# Patient Record
Sex: FEMALE | Race: WHITE | Employment: OTHER | URBAN - NONMETROPOLITAN AREA
[De-identification: names, ages, dates, MRNs, and addresses within clinical notes are randomized per-mention and may not be internally consistent; named-entity substitution may affect disease eponyms.]

---

## 2017-05-15 ENCOUNTER — HOSPITAL ENCOUNTER (OUTPATIENT)
Dept: CT IMAGING | Age: 67
Discharge: HOME | End: 2017-05-15
Payer: MEDICARE

## 2017-05-15 DIAGNOSIS — M79.605 LEFT LEG PAIN: ICD-10-CM

## 2017-05-15 DIAGNOSIS — R22.42 LOCALIZED SWELLING, MASS AND LUMP, LOWER LIMB, LEFT: ICD-10-CM

## 2017-05-15 PROCEDURE — 73701 CT LOWER EXTREMITY W/DYE: CPT

## 2017-05-15 PROCEDURE — 6360000004 HC RX CONTRAST MEDICATION: Performed by: NURSE PRACTITIONER

## 2017-05-15 RX ADMIN — IOVERSOL 75 ML: 741 INJECTION INTRA-ARTERIAL; INTRAVENOUS at 14:57

## 2017-07-27 RX ORDER — TRIAMTERENE AND HYDROCHLOROTHIAZIDE 37.5; 25 MG/1; MG/1
TABLET ORAL
Qty: 90 TABLET | Refills: 1 | Status: SHIPPED | OUTPATIENT
Start: 2017-07-27 | End: 2018-04-16 | Stop reason: SDUPTHER

## 2017-08-14 LAB
ALBUMIN SERPL-MCNC: 3.8 G/DL (ref 3.5–5.2)
ALP BLD-CCNC: 70 U/L (ref 35–104)
ALT SERPL-CCNC: 14 U/L (ref 5–33)
ANION GAP SERPL CALCULATED.3IONS-SCNC: 16 MMOL/L (ref 7–19)
AST SERPL-CCNC: 21 U/L (ref 5–32)
BASOPHILS ABSOLUTE: 0.1 K/UL (ref 0–0.2)
BASOPHILS RELATIVE PERCENT: 0.5 % (ref 0–1)
BILIRUB SERPL-MCNC: <0.2 MG/DL (ref 0.2–1.2)
BUN BLDV-MCNC: 24 MG/DL (ref 8–23)
CALCIUM SERPL-MCNC: 9.7 MG/DL (ref 8.8–10.2)
CHLORIDE BLD-SCNC: 101 MMOL/L (ref 98–111)
CHOLESTEROL, TOTAL: 120 MG/DL (ref 160–199)
CO2: 24 MMOL/L (ref 22–29)
CREAT SERPL-MCNC: 1.1 MG/DL (ref 0.5–0.9)
EOSINOPHILS ABSOLUTE: 0.8 K/UL (ref 0–0.6)
EOSINOPHILS RELATIVE PERCENT: 6.8 % (ref 0–5)
GFR NON-AFRICAN AMERICAN: 49
GLUCOSE BLD-MCNC: 91 MG/DL (ref 74–109)
HCT VFR BLD CALC: 37.2 % (ref 37–47)
HDLC SERPL-MCNC: 42 MG/DL (ref 65–121)
HEMOGLOBIN: 12.4 G/DL (ref 12–16)
LDL CHOLESTEROL CALCULATED: 30 MG/DL
LYMPHOCYTES ABSOLUTE: 3.6 K/UL (ref 1.1–4.5)
LYMPHOCYTES RELATIVE PERCENT: 33 % (ref 20–40)
MCH RBC QN AUTO: 31.4 PG (ref 27–31)
MCHC RBC AUTO-ENTMCNC: 33.3 G/DL (ref 33–37)
MCV RBC AUTO: 94.2 FL (ref 81–99)
MONOCYTES ABSOLUTE: 0.5 K/UL (ref 0–0.9)
MONOCYTES RELATIVE PERCENT: 4.7 % (ref 0–10)
NEUTROPHILS ABSOLUTE: 6 K/UL (ref 1.5–7.5)
NEUTROPHILS RELATIVE PERCENT: 54.6 % (ref 50–65)
PDW BLD-RTO: 11.7 % (ref 11.5–14.5)
PLATELET # BLD: 292 K/UL (ref 130–400)
PMV BLD AUTO: 9.7 FL (ref 9.4–12.3)
POTASSIUM SERPL-SCNC: 4.7 MMOL/L (ref 3.5–5)
RBC # BLD: 3.95 M/UL (ref 4.2–5.4)
SODIUM BLD-SCNC: 141 MMOL/L (ref 136–145)
TOTAL PROTEIN: 6.8 G/DL (ref 6.6–8.7)
TRIGL SERPL-MCNC: 242 MG/DL (ref 150–199)
TSH SERPL DL<=0.05 MIU/L-ACNC: 3.84 UIU/ML (ref 0.27–4.2)
VITAMIN D 25-HYDROXY: 46.8 NG/ML
WBC # BLD: 11 K/UL (ref 4.8–10.8)

## 2017-08-18 RX ORDER — ALBUTEROL SULFATE 90 UG/1
2 AEROSOL, METERED RESPIRATORY (INHALATION) EVERY 4 HOURS PRN
COMMUNITY
End: 2020-05-28

## 2017-08-18 RX ORDER — LORATADINE 10 MG/1
10 TABLET ORAL DAILY
COMMUNITY
End: 2019-12-30 | Stop reason: ALTCHOICE

## 2017-08-18 RX ORDER — LOVASTATIN 40 MG/1
40 TABLET ORAL NIGHTLY
COMMUNITY
End: 2018-04-16 | Stop reason: SDUPTHER

## 2017-08-18 RX ORDER — LOSARTAN POTASSIUM 100 MG/1
100 TABLET ORAL DAILY
COMMUNITY
End: 2018-04-16 | Stop reason: SDUPTHER

## 2017-08-18 RX ORDER — B-COMPLEX WITH VITAMIN C
1 TABLET ORAL 2 TIMES DAILY
COMMUNITY

## 2017-08-18 RX ORDER — ERGOCALCIFEROL 1.25 MG/1
50000 CAPSULE ORAL
COMMUNITY
End: 2018-01-16 | Stop reason: SDUPTHER

## 2017-08-18 RX ORDER — ESTRADIOL 0.5 MG/1
0.5 TABLET ORAL DAILY
COMMUNITY
End: 2017-11-14 | Stop reason: SDUPTHER

## 2017-08-18 RX ORDER — LEVOTHYROXINE SODIUM 0.05 MG/1
50 TABLET ORAL DAILY
COMMUNITY
End: 2018-02-27

## 2017-08-18 RX ORDER — DIPHENHYDRAMINE HYDROCHLORIDE 25 MG/1
2 TABLET ORAL DAILY
COMMUNITY
End: 2022-06-28

## 2017-08-18 RX ORDER — OMEGA-3/DHA/EPA/FISH OIL 60 MG-90MG
4 CAPSULE ORAL DAILY
COMMUNITY
End: 2022-06-28

## 2017-08-20 PROBLEM — E03.9 ACQUIRED HYPOTHYROIDISM: Chronic | Status: ACTIVE | Noted: 2017-08-20

## 2017-08-20 PROBLEM — E78.01 FAMILIAL HYPERCHOLESTEROLEMIA: Chronic | Status: ACTIVE | Noted: 2017-08-20

## 2017-08-20 PROBLEM — I10 ESSENTIAL HYPERTENSION: Chronic | Status: ACTIVE | Noted: 2017-08-20

## 2017-08-21 ENCOUNTER — OFFICE VISIT (OUTPATIENT)
Dept: INTERNAL MEDICINE | Age: 67
End: 2017-08-21
Payer: MEDICARE

## 2017-08-21 VITALS
HEIGHT: 60 IN | RESPIRATION RATE: 20 BRPM | BODY MASS INDEX: 28.27 KG/M2 | HEART RATE: 98 BPM | DIASTOLIC BLOOD PRESSURE: 64 MMHG | OXYGEN SATURATION: 98 % | WEIGHT: 144 LBS | SYSTOLIC BLOOD PRESSURE: 132 MMHG

## 2017-08-21 DIAGNOSIS — I10 ESSENTIAL HYPERTENSION: Chronic | ICD-10-CM

## 2017-08-21 DIAGNOSIS — L65.9 ALOPECIA: Chronic | ICD-10-CM

## 2017-08-21 DIAGNOSIS — E78.01 FAMILIAL HYPERCHOLESTEROLEMIA: Chronic | ICD-10-CM

## 2017-08-21 DIAGNOSIS — E03.9 ACQUIRED HYPOTHYROIDISM: Primary | Chronic | ICD-10-CM

## 2017-08-21 DIAGNOSIS — Z12.31 ENCOUNTER FOR SCREENING MAMMOGRAM FOR MALIGNANT NEOPLASM OF BREAST: ICD-10-CM

## 2017-08-21 DIAGNOSIS — Z12.39 BREAST CANCER SCREENING: ICD-10-CM

## 2017-08-21 PROCEDURE — G0439 PPPS, SUBSEQ VISIT: HCPCS | Performed by: INTERNAL MEDICINE

## 2017-08-21 PROCEDURE — 90670 PCV13 VACCINE IM: CPT | Performed by: INTERNAL MEDICINE

## 2017-08-21 PROCEDURE — G0009 ADMIN PNEUMOCOCCAL VACCINE: HCPCS | Performed by: INTERNAL MEDICINE

## 2017-08-21 PROCEDURE — 99213 OFFICE O/P EST LOW 20 MIN: CPT | Performed by: INTERNAL MEDICINE

## 2017-08-21 RX ORDER — LEVOTHYROXINE SODIUM 50 MCG
50 TABLET ORAL DAILY
Qty: 30 TABLET | Refills: 5 | Status: SHIPPED | OUTPATIENT
Start: 2017-08-21 | End: 2018-03-13 | Stop reason: SDUPTHER

## 2017-08-21 ASSESSMENT — ENCOUNTER SYMPTOMS
COUGH: 0
BLOOD IN STOOL: 0
EYE PAIN: 0
ABDOMINAL PAIN: 0
APNEA: 0
TROUBLE SWALLOWING: 0
ANAL BLEEDING: 0
CONSTIPATION: 0
BACK PAIN: 0
SHORTNESS OF BREATH: 0
DIARRHEA: 0
EYE REDNESS: 0
VOICE CHANGE: 0
SORE THROAT: 0
NAUSEA: 0

## 2017-08-21 ASSESSMENT — PATIENT HEALTH QUESTIONNAIRE - PHQ9: SUM OF ALL RESPONSES TO PHQ QUESTIONS 1-9: 0

## 2017-08-21 ASSESSMENT — LIFESTYLE VARIABLES: HOW OFTEN DO YOU HAVE A DRINK CONTAINING ALCOHOL: 0

## 2017-08-21 ASSESSMENT — ANXIETY QUESTIONNAIRES: GAD7 TOTAL SCORE: 0

## 2017-09-18 ENCOUNTER — TELEPHONE (OUTPATIENT)
Dept: INTERNAL MEDICINE | Age: 67
End: 2017-09-18

## 2017-09-21 ENCOUNTER — TELEPHONE (OUTPATIENT)
Dept: INTERNAL MEDICINE | Age: 67
End: 2017-09-21

## 2017-11-14 RX ORDER — ESTRADIOL 0.5 MG/1
0.5 TABLET ORAL DAILY
Qty: 90 TABLET | Refills: 1 | Status: SHIPPED | OUTPATIENT
Start: 2017-11-14 | End: 2017-11-17 | Stop reason: SDUPTHER

## 2017-11-14 NOTE — TELEPHONE ENCOUNTER
Nikkie called requesting a refill of the below medication which has been pended for you:     Requested Prescriptions     Pending Prescriptions Disp Refills    estradiol (ESTRACE) 0.5 MG tablet 90 tablet 1     Sig: Take 1 tablet by mouth daily       Last Appointment Date: 8/21/2017  Next Appointment Date: 2/27/2018    Allergies   Allergen Reactions    Compazine [Prochlorperazine Maleate]      Eyelids flipped up and mouth stretched to the side.      Thorazine [Chlorpromazine]      Eyelids flipped up and mouth stretched to the side

## 2017-11-15 ENCOUNTER — TELEPHONE (OUTPATIENT)
Dept: INTERNAL MEDICINE | Age: 67
End: 2017-11-15

## 2017-11-17 RX ORDER — ESTRADIOL 0.5 MG/1
0.5 TABLET ORAL DAILY
Qty: 90 TABLET | Refills: 3 | Status: SHIPPED | OUTPATIENT
Start: 2017-11-17 | End: 2017-11-22 | Stop reason: SDUPTHER

## 2017-11-22 RX ORDER — ESTRADIOL 0.5 MG/1
0.5 TABLET ORAL DAILY
Qty: 90 TABLET | Refills: 3 | Status: SHIPPED | OUTPATIENT
Start: 2017-11-22 | End: 2018-04-16 | Stop reason: SDUPTHER

## 2017-11-27 ENCOUNTER — HOSPITAL ENCOUNTER (OUTPATIENT)
Dept: WOMENS IMAGING | Age: 67
Discharge: HOME | End: 2017-11-27
Payer: MEDICARE

## 2017-11-27 DIAGNOSIS — Z12.39 BREAST CANCER SCREENING: ICD-10-CM

## 2017-11-27 DIAGNOSIS — Z12.31 ENCOUNTER FOR SCREENING MAMMOGRAM FOR MALIGNANT NEOPLASM OF BREAST: ICD-10-CM

## 2017-11-27 PROCEDURE — 77063 BREAST TOMOSYNTHESIS BI: CPT

## 2018-01-17 RX ORDER — ERGOCALCIFEROL 1.25 MG/1
CAPSULE ORAL
Qty: 3 CAPSULE | Refills: 5 | Status: SHIPPED | OUTPATIENT
Start: 2018-01-17 | End: 2019-03-19 | Stop reason: SDUPTHER

## 2018-01-18 PROBLEM — E55.9 VITAMIN D DEFICIENCY: Status: ACTIVE | Noted: 2018-01-18

## 2018-02-20 DIAGNOSIS — E03.9 ACQUIRED HYPOTHYROIDISM: Chronic | ICD-10-CM

## 2018-02-20 DIAGNOSIS — L65.9 ALOPECIA: Chronic | ICD-10-CM

## 2018-02-20 DIAGNOSIS — I10 ESSENTIAL HYPERTENSION: Chronic | ICD-10-CM

## 2018-02-20 DIAGNOSIS — E78.01 FAMILIAL HYPERCHOLESTEROLEMIA: Chronic | ICD-10-CM

## 2018-02-20 LAB
ALBUMIN SERPL-MCNC: 4.1 G/DL (ref 3.5–5.2)
ALP BLD-CCNC: 72 U/L (ref 35–104)
ALT SERPL-CCNC: 12 U/L (ref 5–33)
ANION GAP SERPL CALCULATED.3IONS-SCNC: 15 MMOL/L (ref 7–19)
AST SERPL-CCNC: 18 U/L (ref 5–32)
BILIRUB SERPL-MCNC: <0.2 MG/DL (ref 0.2–1.2)
BUN BLDV-MCNC: 34 MG/DL (ref 8–23)
CALCIUM SERPL-MCNC: 9.3 MG/DL (ref 8.8–10.2)
CHLORIDE BLD-SCNC: 105 MMOL/L (ref 98–111)
CHOLESTEROL, TOTAL: 156 MG/DL (ref 160–199)
CO2: 24 MMOL/L (ref 22–29)
CREAT SERPL-MCNC: 1.1 MG/DL (ref 0.5–0.9)
GFR NON-AFRICAN AMERICAN: 49
GLUCOSE BLD-MCNC: 95 MG/DL (ref 74–109)
HDLC SERPL-MCNC: 52 MG/DL (ref 65–121)
LDL CHOLESTEROL CALCULATED: 67 MG/DL
POTASSIUM SERPL-SCNC: 4.5 MMOL/L (ref 3.5–5)
SODIUM BLD-SCNC: 144 MMOL/L (ref 136–145)
T3 FREE: 2.7 PG/ML (ref 2–4.4)
T4 FREE: 1.6 NG/DL (ref 0.9–1.7)
TOTAL PROTEIN: 6.7 G/DL (ref 6.6–8.7)
TRIGL SERPL-MCNC: 185 MG/DL (ref 0–149)
TSH SERPL DL<=0.05 MIU/L-ACNC: 0.98 UIU/ML (ref 0.27–4.2)

## 2018-02-21 LAB — TSH, 3RD GENERATION: 1 MU/L (ref 0.3–4)

## 2018-02-27 ENCOUNTER — OFFICE VISIT (OUTPATIENT)
Dept: INTERNAL MEDICINE | Age: 68
End: 2018-02-27
Payer: MEDICARE

## 2018-02-27 VITALS
OXYGEN SATURATION: 99 % | HEART RATE: 79 BPM | DIASTOLIC BLOOD PRESSURE: 50 MMHG | BODY MASS INDEX: 24.39 KG/M2 | WEIGHT: 124.2 LBS | HEIGHT: 60 IN | SYSTOLIC BLOOD PRESSURE: 110 MMHG

## 2018-02-27 DIAGNOSIS — L65.9 ALOPECIA: Chronic | ICD-10-CM

## 2018-02-27 DIAGNOSIS — E03.9 ACQUIRED HYPOTHYROIDISM: Chronic | ICD-10-CM

## 2018-02-27 DIAGNOSIS — I10 ESSENTIAL HYPERTENSION: Chronic | ICD-10-CM

## 2018-02-27 DIAGNOSIS — E55.9 VITAMIN D DEFICIENCY: ICD-10-CM

## 2018-02-27 DIAGNOSIS — E78.01 FAMILIAL HYPERCHOLESTEROLEMIA: Primary | Chronic | ICD-10-CM

## 2018-02-27 PROCEDURE — 1036F TOBACCO NON-USER: CPT | Performed by: INTERNAL MEDICINE

## 2018-02-27 PROCEDURE — 99213 OFFICE O/P EST LOW 20 MIN: CPT | Performed by: INTERNAL MEDICINE

## 2018-02-27 PROCEDURE — 3017F COLORECTAL CA SCREEN DOC REV: CPT | Performed by: INTERNAL MEDICINE

## 2018-02-27 PROCEDURE — G8400 PT W/DXA NO RESULTS DOC: HCPCS | Performed by: INTERNAL MEDICINE

## 2018-02-27 PROCEDURE — 3014F SCREEN MAMMO DOC REV: CPT | Performed by: INTERNAL MEDICINE

## 2018-02-27 PROCEDURE — G8420 CALC BMI NORM PARAMETERS: HCPCS | Performed by: INTERNAL MEDICINE

## 2018-02-27 PROCEDURE — G8484 FLU IMMUNIZE NO ADMIN: HCPCS | Performed by: INTERNAL MEDICINE

## 2018-02-27 PROCEDURE — 1123F ACP DISCUSS/DSCN MKR DOCD: CPT | Performed by: INTERNAL MEDICINE

## 2018-02-27 PROCEDURE — 4040F PNEUMOC VAC/ADMIN/RCVD: CPT | Performed by: INTERNAL MEDICINE

## 2018-02-27 PROCEDURE — G8427 DOCREV CUR MEDS BY ELIG CLIN: HCPCS | Performed by: INTERNAL MEDICINE

## 2018-02-27 PROCEDURE — 1090F PRES/ABSN URINE INCON ASSESS: CPT | Performed by: INTERNAL MEDICINE

## 2018-02-27 ASSESSMENT — ENCOUNTER SYMPTOMS
SHORTNESS OF BREATH: 0
CONSTIPATION: 0
NAUSEA: 0
DIARRHEA: 0
ABDOMINAL PAIN: 0
COUGH: 0

## 2018-02-27 NOTE — PROGRESS NOTES
Eyelids flipped up and mouth stretched to the side.  Thorazine [Chlorpromazine]      Eyelids flipped up and mouth stretched to the side      Current Outpatient Prescriptions   Medication Sig Dispense Refill    vitamin D (ERGOCALCIFEROL) 12891 units CAPS capsule TAKE ONE CAPSULE BY MOUTH ONCE EVERY MONTH 3 capsule 5    estradiol (ESTRACE) 0.5 MG tablet Take 1 tablet by mouth daily 90 tablet 3    hydrocortisone (PROCTOSOL HC) 2.5 % rectal cream Place rectally 3 times daily as needed for Hemorrhoids Place rectally 2 times daily. 1 Tube 1    SYNTHROID 50 MCG tablet Take 1 tablet by mouth Daily 30 tablet 5    fluticasone-salmeterol (ADVAIR DISKUS) 500-50 MCG/DOSE diskus inhaler Inhale 1 puff into the lungs daily as needed       Biotin (BIOTIN 5000) 5 MG CAPS Take 2 tablets by mouth daily      calcium carbonate-vitamin D (CALCIUM 600+D) 600-200 MG-UNIT TABS Take 1 tablet by mouth 2 times daily      loratadine (CLARITIN) 10 MG tablet Take 10 mg by mouth daily      Omega-3 Fatty Acids (FISH OIL) 500 MG CAPS Take 1 capsule by mouth daily      losartan (COZAAR) 100 MG tablet Take 100 mg by mouth daily      lovastatin (MEVACOR) 40 MG tablet Take 40 mg by mouth nightly      albuterol sulfate  (90 Base) MCG/ACT inhaler Inhale 2 puffs into the lungs every 4 hours as needed for Wheezing      triamterene-hydrochlorothiazide (MAXZIDE-25) 37.5-25 MG per tablet TAKE 1 TABLET EVERY DAY 90 tablet 1     No current facility-administered medications for this visit. Review of Systems   Constitutional: Negative for fatigue and unexpected weight change. Respiratory: Negative for cough and shortness of breath. Cardiovascular: Negative for chest pain, palpitations and leg swelling. Gastrointestinal: Negative for abdominal pain, constipation, diarrhea and nausea. Genitourinary: Negative for difficulty urinating, dysuria and frequency. Musculoskeletal: Negative for arthralgias and myalgias.    Skin: Negative for rash. Neurological: Negative for dizziness, syncope, weakness, numbness and headaches. BP (!) 110/50   Pulse 79   Ht 5' (1.524 m)   Wt 124 lb 3.2 oz (56.3 kg)   SpO2 99%   BMI 24.26 kg/m²    Physical Exam   Constitutional: She is oriented to person, place, and time. She appears well-developed. No distress. HENT:   Head: Normocephalic. Neck: Neck supple. No thyromegaly present. Cardiovascular: Normal rate, regular rhythm and normal heart sounds. Exam reveals no gallop. No murmur heard. No carotid bruits heard   Pulmonary/Chest: Effort normal and breath sounds normal. No respiratory distress. Musculoskeletal: She exhibits no edema. Lymphadenopathy:     She has no cervical adenopathy. Neurological: She is alert and oriented to person, place, and time. Psychiatric: She has a normal mood and affect.  Thought content normal.        Results for orders placed or performed in visit on 02/20/18   TSH without Reflex   Result Value Ref Range    TSH 0.983 0.270 - 4.200 uIU/mL   Lipid Panel   Result Value Ref Range    Cholesterol, Total 156 (L) 160 - 199 mg/dL    Triglycerides 185 (H) 0 - 149 mg/dL    HDL 52 (L) 65 - 121 mg/dL    LDL Calculated 67 <100 mg/dL   Comprehensive Metabolic Panel   Result Value Ref Range    Sodium 144 136 - 145 mmol/L    Potassium 4.5 3.5 - 5.0 mmol/L    Chloride 105 98 - 111 mmol/L    CO2 24 22 - 29 mmol/L    Anion Gap 15 7 - 19 mmol/L    Glucose 95 74 - 109 mg/dL    BUN 34 (H) 8 - 23 mg/dL    CREATININE 1.1 (H) 0.5 - 0.9 mg/dL    GFR Non- 49 (A) >60    Calcium 9.3 8.8 - 10.2 mg/dL    Total Protein 6.7 6.6 - 8.7 g/dL    Alb 4.1 3.5 - 5.2 g/dL    Total Bilirubin <0.2 0.2 - 1.2 mg/dL    Alkaline Phosphatase 72 35 - 104 U/L    ALT 12 5 - 33 U/L    AST 18 5 - 32 U/L   T4, Free   Result Value Ref Range    T4 Free 1.6 0.9 - 1.7 ng/dL   T3, Free   Result Value Ref Range    T3, Free 2.7 2.0 - 4.4 pg/mL   TSH 3RD GENERATION   Result Value Ref Range

## 2018-03-14 RX ORDER — LEVOTHYROXINE SODIUM 50 MCG
TABLET ORAL
Qty: 90 TABLET | Refills: 3 | Status: SHIPPED | OUTPATIENT
Start: 2018-03-14 | End: 2018-04-12 | Stop reason: SDUPTHER

## 2018-04-12 ENCOUNTER — TELEPHONE (OUTPATIENT)
Dept: INTERNAL MEDICINE | Age: 68
End: 2018-04-12

## 2018-04-12 RX ORDER — LEVOTHYROXINE SODIUM 50 MCG
50 TABLET ORAL DAILY
Qty: 90 TABLET | Refills: 3 | Status: SHIPPED | OUTPATIENT
Start: 2018-04-12 | End: 2019-03-19

## 2018-04-16 RX ORDER — ESTRADIOL 0.5 MG/1
0.5 TABLET ORAL DAILY
Qty: 90 TABLET | Refills: 3 | Status: SHIPPED | OUTPATIENT
Start: 2018-04-16 | End: 2019-01-28 | Stop reason: SDUPTHER

## 2018-04-16 RX ORDER — LOVASTATIN 40 MG/1
40 TABLET ORAL NIGHTLY
Qty: 90 TABLET | Refills: 3 | Status: SHIPPED | OUTPATIENT
Start: 2018-04-16 | End: 2019-03-19 | Stop reason: SDUPTHER

## 2018-04-16 RX ORDER — TRIAMTERENE AND HYDROCHLOROTHIAZIDE 37.5; 25 MG/1; MG/1
1 TABLET ORAL DAILY
Qty: 30 TABLET | Refills: 0 | Status: SHIPPED | OUTPATIENT
Start: 2018-04-16 | End: 2018-04-16 | Stop reason: SDUPTHER

## 2018-04-16 RX ORDER — LOSARTAN POTASSIUM 100 MG/1
100 TABLET ORAL DAILY
Qty: 90 TABLET | Refills: 3 | Status: SHIPPED | OUTPATIENT
Start: 2018-04-16 | End: 2019-03-19 | Stop reason: SDUPTHER

## 2018-04-16 RX ORDER — TRIAMTERENE AND HYDROCHLOROTHIAZIDE 37.5; 25 MG/1; MG/1
1 TABLET ORAL DAILY
Qty: 90 TABLET | Refills: 3 | Status: SHIPPED | OUTPATIENT
Start: 2018-04-16 | End: 2019-03-19 | Stop reason: SDUPTHER

## 2018-05-01 ENCOUNTER — HOSPITAL ENCOUNTER (OUTPATIENT)
Dept: GENERAL RADIOLOGY | Age: 68
Discharge: HOME | End: 2018-05-01
Payer: MEDICARE

## 2018-05-01 ENCOUNTER — OFFICE VISIT (OUTPATIENT)
Dept: INTERNAL MEDICINE | Age: 68
End: 2018-05-01
Payer: MEDICARE

## 2018-05-01 VITALS
DIASTOLIC BLOOD PRESSURE: 70 MMHG | TEMPERATURE: 99.1 F | HEART RATE: 91 BPM | WEIGHT: 125 LBS | BODY MASS INDEX: 24.54 KG/M2 | OXYGEN SATURATION: 94 % | HEIGHT: 60 IN | SYSTOLIC BLOOD PRESSURE: 134 MMHG

## 2018-05-01 DIAGNOSIS — J42 ACUTE EXACERBATION OF CHRONIC BRONCHITIS (HCC): Primary | ICD-10-CM

## 2018-05-01 DIAGNOSIS — J20.9 ACUTE EXACERBATION OF CHRONIC BRONCHITIS (HCC): Primary | ICD-10-CM

## 2018-05-01 DIAGNOSIS — J20.9 ACUTE EXACERBATION OF CHRONIC BRONCHITIS (HCC): ICD-10-CM

## 2018-05-01 DIAGNOSIS — J42 ACUTE EXACERBATION OF CHRONIC BRONCHITIS (HCC): ICD-10-CM

## 2018-05-01 PROCEDURE — G8400 PT W/DXA NO RESULTS DOC: HCPCS | Performed by: NURSE PRACTITIONER

## 2018-05-01 PROCEDURE — 96372 THER/PROPH/DIAG INJ SC/IM: CPT | Performed by: NURSE PRACTITIONER

## 2018-05-01 PROCEDURE — 1036F TOBACCO NON-USER: CPT | Performed by: NURSE PRACTITIONER

## 2018-05-01 PROCEDURE — 3023F SPIROM DOC REV: CPT | Performed by: NURSE PRACTITIONER

## 2018-05-01 PROCEDURE — G8427 DOCREV CUR MEDS BY ELIG CLIN: HCPCS | Performed by: NURSE PRACTITIONER

## 2018-05-01 PROCEDURE — 4040F PNEUMOC VAC/ADMIN/RCVD: CPT | Performed by: NURSE PRACTITIONER

## 2018-05-01 PROCEDURE — 1090F PRES/ABSN URINE INCON ASSESS: CPT | Performed by: NURSE PRACTITIONER

## 2018-05-01 PROCEDURE — 3017F COLORECTAL CA SCREEN DOC REV: CPT | Performed by: NURSE PRACTITIONER

## 2018-05-01 PROCEDURE — G8926 SPIRO NO PERF OR DOC: HCPCS | Performed by: NURSE PRACTITIONER

## 2018-05-01 PROCEDURE — 99214 OFFICE O/P EST MOD 30 MIN: CPT | Performed by: NURSE PRACTITIONER

## 2018-05-01 PROCEDURE — 71046 X-RAY EXAM CHEST 2 VIEWS: CPT

## 2018-05-01 PROCEDURE — G8420 CALC BMI NORM PARAMETERS: HCPCS | Performed by: NURSE PRACTITIONER

## 2018-05-01 PROCEDURE — 1123F ACP DISCUSS/DSCN MKR DOCD: CPT | Performed by: NURSE PRACTITIONER

## 2018-05-01 RX ORDER — METHYLPREDNISOLONE ACETATE 80 MG/ML
80 INJECTION, SUSPENSION INTRA-ARTICULAR; INTRALESIONAL; INTRAMUSCULAR; SOFT TISSUE ONCE
Status: COMPLETED | OUTPATIENT
Start: 2018-05-01 | End: 2018-05-01

## 2018-05-01 RX ORDER — AZITHROMYCIN 250 MG/1
TABLET, FILM COATED ORAL
Qty: 1 PACKET | Refills: 0 | Status: SHIPPED | OUTPATIENT
Start: 2018-05-01 | End: 2018-05-09 | Stop reason: ALTCHOICE

## 2018-05-01 RX ORDER — BENZONATATE 200 MG/1
200 CAPSULE ORAL 3 TIMES DAILY PRN
Qty: 30 CAPSULE | Refills: 0 | Status: SHIPPED | OUTPATIENT
Start: 2018-05-01 | End: 2018-05-09 | Stop reason: SDUPTHER

## 2018-05-01 RX ADMIN — METHYLPREDNISOLONE ACETATE 80 MG: 80 INJECTION, SUSPENSION INTRA-ARTICULAR; INTRALESIONAL; INTRAMUSCULAR; SOFT TISSUE at 15:53

## 2018-05-01 ASSESSMENT — ENCOUNTER SYMPTOMS
VOICE CHANGE: 0
VOMITING: 0
SHORTNESS OF BREATH: 0
NAUSEA: 0
RHINORRHEA: 0
COLOR CHANGE: 0
PHOTOPHOBIA: 0
COUGH: 1
BACK PAIN: 0

## 2018-05-08 ENCOUNTER — TELEPHONE (OUTPATIENT)
Dept: INTERNAL MEDICINE | Age: 68
End: 2018-05-08

## 2018-05-09 ENCOUNTER — OFFICE VISIT (OUTPATIENT)
Dept: INTERNAL MEDICINE | Age: 68
End: 2018-05-09
Payer: MEDICARE

## 2018-05-09 VITALS
DIASTOLIC BLOOD PRESSURE: 74 MMHG | HEART RATE: 72 BPM | TEMPERATURE: 98.5 F | OXYGEN SATURATION: 95 % | SYSTOLIC BLOOD PRESSURE: 142 MMHG

## 2018-05-09 DIAGNOSIS — J40 BRONCHITIS: Primary | ICD-10-CM

## 2018-05-09 PROCEDURE — G8400 PT W/DXA NO RESULTS DOC: HCPCS | Performed by: NURSE PRACTITIONER

## 2018-05-09 PROCEDURE — 1036F TOBACCO NON-USER: CPT | Performed by: NURSE PRACTITIONER

## 2018-05-09 PROCEDURE — 1123F ACP DISCUSS/DSCN MKR DOCD: CPT | Performed by: NURSE PRACTITIONER

## 2018-05-09 PROCEDURE — 3017F COLORECTAL CA SCREEN DOC REV: CPT | Performed by: NURSE PRACTITIONER

## 2018-05-09 PROCEDURE — G8427 DOCREV CUR MEDS BY ELIG CLIN: HCPCS | Performed by: NURSE PRACTITIONER

## 2018-05-09 PROCEDURE — 4040F PNEUMOC VAC/ADMIN/RCVD: CPT | Performed by: NURSE PRACTITIONER

## 2018-05-09 PROCEDURE — G8420 CALC BMI NORM PARAMETERS: HCPCS | Performed by: NURSE PRACTITIONER

## 2018-05-09 PROCEDURE — 99213 OFFICE O/P EST LOW 20 MIN: CPT | Performed by: NURSE PRACTITIONER

## 2018-05-09 PROCEDURE — 1090F PRES/ABSN URINE INCON ASSESS: CPT | Performed by: NURSE PRACTITIONER

## 2018-05-09 RX ORDER — LEVOFLOXACIN 750 MG/1
750 TABLET ORAL DAILY
Qty: 10 TABLET | Refills: 0 | Status: SHIPPED | OUTPATIENT
Start: 2018-05-09 | End: 2018-05-19

## 2018-05-09 RX ORDER — BENZONATATE 200 MG/1
200 CAPSULE ORAL 3 TIMES DAILY PRN
Qty: 30 CAPSULE | Refills: 0 | Status: SHIPPED | OUTPATIENT
Start: 2018-05-09 | End: 2019-12-30 | Stop reason: ALTCHOICE

## 2018-05-09 RX ORDER — METHYLPREDNISOLONE 4 MG/1
TABLET ORAL
Qty: 1 KIT | Refills: 0 | Status: SHIPPED | OUTPATIENT
Start: 2018-05-09 | End: 2018-05-15

## 2018-05-09 ASSESSMENT — ENCOUNTER SYMPTOMS
VOMITING: 0
BACK PAIN: 0
VOICE CHANGE: 0
PHOTOPHOBIA: 0
WHEEZING: 1
NAUSEA: 0
COUGH: 1
RHINORRHEA: 0
SHORTNESS OF BREATH: 1
COLOR CHANGE: 0

## 2018-07-17 ENCOUNTER — TELEPHONE (OUTPATIENT)
Dept: INTERNAL MEDICINE | Age: 68
End: 2018-07-17

## 2018-07-18 RX ORDER — IBUPROFEN 200 MG
200 TABLET ORAL EVERY 8 HOURS PRN
COMMUNITY
End: 2020-12-29

## 2018-09-04 DIAGNOSIS — I10 ESSENTIAL HYPERTENSION: Chronic | ICD-10-CM

## 2018-09-04 DIAGNOSIS — E03.9 ACQUIRED HYPOTHYROIDISM: Chronic | ICD-10-CM

## 2018-09-04 DIAGNOSIS — E78.01 FAMILIAL HYPERCHOLESTEROLEMIA: Chronic | ICD-10-CM

## 2018-09-04 DIAGNOSIS — E55.9 VITAMIN D DEFICIENCY: ICD-10-CM

## 2018-09-04 LAB
ALBUMIN SERPL-MCNC: 4.3 G/DL (ref 3.5–5.2)
ALP BLD-CCNC: 67 U/L (ref 35–104)
ALT SERPL-CCNC: 16 U/L (ref 5–33)
ANION GAP SERPL CALCULATED.3IONS-SCNC: 18 MMOL/L (ref 7–19)
AST SERPL-CCNC: 20 U/L (ref 5–32)
BASOPHILS ABSOLUTE: 0.1 K/UL (ref 0–0.2)
BASOPHILS RELATIVE PERCENT: 0.8 % (ref 0–1)
BILIRUB SERPL-MCNC: 0.3 MG/DL (ref 0.2–1.2)
BUN BLDV-MCNC: 27 MG/DL (ref 8–23)
CALCIUM SERPL-MCNC: 10 MG/DL (ref 8.8–10.2)
CHLORIDE BLD-SCNC: 103 MMOL/L (ref 98–111)
CHOLESTEROL, TOTAL: 160 MG/DL (ref 160–199)
CO2: 22 MMOL/L (ref 22–29)
CREAT SERPL-MCNC: 1 MG/DL (ref 0.5–0.9)
EOSINOPHILS ABSOLUTE: 0.5 K/UL (ref 0–0.6)
EOSINOPHILS RELATIVE PERCENT: 5.4 % (ref 0–5)
GFR NON-AFRICAN AMERICAN: 55
GLUCOSE BLD-MCNC: 88 MG/DL (ref 74–109)
HCT VFR BLD CALC: 39.5 % (ref 37–47)
HDLC SERPL-MCNC: 65 MG/DL (ref 65–121)
HEMOGLOBIN: 12.8 G/DL (ref 12–16)
LDL CHOLESTEROL CALCULATED: 56 MG/DL
LYMPHOCYTES ABSOLUTE: 3.3 K/UL (ref 1.1–4.5)
LYMPHOCYTES RELATIVE PERCENT: 35.1 % (ref 20–40)
MCH RBC QN AUTO: 30.4 PG (ref 27–31)
MCHC RBC AUTO-ENTMCNC: 32.4 G/DL (ref 33–37)
MCV RBC AUTO: 93.8 FL (ref 81–99)
MONOCYTES ABSOLUTE: 0.4 K/UL (ref 0–0.9)
MONOCYTES RELATIVE PERCENT: 4.4 % (ref 0–10)
NEUTROPHILS ABSOLUTE: 5 K/UL (ref 1.5–7.5)
NEUTROPHILS RELATIVE PERCENT: 54.1 % (ref 50–65)
PDW BLD-RTO: 11.9 % (ref 11.5–14.5)
PLATELET # BLD: 271 K/UL (ref 130–400)
PMV BLD AUTO: 10 FL (ref 9.4–12.3)
POTASSIUM SERPL-SCNC: 4.7 MMOL/L (ref 3.5–5)
RBC # BLD: 4.21 M/UL (ref 4.2–5.4)
SODIUM BLD-SCNC: 143 MMOL/L (ref 136–145)
TOTAL PROTEIN: 7.1 G/DL (ref 6.6–8.7)
TRIGL SERPL-MCNC: 194 MG/DL (ref 0–149)
TSH SERPL DL<=0.05 MIU/L-ACNC: 3.22 UIU/ML (ref 0.27–4.2)
VITAMIN D 25-HYDROXY: 55.7 NG/ML
WBC # BLD: 9.3 K/UL (ref 4.8–10.8)

## 2018-09-11 ENCOUNTER — OFFICE VISIT (OUTPATIENT)
Dept: INTERNAL MEDICINE | Age: 68
End: 2018-09-11
Payer: MEDICARE

## 2018-09-11 VITALS
DIASTOLIC BLOOD PRESSURE: 80 MMHG | BODY MASS INDEX: 26.13 KG/M2 | HEIGHT: 59 IN | WEIGHT: 129.6 LBS | SYSTOLIC BLOOD PRESSURE: 130 MMHG | OXYGEN SATURATION: 98 % | HEART RATE: 76 BPM

## 2018-09-11 DIAGNOSIS — E78.01 FAMILIAL HYPERCHOLESTEROLEMIA: Chronic | ICD-10-CM

## 2018-09-11 DIAGNOSIS — E03.9 ACQUIRED HYPOTHYROIDISM: Chronic | ICD-10-CM

## 2018-09-11 DIAGNOSIS — E55.9 VITAMIN D DEFICIENCY: ICD-10-CM

## 2018-09-11 DIAGNOSIS — I10 ESSENTIAL HYPERTENSION: Primary | Chronic | ICD-10-CM

## 2018-09-11 DIAGNOSIS — L65.9 ALOPECIA: Chronic | ICD-10-CM

## 2018-09-11 PROCEDURE — G8427 DOCREV CUR MEDS BY ELIG CLIN: HCPCS | Performed by: INTERNAL MEDICINE

## 2018-09-11 PROCEDURE — 3017F COLORECTAL CA SCREEN DOC REV: CPT | Performed by: INTERNAL MEDICINE

## 2018-09-11 PROCEDURE — G8400 PT W/DXA NO RESULTS DOC: HCPCS | Performed by: INTERNAL MEDICINE

## 2018-09-11 PROCEDURE — 1036F TOBACCO NON-USER: CPT | Performed by: INTERNAL MEDICINE

## 2018-09-11 PROCEDURE — G8419 CALC BMI OUT NRM PARAM NOF/U: HCPCS | Performed by: INTERNAL MEDICINE

## 2018-09-11 PROCEDURE — 1090F PRES/ABSN URINE INCON ASSESS: CPT | Performed by: INTERNAL MEDICINE

## 2018-09-11 PROCEDURE — 1101F PT FALLS ASSESS-DOCD LE1/YR: CPT | Performed by: INTERNAL MEDICINE

## 2018-09-11 PROCEDURE — G0439 PPPS, SUBSEQ VISIT: HCPCS | Performed by: INTERNAL MEDICINE

## 2018-09-11 PROCEDURE — 4040F PNEUMOC VAC/ADMIN/RCVD: CPT | Performed by: INTERNAL MEDICINE

## 2018-09-11 PROCEDURE — 99213 OFFICE O/P EST LOW 20 MIN: CPT | Performed by: INTERNAL MEDICINE

## 2018-09-11 PROCEDURE — 1123F ACP DISCUSS/DSCN MKR DOCD: CPT | Performed by: INTERNAL MEDICINE

## 2018-09-11 ASSESSMENT — ENCOUNTER SYMPTOMS
SHORTNESS OF BREATH: 0
RHINORRHEA: 0
BACK PAIN: 0
BLOOD IN STOOL: 0
COUGH: 0
CONSTIPATION: 0
COLOR CHANGE: 0
TROUBLE SWALLOWING: 0
ABDOMINAL PAIN: 0
SINUS PRESSURE: 0
SORE THROAT: 0
DIARRHEA: 0
NAUSEA: 0

## 2018-09-11 ASSESSMENT — PATIENT HEALTH QUESTIONNAIRE - PHQ9
SUM OF ALL RESPONSES TO PHQ QUESTIONS 1-9: 0
SUM OF ALL RESPONSES TO PHQ QUESTIONS 1-9: 0

## 2018-09-11 ASSESSMENT — LIFESTYLE VARIABLES: HOW OFTEN DO YOU HAVE A DRINK CONTAINING ALCOHOL: 0

## 2018-09-11 ASSESSMENT — ANXIETY QUESTIONNAIRES: GAD7 TOTAL SCORE: 0

## 2018-09-11 NOTE — PROGRESS NOTES
mouth daily      albuterol sulfate  (90 Base) MCG/ACT inhaler Inhale 2 puffs into the lungs every 4 hours as needed for Wheezing         Past Medical History:   Diagnosis Date    Acquired hypothyroidism 8/20/2017    Alopecia 8/21/2017    Essential hypertension 8/20/2017    Familial hypercholesterolemia 8/20/2017     Past Surgical History:   Procedure Laterality Date    BREAST REDUCTION SURGERY      BUNIONECTOMY      bunion correct osteotomy smith double-stem lesser MTP Impl    COLONOSCOPY      HEMORRHOID SURGERY      KNEE SURGERY Right     RECTAL SURGERY      anal fissurectomy with sphincterotomy    MAICOL AND BSO      TUBAL LIGATION       Family History   Problem Relation Age of Onset    High Blood Pressure Mother     Diabetes Mother         Type 2    High Blood Pressure Father     Diabetes Sister         Type 2    Diabetes Brother         Type 2    Cancer Brother     Cancer Other      Social History     Social History    Marital status:      Spouse name: jammie kaiser    Number of children: 2    Years of education: 15     Occupational History     Retired     Social History Main Topics    Smoking status: Former Smoker     Packs/day: 1.00     Years: 8.00     Types: Cigarettes     Start date: 1980     Quit date: 1988    Smokeless tobacco: Never Used    Alcohol use No    Drug use: No    Sexual activity: Yes     Partners: Male     Other Topics Concern    Not on file     Social History Narrative    No narrative on file       Consultants:   Patient Care Team:  Reuben Rivas MD as PCP - General (Internal Medicine)   Dr Sayda Sykes form completed by patient, reviewed and scanned into chart. Summary of HRA, and behavioral, psychosocial, cognitive and functional/safety screening results are documented in additional note within this encounter.     Wt Readings from Last 3 Encounters:   09/11/18 129 lb 9.6 oz (58.8 kg)   05/01/18 125 lb (56.7 kg) 02/27/18 124 lb 3.2 oz (56.3 kg)     BP Readings from Last 3 Encounters:   09/11/18 130/80   05/09/18 (!) 142/74   05/01/18 134/70       Pertinent Physical Exam    Vitals:    09/11/18 1543   BP: 130/80   Pulse: 76   SpO2: 98%   Weight: 129 lb 9.6 oz (58.8 kg)   Height: 4' 11.45\" (1.51 m)     Body mass index is 25.78 kg/m². The following problems were reviewed today and where indicated follow up appointments were made and/or referrals ordered. Risk Factor Screenings with Interventions     Fall Risk:     Fall Risk Interventions:    · None indicated    Depression:     Depression Interventions:  · None indicated    Anxiety:     Anxiety Interventions:  · None indicated    Cognitive:     Cognitive Impairment Interventions:  · None indicated       Social History     Social History    Marital status:      Spouse name: jammie kaiser    Number of children: 2    Years of education: 15     Occupational History     Retired     Social History Main Topics    Smoking status: Former Smoker     Packs/day: 1.00     Years: 8.00     Types: Cigarettes     Start date: 1980     Quit date: 1988    Smokeless tobacco: Never Used    Alcohol use No    Drug use: No    Sexual activity: Yes     Partners: Male     Other Topics Concern    Not on file     Social History Narrative    No narrative on file       Substance Abuse Interventions:  · None indicated    Health Risk Assessment:        General Health Risk Interventions:  · None indicated       There is no height or weight on file to calculate BMI.   Health Habits/Nutrition Interventions:  · None indicated       Hearing/Vision Interventions:  · None indicated       Safety Interventions:  · None indicated       ADL Interventions:  · None indicated    Personalized Preventive Plan     Immunization History   Administered Date(s) Administered    Pneumococcal 13-valent Conjugate ([id number]) 08/21/2017       Health Maintenance Due   Topic Date Due    Hepatitis C screen 1950    DTaP/Tdap/Td vaccine (1 - Tdap) 03/24/1969    Shingles Vaccine (1 of 2 - 2 Dose Series) 03/24/2000    Colon cancer screen colonoscopy  12/16/2017    Pneumococcal low/med risk (2 of 2 - PPSV23) 08/21/2018    Flu vaccine (1) 09/01/2018       Recommendations for Preventive Services Due: see orders. Recommended screening schedule for the next 5-10 years is provided to the patient in written form: see Patient Instructions/AVS.    PROBLEM VISIT:      Chief Complaint   Patient presents with    Medicare AWV    Hypertension      HPI:    Asthma has been overall stable. Uses inhalers prn only. No day to day sx. Taking levothyroxine as directed without missed doses. No symptoms of hypothyroidism or hyperthyroidism evident   Feels she has done better on Synthroid now. Hypertension  Blood pressure control has been acceptable with blood pressures ranging in the 130/80 range. Compliant with medications. Tolerating medications without problem. Hyperlipidemia    Lipids are currently being treated with lovastatin  No reported side effects from lipid medication. Low-fat diet is being followed. Alopecia has improved.     Past Medical History:   Diagnosis Date    Acquired hypothyroidism 8/20/2017    Alopecia 8/21/2017    Essential hypertension 8/20/2017    Familial hypercholesterolemia 8/20/2017      Past Surgical History:   Procedure Laterality Date    BREAST REDUCTION SURGERY      BUNIONECTOMY      bunion correct osteotomy smith double-stem lesser MTP Impl    COLONOSCOPY      HEMORRHOID SURGERY      KNEE SURGERY Right     RECTAL SURGERY      anal fissurectomy with sphincterotomy    MAICOL AND BSO      TUBAL LIGATION        Family History   Problem Relation Age of Onset    High Blood Pressure Mother     Diabetes Mother         Type 2    High Blood Pressure Father     Diabetes Sister         Type 2    Diabetes Brother         Type 2    Cancer Brother     Cancer Other       Social History     Social History    Marital status:      Spouse name: jammie kaiser    Number of children: 2    Years of education: 15     Occupational History     Retired     Social History Main Topics    Smoking status: Former Smoker     Packs/day: 1.00     Years: 8.00     Types: Cigarettes     Start date: 1980     Quit date: 1988    Smokeless tobacco: Never Used    Alcohol use No    Drug use: No    Sexual activity: Yes     Partners: Male     Other Topics Concern    Not on file     Social History Narrative    No narrative on file      Allergies   Allergen Reactions    Compazine [Prochlorperazine Maleate]      Eyelids flipped up and mouth stretched to the side.  Thorazine [Chlorpromazine]      Eyelids flipped up and mouth stretched to the side      Current Outpatient Prescriptions   Medication Sig Dispense Refill    ibuprofen (ADVIL;MOTRIN) 200 MG tablet Take 200 mg by mouth 2 tablets BID      benzonatate (TESSALON) 200 MG capsule Take 1 capsule by mouth 3 times daily as needed for Cough 30 capsule 0    losartan (COZAAR) 100 MG tablet Take 1 tablet by mouth daily 90 tablet 3    estradiol (ESTRACE) 0.5 MG tablet Take 1 tablet by mouth daily 90 tablet 3    lovastatin (MEVACOR) 40 MG tablet Take 1 tablet by mouth nightly 90 tablet 3    triamterene-hydrochlorothiazide (MAXZIDE-25) 37.5-25 MG per tablet Take 1 tablet by mouth daily 90 tablet 3    SYNTHROID 50 MCG tablet Take 1 tablet by mouth daily 90 tablet 3    vitamin D (ERGOCALCIFEROL) 13370 units CAPS capsule TAKE ONE CAPSULE BY MOUTH ONCE EVERY MONTH 3 capsule 5    hydrocortisone (PROCTOSOL HC) 2.5 % rectal cream Place rectally 3 times daily as needed for Hemorrhoids Place rectally 2 times daily.  1 Tube 1    fluticasone-salmeterol (ADVAIR DISKUS) 500-50 MCG/DOSE diskus inhaler Inhale 1 puff into the lungs daily as needed       Biotin (BIOTIN 5000) 5 MG CAPS Take 2 tablets by mouth daily      calcium carbonate-vitamin D (CALCIUM 600+D) exhibits no discharge. No scleral icterus. Fundiscopic exam normal   Neck: No JVD present. No thyromegaly present. Cardiovascular: Normal rate, regular rhythm, normal heart sounds and intact distal pulses. Exam reveals no gallop. No murmur heard. Pulses:       Dorsalis pedis pulses are 2+ on the right side, and 2+ on the left side. Posterior tibial pulses are 2+ on the right side, and 2+ on the left side. No Carotid or abdominal bruits   Pulmonary/Chest: Effort normal and breath sounds normal. No respiratory distress. She exhibits no tenderness. Abdominal: Soft. Bowel sounds are normal. She exhibits no distension and no mass. There is no tenderness. Musculoskeletal: Normal range of motion. She exhibits no edema or tenderness. Lymphadenopathy:     She has no cervical adenopathy. Neurological: She is alert and oriented to person, place, and time. No cranial nerve deficit. Coordination normal.   No focal weakness   Skin: Skin is dry. No rash noted. No erythema. Psychiatric: She has a normal mood and affect. Her behavior is normal. Judgment and thought content normal.   Vitals reviewed.        Results for orders placed or performed in visit on 09/04/18   CBC Auto Differential   Result Value Ref Range    WBC 9.3 4.8 - 10.8 K/uL    RBC 4.21 4.20 - 5.40 M/uL    Hemoglobin 12.8 12.0 - 16.0 g/dL    Hematocrit 39.5 37.0 - 47.0 %    MCV 93.8 81.0 - 99.0 fL    MCH 30.4 27.0 - 31.0 pg    MCHC 32.4 (L) 33.0 - 37.0 g/dL    RDW 11.9 11.5 - 14.5 %    Platelets 862 523 - 455 K/uL    MPV 10.0 9.4 - 12.3 fL    Neutrophils % 54.1 50.0 - 65.0 %    Lymphocytes % 35.1 20.0 - 40.0 %    Monocytes % 4.4 0.0 - 10.0 %    Eosinophils % 5.4 (H) 0.0 - 5.0 %    Basophils % 0.8 0.0 - 1.0 %    Neutrophils # 5.0 1.5 - 7.5 K/uL    Lymphocytes # 3.3 1.1 - 4.5 K/uL    Monocytes # 0.40 0.00 - 0.90 K/uL    Eosinophils # 0.50 0.00 - 0.60 K/uL    Basophils # 0.10 0.00 - 0.20 K/uL   Comprehensive Metabolic Panel   Result Value Ref Range    Sodium 143 136 - 145 mmol/L    Potassium 4.7 3.5 - 5.0 mmol/L    Chloride 103 98 - 111 mmol/L    CO2 22 22 - 29 mmol/L    Anion Gap 18 7 - 19 mmol/L    Glucose 88 74 - 109 mg/dL    BUN 27 (H) 8 - 23 mg/dL    CREATININE 1.0 (H) 0.5 - 0.9 mg/dL    GFR Non-African American 55 (A) >60    Calcium 10.0 8.8 - 10.2 mg/dL    Total Protein 7.1 6.6 - 8.7 g/dL    Alb 4.3 3.5 - 5.2 g/dL    Total Bilirubin 0.3 0.2 - 1.2 mg/dL    Alkaline Phosphatase 67 35 - 104 U/L    ALT 16 5 - 33 U/L    AST 20 5 - 32 U/L   Lipid Panel   Result Value Ref Range    Cholesterol, Total 160 160 - 199 mg/dL    Triglycerides 194 (H) 0 - 149 mg/dL    HDL 65 65 - 121 mg/dL    LDL Calculated 56 <100 mg/dL   TSH without Reflex   Result Value Ref Range    TSH 3.220 0.270 - 4.200 uIU/mL   Vitamin D 25 Hydroxy   Result Value Ref Range    Vit D, 25-Hydroxy 55.7 >=30 ng/mL           Patient Active Problem List   Diagnosis    Essential hypertension    Familial hypercholesterolemia    Acquired hypothyroidism    Alopecia    Vitamin D deficiency       DIAGNOSES:    ICD-10-CM ICD-9-CM    1. Essential hypertension I10 401.9 Comprehensive Metabolic Panel   2. Acquired hypothyroidism E03.9 244.9 Comprehensive Metabolic Panel      TSH without Reflex   3. Alopecia L65.9 704.00    4. Familial hypercholesterolemia E78.01 272.0 Lipid Panel   5. Vitamin D deficiency E55.9 268.9         Orders Placed This Encounter   Procedures    Comprehensive Metabolic Panel    Lipid Panel    TSH without Reflex          New Prescriptions    No medications on file        ASSESSMENT/PLAN:  Her lipids are very well controlled though triglycerides are just a touch high. He has done a good job maintaining her weight. TSH is therapeutic. Vitamin D is normal. Her blood pressure is well-controlled. Renal function has improved slightly. Her asthma is stable. I'll see her back in 6 months with CMP lipid and TSH.

## 2018-10-09 ENCOUNTER — TELEPHONE (OUTPATIENT)
Dept: INTERNAL MEDICINE | Age: 68
End: 2018-10-09

## 2018-10-09 DIAGNOSIS — Z12.39 BREAST CANCER SCREENING: Primary | ICD-10-CM

## 2018-12-27 ENCOUNTER — HOSPITAL ENCOUNTER (OUTPATIENT)
Dept: WOMENS IMAGING | Age: 68
Discharge: HOME | End: 2018-12-27
Payer: MEDICARE

## 2018-12-27 DIAGNOSIS — Z12.39 BREAST CANCER SCREENING: ICD-10-CM

## 2018-12-27 PROCEDURE — 77063 BREAST TOMOSYNTHESIS BI: CPT

## 2019-01-28 ENCOUNTER — TELEPHONE (OUTPATIENT)
Dept: INTERNAL MEDICINE | Age: 69
End: 2019-01-28

## 2019-01-28 RX ORDER — ESTRADIOL 0.5 MG/1
0.5 TABLET ORAL DAILY
Qty: 90 TABLET | Refills: 3 | Status: SHIPPED | OUTPATIENT
Start: 2019-01-28 | End: 2019-12-30 | Stop reason: SDUPTHER

## 2019-03-12 DIAGNOSIS — E78.01 FAMILIAL HYPERCHOLESTEROLEMIA: Chronic | ICD-10-CM

## 2019-03-12 DIAGNOSIS — I10 ESSENTIAL HYPERTENSION: Chronic | ICD-10-CM

## 2019-03-12 DIAGNOSIS — E03.9 ACQUIRED HYPOTHYROIDISM: Chronic | ICD-10-CM

## 2019-03-12 LAB
ALBUMIN SERPL-MCNC: 4.1 G/DL (ref 3.5–5.2)
ALP BLD-CCNC: 70 U/L (ref 35–104)
ALT SERPL-CCNC: 14 U/L (ref 5–33)
ANION GAP SERPL CALCULATED.3IONS-SCNC: 10 MMOL/L (ref 7–19)
AST SERPL-CCNC: 20 U/L (ref 5–32)
BILIRUB SERPL-MCNC: 0.3 MG/DL (ref 0.2–1.2)
BUN BLDV-MCNC: 32 MG/DL (ref 8–23)
CALCIUM SERPL-MCNC: 9.6 MG/DL (ref 8.8–10.2)
CHLORIDE BLD-SCNC: 106 MMOL/L (ref 98–111)
CHOLESTEROL, TOTAL: 141 MG/DL (ref 160–199)
CO2: 26 MMOL/L (ref 22–29)
CREAT SERPL-MCNC: 1.3 MG/DL (ref 0.5–0.9)
GFR NON-AFRICAN AMERICAN: 41
GLUCOSE BLD-MCNC: 92 MG/DL (ref 74–109)
HDLC SERPL-MCNC: 53 MG/DL (ref 65–121)
LDL CHOLESTEROL CALCULATED: 53 MG/DL
POTASSIUM SERPL-SCNC: 4.3 MMOL/L (ref 3.5–5)
SODIUM BLD-SCNC: 142 MMOL/L (ref 136–145)
TOTAL PROTEIN: 6.9 G/DL (ref 6.6–8.7)
TRIGL SERPL-MCNC: 177 MG/DL (ref 0–149)
TSH SERPL DL<=0.05 MIU/L-ACNC: 5.02 UIU/ML (ref 0.27–4.2)

## 2019-03-19 ENCOUNTER — OFFICE VISIT (OUTPATIENT)
Dept: INTERNAL MEDICINE | Age: 69
End: 2019-03-19
Payer: MEDICARE

## 2019-03-19 VITALS
OXYGEN SATURATION: 98 % | HEART RATE: 70 BPM | WEIGHT: 131 LBS | DIASTOLIC BLOOD PRESSURE: 82 MMHG | BODY MASS INDEX: 26.41 KG/M2 | SYSTOLIC BLOOD PRESSURE: 118 MMHG | HEIGHT: 59 IN | RESPIRATION RATE: 18 BRPM

## 2019-03-19 DIAGNOSIS — Z23 NEED FOR PNEUMOCOCCAL VACCINATION: ICD-10-CM

## 2019-03-19 DIAGNOSIS — E55.9 VITAMIN D DEFICIENCY: ICD-10-CM

## 2019-03-19 DIAGNOSIS — E03.9 ACQUIRED HYPOTHYROIDISM: Chronic | ICD-10-CM

## 2019-03-19 DIAGNOSIS — L65.9 ALOPECIA: Primary | Chronic | ICD-10-CM

## 2019-03-19 DIAGNOSIS — E78.2 MIXED HYPERLIPIDEMIA: ICD-10-CM

## 2019-03-19 DIAGNOSIS — I10 ESSENTIAL HYPERTENSION: Chronic | ICD-10-CM

## 2019-03-19 PROCEDURE — G8427 DOCREV CUR MEDS BY ELIG CLIN: HCPCS | Performed by: NURSE PRACTITIONER

## 2019-03-19 PROCEDURE — 1123F ACP DISCUSS/DSCN MKR DOCD: CPT | Performed by: NURSE PRACTITIONER

## 2019-03-19 PROCEDURE — G8484 FLU IMMUNIZE NO ADMIN: HCPCS | Performed by: NURSE PRACTITIONER

## 2019-03-19 PROCEDURE — 99214 OFFICE O/P EST MOD 30 MIN: CPT | Performed by: NURSE PRACTITIONER

## 2019-03-19 PROCEDURE — 3017F COLORECTAL CA SCREEN DOC REV: CPT | Performed by: NURSE PRACTITIONER

## 2019-03-19 PROCEDURE — G0009 ADMIN PNEUMOCOCCAL VACCINE: HCPCS | Performed by: NURSE PRACTITIONER

## 2019-03-19 PROCEDURE — G8419 CALC BMI OUT NRM PARAM NOF/U: HCPCS | Performed by: NURSE PRACTITIONER

## 2019-03-19 PROCEDURE — 1036F TOBACCO NON-USER: CPT | Performed by: NURSE PRACTITIONER

## 2019-03-19 PROCEDURE — G8400 PT W/DXA NO RESULTS DOC: HCPCS | Performed by: NURSE PRACTITIONER

## 2019-03-19 PROCEDURE — 1090F PRES/ABSN URINE INCON ASSESS: CPT | Performed by: NURSE PRACTITIONER

## 2019-03-19 PROCEDURE — 90732 PPSV23 VACC 2 YRS+ SUBQ/IM: CPT | Performed by: NURSE PRACTITIONER

## 2019-03-19 PROCEDURE — 4040F PNEUMOC VAC/ADMIN/RCVD: CPT | Performed by: NURSE PRACTITIONER

## 2019-03-19 PROCEDURE — 1101F PT FALLS ASSESS-DOCD LE1/YR: CPT | Performed by: NURSE PRACTITIONER

## 2019-03-19 RX ORDER — TRIAMTERENE AND HYDROCHLOROTHIAZIDE 37.5; 25 MG/1; MG/1
1 TABLET ORAL DAILY
Qty: 90 TABLET | Refills: 3 | Status: SHIPPED | OUTPATIENT
Start: 2019-03-19 | End: 2019-12-30 | Stop reason: SDUPTHER

## 2019-03-19 RX ORDER — LOSARTAN POTASSIUM 100 MG/1
100 TABLET ORAL DAILY
Qty: 90 TABLET | Refills: 3 | Status: SHIPPED | OUTPATIENT
Start: 2019-03-19 | End: 2019-12-30 | Stop reason: SDUPTHER

## 2019-03-19 RX ORDER — ERGOCALCIFEROL 1.25 MG/1
50000 CAPSULE ORAL WEEKLY
Qty: 12 CAPSULE | Refills: 5 | Status: SHIPPED | OUTPATIENT
Start: 2019-03-19 | End: 2019-06-25

## 2019-03-19 RX ORDER — LOVASTATIN 40 MG/1
40 TABLET ORAL NIGHTLY
Qty: 90 TABLET | Refills: 3 | Status: SHIPPED | OUTPATIENT
Start: 2019-03-19 | End: 2019-12-30 | Stop reason: SDUPTHER

## 2019-03-19 RX ORDER — LEVOTHYROXINE SODIUM 50 MCG
50 TABLET ORAL DAILY
Qty: 90 TABLET | Refills: 3 | Status: CANCELLED | OUTPATIENT
Start: 2019-03-19

## 2019-03-19 RX ORDER — LEVOTHYROXINE SODIUM 75 MCG
75 TABLET ORAL DAILY
Qty: 30 TABLET | Refills: 0 | Status: SHIPPED | OUTPATIENT
Start: 2019-03-19 | End: 2019-06-25 | Stop reason: ALTCHOICE

## 2019-03-19 ASSESSMENT — PATIENT HEALTH QUESTIONNAIRE - PHQ9
SUM OF ALL RESPONSES TO PHQ QUESTIONS 1-9: 0
2. FEELING DOWN, DEPRESSED OR HOPELESS: 0
SUM OF ALL RESPONSES TO PHQ9 QUESTIONS 1 & 2: 0
1. LITTLE INTEREST OR PLEASURE IN DOING THINGS: 0
SUM OF ALL RESPONSES TO PHQ QUESTIONS 1-9: 0

## 2019-03-19 ASSESSMENT — ENCOUNTER SYMPTOMS
VOMITING: 0
DIARRHEA: 0
WHEEZING: 0
TROUBLE SWALLOWING: 0
STRIDOR: 0
CHOKING: 0
EYE DISCHARGE: 0
SORE THROAT: 0
COLOR CHANGE: 0
BLOOD IN STOOL: 0
SHORTNESS OF BREATH: 0
ABDOMINAL PAIN: 0
CONSTIPATION: 0
EYE ITCHING: 0
ABDOMINAL DISTENTION: 0
COUGH: 0
NAUSEA: 0

## 2019-03-20 ENCOUNTER — TELEPHONE (OUTPATIENT)
Dept: INTERNAL MEDICINE | Age: 69
End: 2019-03-20

## 2019-03-20 NOTE — TELEPHONE ENCOUNTER
Patient called stating we were supposed to change Synthroid to [place] Thyroid. Synthroid dose was 75mcg would it be the same mcg or different? I didn't see 75 under [place] Thyroid.

## 2019-03-21 ENCOUNTER — TELEPHONE (OUTPATIENT)
Dept: INTERNAL MEDICINE | Age: 69
End: 2019-03-21

## 2019-03-22 DIAGNOSIS — E03.9 ACQUIRED HYPOTHYROIDISM: Primary | Chronic | ICD-10-CM

## 2019-03-22 RX ORDER — THYROID, PORCINE 60 MG/1
65 TABLET ORAL DAILY
Qty: 90 TABLET | Refills: 2 | Status: SHIPPED | OUTPATIENT
Start: 2019-03-22 | End: 2019-03-22 | Stop reason: SDUPTHER

## 2019-03-22 RX ORDER — THYROID, PORCINE 60 MG/1
65 TABLET ORAL DAILY
Qty: 90 TABLET | Refills: 2 | Status: SHIPPED | OUTPATIENT
Start: 2019-03-22 | End: 2019-03-25 | Stop reason: SDUPTHER

## 2019-03-25 RX ORDER — THYROID, PORCINE 60 MG/1
65 TABLET ORAL DAILY
Qty: 90 TABLET | Refills: 2 | Status: SHIPPED | OUTPATIENT
Start: 2019-03-25 | End: 2019-06-25 | Stop reason: ALTCHOICE

## 2019-04-23 ENCOUNTER — OFFICE VISIT (OUTPATIENT)
Dept: INTERNAL MEDICINE | Age: 69
End: 2019-04-23
Payer: MEDICARE

## 2019-04-23 VITALS
DIASTOLIC BLOOD PRESSURE: 80 MMHG | OXYGEN SATURATION: 98 % | WEIGHT: 131 LBS | SYSTOLIC BLOOD PRESSURE: 132 MMHG | HEART RATE: 73 BPM | HEIGHT: 59 IN | RESPIRATION RATE: 18 BRPM | BODY MASS INDEX: 26.41 KG/M2

## 2019-04-23 DIAGNOSIS — I10 ESSENTIAL HYPERTENSION: Chronic | ICD-10-CM

## 2019-04-23 DIAGNOSIS — R21 RASH: Primary | ICD-10-CM

## 2019-04-23 PROCEDURE — 4040F PNEUMOC VAC/ADMIN/RCVD: CPT | Performed by: NURSE PRACTITIONER

## 2019-04-23 PROCEDURE — 1123F ACP DISCUSS/DSCN MKR DOCD: CPT | Performed by: NURSE PRACTITIONER

## 2019-04-23 PROCEDURE — 1090F PRES/ABSN URINE INCON ASSESS: CPT | Performed by: NURSE PRACTITIONER

## 2019-04-23 PROCEDURE — G8400 PT W/DXA NO RESULTS DOC: HCPCS | Performed by: NURSE PRACTITIONER

## 2019-04-23 PROCEDURE — 99212 OFFICE O/P EST SF 10 MIN: CPT | Performed by: NURSE PRACTITIONER

## 2019-04-23 PROCEDURE — G8419 CALC BMI OUT NRM PARAM NOF/U: HCPCS | Performed by: NURSE PRACTITIONER

## 2019-04-23 PROCEDURE — 1036F TOBACCO NON-USER: CPT | Performed by: NURSE PRACTITIONER

## 2019-04-23 PROCEDURE — 3017F COLORECTAL CA SCREEN DOC REV: CPT | Performed by: NURSE PRACTITIONER

## 2019-04-23 PROCEDURE — G8427 DOCREV CUR MEDS BY ELIG CLIN: HCPCS | Performed by: NURSE PRACTITIONER

## 2019-04-23 PROCEDURE — 96372 THER/PROPH/DIAG INJ SC/IM: CPT | Performed by: NURSE PRACTITIONER

## 2019-04-23 RX ORDER — METHYLPREDNISOLONE ACETATE 80 MG/ML
80 INJECTION, SUSPENSION INTRA-ARTICULAR; INTRALESIONAL; INTRAMUSCULAR; SOFT TISSUE ONCE
Status: COMPLETED | OUTPATIENT
Start: 2019-04-23 | End: 2019-04-23

## 2019-04-23 RX ADMIN — METHYLPREDNISOLONE ACETATE 80 MG: 80 INJECTION, SUSPENSION INTRA-ARTICULAR; INTRALESIONAL; INTRAMUSCULAR; SOFT TISSUE at 09:07

## 2019-04-23 ASSESSMENT — ENCOUNTER SYMPTOMS
EYE DISCHARGE: 0
COLOR CHANGE: 0
SHORTNESS OF BREATH: 0
NAUSEA: 0
TROUBLE SWALLOWING: 0
WHEEZING: 0
ABDOMINAL PAIN: 0
COUGH: 0
VOMITING: 0
STRIDOR: 0
EYE ITCHING: 0
BLOOD IN STOOL: 0
ABDOMINAL DISTENTION: 0
CHOKING: 0
DIARRHEA: 0
SORE THROAT: 0
CONSTIPATION: 0

## 2019-04-23 NOTE — PROGRESS NOTES
[facility]  [facility] [facility] [address]  Dept: [phone]  Dept Fax: [phone]  Loc: [phone]    Donovan Humphrey is a 71 y.o. female who presents today for her medical conditions/complaints as noted below. Donovan Humphrey is c/surendra Rash (Patient has itchy rash x 10 days.)        HPI:     HPI   1. Rash to forearms and neck and now on her ear; She was clearing fence rows this weekend and now has the draining rash and itching   2. HTN;  HTN:  Stable on current meds; No side effects of the meds; Takes as directed; takes blood pressure 3-4 times a week   Chief Complaint   Patient presents with    Rash     Patient has itchy rash x 10 days.        Past Medical History:   Diagnosis Date    Acquired hypothyroidism 8/20/2017    Alopecia 8/21/2017    Essential hypertension 8/20/2017    Familial hypercholesterolemia 8/20/2017      Past Surgical History:   Procedure Laterality Date    BREAST REDUCTION SURGERY      BUNIONECTOMY      bunion correct osteotomy smith double-stem lesser MTP Impl    COLONOSCOPY      HEMORRHOID SURGERY      KNEE SURGERY Right     RECTAL SURGERY      anal fissurectomy with sphincterotomy    MAICOL AND BSO      TUBAL LIGATION         Vitals 4/23/2019 3/19/2019 9/11/2018 5/9/2018 5/9/2018 4/9/9200   SYSTOLIC 156 253 [phone] 746   DIASTOLIC 80 82 80 74 72 70   Site - - - - Left Arm Left Arm   Position - - - - Sitting Sitting   Pulse 73 70 76 - 72 91   Temp - - - - 98.5 99.1   Resp 18 18 - - - -   Weight 131 lb 131 lb 129 lb 9.6 oz - - 125 lb   Height 4' 11.25\" 4' 11.25\" 4' 11.449\" - - 5' 0\"   BMI (wt*703/ht~2) 26.23 kg/m2 26.23 kg/m2 25.78 kg/m2 - - 24.41 kg/m2       Family History   Problem Relation Age of Onset    High Blood Pressure Mother     Diabetes Mother         Type 2    High Blood Pressure Father     Diabetes Sister         Type 2    Diabetes Brother         Type 2    Cancer Brother  Cancer Other        Social History     Tobacco Use    Smoking status: Former Smoker     Packs/day: 1.00     Years: 8.00     Pack years: 8.00     Types: Cigarettes     Start date:      Last attempt to quit:      Years since quittin.3    Smokeless tobacco: Never Used   Substance Use Topics    Alcohol use: No      Current Outpatient Medications   Medication Sig Dispense Refill    thyroid (ARMOUR) 65 MG tablet Take 1 tablet by mouth daily 90 tablet 2    losartan (COZAAR) 100 MG tablet Take 1 tablet by mouth daily 90 tablet 3    lovastatin (MEVACOR) 40 MG tablet Take 1 tablet by mouth nightly 90 tablet 3    triamterene-hydrochlorothiazide (MAXZIDE-25) 37.5-25 MG per tablet Take 1 tablet by mouth daily 90 tablet 3    vitamin D (ERGOCALCIFEROL) 92438 units CAPS capsule Take 1 capsule by mouth once a week 12 capsule 5    estradiol (ESTRACE) 0.5 MG tablet Take 1 tablet by mouth daily 90 tablet 3    ibuprofen (ADVIL;MOTRIN) 200 MG tablet Take 200 mg by mouth 2 tablets BID      benzonatate (TESSALON) 200 MG capsule Take 1 capsule by mouth 3 times daily as needed for Cough 30 capsule 0    hydrocortisone (PROCTOSOL HC) 2.5 % rectal cream Place rectally 3 times daily as needed for Hemorrhoids Place rectally 2 times daily.  1 Tube 1    fluticasone-salmeterol (ADVAIR DISKUS) 500-50 MCG/DOSE diskus inhaler Inhale 1 puff into the lungs daily as needed       Biotin (BIOTIN 5000) 5 MG CAPS Take 2 tablets by mouth daily      calcium carbonate-vitamin D (CALCIUM 600+D) 600-200 MG-UNIT TABS Take 1 tablet by mouth 2 times daily      loratadine (CLARITIN) 10 MG tablet Take 10 mg by mouth daily      Omega-3 Fatty Acids (FISH OIL) 500 MG CAPS Take 1 capsule by mouth daily      albuterol sulfate  (90 Base) MCG/ACT inhaler Inhale 2 puffs into the lungs every 4 hours as needed for Wheezing      SYNTHROID 75 MCG tablet Take 1 tablet by mouth Daily 30 tablet 0     No current facility-administered scleral icterus. Neck: Normal range of motion. Neck supple. No JVD present. No thyromegaly present. Cardiovascular: Normal rate, regular rhythm and normal heart sounds. No murmur heard. Pulmonary/Chest: Effort normal and breath sounds normal. No respiratory distress. She has no wheezes. She has no rales. Abdominal: Soft. Bowel sounds are normal. She exhibits no distension and no mass. There is no tenderness. There is no rebound and no guarding. Musculoskeletal: Normal range of motion. She exhibits no edema or tenderness. Neurological: She is alert and oriented to person, place, and time. She has normal reflexes. She displays normal reflexes. No cranial nerve deficit. Coordination normal.   Skin: Skin is warm and dry. Rash (contact rash noted on both arms neck and left ear ) noted. No erythema. Psychiatric: Her behavior is normal. Judgment and thought content normal. She does not exhibit a depressed mood. /80   Pulse 73   Resp 18   Ht 4' 11.25\" (1.505 m)   Wt 131 lb (59.4 kg)   SpO2 98%   BMI 26.24 kg/m²     Assessment:       Diagnosis Orders   1. Rash     2. Essential hypertension         Plan:        Patient given educational materials - see patient instructions. Discussed use, benefit, and side effects of prescribed medications. Allpatient questions answered. Pt voiced understanding. Reviewed health maintenance. Instructed to continue current medications, diet and exercise. Patient agreed with treatment plan. Follow up as directed. MEDICATIONS:  No orders of the defined types were placed in this encounter. ORDERS:  No orders of the defined types were placed in this encounter. Follow-up:  No follow-ups on file. PATIENT INSTRUCTIONS:  Patient Instructions   1.   Rash; ,medrol 80 IM today   Hydrocortisone cream and benadryl cream to  Rash 3 times a day    you can use the benadryl alone as much as you want;      2.  HTn  The current medical regimen is effective;

## 2019-06-10 DIAGNOSIS — E03.9 ACQUIRED HYPOTHYROIDISM: Chronic | ICD-10-CM

## 2019-06-10 DIAGNOSIS — I10 ESSENTIAL HYPERTENSION: Chronic | ICD-10-CM

## 2019-06-10 DIAGNOSIS — E55.9 VITAMIN D DEFICIENCY: ICD-10-CM

## 2019-06-10 LAB
ALBUMIN SERPL-MCNC: 4.1 G/DL (ref 3.5–5.2)
ALP BLD-CCNC: 59 U/L (ref 35–104)
ALT SERPL-CCNC: 16 U/L (ref 5–33)
ANION GAP SERPL CALCULATED.3IONS-SCNC: 16 MMOL/L (ref 7–19)
AST SERPL-CCNC: 21 U/L (ref 5–32)
BASOPHILS ABSOLUTE: 0 K/UL (ref 0–0.2)
BASOPHILS RELATIVE PERCENT: 0.4 % (ref 0–1)
BILIRUB SERPL-MCNC: 0.3 MG/DL (ref 0.2–1.2)
BUN BLDV-MCNC: 30 MG/DL (ref 8–23)
CALCIUM SERPL-MCNC: 9.5 MG/DL (ref 8.8–10.2)
CHLORIDE BLD-SCNC: 103 MMOL/L (ref 98–111)
CO2: 26 MMOL/L (ref 22–29)
CREAT SERPL-MCNC: 1.1 MG/DL (ref 0.5–0.9)
EOSINOPHILS ABSOLUTE: 0.2 K/UL (ref 0–0.6)
EOSINOPHILS RELATIVE PERCENT: 2.5 % (ref 0–5)
GFR NON-AFRICAN AMERICAN: 49
GLUCOSE BLD-MCNC: 77 MG/DL (ref 74–109)
HCT VFR BLD CALC: 38.4 % (ref 37–47)
HEMOGLOBIN: 12.8 G/DL (ref 12–16)
LYMPHOCYTES ABSOLUTE: 3.7 K/UL (ref 1.1–4.5)
LYMPHOCYTES RELATIVE PERCENT: 44.2 % (ref 20–40)
MCH RBC QN AUTO: 31.1 PG (ref 27–31)
MCHC RBC AUTO-ENTMCNC: 33.3 G/DL (ref 33–37)
MCV RBC AUTO: 93.4 FL (ref 81–99)
MONOCYTES ABSOLUTE: 0.4 K/UL (ref 0–0.9)
MONOCYTES RELATIVE PERCENT: 4.7 % (ref 0–10)
NEUTROPHILS ABSOLUTE: 4 K/UL (ref 1.5–7.5)
NEUTROPHILS RELATIVE PERCENT: 48 % (ref 50–65)
PDW BLD-RTO: 11.5 % (ref 11.5–14.5)
PLATELET # BLD: 258 K/UL (ref 130–400)
PMV BLD AUTO: 10.3 FL (ref 9.4–12.3)
POTASSIUM SERPL-SCNC: 4.5 MMOL/L (ref 3.5–5)
RBC # BLD: 4.11 M/UL (ref 4.2–5.4)
SODIUM BLD-SCNC: 145 MMOL/L (ref 136–145)
TOTAL PROTEIN: 6.8 G/DL (ref 6.6–8.7)
TRIGL SERPL-MCNC: 156 MG/DL (ref 0–149)
TSH SERPL DL<=0.05 MIU/L-ACNC: 4.55 UIU/ML (ref 0.27–4.2)
WBC # BLD: 8.4 K/UL (ref 4.8–10.8)

## 2019-06-14 LAB — VITAMIN D 1,25-DIHYDROXY: 28.8 PG/ML (ref 19.9–79.3)

## 2019-06-25 ENCOUNTER — OFFICE VISIT (OUTPATIENT)
Dept: INTERNAL MEDICINE | Age: 69
End: 2019-06-25
Payer: MEDICARE

## 2019-06-25 VITALS
WEIGHT: 131 LBS | HEART RATE: 72 BPM | DIASTOLIC BLOOD PRESSURE: 74 MMHG | OXYGEN SATURATION: 97 % | BODY MASS INDEX: 26.41 KG/M2 | HEIGHT: 59 IN | RESPIRATION RATE: 18 BRPM | SYSTOLIC BLOOD PRESSURE: 134 MMHG

## 2019-06-25 DIAGNOSIS — E78.2 MIXED HYPERLIPIDEMIA: ICD-10-CM

## 2019-06-25 DIAGNOSIS — L65.9 ALOPECIA: Chronic | ICD-10-CM

## 2019-06-25 DIAGNOSIS — E03.9 ACQUIRED HYPOTHYROIDISM: Chronic | ICD-10-CM

## 2019-06-25 DIAGNOSIS — I10 ESSENTIAL HYPERTENSION: Chronic | ICD-10-CM

## 2019-06-25 DIAGNOSIS — M89.9 DISORDER OF BONE: ICD-10-CM

## 2019-06-25 DIAGNOSIS — Z00.00 HEALTHCARE MAINTENANCE: ICD-10-CM

## 2019-06-25 DIAGNOSIS — E55.9 VITAMIN D DEFICIENCY: Primary | ICD-10-CM

## 2019-06-25 PROCEDURE — 1036F TOBACCO NON-USER: CPT | Performed by: NURSE PRACTITIONER

## 2019-06-25 PROCEDURE — 1123F ACP DISCUSS/DSCN MKR DOCD: CPT | Performed by: NURSE PRACTITIONER

## 2019-06-25 PROCEDURE — 3017F COLORECTAL CA SCREEN DOC REV: CPT | Performed by: NURSE PRACTITIONER

## 2019-06-25 PROCEDURE — G8419 CALC BMI OUT NRM PARAM NOF/U: HCPCS | Performed by: NURSE PRACTITIONER

## 2019-06-25 PROCEDURE — 4040F PNEUMOC VAC/ADMIN/RCVD: CPT | Performed by: NURSE PRACTITIONER

## 2019-06-25 PROCEDURE — 1090F PRES/ABSN URINE INCON ASSESS: CPT | Performed by: NURSE PRACTITIONER

## 2019-06-25 PROCEDURE — 99214 OFFICE O/P EST MOD 30 MIN: CPT | Performed by: NURSE PRACTITIONER

## 2019-06-25 PROCEDURE — G8400 PT W/DXA NO RESULTS DOC: HCPCS | Performed by: NURSE PRACTITIONER

## 2019-06-25 PROCEDURE — G8427 DOCREV CUR MEDS BY ELIG CLIN: HCPCS | Performed by: NURSE PRACTITIONER

## 2019-06-25 RX ORDER — THYROID, PORCINE 60 MG/1
65 TABLET ORAL DAILY
Qty: 90 TABLET | Refills: 2 | Status: CANCELLED | OUTPATIENT
Start: 2019-06-25

## 2019-06-25 RX ORDER — ERGOCALCIFEROL 1.25 MG/1
50000 CAPSULE ORAL
Qty: 24 CAPSULE | Refills: 5 | Status: SHIPPED | OUTPATIENT
Start: 2019-06-27 | End: 2019-12-30 | Stop reason: SDUPTHER

## 2019-06-25 RX ORDER — LEVOTHYROXINE AND LIOTHYRONINE 38; 9 UG/1; UG/1
TABLET ORAL
Qty: 34 TABLET | Refills: 3 | Status: SHIPPED | OUTPATIENT
Start: 2019-06-25 | End: 2019-06-27 | Stop reason: SDUPTHER

## 2019-06-25 ASSESSMENT — ENCOUNTER SYMPTOMS
VOMITING: 0
ABDOMINAL DISTENTION: 0
CONSTIPATION: 0
SORE THROAT: 0
BLOOD IN STOOL: 0
ABDOMINAL PAIN: 0
COLOR CHANGE: 0
EYE ITCHING: 0
DIARRHEA: 0
NAUSEA: 0
EYE DISCHARGE: 0
STRIDOR: 0
CHOKING: 0
SHORTNESS OF BREATH: 0
WHEEZING: 0
COUGH: 0
TROUBLE SWALLOWING: 0

## 2019-06-25 NOTE — PATIENT INSTRUCTIONS
1. Hypothyroidism; New prescription sent in for [place] Thyroid. Take 1 tablet daily except for 2 tablets one day per week. We will recheck TSH level in 6 months. 2. Alopecia; Continue taking [place] thyroid as prescribed. 3. Hypertension; continue current medications as prescribed and monitor blood pressure at home. 4. Hypercholesterolemia; Continue taking fish oil supplements and monitor diet. Try to include some \"good\" fats in your diet, such as avocado, olive oil, and coconut oil. 5. Health maintenance; Bone density scan was ordered. Someone will be calling you with an appointment for this. 6. Vitamin D deficiency; Increase your vitamin D to one tablet twice weekly. We will recheck Vitamin D at next follow-up appointment.

## 2019-06-25 NOTE — PROGRESS NOTES
[facility] INTERNAL MEDICINE  [facility] [address]  Dept: [phone]  Dept Fax: [phone]  Loc: [phone]    Michael Castro is a 71 y.o. female who presents today for her medical conditions/complaints as noted below. Michael Castro is c/o of Hypothyroidism (Patient is here for routine follow up visit and review of labs done 6/10/19.)        HPI:     HPI   1. Hypothyroidism; TSH is down from 5.020 to 4.550. She has been taking generic [place], but would like to be switched to the brand name because she feels that it works better for her. 2. Alopecia; She states that the change to the [place] thyroid has definitely helped. She reports that her hair started growing back fully a few weeks ago and has had continued to grow. 3. HTN:  Stable on losartan and maxzide; No side effects of the meds; Takes as directed; takes blood pressure 3-4 times a week. 4. Hypercholesterolemia; Triglycerides are decreased from 177 to 156. She currently takes fish oil supplements. 5. Health maintenance; She had a mammogram in December 2018. She had her colonoscopy in 2014 and states that she does not have to have another until 2024. Her last bone density scan was in 2011.  6. Vitamin D deficiency; Vitamin D level is 28.8 at most recent check. It has decreased from 55.7. She currently takes Vitamin D 61705 units once weekly. Chief Complaint   Patient presents with    Hypothyroidism     Patient is here for routine follow up visit and review of labs done 6/10/19.        Past Medical History:   Diagnosis Date    Acquired hypothyroidism 8/20/2017    Alopecia 8/21/2017    Essential hypertension 8/20/2017    Familial hypercholesterolemia 8/20/2017      Past Surgical History:   Procedure Laterality Date    BREAST REDUCTION SURGERY      BUNIONECTOMY      bunion correct osteotomy smith double-stem lesser MTP Impl    COLONOSCOPY      HEMORRHOID SURGERY      KNEE SURGERY Right     RECTAL SURGERY      anal fissurectomy with sphincterotomy    MAICOL AND BSO      TUBAL LIGATION         Vitals 2019 2019 3/19/2019 2018 2018 8198   SYSTOLIC 961 415 591 370 373 796   DIASTOLIC 74 80 82 80 74 72   Site - - - - - Left Arm   Position - - - - - Sitting   Pulse 72 73 70 76 - 72   Temp - - - - - 98.5   Resp 18 18 18 - - -   SpO2 97 98 98 98 - 95   Weight 131 lb 131 lb 131 lb 129 lb 9.6 oz - -   Height 4' 11.25\" 4' 11.25\" 4' 11.25\" 4' 11.449\" - -   BMI (wt*703/ht~2) 26.23 kg/m2 26.23 kg/m2 26.23 kg/m2 25.78 kg/m2 - -       Family History   Problem Relation Age of Onset    High Blood Pressure Mother     Diabetes Mother         Type 2    High Blood Pressure Father     Diabetes Sister         Type 2    Diabetes Brother         Type 2    Cancer Brother     Cancer Other        Social History     Tobacco Use    Smoking status: Former Smoker     Packs/day: 1.00     Years: 8.00     Pack years: 8.00     Types: Cigarettes     Start date:      Last attempt to quit:      Years since quittin.5    Smokeless tobacco: Never Used   Substance Use Topics    Alcohol use: No      Current Outpatient Medications   Medication Sig Dispense Refill    thyroid (ARMOUR THYROID) 60 MG tablet Take 1 tablet daily except one day a week take 2 tablets.  34 tablet 3    [START ON 2019] vitamin D (ERGOCALCIFEROL) 53027 units CAPS capsule Take 1 capsule by mouth Twice a Week 24 capsule 5    losartan (COZAAR) 100 MG tablet Take 1 tablet by mouth daily 90 tablet 3    lovastatin (MEVACOR) 40 MG tablet Take 1 tablet by mouth nightly 90 tablet 3    triamterene-hydrochlorothiazide (MAXZIDE-25) 37.5-25 MG per tablet Take 1 tablet by mouth daily 90 tablet 3    estradiol (ESTRACE) 0.5 MG tablet Take 1 tablet by mouth daily 90 tablet 3    ibuprofen (ADVIL;MOTRIN) 200 MG tablet Take 200 mg by mouth 2 tablets BID      hydrocortisone (PROCTOSOL HC) 2.5 % rectal 06/10/2019    CO2 26 06/10/2019    BUN 30 (H) 06/10/2019    CREATININE 1.1 (H) 06/10/2019    GLUCOSE 77 06/10/2019    CALCIUM 9.5 06/10/2019    PROT 6.8 06/10/2019    LABALBU 4.1 06/10/2019    BILITOT 0.3 06/10/2019    ALKPHOS 59 06/10/2019    AST 21 06/10/2019    ALT 16 06/10/2019    LABGLOM 49 (A) 06/10/2019     Lab Results   Component Value Date    CHOL 141 (L) 03/12/2019    CHOL 160 09/04/2018    CHOL 156 (L) 02/20/2018     Lab Results   Component Value Date    TRIG 156 (H) 06/10/2019    TRIG 177 (H) 03/12/2019    TRIG 194 (H) 09/04/2018     Lab Results   Component Value Date    HDL 53 (L) 03/12/2019    HDL 65 09/04/2018    HDL 52 (L) 02/20/2018     Lab Results   Component Value Date    LDLCALC 53 03/12/2019    LDLCALC 56 09/04/2018    LDLCALC 67 02/20/2018     Lab Results   Component Value Date     06/10/2019    K 4.5 06/10/2019     06/10/2019    CO2 26 06/10/2019    BUN 30 06/10/2019    CREATININE 1.1 06/10/2019    GLUCOSE 77 06/10/2019    CALCIUM 9.5 06/10/2019      Lab Results   Component Value Date    WBC 8.4 06/10/2019    HGB 12.8 06/10/2019    HCT 38.4 06/10/2019    MCV 93.4 06/10/2019     06/10/2019    LYMPHOPCT 44.2 (H) 06/10/2019    RBC 4.11 (L) 06/10/2019    MCH 31.1 (H) 06/10/2019    MCHC 33.3 06/10/2019    RDW 11.5 06/10/2019     Lab Results   Component Value Date    VITD25 55.7 09/04/2018       Subjective:      Review of Systems   Constitutional: Negative for fatigue, fever and unexpected weight change. HENT: Negative for ear discharge, ear pain, mouth sores, sore throat and trouble swallowing. Eyes: Negative for discharge, itching and visual disturbance. Respiratory: Negative for cough, choking, shortness of breath, wheezing and stridor. Cardiovascular: Negative for chest pain, palpitations and leg swelling. Gastrointestinal: Negative for abdominal distention, abdominal pain, blood in stool, constipation, diarrhea, nausea and vomiting.    Endocrine: Negative for cold intolerance, polydipsia and polyuria. Genitourinary: Negative for difficulty urinating, dysuria, frequency and urgency. Musculoskeletal: Negative for arthralgias and gait problem. Skin: Negative for color change and rash. Allergic/Immunologic: Negative for food allergies and immunocompromised state. Neurological: Negative for dizziness, tremors, syncope, speech difficulty, weakness and headaches. Hematological: Negative for adenopathy. Does not bruise/bleed easily. Psychiatric/Behavioral: Negative for confusion and hallucinations. Objective:     Physical Exam   Constitutional: She is oriented to person, place, and time. She appears well-developed and well-nourished. No distress. HENT:   Head: Normocephalic and atraumatic. Eyes: Pupils are equal, round, and reactive to light. Right eye exhibits no discharge. Left eye exhibits no discharge. No scleral icterus. Neck: Normal range of motion. Neck supple. No JVD present. No thyromegaly present. Cardiovascular: Normal rate, regular rhythm and normal heart sounds. No murmur heard. Pulmonary/Chest: Effort normal and breath sounds normal. No respiratory distress. She has no wheezes. She has no rales. Abdominal: Soft. Bowel sounds are normal. She exhibits no distension and no mass. There is no tenderness. There is no rebound and no guarding. Musculoskeletal: Normal range of motion. She exhibits no edema or tenderness. Neurological: She is alert and oriented to person, place, and time. She has normal reflexes. She displays normal reflexes. No cranial nerve deficit. Coordination normal.   Skin: Skin is warm and dry. No rash noted. No erythema. Psychiatric: Her behavior is normal. Judgment and thought content normal. She does not exhibit a depressed mood. /74   Pulse 72   Resp 18   Ht 4' 11.25\" (1.505 m)   Wt 131 lb (59.4 kg)   SpO2 97%   BMI 26.24 kg/m²     Assessment:       Diagnosis Orders   1.  Vitamin D deficiency  DEXA

## 2019-06-27 ENCOUNTER — TELEPHONE (OUTPATIENT)
Dept: INTERNAL MEDICINE | Age: 69
End: 2019-06-27

## 2019-06-27 RX ORDER — LEVOTHYROXINE AND LIOTHYRONINE 38; 9 UG/1; UG/1
TABLET ORAL
Qty: 34 TABLET | Refills: 3 | Status: SHIPPED | OUTPATIENT
Start: 2019-06-27 | End: 2019-06-28 | Stop reason: SDUPTHER

## 2019-06-28 RX ORDER — THYROID 60 MG
TABLET ORAL
Qty: 34 TABLET | Refills: 3 | Status: SHIPPED | OUTPATIENT
Start: 2019-06-28 | End: 2019-07-01 | Stop reason: SDUPTHER

## 2019-07-01 ENCOUNTER — HOSPITAL ENCOUNTER (OUTPATIENT)
Dept: WOMENS IMAGING | Age: 69
Discharge: HOME | End: 2019-07-01
Payer: MEDICARE

## 2019-07-01 DIAGNOSIS — E55.9 VITAMIN D DEFICIENCY: ICD-10-CM

## 2019-07-01 DIAGNOSIS — M89.9 DISORDER OF BONE: ICD-10-CM

## 2019-07-01 PROCEDURE — 77080 DXA BONE DENSITY AXIAL: CPT

## 2019-07-01 RX ORDER — THYROID 60 MG
60 TABLET ORAL DAILY
Qty: 30 TABLET | Refills: 3 | Status: SHIPPED | OUTPATIENT
Start: 2019-07-01 | End: 2019-10-12 | Stop reason: SDUPTHER

## 2019-10-14 RX ORDER — THYROID 60 MG
TABLET ORAL
Qty: 30 TABLET | Refills: 3 | Status: SHIPPED | OUTPATIENT
Start: 2019-10-14 | End: 2019-12-30 | Stop reason: SDUPTHER

## 2019-12-20 ENCOUNTER — TELEPHONE (OUTPATIENT)
Dept: ADMINISTRATIVE | Age: 69
End: 2019-12-20

## 2019-12-20 RX ORDER — CIPROFLOXACIN 500 MG/1
500 TABLET, FILM COATED ORAL 2 TIMES DAILY
Qty: 10 TABLET | Refills: 0 | Status: SHIPPED | OUTPATIENT
Start: 2019-12-20 | End: 2019-12-25

## 2019-12-23 DIAGNOSIS — M89.9 DISORDER OF BONE: ICD-10-CM

## 2019-12-23 DIAGNOSIS — Z00.00 HEALTHCARE MAINTENANCE: ICD-10-CM

## 2019-12-23 LAB
ALBUMIN SERPL-MCNC: 4.1 G/DL (ref 3.5–5.2)
ALP BLD-CCNC: 70 U/L (ref 35–104)
ALT SERPL-CCNC: 10 U/L (ref 5–33)
ANION GAP SERPL CALCULATED.3IONS-SCNC: 16 MMOL/L (ref 7–19)
AST SERPL-CCNC: 17 U/L (ref 5–32)
BASOPHILS ABSOLUTE: 0 K/UL (ref 0–0.2)
BASOPHILS RELATIVE PERCENT: 0.5 % (ref 0–1)
BILIRUB SERPL-MCNC: 0.3 MG/DL (ref 0.2–1.2)
BILIRUBIN URINE: NEGATIVE
BLOOD, URINE: NEGATIVE
BUN BLDV-MCNC: 29 MG/DL (ref 8–23)
CALCIUM SERPL-MCNC: 9.4 MG/DL (ref 8.8–10.2)
CHLORIDE BLD-SCNC: 103 MMOL/L (ref 98–111)
CHOLESTEROL, TOTAL: 138 MG/DL (ref 160–199)
CLARITY: CLEAR
CO2: 23 MMOL/L (ref 22–29)
COLOR: YELLOW
CREAT SERPL-MCNC: 1.1 MG/DL (ref 0.5–0.9)
EOSINOPHILS ABSOLUTE: 0.4 K/UL (ref 0–0.6)
EOSINOPHILS RELATIVE PERCENT: 5.5 % (ref 0–5)
GFR NON-AFRICAN AMERICAN: 49
GLUCOSE BLD-MCNC: 98 MG/DL (ref 74–109)
GLUCOSE URINE: NEGATIVE MG/DL
HCT VFR BLD CALC: 39.6 % (ref 37–47)
HDLC SERPL-MCNC: 50 MG/DL (ref 65–121)
HEMOGLOBIN: 13 G/DL (ref 12–16)
IMMATURE GRANULOCYTES #: 0 K/UL
KETONES, URINE: NEGATIVE MG/DL
LDL CHOLESTEROL CALCULATED: 44 MG/DL
LEUKOCYTE ESTERASE, URINE: NEGATIVE
LYMPHOCYTES ABSOLUTE: 2.7 K/UL (ref 1.1–4.5)
LYMPHOCYTES RELATIVE PERCENT: 34.7 % (ref 20–40)
MCH RBC QN AUTO: 30.4 PG (ref 27–31)
MCHC RBC AUTO-ENTMCNC: 32.8 G/DL (ref 33–37)
MCV RBC AUTO: 92.5 FL (ref 81–99)
MONOCYTES ABSOLUTE: 0.5 K/UL (ref 0–0.9)
MONOCYTES RELATIVE PERCENT: 6 % (ref 0–10)
NEUTROPHILS ABSOLUTE: 4.1 K/UL (ref 1.5–7.5)
NEUTROPHILS RELATIVE PERCENT: 53 % (ref 50–65)
NITRITE, URINE: NEGATIVE
PDW BLD-RTO: 11.9 % (ref 11.5–14.5)
PH UA: 5 (ref 5–8)
PLATELET # BLD: 275 K/UL (ref 130–400)
PMV BLD AUTO: 9.9 FL (ref 9.4–12.3)
POTASSIUM SERPL-SCNC: 4.2 MMOL/L (ref 3.5–5)
PROTEIN UA: NEGATIVE MG/DL
RBC # BLD: 4.28 M/UL (ref 4.2–5.4)
SODIUM BLD-SCNC: 142 MMOL/L (ref 136–145)
SPECIFIC GRAVITY UA: 1.01 (ref 1–1.03)
TOTAL PROTEIN: 6.9 G/DL (ref 6.6–8.7)
TRIGL SERPL-MCNC: 218 MG/DL (ref 0–149)
TSH SERPL DL<=0.05 MIU/L-ACNC: 0.89 UIU/ML (ref 0.27–4.2)
URINE REFLEX TO CULTURE: NORMAL
UROBILINOGEN, URINE: 0.2 E.U./DL
VITAMIN D 25-HYDROXY: 73.4 NG/ML
WBC # BLD: 7.7 K/UL (ref 4.8–10.8)

## 2019-12-30 ENCOUNTER — OFFICE VISIT (OUTPATIENT)
Dept: INTERNAL MEDICINE | Age: 69
End: 2019-12-30
Payer: MEDICARE

## 2019-12-30 VITALS
BODY MASS INDEX: 25.64 KG/M2 | SYSTOLIC BLOOD PRESSURE: 136 MMHG | OXYGEN SATURATION: 98 % | DIASTOLIC BLOOD PRESSURE: 64 MMHG | WEIGHT: 128 LBS | HEART RATE: 70 BPM

## 2019-12-30 DIAGNOSIS — E55.9 VITAMIN D DEFICIENCY: Primary | ICD-10-CM

## 2019-12-30 DIAGNOSIS — E78.01 FAMILIAL HYPERCHOLESTEROLEMIA: Chronic | ICD-10-CM

## 2019-12-30 DIAGNOSIS — Z12.31 ENCOUNTER FOR SCREENING MAMMOGRAM FOR MALIGNANT NEOPLASM OF BREAST: ICD-10-CM

## 2019-12-30 DIAGNOSIS — I10 ESSENTIAL HYPERTENSION: Chronic | ICD-10-CM

## 2019-12-30 DIAGNOSIS — Z12.39 SCREENING FOR BREAST CANCER: ICD-10-CM

## 2019-12-30 DIAGNOSIS — E03.9 ACQUIRED HYPOTHYROIDISM: Chronic | ICD-10-CM

## 2019-12-30 DIAGNOSIS — E78.2 MIXED HYPERLIPIDEMIA: ICD-10-CM

## 2019-12-30 PROCEDURE — G8417 CALC BMI ABV UP PARAM F/U: HCPCS | Performed by: NURSE PRACTITIONER

## 2019-12-30 PROCEDURE — G8427 DOCREV CUR MEDS BY ELIG CLIN: HCPCS | Performed by: NURSE PRACTITIONER

## 2019-12-30 PROCEDURE — 99214 OFFICE O/P EST MOD 30 MIN: CPT | Performed by: NURSE PRACTITIONER

## 2019-12-30 PROCEDURE — 1036F TOBACCO NON-USER: CPT | Performed by: NURSE PRACTITIONER

## 2019-12-30 PROCEDURE — 4040F PNEUMOC VAC/ADMIN/RCVD: CPT | Performed by: NURSE PRACTITIONER

## 2019-12-30 PROCEDURE — 3017F COLORECTAL CA SCREEN DOC REV: CPT | Performed by: NURSE PRACTITIONER

## 2019-12-30 PROCEDURE — G8484 FLU IMMUNIZE NO ADMIN: HCPCS | Performed by: NURSE PRACTITIONER

## 2019-12-30 PROCEDURE — G8399 PT W/DXA RESULTS DOCUMENT: HCPCS | Performed by: NURSE PRACTITIONER

## 2019-12-30 PROCEDURE — 1090F PRES/ABSN URINE INCON ASSESS: CPT | Performed by: NURSE PRACTITIONER

## 2019-12-30 PROCEDURE — 1123F ACP DISCUSS/DSCN MKR DOCD: CPT | Performed by: NURSE PRACTITIONER

## 2019-12-30 RX ORDER — LOSARTAN POTASSIUM 100 MG/1
100 TABLET ORAL DAILY
Qty: 90 TABLET | Refills: 3 | Status: SHIPPED | OUTPATIENT
Start: 2019-12-30 | End: 2020-10-26 | Stop reason: SDUPTHER

## 2019-12-30 RX ORDER — TRIAMTERENE AND HYDROCHLOROTHIAZIDE 37.5; 25 MG/1; MG/1
1 TABLET ORAL DAILY
Qty: 90 TABLET | Refills: 3 | Status: SHIPPED | OUTPATIENT
Start: 2019-12-30 | End: 2020-10-26 | Stop reason: SDUPTHER

## 2019-12-30 RX ORDER — LOVASTATIN 40 MG/1
40 TABLET ORAL NIGHTLY
Qty: 90 TABLET | Refills: 3 | Status: SHIPPED | OUTPATIENT
Start: 2019-12-30 | End: 2020-10-26 | Stop reason: SDUPTHER

## 2019-12-30 RX ORDER — ESTRADIOL 0.5 MG/1
0.5 TABLET ORAL DAILY
Qty: 90 TABLET | Refills: 3 | Status: SHIPPED | OUTPATIENT
Start: 2019-12-30 | End: 2020-10-26 | Stop reason: SDUPTHER

## 2019-12-30 RX ORDER — ERGOCALCIFEROL 1.25 MG/1
50000 CAPSULE ORAL
Qty: 24 CAPSULE | Refills: 5 | Status: SHIPPED | OUTPATIENT
Start: 2019-12-30 | End: 2020-12-29 | Stop reason: SDUPTHER

## 2019-12-30 RX ORDER — THYROID 60 MG
TABLET ORAL
Qty: 30 TABLET | Refills: 3 | Status: SHIPPED | OUTPATIENT
Start: 2019-12-30 | End: 2020-01-23

## 2019-12-30 ASSESSMENT — ENCOUNTER SYMPTOMS
COLOR CHANGE: 0
EYE ITCHING: 0
TROUBLE SWALLOWING: 0
SHORTNESS OF BREATH: 0
EYE DISCHARGE: 0
CHOKING: 0
VOMITING: 0
BLOOD IN STOOL: 0
STRIDOR: 0
SORE THROAT: 0
COUGH: 0
CONSTIPATION: 0
ABDOMINAL PAIN: 0
NAUSEA: 0
DIARRHEA: 0
ABDOMINAL DISTENTION: 0
WHEEZING: 0

## 2020-01-20 ENCOUNTER — HOSPITAL ENCOUNTER (OUTPATIENT)
Dept: WOMENS IMAGING | Age: 70
Discharge: HOME | End: 2020-01-20
Payer: MEDICARE

## 2020-01-20 PROCEDURE — G0279 TOMOSYNTHESIS, MAMMO: HCPCS

## 2020-01-23 RX ORDER — THYROID 60 MG
TABLET ORAL
Qty: 30 TABLET | Refills: 2 | Status: SHIPPED
Start: 2020-01-23 | End: 2020-03-02

## 2020-02-04 ENCOUNTER — HOSPITAL ENCOUNTER (OUTPATIENT)
Dept: WOMENS IMAGING | Age: 70
Discharge: HOME | End: 2020-02-04
Payer: MEDICARE

## 2020-02-04 PROCEDURE — G0279 TOMOSYNTHESIS, MAMMO: HCPCS

## 2020-02-04 PROCEDURE — 76642 ULTRASOUND BREAST LIMITED: CPT

## 2020-02-14 NOTE — PROGRESS NOTES
physical examination. IMPRESSION: Painful contractured breast implants    PLAN: I would recommend removal of her implants at her convenience. I do not see anything worrisome. MRI may possibly be helpful but only if insurance requires it. DISCUSSION:  We discussed  the fibrocystic disease and its relationship to breast cancer  , the pathophysiology of breast cancer, the pathophysiology of fibrocystic disease, the importance of routine mammograms, the technique of good breast self-examination and encouraged her, the effect of caffeine on her breasts and the risks and benefits of hormone replacement therapy    I have seen, examined and reviewed this patient medication list, appropriate labs and imaging studies. I reviewed relevant medical records and others physicians notes. I discussed the plans of care with the patient. I answered all the questions to the patients satisfaction. I, Dr Jamshid Bryant, personally performed the services described in this documentation as scribed by Ana Emerson MA in my presence and is both accurate and complete. (Please note that portions of this note were completed with a voice recognition program. Efforts were made to edit the dictations but occasionally words are mis-transcribed.)  Over 50% of the total visit time of 60 minutes in face to face encounter with the patient, out of which more than 50% of the time was spent in counseling patient or family and coordination of care. Counseling included but was not limited to time spent reviewing labs, imaging studies/ treatment plan and answering questions.

## 2020-02-17 ENCOUNTER — OFFICE VISIT (OUTPATIENT)
Dept: SURGERY | Age: 70
End: 2020-02-17
Payer: MEDICARE

## 2020-02-17 VITALS
HEART RATE: 84 BPM | DIASTOLIC BLOOD PRESSURE: 70 MMHG | SYSTOLIC BLOOD PRESSURE: 136 MMHG | WEIGHT: 130 LBS | HEIGHT: 60 IN | BODY MASS INDEX: 25.52 KG/M2

## 2020-02-17 PROCEDURE — 3017F COLORECTAL CA SCREEN DOC REV: CPT | Performed by: SURGERY

## 2020-02-17 PROCEDURE — G8417 CALC BMI ABV UP PARAM F/U: HCPCS | Performed by: SURGERY

## 2020-02-17 PROCEDURE — 99205 OFFICE O/P NEW HI 60 MIN: CPT | Performed by: SURGERY

## 2020-02-17 PROCEDURE — 1123F ACP DISCUSS/DSCN MKR DOCD: CPT | Performed by: SURGERY

## 2020-02-17 PROCEDURE — 1090F PRES/ABSN URINE INCON ASSESS: CPT | Performed by: SURGERY

## 2020-02-17 PROCEDURE — G8399 PT W/DXA RESULTS DOCUMENT: HCPCS | Performed by: SURGERY

## 2020-02-17 PROCEDURE — G8484 FLU IMMUNIZE NO ADMIN: HCPCS | Performed by: SURGERY

## 2020-02-17 PROCEDURE — 4040F PNEUMOC VAC/ADMIN/RCVD: CPT | Performed by: SURGERY

## 2020-02-17 PROCEDURE — 1036F TOBACCO NON-USER: CPT | Performed by: SURGERY

## 2020-02-17 PROCEDURE — G8428 CUR MEDS NOT DOCUMENT: HCPCS | Performed by: SURGERY

## 2020-02-18 PROBLEM — T85.44XA BREAST IMPLANT CAPSULAR CONTRACTURE: Status: ACTIVE | Noted: 2020-02-18

## 2020-03-02 ENCOUNTER — HOSPITAL ENCOUNTER (OUTPATIENT)
Dept: PREADMISSION TESTING | Age: 70
Discharge: HOME | End: 2020-03-06
Payer: MEDICARE

## 2020-03-02 VITALS — BODY MASS INDEX: 25.52 KG/M2 | HEIGHT: 60 IN | WEIGHT: 130 LBS

## 2020-03-02 LAB
EKG P AXIS: 52 DEGREES
EKG P-R INTERVAL: 156 MS
EKG Q-T INTERVAL: 412 MS
EKG QRS DURATION: 96 MS
EKG QTC CALCULATION (BAZETT): 433 MS
EKG T AXIS: 35 DEGREES

## 2020-03-02 PROCEDURE — 93005 ELECTROCARDIOGRAM TRACING: CPT | Performed by: ANESTHESIOLOGY

## 2020-03-02 PROCEDURE — 93010 ELECTROCARDIOGRAM REPORT: CPT | Performed by: INTERNAL MEDICINE

## 2020-03-02 RX ORDER — LEVOTHYROXINE AND LIOTHYRONINE 38; 9 UG/1; UG/1
60 TABLET ORAL DAILY
COMMUNITY
End: 2020-04-08

## 2020-03-25 ENCOUNTER — TELEPHONE (OUTPATIENT)
Dept: SURGERY | Age: 70
End: 2020-03-25

## 2020-04-08 RX ORDER — THYROID 60 MG
TABLET ORAL
Qty: 35 TABLET | Refills: 1 | Status: SHIPPED | OUTPATIENT
Start: 2020-04-08 | End: 2020-06-08

## 2020-05-28 ENCOUNTER — HOSPITAL ENCOUNTER (OUTPATIENT)
Dept: PREADMISSION TESTING | Age: 70
Discharge: HOME | End: 2020-06-01
Payer: MEDICARE

## 2020-05-28 VITALS — HEIGHT: 60 IN | BODY MASS INDEX: 25.13 KG/M2 | WEIGHT: 128 LBS

## 2020-05-28 LAB
ANION GAP SERPL CALCULATED.3IONS-SCNC: 12 MMOL/L (ref 7–19)
BASOPHILS ABSOLUTE: 0 K/UL (ref 0–0.2)
BASOPHILS RELATIVE PERCENT: 0.4 % (ref 0–1)
BUN BLDV-MCNC: 25 MG/DL (ref 8–23)
CALCIUM SERPL-MCNC: 9.7 MG/DL (ref 8.8–10.2)
CHLORIDE BLD-SCNC: 105 MMOL/L (ref 98–111)
CO2: 26 MMOL/L (ref 22–29)
CREAT SERPL-MCNC: 1.2 MG/DL (ref 0.5–0.9)
EKG P AXIS: 58 DEGREES
EKG P-R INTERVAL: 160 MS
EKG Q-T INTERVAL: 396 MS
EKG QRS DURATION: 94 MS
EKG QTC CALCULATION (BAZETT): 417 MS
EKG T AXIS: 36 DEGREES
EOSINOPHILS ABSOLUTE: 0.4 K/UL (ref 0–0.6)
EOSINOPHILS RELATIVE PERCENT: 4.3 % (ref 0–5)
GFR NON-AFRICAN AMERICAN: 44
GLUCOSE BLD-MCNC: 85 MG/DL (ref 74–109)
HCT VFR BLD CALC: 37.8 % (ref 37–47)
HEMOGLOBIN: 12.4 G/DL (ref 12–16)
IMMATURE GRANULOCYTES #: 0 K/UL
LYMPHOCYTES ABSOLUTE: 3.3 K/UL (ref 1.1–4.5)
LYMPHOCYTES RELATIVE PERCENT: 39.7 % (ref 20–40)
MCH RBC QN AUTO: 31 PG (ref 27–31)
MCHC RBC AUTO-ENTMCNC: 32.8 G/DL (ref 33–37)
MCV RBC AUTO: 94.5 FL (ref 81–99)
MONOCYTES ABSOLUTE: 0.4 K/UL (ref 0–0.9)
MONOCYTES RELATIVE PERCENT: 5.3 % (ref 0–10)
NEUTROPHILS ABSOLUTE: 4.2 K/UL (ref 1.5–7.5)
NEUTROPHILS RELATIVE PERCENT: 50.1 % (ref 50–65)
PDW BLD-RTO: 11.7 % (ref 11.5–14.5)
PLATELET # BLD: 260 K/UL (ref 130–400)
PMV BLD AUTO: 10 FL (ref 9.4–12.3)
POTASSIUM SERPL-SCNC: 4.5 MMOL/L (ref 3.5–5)
RBC # BLD: 4 M/UL (ref 4.2–5.4)
SODIUM BLD-SCNC: 143 MMOL/L (ref 136–145)
WBC # BLD: 8.3 K/UL (ref 4.8–10.8)

## 2020-05-28 PROCEDURE — 85025 COMPLETE CBC W/AUTO DIFF WBC: CPT

## 2020-05-28 PROCEDURE — 93005 ELECTROCARDIOGRAM TRACING: CPT | Performed by: ANESTHESIOLOGY

## 2020-05-28 PROCEDURE — 80048 BASIC METABOLIC PNL TOTAL CA: CPT

## 2020-05-28 PROCEDURE — 93010 ELECTROCARDIOGRAM REPORT: CPT | Performed by: INTERNAL MEDICINE

## 2020-05-28 RX ORDER — ACETAMINOPHEN 500 MG
1000 TABLET ORAL ONCE
Status: CANCELLED | OUTPATIENT
Start: 2020-06-11

## 2020-05-28 RX ORDER — GABAPENTIN 300 MG/1
300 CAPSULE ORAL ONCE
Status: CANCELLED | OUTPATIENT
Start: 2020-06-11

## 2020-05-28 RX ORDER — CELECOXIB 200 MG/1
200 CAPSULE ORAL ONCE
Status: CANCELLED | OUTPATIENT
Start: 2020-06-11

## 2020-06-07 ENCOUNTER — OFFICE VISIT (OUTPATIENT)
Age: 70
End: 2020-06-07

## 2020-06-07 VITALS — OXYGEN SATURATION: 98 % | TEMPERATURE: 96.8 F

## 2020-06-07 NOTE — PATIENT INSTRUCTIONS
Preventing the Spread of Coronavirus Disease 2019 in Homes and Residential Communities   For the most recent information go to [email or web address]    Prevention steps for People with confirmed or suspected COVID-19 (including persons under investigation) who do not need to be hospitalized  and   People with confirmed COVID-19 who were hospitalized and determined to be medically stable to go home    Your healthcare provider and public health staff will evaluate whether you can be cared for at home. If it is determined that you do not need to be hospitalized and can be isolated at home, you will be monitored by staff from your local or state health department. You should follow the prevention steps below until a healthcare provider or local or state health department says you can return to your normal activities. Stay home except to get medical care  People who are mildly ill with COVID-19 are able to isolate at home during their illness. You should restrict activities outside your home, except for getting medical care. Do not go to work, school, or public areas. Avoid using public transportation, ride-sharing, or taxis. Separate yourself from other people and animals in your home  People: As much as possible, you should stay in a specific room and away from other people in your home. Also, you should use a separate bathroom, if available. Animals: You should restrict contact with pets and other animals while you are sick with COVID-19, just like you would around other people. Although there have not been reports of pets or other animals becoming sick with COVID-19, it is still recommended that people sick with COVID-19 limit contact with animals until more information is known about the virus. When possible, have another member of your household care for your animals while you are sick.  If you are sick with COVID-19, avoid contact with your pet, including departments. redIT allows you to send messages to your doctor, view your test results, renew your prescriptions, schedule appointments, view visit notes, and more. How Do I Sign Up? 1. In your Internet browser, go to [email or web address]  2. Click on the Sign Up Now link in the Sign In box. You will see the New Member Sign Up page. 3. Enter your Portable [employer] Access Code exactly as it appears below. You will not need to use this code after youve completed the sign-up process. If you do not sign up before the expiration date, you must request a new code. [employer] Access Code: Activation code not generated  Current redIT Status: Patient Declined    4. Enter your Social Security Number ([id number]) and Date of Birth (mm/dd/yyyy) as indicated and click Submit. You will be taken to the next sign-up page. 5. Create a redIT ID. This will be your redIT login ID and cannot be changed, so think of one that is secure and easy to remember. 6. Create a redIT password. You can change your password at any time. 7. Enter your Password Reset Question and Answer. This can be used at a later time if you forget your password. 8. Enter your e-mail address. You will receive e-mail notification when new information is available in [address] 9. Click Sign Up. You can now view your medical record. Additional Information  If you have questions, please contact the physician practice where you receive care. Remember, redIT is NOT to be used for urgent needs. For medical emergencies, dial 911. For questions regarding your redIT account call [phone]. If you have a clinical question, please call your doctor's office.

## 2020-06-07 NOTE — PROGRESS NOTES
2020    Leisa Gaming (:  1950) is a 79 y.o. female, here requesting COVID-19 testing    History of Present Illness  Pt is here for pre-op COVID, not evaluated in this clinic    Vitals:    20 1206   Temp: 96.8 °F (36 °C)   TempSrc: Infrared   SpO2: 98%       ASSESSMENT  Screening for COVID-19/ Viral disease    PLAN    COVID-19 sample collected and submitted  Patient given detailed CDC instructions contained within After Visit Summary       An  electronic signature was used to authenticate this note.     --CHRIS Carlos - CNP on 2020 at 12:06 PM

## 2020-06-08 RX ORDER — THYROID 60 MG
TABLET ORAL
Qty: 35 TABLET | Refills: 2 | Status: SHIPPED | OUTPATIENT
Start: 2020-06-08 | End: 2020-12-29 | Stop reason: SDUPTHER

## 2020-06-08 NOTE — TELEPHONE ENCOUNTER
Nikkie called requesting a refill of the below medication which has been pended for you:     Requested Prescriptions     Pending Prescriptions Disp Refills    ARMOUR THYROID 60 MG tablet [Pharmacy Med Name: [place] Thyroid 60 MG Oral Tablet] 35 tablet 2     Sig: TAKE 1 TABLET BY MOUTH ONCE DAILY EXCEPT  1  DAY  A  WEEK  TAKE  2  TABS       Last Appointment Date: 12/30/2019  Next Appointment Date: 6/29/2020    Allergies   Allergen Reactions    Compazine [Prochlorperazine Maleate] Other (See Comments)     Eyelids flipped up and mouth stretched to the side.      Thorazine [Chlorpromazine] Other (See Comments)     Eyelids flipped up and mouth stretched to the side

## 2020-06-09 LAB
REPORT: NORMAL
SARS-COV-2: NOT DETECTED
THIS TEST SENT TO: NORMAL

## 2020-06-11 ENCOUNTER — HOSPITAL ENCOUNTER (OUTPATIENT)
Age: 70
Setting detail: OUTPATIENT SURGERY
Discharge: HOME | End: 2020-06-11
Attending: SURGERY | Admitting: SURGERY
Payer: MEDICARE

## 2020-06-11 ENCOUNTER — ANESTHESIA (OUTPATIENT)
Dept: OPERATING ROOM | Age: 70
End: 2020-06-11
Payer: MEDICARE

## 2020-06-11 ENCOUNTER — ANESTHESIA EVENT (OUTPATIENT)
Dept: OPERATING ROOM | Age: 70
End: 2020-06-11
Payer: MEDICARE

## 2020-06-11 VITALS
OXYGEN SATURATION: 97 % | WEIGHT: 130 LBS | HEIGHT: 60 IN | HEART RATE: 62 BPM | BODY MASS INDEX: 25.52 KG/M2 | RESPIRATION RATE: 18 BRPM | DIASTOLIC BLOOD PRESSURE: 74 MMHG | SYSTOLIC BLOOD PRESSURE: 160 MMHG | TEMPERATURE: 97.9 F

## 2020-06-11 VITALS
TEMPERATURE: 97 F | SYSTOLIC BLOOD PRESSURE: 85 MMHG | DIASTOLIC BLOOD PRESSURE: 49 MMHG | OXYGEN SATURATION: 97 % | RESPIRATION RATE: 7 BRPM

## 2020-06-11 PROCEDURE — 19371 PERI-IMPLT CAPSLC BRST COMPL: CPT | Performed by: PHYSICIAN ASSISTANT

## 2020-06-11 PROCEDURE — 6370000000 HC RX 637 (ALT 250 FOR IP): Performed by: ANESTHESIOLOGY

## 2020-06-11 PROCEDURE — 19371 PERI-IMPLT CAPSLC BRST COMPL: CPT | Performed by: SURGERY

## 2020-06-11 PROCEDURE — 3700000001 HC ADD 15 MINUTES (ANESTHESIA): Performed by: SURGERY

## 2020-06-11 PROCEDURE — 2709999900 HC NON-CHARGEABLE SUPPLY: Performed by: SURGERY

## 2020-06-11 PROCEDURE — 19330 RMVL RUPTURED BREAST IMPLANT: CPT | Performed by: SURGERY

## 2020-06-11 PROCEDURE — 2580000003 HC RX 258: Performed by: ANESTHESIOLOGY

## 2020-06-11 PROCEDURE — 2720000010 HC SURG SUPPLY STERILE: Performed by: SURGERY

## 2020-06-11 PROCEDURE — 2500000003 HC RX 250 WO HCPCS

## 2020-06-11 PROCEDURE — 2580000003 HC RX 258: Performed by: SURGERY

## 2020-06-11 PROCEDURE — 88307 TISSUE EXAM BY PATHOLOGIST: CPT

## 2020-06-11 PROCEDURE — 19330 RMVL RUPTURED BREAST IMPLANT: CPT | Performed by: PHYSICIAN ASSISTANT

## 2020-06-11 PROCEDURE — 6360000002 HC RX W HCPCS: Performed by: SURGERY

## 2020-06-11 PROCEDURE — 88341 IMHCHEM/IMCYTCHM EA ADD ANTB: CPT

## 2020-06-11 PROCEDURE — 6360000002 HC RX W HCPCS

## 2020-06-11 PROCEDURE — 3600000004 HC SURGERY LEVEL 4 BASE: Performed by: SURGERY

## 2020-06-11 PROCEDURE — 6370000000 HC RX 637 (ALT 250 FOR IP): Performed by: SURGERY

## 2020-06-11 PROCEDURE — 2500000003 HC RX 250 WO HCPCS: Performed by: ANESTHESIOLOGY

## 2020-06-11 PROCEDURE — 7100000001 HC PACU RECOVERY - ADDTL 15 MIN: Performed by: SURGERY

## 2020-06-11 PROCEDURE — 6360000002 HC RX W HCPCS: Performed by: ANESTHESIOLOGY

## 2020-06-11 PROCEDURE — 7100000010 HC PHASE II RECOVERY - FIRST 15 MIN: Performed by: SURGERY

## 2020-06-11 PROCEDURE — 3700000000 HC ANESTHESIA ATTENDED CARE: Performed by: SURGERY

## 2020-06-11 PROCEDURE — 7100000000 HC PACU RECOVERY - FIRST 15 MIN: Performed by: SURGERY

## 2020-06-11 PROCEDURE — 2500000003 HC RX 250 WO HCPCS: Performed by: SURGERY

## 2020-06-11 PROCEDURE — 88342 IMHCHEM/IMCYTCHM 1ST ANTB: CPT

## 2020-06-11 PROCEDURE — 3600000014 HC SURGERY LEVEL 4 ADDTL 15MIN: Performed by: SURGERY

## 2020-06-11 PROCEDURE — 88305 TISSUE EXAM BY PATHOLOGIST: CPT

## 2020-06-11 RX ORDER — FENTANYL CITRATE 50 UG/ML
INJECTION, SOLUTION INTRAMUSCULAR; INTRAVENOUS PRN
Status: DISCONTINUED | OUTPATIENT
Start: 2020-06-11 | End: 2020-06-11 | Stop reason: SDUPTHER

## 2020-06-11 RX ORDER — HYDROMORPHONE HYDROCHLORIDE 1 MG/ML
0.5 INJECTION, SOLUTION INTRAMUSCULAR; INTRAVENOUS; SUBCUTANEOUS EVERY 5 MIN PRN
Status: DISCONTINUED | OUTPATIENT
Start: 2020-06-11 | End: 2020-06-11 | Stop reason: HOSPADM

## 2020-06-11 RX ORDER — SCOLOPAMINE TRANSDERMAL SYSTEM 1 MG/1
1 PATCH, EXTENDED RELEASE TRANSDERMAL ONCE
Status: DISCONTINUED | OUTPATIENT
Start: 2020-06-11 | End: 2020-06-11 | Stop reason: HOSPADM

## 2020-06-11 RX ORDER — DEXAMETHASONE SODIUM PHOSPHATE 10 MG/ML
INJECTION, SOLUTION INTRAMUSCULAR; INTRAVENOUS PRN
Status: DISCONTINUED | OUTPATIENT
Start: 2020-06-11 | End: 2020-06-11 | Stop reason: SDUPTHER

## 2020-06-11 RX ORDER — MORPHINE SULFATE 4 MG/ML
2 INJECTION, SOLUTION INTRAMUSCULAR; INTRAVENOUS EVERY 5 MIN PRN
Status: DISCONTINUED | OUTPATIENT
Start: 2020-06-11 | End: 2020-06-11 | Stop reason: HOSPADM

## 2020-06-11 RX ORDER — LIDOCAINE HYDROCHLORIDE 10 MG/ML
INJECTION, SOLUTION EPIDURAL; INFILTRATION; INTRACAUDAL; PERINEURAL PRN
Status: DISCONTINUED | OUTPATIENT
Start: 2020-06-11 | End: 2020-06-11 | Stop reason: SDUPTHER

## 2020-06-11 RX ORDER — DIPHENHYDRAMINE HYDROCHLORIDE 50 MG/ML
12.5 INJECTION INTRAMUSCULAR; INTRAVENOUS
Status: DISCONTINUED | OUTPATIENT
Start: 2020-06-11 | End: 2020-06-11 | Stop reason: HOSPADM

## 2020-06-11 RX ORDER — METOCLOPRAMIDE HYDROCHLORIDE 5 MG/ML
10 INJECTION INTRAMUSCULAR; INTRAVENOUS ONCE
Status: COMPLETED | OUTPATIENT
Start: 2020-06-11 | End: 2020-06-11

## 2020-06-11 RX ORDER — MORPHINE SULFATE 4 MG/ML
4 INJECTION, SOLUTION INTRAMUSCULAR; INTRAVENOUS EVERY 5 MIN PRN
Status: DISCONTINUED | OUTPATIENT
Start: 2020-06-11 | End: 2020-06-11 | Stop reason: HOSPADM

## 2020-06-11 RX ORDER — LABETALOL HYDROCHLORIDE 5 MG/ML
5 INJECTION, SOLUTION INTRAVENOUS EVERY 10 MIN PRN
Status: DISCONTINUED | OUTPATIENT
Start: 2020-06-11 | End: 2020-06-11 | Stop reason: HOSPADM

## 2020-06-11 RX ORDER — CELECOXIB 200 MG/1
200 CAPSULE ORAL ONCE
Status: COMPLETED | OUTPATIENT
Start: 2020-06-11 | End: 2020-06-11

## 2020-06-11 RX ORDER — SODIUM CHLORIDE 0.9 % (FLUSH) 0.9 %
10 SYRINGE (ML) INJECTION EVERY 12 HOURS SCHEDULED
Status: DISCONTINUED | OUTPATIENT
Start: 2020-06-11 | End: 2020-06-11 | Stop reason: HOSPADM

## 2020-06-11 RX ORDER — SODIUM CHLORIDE, SODIUM LACTATE, POTASSIUM CHLORIDE, CALCIUM CHLORIDE 600; 310; 30; 20 MG/100ML; MG/100ML; MG/100ML; MG/100ML
INJECTION, SOLUTION INTRAVENOUS CONTINUOUS
Status: DISCONTINUED | OUTPATIENT
Start: 2020-06-11 | End: 2020-06-11 | Stop reason: HOSPADM

## 2020-06-11 RX ORDER — LIDOCAINE HYDROCHLORIDE 10 MG/ML
1 INJECTION, SOLUTION EPIDURAL; INFILTRATION; INTRACAUDAL; PERINEURAL
Status: DISCONTINUED | OUTPATIENT
Start: 2020-06-11 | End: 2020-06-11 | Stop reason: HOSPADM

## 2020-06-11 RX ORDER — MEPERIDINE HYDROCHLORIDE 50 MG/ML
12.5 INJECTION INTRAMUSCULAR; INTRAVENOUS; SUBCUTANEOUS EVERY 5 MIN PRN
Status: DISCONTINUED | OUTPATIENT
Start: 2020-06-11 | End: 2020-06-11 | Stop reason: HOSPADM

## 2020-06-11 RX ORDER — ENALAPRILAT 2.5 MG/2ML
1.25 INJECTION INTRAVENOUS
Status: DISCONTINUED | OUTPATIENT
Start: 2020-06-11 | End: 2020-06-11 | Stop reason: HOSPADM

## 2020-06-11 RX ORDER — FENTANYL CITRATE 50 UG/ML
50 INJECTION, SOLUTION INTRAMUSCULAR; INTRAVENOUS
Status: DISCONTINUED | OUTPATIENT
Start: 2020-06-11 | End: 2020-06-11 | Stop reason: HOSPADM

## 2020-06-11 RX ORDER — PROPOFOL 10 MG/ML
INJECTION, EMULSION INTRAVENOUS PRN
Status: DISCONTINUED | OUTPATIENT
Start: 2020-06-11 | End: 2020-06-11 | Stop reason: SDUPTHER

## 2020-06-11 RX ORDER — METOCLOPRAMIDE HYDROCHLORIDE 5 MG/ML
10 INJECTION INTRAMUSCULAR; INTRAVENOUS
Status: DISCONTINUED | OUTPATIENT
Start: 2020-06-11 | End: 2020-06-11 | Stop reason: HOSPADM

## 2020-06-11 RX ORDER — ONDANSETRON 2 MG/ML
INJECTION INTRAMUSCULAR; INTRAVENOUS PRN
Status: DISCONTINUED | OUTPATIENT
Start: 2020-06-11 | End: 2020-06-11 | Stop reason: SDUPTHER

## 2020-06-11 RX ORDER — APREPITANT 40 MG/1
40 CAPSULE ORAL ONCE
Status: COMPLETED | OUTPATIENT
Start: 2020-06-11 | End: 2020-06-11

## 2020-06-11 RX ORDER — MORPHINE SULFATE 4 MG/ML
4 INJECTION, SOLUTION INTRAMUSCULAR; INTRAVENOUS
Status: DISCONTINUED | OUTPATIENT
Start: 2020-06-11 | End: 2020-06-11 | Stop reason: HOSPADM

## 2020-06-11 RX ORDER — SODIUM CHLORIDE 0.9 % (FLUSH) 0.9 %
10 SYRINGE (ML) INJECTION PRN
Status: DISCONTINUED | OUTPATIENT
Start: 2020-06-11 | End: 2020-06-11 | Stop reason: HOSPADM

## 2020-06-11 RX ORDER — HYDRALAZINE HYDROCHLORIDE 20 MG/ML
5 INJECTION INTRAMUSCULAR; INTRAVENOUS EVERY 10 MIN PRN
Status: DISCONTINUED | OUTPATIENT
Start: 2020-06-11 | End: 2020-06-11 | Stop reason: HOSPADM

## 2020-06-11 RX ORDER — GABAPENTIN 300 MG/1
300 CAPSULE ORAL ONCE
Status: COMPLETED | OUTPATIENT
Start: 2020-06-11 | End: 2020-06-11

## 2020-06-11 RX ORDER — HYDROCODONE BITARTRATE AND ACETAMINOPHEN 5; 325 MG/1; MG/1
1 TABLET ORAL EVERY 6 HOURS PRN
Qty: 12 TABLET | Refills: 0 | Status: SHIPPED | OUTPATIENT
Start: 2020-06-11 | End: 2021-06-11

## 2020-06-11 RX ORDER — ACETAMINOPHEN 325 MG/1
975 TABLET ORAL ONCE
Status: COMPLETED | OUTPATIENT
Start: 2020-06-11 | End: 2020-06-11

## 2020-06-11 RX ORDER — MIDAZOLAM HYDROCHLORIDE 1 MG/ML
2 INJECTION INTRAMUSCULAR; INTRAVENOUS
Status: DISCONTINUED | OUTPATIENT
Start: 2020-06-11 | End: 2020-06-11 | Stop reason: HOSPADM

## 2020-06-11 RX ORDER — HYDROMORPHONE HYDROCHLORIDE 1 MG/ML
0.25 INJECTION, SOLUTION INTRAMUSCULAR; INTRAVENOUS; SUBCUTANEOUS EVERY 5 MIN PRN
Status: DISCONTINUED | OUTPATIENT
Start: 2020-06-11 | End: 2020-06-11 | Stop reason: HOSPADM

## 2020-06-11 RX ADMIN — GABAPENTIN 300 MG: 300 CAPSULE ORAL at 15:37

## 2020-06-11 RX ADMIN — LIDOCAINE HYDROCHLORIDE 50 MG: 10 INJECTION, SOLUTION EPIDURAL; INFILTRATION; INTRACAUDAL; PERINEURAL at 16:34

## 2020-06-11 RX ADMIN — FAMOTIDINE 20 MG: 10 INJECTION, SOLUTION INTRAVENOUS at 15:38

## 2020-06-11 RX ADMIN — PROPOFOL 150 MG: 10 INJECTION, EMULSION INTRAVENOUS at 16:34

## 2020-06-11 RX ADMIN — CELECOXIB 200 MG: 200 CAPSULE ORAL at 15:37

## 2020-06-11 RX ADMIN — WATER 2 G: 1 INJECTION INTRAMUSCULAR; INTRAVENOUS; SUBCUTANEOUS at 16:40

## 2020-06-11 RX ADMIN — DEXAMETHASONE SODIUM PHOSPHATE 10 MG: 10 INJECTION, SOLUTION INTRAMUSCULAR; INTRAVENOUS at 16:40

## 2020-06-11 RX ADMIN — METOCLOPRAMIDE 10 MG: 5 INJECTION, SOLUTION INTRAMUSCULAR; INTRAVENOUS at 15:38

## 2020-06-11 RX ADMIN — APREPITANT 40 MG: 40 CAPSULE ORAL at 15:38

## 2020-06-11 RX ADMIN — ONDANSETRON HYDROCHLORIDE 4 MG: 2 INJECTION, SOLUTION INTRAMUSCULAR; INTRAVENOUS at 16:40

## 2020-06-11 RX ADMIN — ACETAMINOPHEN 975 MG: 325 TABLET, FILM COATED ORAL at 15:37

## 2020-06-11 RX ADMIN — SODIUM CHLORIDE, SODIUM LACTATE, POTASSIUM CHLORIDE, AND CALCIUM CHLORIDE: 600; 310; 30; 20 INJECTION, SOLUTION INTRAVENOUS at 15:31

## 2020-06-11 RX ADMIN — FENTANYL CITRATE 50 MCG: 50 INJECTION INTRAMUSCULAR; INTRAVENOUS at 16:42

## 2020-06-11 ASSESSMENT — PAIN SCALES - GENERAL
PAINLEVEL_OUTOF10: 0
PAINLEVEL_OUTOF10: 0

## 2020-06-11 ASSESSMENT — LIFESTYLE VARIABLES: SMOKING_STATUS: 0

## 2020-06-11 NOTE — ANESTHESIA PRE PROCEDURE
Department of Anesthesiology  Preprocedure Note       Name:  Rachelle Mensah   Age:  79 y.o.  :  1950                                          MRN:  [id number]         Date:  2020      Surgeon: Chuy Draper):  Reji Hope MD    Procedure: Procedure(s):  REMOVAL BILATERAL CONTRACTURED IMPLANTS WITH BILATERAL PECTORAL BLOCK    Medications prior to admission:   Prior to Admission medications    Medication Sig Start Date End Date Taking?  Authorizing Provider   MICKY THYROID 60 MG tablet TAKE 1 TABLET BY MOUTH ONCE DAILY EXCEPT  1  DAY  A  WEEK  TAKE  2  TABS 20  Yes CHRIS Casillas   triamterene-hydrochlorothiazide [facility]) 37.5-25 MG per tablet Take 1 tablet by mouth daily 19  Yes CHRIS Casillas   lovastatin (MEVACOR) 40 MG tablet Take 1 tablet by mouth nightly 19  Yes CHRIS Casillas   losartan (COZAAR) 100 MG tablet Take 1 tablet by mouth daily 19  Yes CHRIS Casillas   estradiol (ESTRACE) 0.5 MG tablet Take 1 tablet by mouth daily 19  Yes CHRIS Casillas   vitamin D (ERGOCALCIFEROL) 1.25 MG (07239 UT) CAPS capsule Take 1 capsule by mouth Twice a Week 19  Yes CHRIS Casillas   ibuprofen (ADVIL;MOTRIN) 200 MG tablet Take 200 mg by mouth every 8 hours as needed 2 tablets BID    Yes Historical Provider, MD   Biotin (BIOTIN 5000) 5 MG CAPS Take 2 tablets by mouth daily   Yes Historical Provider, MD   calcium carbonate-vitamin D (CALCIUM 600+D) 600-200 MG-UNIT TABS Take 1 tablet by mouth 2 times daily   Yes Historical Provider, MD   Omega-3 Fatty Acids (FISH OIL) 500 MG CAPS Take 2 capsules by mouth daily    Yes Historical Provider, MD   fluticasone-salmeterol (ADVAIR DISKUS) 500-50 MCG/DOSE diskus inhaler Inhale 1 puff into the lungs daily as needed     Historical Provider, MD       Current medications:    Current Facility-Administered Medications   Medication Dose Route Frequency Provider Last Rate Last Dose    acetaminophen (TYLENOL) Substance Use Topics    Alcohol use: No                                Counseling given: Not Answered      Vital Signs (Current):   Vitals:    06/11/20 1501   BP: (!) 162/75   Pulse: 76   Resp: 16   Temp: 97.5 °F (36.4 °C)   TempSrc: Temporal   SpO2: 98%   Weight: 130 lb (59 kg)   Height: 5' (1.524 m)                                              BP Readings from Last 3 Encounters:   06/11/20 (!) 162/75   02/17/20 136/70   02/10/20 128/60       NPO Status: Time of last liquid consumption: 0700                        Time of last solid consumption: 0700                        Date of last liquid consumption: 06/11/20                        Date of last solid food consumption: 06/11/20    BMI:   Wt Readings from Last 3 Encounters:   06/11/20 130 lb (59 kg)   05/28/20 128 lb (58.1 kg)   03/02/20 130 lb (59 kg)     Body mass index is 25.39 kg/m². CBC:   Lab Results   Component Value Date    WBC 8.3 05/28/2020    RBC 4.00 05/28/2020    HGB 12.4 05/28/2020    HCT 37.8 05/28/2020    MCV 94.5 05/28/2020    RDW 11.7 05/28/2020     05/28/2020       CMP:   Lab Results   Component Value Date     05/28/2020    K 4.5 05/28/2020     05/28/2020    CO2 26 05/28/2020    BUN 25 05/28/2020    CREATININE 1.2 05/28/2020    LABGLOM 44 05/28/2020    GLUCOSE 85 05/28/2020    PROT 6.9 12/23/2019    CALCIUM 9.7 05/28/2020    BILITOT 0.3 12/23/2019    ALKPHOS 70 12/23/2019    AST 17 12/23/2019    ALT 10 12/23/2019       POC Tests: No results for input(s): POCGLU, POCNA, POCK, POCCL, POCBUN, POCHEMO, POCHCT in the last 72 hours.     Coags: No results found for: PROTIME, INR, APTT    HCG (If Applicable): No results found for: PREGTESTUR, PREGSERUM, HCG, HCGQUANT     ABGs: No results found for: PHART, PO2ART, HJL1PZL, HRG9QDZ, BEART, [id number]     Type & Screen (If Applicable):  No results found for: LABABO, LABRH    Drug/Infectious Status (If Applicable):  No results found for: HIV, HEPCAB    COVID-19 Screening (If Applicable):   Lab Results   Component Value Date    COVID19 Not Detected 06/07/2020         Anesthesia Evaluation  Patient summary reviewed and Nursing notes reviewed   history of anesthetic complications: PONV. Airway: Mallampati: I  TM distance: >3 FB   Neck ROM: full   Dental:      Comment: Lower central incisors are bonded    Pulmonary:Negative Pulmonary ROS and normal exam        (-) not a current smoker                           Cardiovascular:    (+) hypertension:, hyperlipidemia      ECG reviewed                        Neuro/Psych:      (-) seizures and CVA           GI/Hepatic/Renal:        (-) GERD       Endo/Other:    (+) hypothyroidism::., .                 Abdominal:           Vascular: negative vascular ROS. Anesthesia Plan      general     ASA 2     (Scop/emend ordered in preop)  Induction: intravenous. MIPS: Postoperative opioids intended and Prophylactic antiemetics administered. Anesthetic plan and risks discussed with patient.                       Yang Rushing MD   6/11/2020

## 2020-06-11 NOTE — BRIEF OP NOTE
Brief Postoperative Note      DATE OF PROCEDURE: 6/11/2020     SURGEON: Rosana Lopez MD    PREOPERATIVE DIAGNOSIS:  T85.44XA    POSTOPERATIVE DIAGNOSIS: Same     OPERATION: Procedure(s):  REMOVAL BILATERAL CONTRACTURED IMPLANTS WITH BILATERAL PECTORAL BLOCK    ANESTHESIA: General    ESTIMATED BLOOD LOSS: Minimal    COMPLICATIONS: None. SPECIMENS:   ID Type Source Tests Collected by Time Destination   A : right breast tissue/implant Tissue Breast SURGICAL PATHOLOGY Rosana Lopez MD 6/11/2020 1701    B : left breast tissue/implant Tissue Breast SURGICAL PATHOLOGY Rosana Lopez MD 6/11/2020 1701        DRAINS:  2JPs    The patient tolerated the procedure well.     Electronically signed by Rosana Lopez MD  on 6/11/2020 at 5:20 PM

## 2020-06-16 NOTE — PROGRESS NOTES
HISTORY OF PRESENT ILLNESS:    Ms. Mil Vivar is a 79 y.o. white female who presents with implant problems. She had bilateral augmentations 30 to 40 years ago by Dr. Dinesh Myers. She is done relatively well but the right side in particular is been very hard, encapsulated, and painful over the years. The left does not bother her quite as much. She has no family history of breast cancer     She is post menopausal and is on HRT. She also is concerned about the cysts and dense breast tissue as well and is worried that the implants may obscure something more sinister. BREAST EXAM:  Nikkie  has  fibrocystic changes throughout both breasts. There are no dominant masses, no skin or nipple changes and no axillary adenopathy. She has bilateral  implants in place. The left is relatively soft. The right has a grade 4 contracture that is palpable and visible and tender. I see nothing suspicious for cancer on physical examination. She is now status post Bilateral implant removal and capsulectomies due to capsular contractures after augmentation mammoplasty with Bilateral intracapsular rupture. This was done on 6/11/2020    PATHOLOGY REVEALS:  FINAL DIAGNOSIS:    A.  Breast, right implant and capsule, removal:  Breast implant medical device with rupture  Benign capsule  Benign breast parenchyma with fibrocystic changes and usual ductal  hyperplasia    B.  Breast, left implant and capsule, removal:  Breast implant medical device with rupture  Benign capsule  Benign breast parenchyma with fibrocystic changes and calcifications    C.  Breast, left lesion, excision:  Intraductal papilloma  Usual ductal hyperplasia  Negative for malignancy  Intraductal papilloma is completely excised    PHYSICAL EXAM:  The  wounds look good with no evidence of infection, fluid accumulation, or skin necrosis.      VIVEK drains were removed without incident    IMPRESSION:    Doing well s/p Bilateral removal of implants and

## 2020-06-18 ENCOUNTER — OFFICE VISIT (OUTPATIENT)
Dept: SURGERY | Age: 70
End: 2020-06-18

## 2020-06-18 VITALS — HEART RATE: 80 BPM | DIASTOLIC BLOOD PRESSURE: 70 MMHG | SYSTOLIC BLOOD PRESSURE: 138 MMHG

## 2020-06-18 PROCEDURE — 99024 POSTOP FOLLOW-UP VISIT: CPT | Performed by: SURGERY

## 2020-06-18 NOTE — Clinical Note
Follow-up with Geoff Granger in 1 week for fluid check Follow-up with me in 1 month We will anticipate bilateral mammograms in 6 months

## 2020-06-21 PROBLEM — Z98.86 STATUS POST IMPLANT REMOVAL FROM BOTH BREASTS: Status: ACTIVE | Noted: 2020-06-21

## 2020-06-22 DIAGNOSIS — E55.9 VITAMIN D DEFICIENCY: ICD-10-CM

## 2020-06-22 DIAGNOSIS — E78.01 FAMILIAL HYPERCHOLESTEROLEMIA: Chronic | ICD-10-CM

## 2020-06-22 DIAGNOSIS — E03.9 ACQUIRED HYPOTHYROIDISM: Chronic | ICD-10-CM

## 2020-06-22 DIAGNOSIS — I10 ESSENTIAL HYPERTENSION: Chronic | ICD-10-CM

## 2020-06-22 LAB
ALBUMIN SERPL-MCNC: 4.2 G/DL (ref 3.5–5.2)
ALP BLD-CCNC: 68 U/L (ref 35–104)
ALT SERPL-CCNC: 15 U/L (ref 5–33)
ANION GAP SERPL CALCULATED.3IONS-SCNC: 14 MMOL/L (ref 7–19)
AST SERPL-CCNC: 21 U/L (ref 5–32)
BILIRUB SERPL-MCNC: 0.3 MG/DL (ref 0.2–1.2)
BILIRUBIN URINE: NEGATIVE
BLOOD, URINE: NEGATIVE
BUN BLDV-MCNC: 24 MG/DL (ref 8–23)
CALCIUM SERPL-MCNC: 9.5 MG/DL (ref 8.8–10.2)
CHLORIDE BLD-SCNC: 100 MMOL/L (ref 98–111)
CHOLESTEROL, TOTAL: 174 MG/DL (ref 160–199)
CLARITY: CLEAR
CO2: 26 MMOL/L (ref 22–29)
COLOR: YELLOW
CREAT SERPL-MCNC: 1 MG/DL (ref 0.5–0.9)
GFR NON-AFRICAN AMERICAN: 55
GLUCOSE BLD-MCNC: 94 MG/DL (ref 74–109)
GLUCOSE URINE: NEGATIVE MG/DL
HCT VFR BLD CALC: 39.2 % (ref 37–47)
HDLC SERPL-MCNC: 55 MG/DL (ref 65–121)
HEMOGLOBIN: 12.5 G/DL (ref 12–16)
KETONES, URINE: NEGATIVE MG/DL
LDL CHOLESTEROL CALCULATED: 66 MG/DL
LEUKOCYTE ESTERASE, URINE: NEGATIVE
MCH RBC QN AUTO: 30.2 PG (ref 27–31)
MCHC RBC AUTO-ENTMCNC: 31.9 G/DL (ref 33–37)
MCV RBC AUTO: 94.7 FL (ref 81–99)
NITRITE, URINE: NEGATIVE
PDW BLD-RTO: 11.9 % (ref 11.5–14.5)
PH UA: 6 (ref 5–8)
PLATELET # BLD: 254 K/UL (ref 130–400)
PMV BLD AUTO: 9.8 FL (ref 9.4–12.3)
POTASSIUM SERPL-SCNC: 4.6 MMOL/L (ref 3.5–5)
PROTEIN UA: NEGATIVE MG/DL
RBC # BLD: 4.14 M/UL (ref 4.2–5.4)
SODIUM BLD-SCNC: 140 MMOL/L (ref 136–145)
SPECIFIC GRAVITY UA: 1.01 (ref 1–1.03)
TOTAL PROTEIN: 6.7 G/DL (ref 6.6–8.7)
TRIGL SERPL-MCNC: 266 MG/DL (ref 0–149)
TSH SERPL DL<=0.05 MIU/L-ACNC: 0.44 UIU/ML (ref 0.27–4.2)
UROBILINOGEN, URINE: 0.2 E.U./DL
VITAMIN D 25-HYDROXY: 76.7 NG/ML
WBC # BLD: 9.3 K/UL (ref 4.8–10.8)

## 2020-06-29 ENCOUNTER — VIRTUAL VISIT (OUTPATIENT)
Dept: INTERNAL MEDICINE | Age: 70
End: 2020-06-29
Payer: MEDICARE

## 2020-06-29 PROCEDURE — 3017F COLORECTAL CA SCREEN DOC REV: CPT | Performed by: NURSE PRACTITIONER

## 2020-06-29 PROCEDURE — 99214 OFFICE O/P EST MOD 30 MIN: CPT | Performed by: NURSE PRACTITIONER

## 2020-06-29 PROCEDURE — 1090F PRES/ABSN URINE INCON ASSESS: CPT | Performed by: NURSE PRACTITIONER

## 2020-06-29 PROCEDURE — 1123F ACP DISCUSS/DSCN MKR DOCD: CPT | Performed by: NURSE PRACTITIONER

## 2020-06-29 PROCEDURE — G8417 CALC BMI ABV UP PARAM F/U: HCPCS | Performed by: NURSE PRACTITIONER

## 2020-06-29 PROCEDURE — G8427 DOCREV CUR MEDS BY ELIG CLIN: HCPCS | Performed by: NURSE PRACTITIONER

## 2020-06-29 PROCEDURE — 1036F TOBACCO NON-USER: CPT | Performed by: NURSE PRACTITIONER

## 2020-06-29 PROCEDURE — G8399 PT W/DXA RESULTS DOCUMENT: HCPCS | Performed by: NURSE PRACTITIONER

## 2020-06-29 PROCEDURE — 4040F PNEUMOC VAC/ADMIN/RCVD: CPT | Performed by: NURSE PRACTITIONER

## 2020-06-29 ASSESSMENT — ENCOUNTER SYMPTOMS
COUGH: 0
SHORTNESS OF BREATH: 0
VOMITING: 0
ABDOMINAL DISTENTION: 0
EYE DISCHARGE: 0
DIARRHEA: 0
TROUBLE SWALLOWING: 0
EYE ITCHING: 0
CHOKING: 0
COLOR CHANGE: 0
SORE THROAT: 0
CONSTIPATION: 0
ABDOMINAL PAIN: 0
STRIDOR: 0
NAUSEA: 0
BLOOD IN STOOL: 0
WHEEZING: 0

## 2020-06-29 NOTE — PROGRESS NOTES
[facility]  [address] [address]  [place] [address]  Dept: [phone]  Dept Fax: [phone]  Loc: [phone]    Aren Guerrero is a 79 y.o. female who were are performing tele health communication  for her medical conditions/complaints as noted below. Currently, our state is under umbrella of national state of emergency and we are using alternative methods of taking care of the needs of our patients so they are not exposed in our office; The patient has consented to this form of communication  Aren Guerrero is c/surendra   Hypertension    THE patient is at home   Tiyessica Dubon is at office   Others in attendance are none    HPI:     HPI   1. Breast surgery; Had implants removed; She has hardly any breast tissue; But she is ok with that ;  Had clear mammogram and will get repeated in 6 months; She has had drains removed; She has fu with him this week too;    2. Hypertension stable on maxide; And losartan 100 mg;    3. HM:  Her c-scope is due; She will wait until cleared by Dr Trinity Jarvis; She sees miguelito and will be calling them to set up   4. Hypothyroid; level is good; today; Stable on current meds; she has been on Perry Thyroid and she feels like since she switched back to brand name that her nails are getting better skin is getting better and saying her eyebrows have grown back. She thinks she is getting some hair growth back she would like to switch back to the Perry Thyroid compound from [facility]. I did call them to reorder that for her. They actually cannot find a record that she had ever had that prescription from them but she says that she did. And now it is available per [facility] the pharmacist.  5.  Hyperlipidemia; She has been off fish oil for about 6 weeks due to schedules;  Stays on lovastatin 40 daily   6. Vit d def; Takes 50,000 weekly; No side effects;   Level today is 68      Chief Complaint Patient presents with    Hypertension       Past Medical History:   Diagnosis Date    Acquired hypothyroidism 2017    Alopecia 2017    Breast implant capsular contracture     \"with cysts behind right breast\" per pt.      Essential hypertension 2017    Familial hypercholesterolemia 2017    PONV (postoperative nausea and vomiting)     Prolonged emergence from general anesthesia       Past Surgical History:   Procedure Laterality Date    BREAST ENHANCEMENT SURGERY Bilateral 2020    REMOVAL BILATERAL CONTRACTURED IMPLANTS WITH BILATERAL PECTORAL BLOCK performed by Amanuel Fair MD at [address] [facility]      bunion correct osteotomy smith double-stem lesser MTP Impl    COLONOSCOPY      HEMORRHOID SURGERY      HYSTERECTOMY      KNEE SURGERY Right     RECTAL SURGERY      anal fissurectomy with sphincterotomy    MAICOL AND BSO      TUBAL LIGATION         Vitals 2020   SYSTOLIC 730 - - - - -   DIASTOLIC 70 - - - - -   Site Right Upper Arm - - - - -   Position Sitting - - - - -   Cuff Size Medium Adult - - - - -   Pulse 80 - - - - -   Temp - - - - - -   Resp - 7 2 11 12 13   SpO2 - - - 97 96 96   Some recent data might be hidden       Family History   Problem Relation Age of Onset    High Blood Pressure Mother     Diabetes Mother         Type 2    High Blood Pressure Father     Diabetes Sister         Type 2    Diabetes Brother         Type 2    Cancer Brother         Blood form of cancer-not leukemia        Social History     Tobacco Use    Smoking status: Former Smoker     Packs/day: 1.00     Years: 8.00     Pack years: 8.00     Types: Cigarettes     Start date:      Last attempt to quit:      Years since quittin.5    Smokeless tobacco: Never Used   Substance Use Topics    Alcohol use: No      Current Outpatient Medications   Medication Sig Dispense Refill    HYDROcodone-acetaminophen (NORCO) 5-325 MG per tablet Take 1 tablet by mouth every 6 hours as needed for Pain. 12 tablet 0    ARMOUR THYROID 60 MG tablet TAKE 1 TABLET BY MOUTH ONCE DAILY EXCEPT  1  DAY  A  WEEK  TAKE  2  TABS 35 tablet 2    triamterene-hydrochlorothiazide (MAXZIDE-25) 37.5-25 MG per tablet Take 1 tablet by mouth daily 90 tablet 3    lovastatin (MEVACOR) 40 MG tablet Take 1 tablet by mouth nightly 90 tablet 3    losartan (COZAAR) 100 MG tablet Take 1 tablet by mouth daily 90 tablet 3    estradiol (ESTRACE) 0.5 MG tablet Take 1 tablet by mouth daily 90 tablet 3    vitamin D (ERGOCALCIFEROL) 1.25 MG (60726 UT) CAPS capsule Take 1 capsule by mouth Twice a Week 24 capsule 5    ibuprofen (ADVIL;MOTRIN) 200 MG tablet Take 200 mg by mouth every 8 hours as needed 2 tablets BID       fluticasone-salmeterol (ADVAIR DISKUS) 500-50 MCG/DOSE diskus inhaler Inhale 1 puff into the lungs daily as needed       Biotin (BIOTIN 5000) 5 MG CAPS Take 2 tablets by mouth daily      calcium carbonate-vitamin D (CALCIUM 600+D) 600-200 MG-UNIT TABS Take 1 tablet by mouth 2 times daily      Omega-3 Fatty Acids (FISH OIL) 500 MG CAPS Take 2 capsules by mouth daily        No current facility-administered medications for this visit. Allergies   Allergen Reactions    Compazine [Prochlorperazine Maleate] Other (See Comments)     Eyelids flipped up and mouth stretched to the side.      Thorazine [Chlorpromazine] Other (See Comments)     Eyelids flipped up and mouth stretched to the side       Health Maintenance   Topic Date Due    Hepatitis C screen  1950    Colon cancer screen colonoscopy  12/16/2017    Annual Wellness Visit (AWV)  05/29/2019    DTaP/Tdap/Td vaccine (1 - Tdap) 07/20/2020 (Originally 3/24/1969)    Shingles Vaccine (1 of 2) 06/29/2021 (Originally 3/24/2000)    Flu vaccine (Season Ended) 09/01/2020    Lipid screen  06/22/2021    TSH testing  06/22/2021    Potassium monitoring Systems   Constitutional: Negative for fatigue, fever and unexpected weight change. HENT: Negative for ear discharge, ear pain, mouth sores, sore throat and trouble swallowing. Eyes: Negative for discharge, itching and visual disturbance. Respiratory: Negative for cough, choking, shortness of breath, wheezing and stridor. Cardiovascular: Negative for chest pain, palpitations and leg swelling. Gastrointestinal: Negative for abdominal distention, abdominal pain, blood in stool, constipation, diarrhea, nausea and vomiting. Endocrine: Negative for cold intolerance, polydipsia and polyuria. Still losing her hair the brand has helped some   Genitourinary: Negative for difficulty urinating, dysuria, frequency and urgency. Musculoskeletal: Negative for arthralgias and gait problem. Skin: Negative for color change and rash. Allergic/Immunologic: Negative for food allergies and immunocompromised state. Neurological: Negative for dizziness, tremors, syncope, speech difficulty, weakness and headaches. Hematological: Negative for adenopathy. Does not bruise/bleed easily. Psychiatric/Behavioral: Negative for confusion and hallucinations. Objective:     Physical Exam   DGEVISITPE      GENERAL:   Afebrile alert no acute distress  Respiratory no use of accessory muscles no acute distress no audible wheezing  Mental status alert oriented adequate thought processes    Patient-Reported Vitals 6/29/2020   Patient-Reported Weight 132   Patient-Reported Systolic 580   Patient-Reported Diastolic 70   Patient-Reported Pulse 72            Assessment:       Diagnosis Orders   1. Essential hypertension  CBC Auto Differential    Comprehensive Metabolic Panel    Urinalysis Reflex to Culture   2. Mixed hyperlipidemia  Triglyceride    Lipid Panel   3. Vitamin D deficiency  Vitamin D 25 Hydroxy   4. Acquired hypothyroidism  TSH without Reflex   5. Healthcare maintenance  Hepatitis C Antibody   6.  Status post implant removal from both breasts 6/11/2020       Labs reviewedfrom 6/22/2020    Plan:        Patient given educational materials - see patient instructions. Discussed use, benefit, and side effects of prescribed medications. Allpatient questions answered. Pt voiced understanding. Reviewed health maintenance. Instructed to continue current medications, diet and exercise. Patient agreed with treatment plan. Follow up as directed. MEDICATIONS:  No orders of the defined types were placed in this encounter. ORDERS:  Orders Placed This Encounter   Procedures    Triglyceride    CBC Auto Differential    Comprehensive Metabolic Panel    Lipid Panel    Vitamin D 25 Hydroxy    Urinalysis Reflex to Culture    TSH without Reflex    Hepatitis C Antibody       Follow-up:  Return in about 6 months (around 12/29/2020) for awv, have labs done prior to appt. Following the remote visit today we will call you when we are available to reschedule your follow up appt      PATIENT INSTRUCTIONS:  Patient Instructions   1. Breast surgery removal of implants; Stable followed by Dr. Alexandra Vega  2. Hypertension; Stable no changes  3. Healthcare maintenance; She will call Dr Parul Soto to set up c-scope when released by Dr Alexandra Vega  4. Hypothyroid; We will try armour thyroid compounded prescription has been called with 34 caps so she is taking 1 daily with an extra one 1 day a week. 5.  Hyperlipidemia;   Restart the fish oil  Recheck trigs in 3 months;   Vitamin D deficiency stable continue same dose no changes are necessary    Electronically signed by CHRIS Floyd on 6/29/2020 at 10:00 AM  Tele visit 28 minutes  We are communicating with telehealth for the 3 month fu for controlled substance      Pursuant to the emergency declaration under the [address] [facility] [address] waiver authority and the [employer] Act, this Virtual Visit was conducted, with patient's consent, to reduce the patient's risk of exposure to COVID-19 and provide continuity of care for an established patient.        EMRDragon/transcription disclaimer:  Much of this encounter note is electronic

## 2020-07-01 ENCOUNTER — OFFICE VISIT (OUTPATIENT)
Dept: SURGERY | Age: 70
End: 2020-07-01

## 2020-07-01 VITALS
WEIGHT: 129 LBS | BODY MASS INDEX: 25.32 KG/M2 | TEMPERATURE: 98.4 F | HEIGHT: 60 IN | HEART RATE: 71 BPM | OXYGEN SATURATION: 99 %

## 2020-07-01 PROCEDURE — 99024 POSTOP FOLLOW-UP VISIT: CPT | Performed by: PHYSICIAN ASSISTANT

## 2020-07-24 NOTE — PROGRESS NOTES
HISTORY OF PRESENT ILLNESS:    Ms. Colletta Grice is a 79 y.o. white female who presents with implant problems. She had bilateral augmentations 30 to 40 years ago by Dr. Jennifer Davila. She is done relatively well but the right side in particular is been very hard, encapsulated, and painful over the years. The left does not bother her quite as much. She has no family history of breast cancer     She is post menopausal and is on HRT. She also is concerned about the cysts and dense breast tissue as well and is worried that the implants may obscure something more sinister. BREAST EXAM:  Nikkie  has  fibrocystic changes throughout both breasts. There are no dominant masses, no skin or nipple changes and no axillary adenopathy. She has bilateral  implants in place. The left is relatively soft. The right has a grade 4 contracture that is palpable and visible and tender. I see nothing suspicious for cancer on physical examination. She is now status post Bilateral implant removal and capsulectomies due to capsular contractures after augmentation mammoplasty with Bilateral intracapsular rupture. This was done on 6/11/2020    PATHOLOGY REVEALS:  FINAL DIAGNOSIS:    A.  Breast, right implant and capsule, removal:  Breast implant medical device with rupture  Benign capsule  Benign breast parenchyma with fibrocystic changes and usual ductal  hyperplasia    B.  Breast, left implant and capsule, removal:  Breast implant medical device with rupture  Benign capsule  Benign breast parenchyma with fibrocystic changes and calcifications    C.  Breast, left lesion, excision:  Intraductal papilloma  Usual ductal hyperplasia  Negative for malignancy  Intraductal papilloma is completely excised    She is now 1 month out and doing very well. She is very happy with her surgery. PHYSICAL EXAM:  The  wounds look good with no evidence of infection, fluid accumulation, or skin necrosis.   She has no palpable masses, no skin or nipple changes, and no axillary adenopathy. IMPRESSION:    Doing well s/p Bilateral removal of implants and capsulectomy due to capsular contractures after augmentation mammoplasty with Bilateral intracapsular rupture 6/11/2020       PLAN: We will get bilateral mammograms when she is due in January 2021. She will call if anything changes. I have seen, examined and reviewed this patient medication list, appropriate labs and imaging studies. I reviewed relevant medical records and others physicians notes. I discussed the plans of care with the patient. I answered all the questions to the patients satisfaction. I, Dr Lety Morley, personally performed the services described in this documentation as scribed by Epifanio Glasgow MA in my presence and is both accurate and complete. (Please note that portions of this note were completed with a voice recognition program. Efforts were made to edit the dictations but occasionally words are mis-transcribed.)  Over 50% of the total visit time of 15 minutes in face to face encounter with the patient, out of which more than 50% of the time was spent in counseling patient or family and coordination of care. Counseling included but was not limited to time spent reviewing labs, imaging studies/ treatment plan and answering questions.

## 2020-07-27 ENCOUNTER — OFFICE VISIT (OUTPATIENT)
Dept: SURGERY | Age: 70
End: 2020-07-27

## 2020-07-27 VITALS — SYSTOLIC BLOOD PRESSURE: 124 MMHG | HEART RATE: 80 BPM | DIASTOLIC BLOOD PRESSURE: 70 MMHG

## 2020-07-27 PROCEDURE — 99024 POSTOP FOLLOW-UP VISIT: CPT | Performed by: SURGERY

## 2020-07-27 NOTE — PROGRESS NOTES
Subjective:  Patient presents today for follow-up wound check and evaluation for fluid. She is status post bilateral capsulectomy and removal of previous implants. She is doing well. Patient Active Problem List    Diagnosis Date Noted    Status post implant removal from both breasts 6/11/2020 06/21/2020    Breast implant capsular contracture 02/18/2020    Mixed hyperlipidemia 03/19/2019    Vitamin D deficiency 01/18/2018    Alopecia 08/21/2017    Essential hypertension 08/20/2017    Familial hypercholesterolemia 08/20/2017    Acquired hypothyroidism 08/20/2017     Current Outpatient Medications   Medication Sig Dispense Refill    HYDROcodone-acetaminophen (NORCO) 5-325 MG per tablet Take 1 tablet by mouth every 6 hours as needed for Pain. 12 tablet 0    ARMOUR THYROID 60 MG tablet TAKE 1 TABLET BY MOUTH ONCE DAILY EXCEPT  1  DAY  A  WEEK  TAKE  2  TABS 35 tablet 2    triamterene-hydrochlorothiazide (MAXZIDE-25) 37.5-25 MG per tablet Take 1 tablet by mouth daily 90 tablet 3    lovastatin (MEVACOR) 40 MG tablet Take 1 tablet by mouth nightly 90 tablet 3    losartan (COZAAR) 100 MG tablet Take 1 tablet by mouth daily 90 tablet 3    estradiol (ESTRACE) 0.5 MG tablet Take 1 tablet by mouth daily 90 tablet 3    vitamin D (ERGOCALCIFEROL) 1.25 MG (60781 UT) CAPS capsule Take 1 capsule by mouth Twice a Week 24 capsule 5    ibuprofen (ADVIL;MOTRIN) 200 MG tablet Take 200 mg by mouth every 8 hours as needed 2 tablets BID       fluticasone-salmeterol (ADVAIR DISKUS) 500-50 MCG/DOSE diskus inhaler Inhale 1 puff into the lungs daily as needed       Biotin (BIOTIN 5000) 5 MG CAPS Take 2 tablets by mouth daily      calcium carbonate-vitamin D (CALCIUM 600+D) 600-200 MG-UNIT TABS Take 1 tablet by mouth 2 times daily      Omega-3 Fatty Acids (FISH OIL) 500 MG CAPS Take 2 capsules by mouth daily        No current facility-administered medications for this visit.       Allergies: Compazine [prochlorperazine

## 2020-09-22 DIAGNOSIS — Z00.00 HEALTHCARE MAINTENANCE: ICD-10-CM

## 2020-09-22 DIAGNOSIS — E78.2 MIXED HYPERLIPIDEMIA: ICD-10-CM

## 2020-09-22 DIAGNOSIS — Z12.31 VISIT FOR SCREENING MAMMOGRAM: ICD-10-CM

## 2020-09-22 LAB
HEPATITIS C ANTIBODY INTERPRETATION: NORMAL
TRIGL SERPL-MCNC: 204 MG/DL (ref 0–149)

## 2020-10-23 NOTE — TELEPHONE ENCOUNTER
Requested Prescriptions     Pending Prescriptions Disp Refills    estradiol (ESTRACE) 0.5 MG tablet 90 tablet 3     Sig: Take 1 tablet by mouth daily    losartan (COZAAR) 100 MG tablet 90 tablet 3     Sig: Take 1 tablet by mouth daily    triamterene-hydroCHLOROthiazide (MAXZIDE-25) 37.5-25 MG per tablet 90 tablet 3     Sig: Take 1 tablet by mouth daily    lovastatin (MEVACOR) 40 MG tablet 90 tablet 3     Sig: Take 1 tablet by mouth nightly     Last Appointment Date: [facility] 6/29/2020  Next Appointment Date: 12/29/2020    Allergies   Allergen Reactions    Compazine [Prochlorperazine Maleate] Other (See Comments)     Eyelids flipped up and mouth stretched to the side.      Thorazine [Chlorpromazine] Other (See Comments)     Eyelids flipped up and mouth stretched to the side

## 2020-10-26 RX ORDER — LOSARTAN POTASSIUM 100 MG/1
100 TABLET ORAL DAILY
Qty: 90 TABLET | Refills: 3 | Status: SHIPPED | OUTPATIENT
Start: 2020-10-26 | End: 2021-07-22

## 2020-10-26 RX ORDER — TRIAMTERENE AND HYDROCHLOROTHIAZIDE 37.5; 25 MG/1; MG/1
1 TABLET ORAL DAILY
Qty: 90 TABLET | Refills: 3 | Status: SHIPPED | OUTPATIENT
Start: 2020-10-26 | End: 2020-12-29

## 2020-10-26 RX ORDER — LOVASTATIN 40 MG/1
40 TABLET ORAL NIGHTLY
Qty: 90 TABLET | Refills: 3 | Status: SHIPPED | OUTPATIENT
Start: 2020-10-26 | End: 2021-07-22

## 2020-10-26 RX ORDER — ESTRADIOL 0.5 MG/1
0.5 TABLET ORAL DAILY
Qty: 90 TABLET | Refills: 3 | Status: SHIPPED | OUTPATIENT
Start: 2020-10-26 | End: 2021-07-22

## 2020-11-10 ENCOUNTER — VIRTUAL VISIT (OUTPATIENT)
Dept: INTERNAL MEDICINE | Age: 70
End: 2020-11-10
Payer: MEDICARE

## 2020-11-10 PROCEDURE — 99442 PR PHYS/QHP TELEPHONE EVALUATION 11-20 MIN: CPT | Performed by: NURSE PRACTITIONER

## 2020-11-10 RX ORDER — METHYLPREDNISOLONE 4 MG/1
TABLET ORAL
Qty: 1 KIT | Refills: 0 | Status: SHIPPED | OUTPATIENT
Start: 2020-11-10 | End: 2020-11-16

## 2020-11-10 RX ORDER — CEFDINIR 300 MG/1
300 CAPSULE ORAL 2 TIMES DAILY
Qty: 14 CAPSULE | Refills: 0 | Status: SHIPPED | OUTPATIENT
Start: 2020-11-10 | End: 2020-11-17

## 2020-11-10 ASSESSMENT — ENCOUNTER SYMPTOMS
COLOR CHANGE: 0
SORE THROAT: 0
ABDOMINAL PAIN: 0
TROUBLE SWALLOWING: 0
STRIDOR: 0
EYE ITCHING: 0
CONSTIPATION: 0
WHEEZING: 1
ABDOMINAL DISTENTION: 0
VOMITING: 0
EYE DISCHARGE: 0
NAUSEA: 0
DIARRHEA: 0
BLOOD IN STOOL: 0
COUGH: 1
CHOKING: 0
SHORTNESS OF BREATH: 0

## 2020-11-10 NOTE — PATIENT INSTRUCTIONS
Chronic bronchitis we will send a Medrol Dosepak and cefdinir 300 twice a day for 7 days. You do need to use your inhalers at least 4 times a day while you are wheezing and slowly wean yourself down to your maintenance dose. Should you develop fever cough vomiting diarrhea you need to either let us know or just go directly to the emergency department.

## 2020-11-10 NOTE — PROGRESS NOTES
[address] [facility]  [id number] [place] [address] Sravanthi Maria [address]  Dept: [phone]  Dept Fax: [phone]  Loc: [phone]    Clari Nino is a 79 y.o. female who were are performing tele health communication  for her medical conditions/complaints as noted below. Currently, our state is under umbrella of national state of emergency and we are using alternative methods of taking care of the needs of our patients so they are not exposed in our office; The patient has consented to this form of communication  Clari Nino is c/surendra   Cough    THE patient is at home  Silvestresam Friedman is at office   Others in attendance are none    HPI:     HPI   1. Cough with head and chest congestion ; she has clear to green sputum; It is worse in the am ; she is having some wheezing   She has had this for a few days;;  She has inhalers that she uses routinely daily   She has had no fever or chills   Chief Complaint   Patient presents with    Cough       Past Medical History:   Diagnosis Date    Acquired hypothyroidism 8/20/2017    Alopecia 08/21/2017    Breast implant capsular contracture     \"with cysts behind right breast\" per pt.      Essential hypertension 8/20/2017    Familial hypercholesterolemia 8/20/2017    PONV (postoperative nausea and vomiting)     Prolonged emergence from general anesthesia       Past Surgical History:   Procedure Laterality Date    BREAST ENHANCEMENT SURGERY Bilateral 6/11/2020    REMOVAL BILATERAL CONTRACTURED IMPLANTS WITH BILATERAL PECTORAL BLOCK performed by Dusty Saint, MD at [address] [facility]      bunion correct osteotomy smith double-stem lesser MTP Impl    COLONOSCOPY      HEMORRHOID SURGERY      HYSTERECTOMY      KNEE SURGERY Right     RECTAL SURGERY      anal fissurectomy with sphincterotomy    MAICOL AND BSO      TUBAL LIGATION         Vitals 7/27/2020 7/1/2020 6/18/2020 2020   SYSTOLIC 977 - 076 - - -   DIASTOLIC 70 - 70 - - -   Site Right Upper Arm - Right Upper Arm - - -   Position Sitting - Sitting - - -   Cuff Size Medium Adult - Medium Adult - - -   Pulse 80 71 80 - - -   Temp - 98.4 - - - -   Resp - - - 7 2 11   SpO2 - 99 - - - 97   Weight - 129 lb - - - -   Height - 5' 0\" - - - -   Body mass index - 25.19 kg/m2 - - - -   Some recent data might be hidden       Family History   Problem Relation Age of Onset    High Blood Pressure Mother     Diabetes Mother         Type 2    High Blood Pressure Father     Diabetes Sister         Type 2    Diabetes Brother         Type 2    Cancer Brother         Blood form of cancer-not leukemia        Social History     Tobacco Use    Smoking status: Former Smoker     Packs/day: 1.00     Years: 8.00     Pack years: 8.00     Types: Cigarettes     Start date:      Last attempt to quit:      Years since quittin.8    Smokeless tobacco: Never Used   Substance Use Topics    Alcohol use: No      Current Outpatient Medications   Medication Sig Dispense Refill    cefdinir (OMNICEF) 300 MG capsule Take 1 capsule by mouth 2 times daily for 7 days 14 capsule 0    methylPREDNISolone (MEDROL DOSEPACK) 4 MG tablet Take by mouth. 1 kit 0    estradiol (ESTRACE) 0.5 MG tablet Take 1 tablet by mouth daily 90 tablet 3    losartan (COZAAR) 100 MG tablet Take 1 tablet by mouth daily 90 tablet 3    triamterene-hydroCHLOROthiazide (MAXZIDE-25) 37.5-25 MG per tablet Take 1 tablet by mouth daily 90 tablet 3    lovastatin (MEVACOR) 40 MG tablet Take 1 tablet by mouth nightly 90 tablet 3    HYDROcodone-acetaminophen (NORCO) 5-325 MG per tablet Take 1 tablet by mouth every 6 hours as needed for Pain.  12 tablet 0    ARMOUR THYROID 60 MG tablet TAKE 1 TABLET BY MOUTH ONCE DAILY EXCEPT  1  DAY  A  WEEK  TAKE  2  TABS 35 tablet 2    vitamin D (ERGOCALCIFEROL) 1.25 MG (68053 UT) CAPS capsule Take 1 capsule by mouth Twice a Week 24 capsule 5    ibuprofen (ADVIL;MOTRIN) 200 MG tablet Take 200 mg by mouth every 8 hours as needed 2 tablets BID       fluticasone-salmeterol (ADVAIR DISKUS) 500-50 MCG/DOSE diskus inhaler Inhale 1 puff into the lungs daily as needed       Biotin (BIOTIN 5000) 5 MG CAPS Take 2 tablets by mouth daily      calcium carbonate-vitamin D (CALCIUM 600+D) 600-200 MG-UNIT TABS Take 1 tablet by mouth 2 times daily      Omega-3 Fatty Acids (FISH OIL) 500 MG CAPS Take 2 capsules by mouth daily        No current facility-administered medications for this visit. Allergies   Allergen Reactions    Compazine [Prochlorperazine Maleate] Other (See Comments)     Eyelids flipped up and mouth stretched to the side.      Thorazine [Chlorpromazine] Other (See Comments)     Eyelids flipped up and mouth stretched to the side       Health Maintenance   Topic Date Due    DTaP/Tdap/Td vaccine (1 - Tdap) [birth year]/1969    Colon cancer screen colonoscopy  12/16/2017    Annual Wellness Visit (AWV)  05/29/2019    Flu vaccine (1) 09/01/2020    Shingles Vaccine (1 of 2) 06/29/2021 (Originally 3/24/2000)    Lipid screen  06/22/2021    TSH testing  06/22/2021    Potassium monitoring  06/22/2021    Creatinine monitoring  06/22/2021    Breast cancer screen  02/04/2022    DEXA (modify frequency per FRAX score)  Completed    Pneumococcal 65+ years Vaccine  Completed    Hepatitis C screen  Completed    Hepatitis A vaccine  Aged Out    Hepatitis B vaccine  Aged Out    Hib vaccine  Aged Out    Meningococcal (ACWY) vaccine  Aged Out       No results found for: LABA1C  No results found for: PSA, PSADIA  TSH   Date Value Ref Range Status   06/22/2020 0.439 0.270 - 4.200 uIU/mL Final   ]  Lab Results   Component Value Date     06/22/2020    K 4.6 06/22/2020     06/22/2020    CO2 26 06/22/2020    BUN 24 (H) 06/22/2020    CREATININE 1.0 (H) 06/22/2020    GLUCOSE 94 06/22/2020    CALCIUM 9.5 06/22/2020    PROT 6.7 06/22/2020    LABALBU 4.2 06/22/2020    BILITOT 0.3 06/22/2020    ALKPHOS 68 06/22/2020    AST 21 06/22/2020    ALT 15 06/22/2020    LABGLOM 55 (A) 06/22/2020     Lab Results   Component Value Date    CHOL 174 06/22/2020    CHOL 138 (L) 12/23/2019    CHOL 141 (L) 03/12/2019     Lab Results   Component Value Date    TRIG 204 (H) 09/22/2020    TRIG 266 (H) 06/22/2020    TRIG 218 (H) 12/23/2019     Lab Results   Component Value Date    HDL 55 (L) 06/22/2020    HDL 50 (L) 12/23/2019    HDL 53 (L) 03/12/2019     Lab Results   Component Value Date    LDLCALC 66 06/22/2020    LDLCALC 44 12/23/2019    LDLCALC 53 03/12/2019     Lab Results   Component Value Date     06/22/2020    K 4.6 06/22/2020     06/22/2020    CO2 26 06/22/2020    BUN 24 06/22/2020    CREATININE 1.0 06/22/2020    GLUCOSE 94 06/22/2020    CALCIUM 9.5 06/22/2020      Lab Results   Component Value Date    WBC 9.3 06/22/2020    HGB 12.5 06/22/2020    HCT 39.2 06/22/2020    MCV 94.7 06/22/2020     06/22/2020    LYMPHOPCT 39.7 05/28/2020    RBC 4.14 (L) 06/22/2020    MCH 30.2 06/22/2020    MCHC 31.9 (L) 06/22/2020    RDW 11.9 06/22/2020     Lab Results   Component Value Date    VITD25 76.7 06/22/2020       Subjective:      Review of Systems   Constitutional: Negative for fatigue, fever and unexpected weight change. HENT: Negative for ear discharge, ear pain, mouth sores, sore throat and trouble swallowing. Eyes: Negative for discharge, itching and visual disturbance. Respiratory: Positive for cough and wheezing. Negative for choking, shortness of breath and stridor. Cardiovascular: Negative for chest pain, palpitations and leg swelling. Gastrointestinal: Negative for abdominal distention, abdominal pain, blood in stool, constipation, diarrhea, nausea and vomiting. Endocrine: Negative for cold intolerance, polydipsia and polyuria. Genitourinary: Negative for difficulty urinating, dysuria, frequency and urgency. Musculoskeletal: Negative for arthralgias and gait problem. Skin: Negative for color change and rash. Allergic/Immunologic: Negative for food allergies and immunocompromised state. Neurological: Negative for dizziness, tremors, syncope, speech difficulty, weakness and headaches. Hematological: Negative for adenopathy. Does not bruise/bleed easily. Psychiatric/Behavioral: Negative for confusion and hallucinations. Objective:     Physical Exam     DGEVISITPE      GENERAL:   Afebrile alert no acute distress  Respiratory no use of accessory muscles no acute distress no audible wheezing  Mental status alert oriented adequate thought processes        Vital signs were not taken at this visit as no equipment was available; There were no vitals taken for this visit. Assessment:       Diagnosis Orders   1. Mucopurulent chronic bronchitis ([facility] [address]         Plan:        Patient given educational materials - see patient instructions. Discussed use, benefit, and side effects of prescribed medications. Allpatient questions answered. Pt voiced understanding. Reviewed health maintenance. Instructed to continue current medications, diet and exercise. Patient agreed with treatment plan. Follow up as directed. MEDICATIONS:  Orders Placed This Encounter   Medications    cefdinir (OMNICEF) 300 MG capsule     Sig: Take 1 capsule by mouth 2 times daily for 7 days     Dispense:  14 capsule     Refill:  0    methylPREDNISolone (MEDROL DOSEPACK) 4 MG tablet     Sig: Take by mouth. Dispense:  1 kit     Refill:  0         ORDERS:  No orders of the defined types were placed in this encounter. Follow-up:  No follow-ups on file. Following the remote visit today we will call you when we are available to reschedule your follow up appt      PATIENT INSTRUCTIONS:  Patient Instructions   Chronic bronchitis we will send a Medrol Dosepak and cefdinir 300 twice a day for 7 days.   You do need to use your inhalers at least 4 times a day while you are wheezing and slowly wean yourself down to your maintenance dose. Should you develop fever cough vomiting diarrhea you need to either let us know or just go directly to the emergency department. Electronically signed by CHRIS Meadows on 11/10/2020 at 8:02 AM  Tele visit 15 minutes  We are communicating with telehealth for the 3 month fu for controlled substance      Pursuant to the emergency declaration under the [address] [place] [address], [facility] waiver authority and the [employer] Act, this Virtual Visit was conducted, with patient's consent, to reduce the patient's risk of exposure to COVID-19 and provide continuity of care for an established patient.        EMRDragon/transcription disclaimer:  Much of this encounter note is electronic

## 2020-12-22 DIAGNOSIS — E78.2 MIXED HYPERLIPIDEMIA: ICD-10-CM

## 2020-12-22 DIAGNOSIS — E03.9 ACQUIRED HYPOTHYROIDISM: Chronic | ICD-10-CM

## 2020-12-22 DIAGNOSIS — I10 ESSENTIAL HYPERTENSION: Chronic | ICD-10-CM

## 2020-12-22 DIAGNOSIS — E55.9 VITAMIN D DEFICIENCY: ICD-10-CM

## 2020-12-22 LAB
ALBUMIN SERPL-MCNC: 4.2 G/DL (ref 3.5–5.2)
ALP BLD-CCNC: 73 U/L (ref 35–104)
ALT SERPL-CCNC: 13 U/L (ref 5–33)
ANION GAP SERPL CALCULATED.3IONS-SCNC: 13 MMOL/L (ref 7–19)
AST SERPL-CCNC: 18 U/L (ref 5–32)
BASOPHILS ABSOLUTE: 0 K/UL (ref 0–0.2)
BASOPHILS RELATIVE PERCENT: 0.5 % (ref 0–1)
BILIRUB SERPL-MCNC: 0.3 MG/DL (ref 0.2–1.2)
BILIRUBIN URINE: NEGATIVE
BLOOD, URINE: NEGATIVE
BUN BLDV-MCNC: 26 MG/DL (ref 8–23)
CALCIUM SERPL-MCNC: 9.4 MG/DL (ref 8.8–10.2)
CHLORIDE BLD-SCNC: 103 MMOL/L (ref 98–111)
CHOLESTEROL, TOTAL: 162 MG/DL (ref 160–199)
CLARITY: CLEAR
CO2: 25 MMOL/L (ref 22–29)
COLOR: YELLOW
CREAT SERPL-MCNC: 1.2 MG/DL (ref 0.5–0.9)
EOSINOPHILS ABSOLUTE: 0.3 K/UL (ref 0–0.6)
EOSINOPHILS RELATIVE PERCENT: 3.2 % (ref 0–5)
GFR AFRICAN AMERICAN: 54
GFR NON-AFRICAN AMERICAN: 44
GLUCOSE BLD-MCNC: 93 MG/DL (ref 74–109)
GLUCOSE URINE: NEGATIVE MG/DL
HCT VFR BLD CALC: 41.7 % (ref 37–47)
HDLC SERPL-MCNC: 62 MG/DL (ref 65–121)
HEMOGLOBIN: 13.5 G/DL (ref 12–16)
IMMATURE GRANULOCYTES #: 0 K/UL
KETONES, URINE: NEGATIVE MG/DL
LDL CHOLESTEROL CALCULATED: 59 MG/DL
LEUKOCYTE ESTERASE, URINE: NEGATIVE
LYMPHOCYTES ABSOLUTE: 3.2 K/UL (ref 1.1–4.5)
LYMPHOCYTES RELATIVE PERCENT: 39.1 % (ref 20–40)
MCH RBC QN AUTO: 30 PG (ref 27–31)
MCHC RBC AUTO-ENTMCNC: 32.4 G/DL (ref 33–37)
MCV RBC AUTO: 92.7 FL (ref 81–99)
MONOCYTES ABSOLUTE: 0.4 K/UL (ref 0–0.9)
MONOCYTES RELATIVE PERCENT: 5.4 % (ref 0–10)
NEUTROPHILS ABSOLUTE: 4.2 K/UL (ref 1.5–7.5)
NEUTROPHILS RELATIVE PERCENT: 51.7 % (ref 50–65)
NITRITE, URINE: NEGATIVE
PDW BLD-RTO: 11.9 % (ref 11.5–14.5)
PH UA: 5 (ref 5–8)
PLATELET # BLD: 253 K/UL (ref 130–400)
PMV BLD AUTO: 9.8 FL (ref 9.4–12.3)
POTASSIUM SERPL-SCNC: 4.2 MMOL/L (ref 3.5–5)
PROTEIN UA: NEGATIVE MG/DL
RBC # BLD: 4.5 M/UL (ref 4.2–5.4)
SODIUM BLD-SCNC: 141 MMOL/L (ref 136–145)
SPECIFIC GRAVITY UA: 1.02 (ref 1–1.03)
TOTAL PROTEIN: 7.1 G/DL (ref 6.6–8.7)
TRIGL SERPL-MCNC: 206 MG/DL (ref 0–149)
TSH SERPL DL<=0.05 MIU/L-ACNC: 7.23 UIU/ML (ref 0.27–4.2)
UROBILINOGEN, URINE: 0.2 E.U./DL
VITAMIN D 25-HYDROXY: >100 NG/ML
WBC # BLD: 8.1 K/UL (ref 4.8–10.8)

## 2020-12-29 ENCOUNTER — OFFICE VISIT (OUTPATIENT)
Dept: INTERNAL MEDICINE | Age: 70
End: 2020-12-29
Payer: MEDICARE

## 2020-12-29 VITALS
WEIGHT: 132 LBS | HEART RATE: 60 BPM | SYSTOLIC BLOOD PRESSURE: 136 MMHG | DIASTOLIC BLOOD PRESSURE: 75 MMHG | HEIGHT: 60 IN | BODY MASS INDEX: 25.91 KG/M2

## 2020-12-29 PROBLEM — E78.01 FAMILIAL HYPERCHOLESTEROLEMIA: Chronic | Status: RESOLVED | Noted: 2017-08-20 | Resolved: 2020-12-29

## 2020-12-29 PROCEDURE — 1123F ACP DISCUSS/DSCN MKR DOCD: CPT | Performed by: NURSE PRACTITIONER

## 2020-12-29 PROCEDURE — 99214 OFFICE O/P EST MOD 30 MIN: CPT | Performed by: NURSE PRACTITIONER

## 2020-12-29 PROCEDURE — G8427 DOCREV CUR MEDS BY ELIG CLIN: HCPCS | Performed by: NURSE PRACTITIONER

## 2020-12-29 PROCEDURE — G8399 PT W/DXA RESULTS DOCUMENT: HCPCS | Performed by: NURSE PRACTITIONER

## 2020-12-29 PROCEDURE — 3017F COLORECTAL CA SCREEN DOC REV: CPT | Performed by: NURSE PRACTITIONER

## 2020-12-29 PROCEDURE — G8484 FLU IMMUNIZE NO ADMIN: HCPCS | Performed by: NURSE PRACTITIONER

## 2020-12-29 PROCEDURE — G8417 CALC BMI ABV UP PARAM F/U: HCPCS | Performed by: NURSE PRACTITIONER

## 2020-12-29 PROCEDURE — 1090F PRES/ABSN URINE INCON ASSESS: CPT | Performed by: NURSE PRACTITIONER

## 2020-12-29 PROCEDURE — 4040F PNEUMOC VAC/ADMIN/RCVD: CPT | Performed by: NURSE PRACTITIONER

## 2020-12-29 PROCEDURE — 1036F TOBACCO NON-USER: CPT | Performed by: NURSE PRACTITIONER

## 2020-12-29 PROCEDURE — G0439 PPPS, SUBSEQ VISIT: HCPCS | Performed by: NURSE PRACTITIONER

## 2020-12-29 RX ORDER — ERGOCALCIFEROL 1.25 MG/1
50000 CAPSULE ORAL
Qty: 24 CAPSULE | Refills: 5 | Status: SHIPPED | OUTPATIENT
Start: 2020-12-31 | End: 2021-12-28 | Stop reason: SDUPTHER

## 2020-12-29 RX ORDER — EZETIMIBE 10 MG/1
10 TABLET ORAL DAILY
Qty: 90 TABLET | Refills: 1 | Status: SHIPPED | OUTPATIENT
Start: 2020-12-29 | End: 2021-04-08 | Stop reason: SINTOL

## 2020-12-29 RX ORDER — THYROID 60 MG
TABLET ORAL
Qty: 35 TABLET | Refills: 2 | Status: SHIPPED | OUTPATIENT
Start: 2020-12-29 | End: 2021-05-03 | Stop reason: SDUPTHER

## 2020-12-29 ASSESSMENT — ENCOUNTER SYMPTOMS
STRIDOR: 0
TROUBLE SWALLOWING: 0
COLOR CHANGE: 0
SORE THROAT: 0
BLOOD IN STOOL: 0
ABDOMINAL PAIN: 0
WHEEZING: 0
ABDOMINAL DISTENTION: 0
EYE DISCHARGE: 0
CHOKING: 0
NAUSEA: 0
VOMITING: 0
COUGH: 0
EYE ITCHING: 0
SHORTNESS OF BREATH: 0
DIARRHEA: 0
CONSTIPATION: 0

## 2020-12-29 ASSESSMENT — PATIENT HEALTH QUESTIONNAIRE - PHQ9
SUM OF ALL RESPONSES TO PHQ QUESTIONS 1-9: 0
SUM OF ALL RESPONSES TO PHQ9 QUESTIONS 1 & 2: 0
2. FEELING DOWN, DEPRESSED OR HOPELESS: 0
SUM OF ALL RESPONSES TO PHQ QUESTIONS 1-9: 0
1. LITTLE INTEREST OR PLEASURE IN DOING THINGS: 0
SUM OF ALL RESPONSES TO PHQ QUESTIONS 1-9: 0

## 2020-12-29 ASSESSMENT — LIFESTYLE VARIABLES: HOW OFTEN DO YOU HAVE A DRINK CONTAINING ALCOHOL: 0

## 2020-12-29 NOTE — PATIENT INSTRUCTIONS
1.  Hypertension;  Continue losartan  Take your pressure daily ; If it goes over 140/90, call me; If you get SOB call me   2. Hypothyroidism;  I will call [facility] with your labs  3. Vit d def;  Start taking weekly labs in 3 months   4. Hyperlipidemia;  Start zetia;  Continue lovastatin and fish oil  Repeat trigs in 3 months   5. CKD:  Drink lots of water; stop the maxide;  Repeat CMP in 4 weeks;  I will call you with that report       Personalized Preventive Plan for [facility]"" - 12/29/2020  Medicare offers a range of preventive health benefits. Some of the tests and screenings are paid in full while other may be subject to a deductible, co-insurance, and/or copay. Some of these benefits include a comprehensive review of your medical history including lifestyle, illnesses that may run in your family, and various assessments and screenings as appropriate. After reviewing your medical record and screening and assessments performed today your provider may have ordered immunizations, labs, imaging, and/or referrals for you. A list of these orders (if applicable) as well as your Preventive Care list are included within your After Visit Summary for your review. Other Preventive Recommendations:    · A preventive eye exam performed by an eye specialist is recommended every 1-2 years to screen for glaucoma; cataracts, macular degeneration, and other eye disorders. · A preventive dental visit is recommended every 6 months. · Try to get at least 150 minutes of exercise per week or 10,000 steps per day on a pedometer . · Order or download the FREE \"Exercise & Physical Activity: Your Everyday Guide\" from [employer]. Call 4-297.353.2773 or search [employer] online. · You need 6717-7941 mg of calcium and 2324-1311 IU of vitamin D per day. It is possible to meet your calcium requirement with diet alone, but a vitamin D supplement is usually necessary to meet this goal.  · When exposed to the sun, use a sunscreen that protects against both UVA and UVB radiation with an SPF of 30 or greater. Reapply every 2 to 3 hours or after sweating, drying off with a towel, or swimming. · Always wear a seat belt when traveling in a car. Always wear a helmet when riding a bicycle or motorcycle.

## 2020-12-29 NOTE — PROGRESS NOTES
[address] [facility]  [id number] [place] [address] Sravanthi Maria [address]  Dept: [phone]  Dept Fax: [phone]  Loc: [phone]    Mikal Starkey is a 79 y.o. female who presents today for her medical conditions/complaints as noted below. Mikal Starkey is c/surendra Medicare AWV (Patient is here for [employer] Visit.) and Hypertension (Patient is here for routine follow up visit and review of labs done 12/22/2020.)        HPI:     HPI   1. Hypertension she is taking losartan 100 mg daily and maxide; Has good   2.  hypothyroidism she changed from armour thyroid  To compounded at [address]  tsh went from 0.4 to 7.2    3. Vit d def level is over 100 so we will decrease to weekly dose   4. Hyperlipidemia trigs are still high take 1200 bid and lovastatin 40 daily   5. CKD  gfr is down from 54 to 44 ;  She drinks only water she has been on soda and ibuprofen for about 10 months     Chief Complaint   Patient presents with    Medicare AWV     Patient is here for Medicare Wellness Visit.  Hypertension     Patient is here for routine follow up visit and review of labs done 12/22/2020. Past Medical History:   Diagnosis Date    Acquired hypothyroidism 8/20/2017    Alopecia 08/21/2017    Breast implant capsular contracture     \"with cysts behind right breast\" per pt.      Essential hypertension 8/20/2017    Familial hypercholesterolemia 8/20/2017    PONV (postoperative nausea and vomiting)     Prolonged emergence from general anesthesia       Past Surgical History:   Procedure Laterality Date    BREAST ENHANCEMENT SURGERY Bilateral 6/11/2020    REMOVAL BILATERAL CONTRACTURED IMPLANTS WITH BILATERAL PECTORAL BLOCK performed by Angie Baumann MD at [facility]      bunion correct osteotomy smith double-stem lesser MTP Impl    COLONOSCOPY      HEMORRHOID SURGERY      HYSTERECTOMY      KNEE SURGERY Right  fluticasone-salmeterol (ADVAIR DISKUS) 500-50 MCG/DOSE diskus inhaler Inhale 1 puff into the lungs daily as needed       Biotin (BIOTIN 5000) 5 MG CAPS Take 2 tablets by mouth daily      calcium carbonate-vitamin D (CALCIUM 600+D) 600-200 MG-UNIT TABS Take 1 tablet by mouth 2 times daily      Omega-3 Fatty Acids (FISH OIL) 500 MG CAPS Take 2 capsules by mouth daily        No current facility-administered medications for this visit. Allergies   Allergen Reactions    Compazine [Prochlorperazine Maleate] Other (See Comments)     Eyelids flipped up and mouth stretched to the side.      Thorazine [Chlorpromazine] Other (See Comments)     Eyelids flipped up and mouth stretched to the side       Health Maintenance   Topic Date Due    Colon cancer screen colonoscopy  12/16/2017    Annual Wellness Visit (AWV)  05/29/2019    DTaP/Tdap/Td vaccine (1 - Tdap) 01/19/2021 (Originally 3/24/1969)    Shingles Vaccine (1 of 2) 06/29/2021 (Originally 3/24/2000)    Flu vaccine (1) 12/29/2021 (Originally 9/1/2020)    Lipid screen  12/22/2021    TSH testing  12/22/2021    Potassium monitoring  12/22/2021    Creatinine monitoring  12/22/2021    Breast cancer screen  02/04/2022    DEXA (modify frequency per FRAX score)  Completed    Pneumococcal 65+ years Vaccine  Completed    Hepatitis C screen  Completed    Hepatitis A vaccine  Aged Out    Hepatitis B vaccine  Aged Out    Hib vaccine  Aged Out    Meningococcal (ACWY) vaccine  Aged Out       No results found for: LABA1C  No results found for: PSA, PSADIA  TSH   Date Value Ref Range Status   12/22/2020 7.230 (H) 0.270 - 4.200 uIU/mL Final   ]  Lab Results   Component Value Date     12/22/2020    K 4.2 12/22/2020     12/22/2020    CO2 25 12/22/2020    BUN 26 (H) 12/22/2020    CREATININE 1.2 (H) 12/22/2020    GLUCOSE 93 12/22/2020    CALCIUM 9.4 12/22/2020    PROT 7.1 12/22/2020    LABALBU 4.2 12/22/2020    BILITOT 0.3 12/22/2020 ALKPHOS 73 12/22/2020    AST 18 12/22/2020    ALT 13 12/22/2020    LABGLOM 44 (A) 12/22/2020    GFRAA 54 (L) 12/22/2020     Lab Results   Component Value Date    CHOL 162 12/22/2020    CHOL 174 06/22/2020    CHOL 138 (L) 12/23/2019     Lab Results   Component Value Date    TRIG 206 (H) 12/22/2020    TRIG 204 (H) 09/22/2020    TRIG 266 (H) 06/22/2020     Lab Results   Component Value Date    HDL 62 (L) 12/22/2020    HDL 55 (L) 06/22/2020    HDL 50 (L) 12/23/2019     Lab Results   Component Value Date    LDLCALC 59 12/22/2020    LDLCALC 66 06/22/2020    LDLCALC 44 12/23/2019     Lab Results   Component Value Date     12/22/2020    K 4.2 12/22/2020     12/22/2020    CO2 25 12/22/2020    BUN 26 12/22/2020    CREATININE 1.2 12/22/2020    GLUCOSE 93 12/22/2020    CALCIUM 9.4 12/22/2020      Lab Results   Component Value Date    WBC 8.1 12/22/2020    HGB 13.5 12/22/2020    HCT 41.7 12/22/2020    MCV 92.7 12/22/2020     12/22/2020    LYMPHOPCT 39.1 12/22/2020    RBC 4.50 12/22/2020    MCH 30.0 12/22/2020    MCHC 32.4 (L) 12/22/2020    RDW 11.9 12/22/2020     Lab Results   Component Value Date    VITD25 >100.0 12/22/2020       Subjective:      Review of Systems   Constitutional: Negative for fatigue, fever and unexpected weight change. HENT: Negative for ear discharge, ear pain, mouth sores, sore throat and trouble swallowing. Eyes: Negative for discharge, itching and visual disturbance. Respiratory: Negative for cough, choking, shortness of breath, wheezing and stridor. Cardiovascular: Negative for chest pain, palpitations and leg swelling. Gastrointestinal: Negative for abdominal distention, abdominal pain, blood in stool, constipation, diarrhea, nausea and vomiting. Endocrine: Negative for cold intolerance, polydipsia and polyuria. Genitourinary: Negative for difficulty urinating, dysuria, frequency and urgency. Musculoskeletal: Negative for arthralgias and gait problem. Skin: Negative for color change and rash. Allergic/Immunologic: Negative for food allergies and immunocompromised state. Neurological: Negative for dizziness, tremors, syncope, speech difficulty, weakness and headaches. Hematological: Negative for adenopathy. Does not bruise/bleed easily. Psychiatric/Behavioral: Negative for confusion and hallucinations. Objective:     Physical Exam  Constitutional:       General: She is not in acute distress. Appearance: She is well-developed. HENT:      Head: Normocephalic and atraumatic. Eyes:      General: No scleral icterus. Right eye: No discharge. Left eye: No discharge. Pupils: Pupils are equal, round, and reactive to light. Neck:      Musculoskeletal: Normal range of motion and neck supple. Thyroid: No thyromegaly. Vascular: No JVD. Cardiovascular:      Rate and Rhythm: Normal rate and regular rhythm. Heart sounds: Normal heart sounds. No murmur. Pulmonary:      Effort: Pulmonary effort is normal. No respiratory distress. Breath sounds: Normal breath sounds. No wheezing or rales. Abdominal:      General: Bowel sounds are normal. There is no distension. Palpations: Abdomen is soft. There is no mass. Tenderness: There is no abdominal tenderness. There is no guarding or rebound. Musculoskeletal: Normal range of motion. General: No tenderness. Skin:     General: Skin is warm and dry. Findings: No erythema or rash. Neurological:      Mental Status: She is alert and oriented to person, place, and time. Cranial Nerves: No cranial nerve deficit. Coordination: Coordination normal.      Deep Tendon Reflexes: Reflexes are normal and symmetric. Reflexes normal.   Psychiatric:         Mood and Affect: Mood is not depressed. Behavior: Behavior normal.         Thought Content:  Thought content normal.         Judgment: Judgment normal. /75   Pulse 60   Ht 5' (1.524 m)   Wt 132 lb (59.9 kg)   BMI 25.78 kg/m²     Assessment:       Diagnosis Orders   1. Stage 3b chronic kidney disease  Comprehensive Metabolic Panel    CBC Auto Differential    Comprehensive Metabolic Panel   2. Essential hypertension     3. Acquired hypothyroidism  TSH without Reflex   4. Vitamin D deficiency  Vitamin D 25 Hydroxy   5. Mixed hyperlipidemia  Triglyceride   6. Routine general medical examination at a health care facility       Labs reviewedfrom 12/22/2020    Plan:        Patient given educational materials - see patient instructions. Discussed use, benefit, and side effects of prescribed medications. Allpatient questions answered. Pt voiced understanding. Reviewed health maintenance. Instructed to continue current medications, diet and exercise. Patient agreed with treatment plan. Follow up as directed. MEDICATIONS:  Orders Placed This Encounter   Medications    ARMOUR THYROID 60 MG tablet     Sig: TAKE 1 TABLET BY MOUTH ONCE DAILY EXCEPT  1  DAY  A  WEEK  TAKE  2  TABS     Dispense:  35 tablet     Refill:  2    vitamin D (ERGOCALCIFEROL) 1.25 MG (54834 UT) CAPS capsule     Sig: Take 1 capsule by mouth Twice a Week     Dispense:  24 capsule     Refill:  5    ezetimibe (ZETIA) 10 MG tablet     Sig: Take 1 tablet by mouth daily     Dispense:  90 tablet     Refill:  1         ORDERS:  Orders Placed This Encounter   Procedures    Comprehensive Metabolic Panel    CBC Auto Differential    Comprehensive Metabolic Panel    Triglyceride    Vitamin D 25 Hydroxy    TSH without Reflex       Follow-up:  Return in 3 months (on 3/29/2021) for Medicare Annual Wellness Visit in 1 year, have labs done prior to appt. PATIENT INSTRUCTIONS:  Patient Instructions   1. Hypertension;  Continue losartan  Take your pressure daily ; If it goes over 140/90, call me; If you get SOB call me   2.   Hypothyroidism;  I will call  with your labs 3.  Vit d def;  Start taking weekly labs in 3 months   4. Hyperlipidemia;  Start zetia;  Continue lovastatin and fish oil  Repeat trigs in 3 months   5. CKD:  Drink lots of water; stop the maxide;  Repeat CMP in 4 weeks;  I will call you with that report       Personalized Preventive Plan for Radha Bah - 12/29/2020  Medicare offers a range of preventive health benefits. Some of the tests and screenings are paid in full while other may be subject to a deductible, co-insurance, and/or copay. Some of these benefits include a comprehensive review of your medical history including lifestyle, illnesses that may run in your family, and various assessments and screenings as appropriate. After reviewing your medical record and screening and assessments performed today your provider may have ordered immunizations, labs, imaging, and/or referrals for you. A list of these orders (if applicable) as well as your Preventive Care list are included within your After Visit Summary for your review. Other Preventive Recommendations:    · A preventive eye exam performed by an eye specialist is recommended every 1-2 years to screen for glaucoma; cataracts, macular degeneration, and other eye disorders. · A preventive dental visit is recommended every 6 months. · Try to get at least 150 minutes of exercise per week or 10,000 steps per day on a pedometer . · Order or download the FREE \"Exercise & Physical Activity: Your Everyday Guide\" from [employer]. Call 1-212.706.8063 or search [employer] online. · You need 2639-8565 mg of calcium and 3838-5869 IU of vitamin D per day.  It is possible to meet your calcium requirement with diet alone, but a vitamin D supplement is usually necessary to meet this goal. · When exposed to the sun, use a sunscreen that protects against both UVA and UVB radiation with an SPF of 30 or greater. Reapply every 2 to 3 hours or after sweating, drying off with a towel, or swimming. · Always wear a seat belt when traveling in a car. Always wear a helmet when riding a bicycle or motorcycle. Electronically signed by CHRIS Moran on 2020 at 10:10 AM    [employer]/transcription disclaimer:  Much of this encounter note is electronic transcription/translation of spoken language to printed texts. The electronic translation of spoken language may be erroneous, or at times,nonsensical words or phrases may be inadvertently transcribed. Although I have reviewed the note for such errors, some may still exist.  Medicare Annual Wellness Visit  Name: Kim David Date: 2020   MRN: [id number] Sex: Female   Age: 79 y.o. Ethnicity: Non-/Non    : 1950 Race: White      Nikkie Jorgeroro  is here for Medicare AWV (Patient is here for Medicare Wellness Visit.) and Hypertension (Patient is here for routine follow up visit and review of labs done 2020.)    Screenings for behavioral, psychosocial and functional/safety risks, and cognitive dysfunction are all negative except as indicated below. These results, as well as other patient data from the [address] form, are documented in Flowsheets linked to this Encounter. Allergies   Allergen Reactions    Compazine [Prochlorperazine Maleate] Other (See Comments)     Eyelids flipped up and mouth stretched to the side.  Thorazine [Chlorpromazine] Other (See Comments)     Eyelids flipped up and mouth stretched to the side         Prior to Visit Medications    Medication Sig Taking?  Authorizing Provider   MICKY THYROID 60 MG tablet TAKE 1 TABLET BY MOUTH ONCE DAILY EXCEPT  1  DAY  A  WEEK  TAKE  2  TABS Yes CHRIS Moran vitamin D (ERGOCALCIFEROL) 1.25 MG (37551 UT) CAPS capsule Take 1 capsule by mouth Twice a Week Yes CHRIS Chaudhry   ezetimibe (ZETIA) 10 MG tablet Take 1 tablet by mouth daily Yes CHRIS Chaudhry   estradiol (ESTRACE) 0.5 MG tablet Take 1 tablet by mouth daily Yes CHRIS Chaudhry   losartan (COZAAR) 100 MG tablet Take 1 tablet by mouth daily Yes CHRIS Chaudhry   lovastatin (MEVACOR) 40 MG tablet Take 1 tablet by mouth nightly Yes CHRIS Chaudhry   HYDROcodone-acetaminophen (NORCO) 5-325 MG per tablet Take 1 tablet by mouth every 6 hours as needed for Pain. Yes Geoff Gomez MD   fluticasone-salmeterol (ADVAIR DISKUS) 500-50 MCG/DOSE diskus inhaler Inhale 1 puff into the lungs daily as needed  Yes Historical Provider, MD   Biotin (BIOTIN 5000) 5 MG CAPS Take 2 tablets by mouth daily Yes Historical Provider, MD   calcium carbonate-vitamin D (CALCIUM 600+D) 600-200 MG-UNIT TABS Take 1 tablet by mouth 2 times daily Yes Historical Provider, MD   Omega-3 Fatty Acids (FISH OIL) 500 MG CAPS Take 2 capsules by mouth daily  Yes Historical Provider, MD         Past Medical History:   Diagnosis Date    Acquired hypothyroidism 8/20/2017    Alopecia 08/21/2017    Breast implant capsular contracture     \"with cysts behind right breast\" per pt.      Essential hypertension 8/20/2017    Familial hypercholesterolemia 8/20/2017    PONV (postoperative nausea and vomiting)     Prolonged emergence from general anesthesia        Past Surgical History:   Procedure Laterality Date    BREAST ENHANCEMENT SURGERY Bilateral 6/11/2020    REMOVAL BILATERAL CONTRACTURED IMPLANTS WITH BILATERAL PECTORAL BLOCK performed by Geoff Gmoez MD at [facility] BUNIONECTOMY      bunion correct osteotomy smith double-stem lesser MTP Impl    COLONOSCOPY      HEMORRHOID SURGERY      HYSTERECTOMY      KNEE SURGERY Right     RECTAL SURGERY      anal fissurectomy with sphincterotomy  MAICOL AND BSO      TUBAL LIGATION           Family History   Problem Relation Age of Onset    High Blood Pressure Mother     Diabetes Mother         Type 2    High Blood Pressure Father     Diabetes Sister         Type 2    Diabetes Brother         Type 2    Cancer Brother         Blood form of cancer-not leukemia        CareTeam (Including outside providers/suppliers regularly involved in providing care):   Patient Care Team:  CHRIS Vera as PCP - General (Nurse Practitioner Acute Care)  CHRIS [facility]    Wt Readings from Last 3 Encounters:   12/29/20 132 lb (59.9 kg)   07/01/20 129 lb (58.5 kg)   06/11/20 130 lb (59 kg)     Vitals:    12/29/20 0921   BP: 136/75   Pulse: 60   Weight: 132 lb (59.9 kg)   Height: 5' (1.524 m)     Body mass index is 25.78 kg/m². Based upon direct observation of the patient, evaluation of cognition reveals recent and remote memory intact.     General Appearance: alert and oriented to person, place and time, well developed and well- nourished, in no acute distress  Skin: warm and dry, no rash or erythema  Head: normocephalic and atraumatic  Eyes: pupils equal, round, and reactive to light, extraocular eye movements intact, conjunctivae normal  ENT: tympanic membrane, external ear and ear canal normal bilaterally, nose without deformity, nasal mucosa and turbinates normal without polyps  Neck: supple and non-tender without mass, no thyromegaly or thyroid nodules, no cervical lymphadenopathy  Pulmonary/Chest: clear to auscultation bilaterally- no wheezes, rales or rhonchi, normal air movement, no respiratory distress  Cardiovascular: normal rate, regular rhythm, normal S1 and S2, no murmurs, rubs, clicks, or gallops, distal pulses intact, no carotid bruits  Abdomen: soft, non-tender, non-distended, normal bowel sounds, no masses or organomegaly  Extremities: no cyanosis, clubbing or edema Musculoskeletal: normal range of motion, no joint swelling, deformity or tenderness  Neurologic: reflexes normal and symmetric, no cranial nerve deficit, gait, coordination and speech normal    Patient's complete Health Risk Assessment and screening values have been reviewed and are found in Flowsheets. The following problems were reviewed today and where indicated follow up appointments were made and/or referrals ordered. Positive Risk Factor Screenings with Interventions:            General Health and ACP:  General  In general, how would you say your health is?: Very Good  In the past 7 days, have you experienced any of the following?  New or Increased Pain, New or Increased Fatigue, Loneliness, Social Isolation, Stress or Anger?: None of These  Do you get the social and emotional support that you need?: Yes  Do you have a Living Will?: Yes  Advance Directives     Power of [address] [place] [address] Will ACP-Advance Directive ACP-Power of     Not on File Not on File Not on File Not on File      General Health Risk Interventions:  · na     Hearing/Vision:  No exam data present  Hearing/Vision  Do you or your family notice any trouble with your hearing?: No  Do you have difficulty driving, watching TV, or doing any of your daily activities because of your eyesight?: No  Have you had an eye exam within the past year?: (!) No  Hearing/Vision Interventions:  · Vision concerns:  patient encouraged to make appointment with his/her eye specialist    Safety:  Safety  Do you have working smoke detectors?: Yes  Have all throw rugs been removed or fastened?: Yes  Do you have non-slip mats or surfaces in all bathtubs/showers?: (!) No  Do all of your stairways have a railing or banister?: Yes  Are your doorways, halls and stairs free of clutter?: Yes  Do you always fasten your seatbelt when you are in a car?: Yes  Safety Interventions:  · na     Personalized Preventive Plan   Current Health Maintenance Status Immunization History   Administered Date(s) Administered    Pneumococcal Conjugate 13-valent ([id number]) 08/21/2017    Pneumococcal Polysaccharide ([id number]) 03/19/2019        Health Maintenance   Topic Date Due    Colon cancer screen colonoscopy  12/16/2017    Annual Wellness Visit (AWV)  05/29/2019    DTaP/Tdap/Td vaccine (1 - Tdap) 01/19/2021 (Originally 3/24/1969)    Shingles Vaccine (1 of 2) 06/29/2021 (Originally 3/24/2000)    Flu vaccine (1) 12/29/2021 (Originally 9/1/2020)    Lipid screen  12/22/2021    TSH testing  12/22/2021    Potassium monitoring  12/22/2021    Creatinine monitoring  12/22/2021    Breast cancer screen  02/04/2022    DEXA (modify frequency per FRAX score)  Completed    Pneumococcal 65+ years Vaccine  Completed    Hepatitis C screen  Completed    Hepatitis A vaccine  Aged Out    Hepatitis B vaccine  Aged Out    Hib vaccine  Aged Out    Meningococcal (ACWY) vaccine  Aged Out     Recommendations for Traction Due: see orders and patient instructions/AVS.  . Recommended screening schedule for the next 5-10 years is provided to the patient in written form: see Patient Sunni Mattson was seen today for medicare awv and hypertension. Diagnoses and all orders for this visit:    Stage 3b chronic kidney disease  -     Comprehensive Metabolic Panel; Future  -     CBC Auto Differential; Future  -     Comprehensive Metabolic Panel; Future    Essential hypertension    Acquired hypothyroidism  -     TSH without Reflex; Future    Vitamin D deficiency  -     Vitamin D 25 Hydroxy; Future    Mixed hyperlipidemia  -     Triglyceride; Future    Routine general medical examination at a health care facility    Other orders  -     ARMOUR THYROID 60 MG tablet; TAKE 1 TABLET BY MOUTH ONCE DAILY EXCEPT  1  DAY  A  WEEK  TAKE  2  TABS  -     vitamin D (ERGOCALCIFEROL) 1.25 MG (40723 UT) CAPS capsule;  Take 1 capsule by mouth Twice a Week -     ezetimibe (ZETIA) 10 MG tablet;  Take 1 tablet by mouth daily

## 2021-01-13 ENCOUNTER — TELEPHONE (OUTPATIENT)
Dept: INTERNAL MEDICINE | Age: 71
End: 2021-01-13

## 2021-01-13 RX ORDER — CEFDINIR 300 MG/1
300 CAPSULE ORAL 2 TIMES DAILY
Qty: 14 CAPSULE | Refills: 0 | Status: SHIPPED | OUTPATIENT
Start: 2021-01-13 | End: 2021-01-20

## 2021-01-13 NOTE — TELEPHONE ENCOUNTER
I spoke with Mrs. Banks,she states she has another bout of bronchitis and would like Cefdinir sent in. She states she is wheezing and coughing. She denies any fever. No sore throat or congestion. She would like medication to be sent to [address] [place] [address] [address].

## 2021-01-21 ENCOUNTER — HOSPITAL ENCOUNTER (OUTPATIENT)
Dept: WOMENS IMAGING | Age: 71
Discharge: HOME | End: 2021-01-21
Payer: MEDICARE

## 2021-01-21 ENCOUNTER — OFFICE VISIT (OUTPATIENT)
Dept: SURGERY | Age: 71
End: 2021-01-21
Payer: MEDICARE

## 2021-01-21 VITALS
SYSTOLIC BLOOD PRESSURE: 135 MMHG | TEMPERATURE: 98.1 F | HEART RATE: 71 BPM | BODY MASS INDEX: 26.11 KG/M2 | HEIGHT: 60 IN | DIASTOLIC BLOOD PRESSURE: 77 MMHG | WEIGHT: 133 LBS

## 2021-01-21 DIAGNOSIS — Z12.31 VISIT FOR SCREENING MAMMOGRAM: Primary | ICD-10-CM

## 2021-01-21 PROCEDURE — G8399 PT W/DXA RESULTS DOCUMENT: HCPCS | Performed by: PHYSICIAN ASSISTANT

## 2021-01-21 PROCEDURE — G8417 CALC BMI ABV UP PARAM F/U: HCPCS | Performed by: PHYSICIAN ASSISTANT

## 2021-01-21 PROCEDURE — G8484 FLU IMMUNIZE NO ADMIN: HCPCS | Performed by: PHYSICIAN ASSISTANT

## 2021-01-21 PROCEDURE — 4040F PNEUMOC VAC/ADMIN/RCVD: CPT | Performed by: PHYSICIAN ASSISTANT

## 2021-01-21 PROCEDURE — G8427 DOCREV CUR MEDS BY ELIG CLIN: HCPCS | Performed by: PHYSICIAN ASSISTANT

## 2021-01-21 PROCEDURE — 1123F ACP DISCUSS/DSCN MKR DOCD: CPT | Performed by: PHYSICIAN ASSISTANT

## 2021-01-21 PROCEDURE — 1090F PRES/ABSN URINE INCON ASSESS: CPT | Performed by: PHYSICIAN ASSISTANT

## 2021-01-21 PROCEDURE — 3017F COLORECTAL CA SCREEN DOC REV: CPT | Performed by: PHYSICIAN ASSISTANT

## 2021-01-21 PROCEDURE — 77067 SCR MAMMO BI INCL CAD: CPT

## 2021-01-21 PROCEDURE — 99213 OFFICE O/P EST LOW 20 MIN: CPT | Performed by: PHYSICIAN ASSISTANT

## 2021-01-21 PROCEDURE — 1036F TOBACCO NON-USER: CPT | Performed by: PHYSICIAN ASSISTANT

## 2021-01-21 NOTE — PROGRESS NOTES
HPI:  Alem Herbert is in for yearly follow-up breast check. She has not noticed any changes in her breasts. SCREENING BILATERAL DIGITAL MAMMOGRAM WITH TOMOSYNTHESIS 1/21/2021   12:10 PM   CLINICAL HISTORY: Screening.  No family history breast cancer. Previous implant explantation 2020   COMPARISON STUDIES: 2/4/2020, 1/20/2020, 12/27/2018, 11/27/2017. FINDINGS:    Digital CC and MLO views of bilateral breasts were obtained. Tomosynthesis in the MLO and CC projections was also performed. The breast tissue is heterogeneously dense (approximately 50-75%   glandular tissue), Category C,  limiting the sensitivity of   mammography. No suspicious masses or calcifications are identified. Scarring along the posterior inferior aspect of each breast related to   the implant explantation. Partially obscured rounded densities seen   bilaterally favoring fibrocystic change. Scattered benign-appearing   calcifications. This study was interpreted with CAD. IMPRESSION AND RECOMMENDATION:    No mammographic evidence of malignancy. Recommendation is for the   patient to return for routine mammography in one year or sooner, if   clinically indicated. BI-RADS CATEGORY 2: BENIGN FINDINGS. BREAST EXAM:  On examination, she has fibrocystic changes throughout both breasts, no dominant masses, no skin or nipple changes and no axillary adenopathy. I see nothing suspicious for breast cancer. ASSESSMENT:  Benign fibrocystic changes              PLAN:  I will plan to see her back prn. She will follow up with Jeanne Yeager for physical exam and annual mammograms. She will contact me if anything significant changes.

## 2021-02-02 DIAGNOSIS — N18.32 STAGE 3B CHRONIC KIDNEY DISEASE (HCC): ICD-10-CM

## 2021-02-02 LAB
ALBUMIN SERPL-MCNC: 4.1 G/DL (ref 3.5–5.2)
ALP BLD-CCNC: 71 U/L (ref 35–104)
ALT SERPL-CCNC: 14 U/L (ref 5–33)
ANION GAP SERPL CALCULATED.3IONS-SCNC: 12 MMOL/L (ref 7–19)
AST SERPL-CCNC: 17 U/L (ref 5–32)
BILIRUB SERPL-MCNC: 0.3 MG/DL (ref 0.2–1.2)
BUN BLDV-MCNC: 15 MG/DL (ref 8–23)
CALCIUM SERPL-MCNC: 9.2 MG/DL (ref 8.8–10.2)
CHLORIDE BLD-SCNC: 98 MMOL/L (ref 98–111)
CO2: 25 MMOL/L (ref 22–29)
CREAT SERPL-MCNC: 1 MG/DL (ref 0.5–0.9)
GFR AFRICAN AMERICAN: >59
GFR NON-AFRICAN AMERICAN: 55
GLUCOSE BLD-MCNC: 77 MG/DL (ref 74–109)
POTASSIUM SERPL-SCNC: 3.5 MMOL/L (ref 3.5–5)
SODIUM BLD-SCNC: 135 MMOL/L (ref 136–145)
TOTAL PROTEIN: 6.8 G/DL (ref 6.6–8.7)

## 2021-02-14 ENCOUNTER — IMMUNIZATION (OUTPATIENT)
Age: 71
End: 2021-02-14
Payer: MEDICARE

## 2021-02-14 PROCEDURE — 0001A PR IMM ADMN SARSCOV2 30MCG/0.3ML DIL RECON 1ST DOSE: CPT | Performed by: FAMILY MEDICINE

## 2021-02-14 PROCEDURE — 91300 COVID-19, PFIZER VACCINE 30MCG/0.3ML DOSE: CPT | Performed by: FAMILY MEDICINE

## 2021-02-16 DIAGNOSIS — E03.9 ACQUIRED HYPOTHYROIDISM: Primary | Chronic | ICD-10-CM

## 2021-02-22 DIAGNOSIS — E03.9 ACQUIRED HYPOTHYROIDISM: Chronic | ICD-10-CM

## 2021-02-22 LAB — TSH SERPL DL<=0.05 MIU/L-ACNC: 2.05 UIU/ML (ref 0.27–4.2)

## 2021-03-07 ENCOUNTER — IMMUNIZATION (OUTPATIENT)
Age: 71
End: 2021-03-07
Payer: MEDICARE

## 2021-03-07 PROCEDURE — 91300 COVID-19, PFIZER VACCINE 30MCG/0.3ML DOSE: CPT | Performed by: FAMILY MEDICINE

## 2021-03-07 PROCEDURE — 0002A COVID-19, PFIZER VACCINE 30MCG/0.3ML DOSE: CPT | Performed by: FAMILY MEDICINE

## 2021-03-09 ENCOUNTER — TELEPHONE (OUTPATIENT)
Dept: INTERNAL MEDICINE | Age: 71
End: 2021-03-09

## 2021-03-09 RX ORDER — TRIAMTERENE AND HYDROCHLOROTHIAZIDE 37.5; 25 MG/1; MG/1
TABLET ORAL
COMMUNITY
Start: 2021-01-06 | End: 2021-12-28 | Stop reason: SDUPTHER

## 2021-03-09 NOTE — TELEPHONE ENCOUNTER
Patient states since starting Zetia she has noticed dizziness and itchy rash on face. She also took the covid shot about 3 weeks ago, same time as Zetia.  Patient did not want to stop medication without talking to you first,

## 2021-03-22 ENCOUNTER — TELEPHONE (OUTPATIENT)
Dept: INTERNAL MEDICINE | Age: 71
End: 2021-03-22

## 2021-03-22 NOTE — TELEPHONE ENCOUNTER
Patient stopped the Zetia 2 weeks ago due to itchy rash and states after 3 days itchy rash was better.  Patient does not want to take Zetia anymore but wants to know what you recommend

## 2021-03-22 NOTE — TELEPHONE ENCOUNTER
Have her to get fish oil 1200 mg twice daily.   Tell her she can for them in the freezer if they bother her stomach

## 2021-04-08 ENCOUNTER — OFFICE VISIT (OUTPATIENT)
Dept: INTERNAL MEDICINE | Age: 71
End: 2021-04-08
Payer: MEDICARE

## 2021-04-08 ENCOUNTER — NURSE TRIAGE (OUTPATIENT)
Dept: OTHER | Facility: CLINIC | Age: 71
End: 2021-04-08

## 2021-04-08 VITALS — OXYGEN SATURATION: 98 % | DIASTOLIC BLOOD PRESSURE: 72 MMHG | SYSTOLIC BLOOD PRESSURE: 138 MMHG | HEART RATE: 77 BPM

## 2021-04-08 DIAGNOSIS — B36.9 FUNGAL RASH OF TORSO: Primary | ICD-10-CM

## 2021-04-08 PROCEDURE — G8417 CALC BMI ABV UP PARAM F/U: HCPCS | Performed by: NURSE PRACTITIONER

## 2021-04-08 PROCEDURE — G8399 PT W/DXA RESULTS DOCUMENT: HCPCS | Performed by: NURSE PRACTITIONER

## 2021-04-08 PROCEDURE — G8427 DOCREV CUR MEDS BY ELIG CLIN: HCPCS | Performed by: NURSE PRACTITIONER

## 2021-04-08 PROCEDURE — 99212 OFFICE O/P EST SF 10 MIN: CPT | Performed by: NURSE PRACTITIONER

## 2021-04-08 PROCEDURE — 4040F PNEUMOC VAC/ADMIN/RCVD: CPT | Performed by: NURSE PRACTITIONER

## 2021-04-08 PROCEDURE — 1090F PRES/ABSN URINE INCON ASSESS: CPT | Performed by: NURSE PRACTITIONER

## 2021-04-08 PROCEDURE — 1123F ACP DISCUSS/DSCN MKR DOCD: CPT | Performed by: NURSE PRACTITIONER

## 2021-04-08 PROCEDURE — 96372 THER/PROPH/DIAG INJ SC/IM: CPT | Performed by: NURSE PRACTITIONER

## 2021-04-08 PROCEDURE — 3017F COLORECTAL CA SCREEN DOC REV: CPT | Performed by: NURSE PRACTITIONER

## 2021-04-08 PROCEDURE — 1036F TOBACCO NON-USER: CPT | Performed by: NURSE PRACTITIONER

## 2021-04-08 RX ORDER — METHYLPREDNISOLONE ACETATE 80 MG/ML
80 INJECTION, SUSPENSION INTRA-ARTICULAR; INTRALESIONAL; INTRAMUSCULAR; SOFT TISSUE ONCE
Status: COMPLETED | OUTPATIENT
Start: 2021-04-08 | End: 2021-04-08

## 2021-04-08 RX ADMIN — METHYLPREDNISOLONE ACETATE 80 MG: 80 INJECTION, SUSPENSION INTRA-ARTICULAR; INTRALESIONAL; INTRAMUSCULAR; SOFT TISSUE at 10:28

## 2021-04-08 ASSESSMENT — PATIENT HEALTH QUESTIONNAIRE - PHQ9
SUM OF ALL RESPONSES TO PHQ9 QUESTIONS 1 & 2: 0
SUM OF ALL RESPONSES TO PHQ QUESTIONS 1-9: 0

## 2021-04-08 NOTE — PATIENT INSTRUCTIONS
1.  Fungal rash  Medrol 80 IM today  Antifungal cream mixed with Benadryl cream the palm of your hand apply 4 times a day, you can use the Benadryl cream as often as you want to the area.

## 2021-04-08 NOTE — PROGRESS NOTES
[address] [facility]  [address] [address] [address]  [address] [address]  Dept: [phone]  Dept Fax: [phone]: [phone]    Huong Hernandez (:  1950) is a 70 y.o. female,Established patient, here for evaluation of the following chief complaint(s): Rash (Patient is here for itchy rash. Patient states it stopped when stopped cholesterol medication and then started back when she started fish oil.)      Huong Hernandez is a 70 y.o. female who presents today for her medical conditions/complaints as noted below. Huong Hernandez is c/surendra Rash (Patient is here for itchy rash. Patient states it stopped when stopped cholesterol medication and then started back when she started fish oil.)        HPI:     Chief Complaint   Patient presents with    Rash     Patient is here for itchy rash. Patient states it stopped when stopped cholesterol medication and then started back when she started fish oil. HPI   Rash to buttock on the right for the last several days. She has been using hydrocortisone cream and Benadryl cream and taking p.o. Benadryl without any relief. She said the itching is really bothering her a lot. Past Medical History:   Diagnosis Date    Acquired hypothyroidism 2017    Alopecia 2017    Breast implant capsular contracture     \"with cysts behind right breast\" per pt.      Essential hypertension 2017    Familial hypercholesterolemia 2017    PONV (postoperative nausea and vomiting)     Prolonged emergence from general anesthesia       Past Surgical History:   Procedure Laterality Date    BREAST ENHANCEMENT SURGERY Bilateral 2020    REMOVAL BILATERAL CONTRACTURED IMPLANTS WITH BILATERAL PECTORAL BLOCK performed by Cristina Torrez MD at [facility] 67 Bilateral     cyst removeal    BUNIONECTOMY      bunion correct osteotomy smith double-stem lesser MTP Impl    [facility]    KNEE SURGERY Right     RECTAL SURGERY      anal fissurectomy with sphincterotomy    MAICOL AND BSO Bilateral     TUBAL LIGATION         Vitals 2020   SYSTOLIC 277 [phone] - 381   DIASTOLIC 72 77 75 70 - 70   Site - Left Upper Arm - Right Upper Arm - Right Upper Arm   Position - Sitting - Sitting - Sitting   Cuff Size - Medium Adult - Medium Adult - Medium Adult   Pulse 77 71 60 80 71 80   Temp - 98.1 - - 98.4 -   Resp - - - - - -   SpO2 98 - - - 99 -   Weight - 133 lb 132 lb - 129 lb -   Height - 5' 0\" 5' 0\" - 5' 0\" -   Body mass index - 25.97 kg/m2 25.78 kg/m2 - 25.19 kg/m2 -   Some recent data might be hidden       Family History   Problem Relation Age of Onset    High Blood Pressure Mother     Diabetes Mother         Type 2    High Blood Pressure Father     Diabetes Sister         Type 2    Diabetes Brother         Type 2    Cancer Brother         Blood form of cancer-not leukemia        Social History     Tobacco Use    Smoking status: Former Smoker     Packs/day: 1.00     Years: 8.00     Pack years: 8.00     Types: Cigarettes     Start date:      Quit date:      Years since quittin.2    Smokeless tobacco: Never Used   Substance Use Topics    Alcohol use: No      Current Outpatient Medications   Medication Sig Dispense Refill    triamterene-hydroCHLOROthiazide (MAXZIDE-25) 37.5-25 MG per tablet       ARMOUR THYROID 60 MG tablet TAKE 1 TABLET BY MOUTH ONCE DAILY EXCEPT  1  DAY  A  WEEK  TAKE  2  TABS 35 tablet 2    vitamin D (ERGOCALCIFEROL) 1.25 MG (07663 UT) CAPS capsule Take 1 capsule by mouth Twice a Week 24 capsule 5    estradiol (ESTRACE) 0.5 MG tablet Take 1 tablet by mouth daily 90 tablet 3    losartan (COZAAR) 100 MG tablet Take 1 tablet by mouth daily 90 tablet 3    lovastatin (MEVACOR) 40 MG tablet Take 1 tablet by mouth nightly 90 tablet 3    HYDROcodone-acetaminophen (NORCO) 5-325 MG per tablet Take 1 tablet by mouth every 6 hours as needed for Pain. 12 tablet 0    fluticasone-salmeterol (ADVAIR DISKUS) 500-50 MCG/DOSE diskus inhaler Inhale 1 puff into the lungs daily as needed       Biotin (BIOTIN 5000) 5 MG CAPS Take 2 tablets by mouth daily      calcium carbonate-vitamin D (CALCIUM 600+D) 600-200 MG-UNIT TABS Take 1 tablet by mouth 2 times daily      Omega-3 Fatty Acids (FISH OIL) 500 MG CAPS Take 2 capsules by mouth daily        No current facility-administered medications for this visit. Allergies   Allergen Reactions    Compazine [Prochlorperazine Maleate] Other (See Comments)     Eyelids flipped up and mouth stretched to the side.      Thorazine [Chlorpromazine] Other (See Comments)     Eyelids flipped up and mouth stretched to the side       Health Maintenance   Topic Date Due    DTaP/Tdap/Td vaccine (1 - Tdap) Never done    Colon cancer screen colonoscopy  12/16/2017    Shingles Vaccine (1 of 2) 06/29/2021 (Originally 3/24/2000)    Flu vaccine (Season Ended) 12/29/2021 (Originally 9/1/2021)    Lipid screen  12/22/2021    Annual Wellness Visit (AWV)  12/30/2021    Potassium monitoring  02/02/2022    Creatinine monitoring  02/02/2022    TSH testing  02/22/2022    Breast cancer screen  01/21/2023    DEXA (modify frequency per FRAX score)  Completed    Pneumococcal 65+ years Vaccine  Completed    COVID-19 Vaccine  Completed    Hepatitis C screen  Completed    Hepatitis A vaccine  Aged Out    Hepatitis B vaccine  Aged Out    Hib vaccine  Aged Out    Meningococcal (ACWY) vaccine  Aged Out       No results found for: LABA1C  No results found for: PSA, PSADIA  TSH   Date Value Ref Range Status   02/22/2021 2.050 0.270 - 4.200 uIU/mL Final   ]  Lab Results   Component Value Date     (L) 02/02/2021    K 3.5 02/02/2021    CL 98 02/02/2021    CO2 25 02/02/2021    BUN 15 02/02/2021    CREATININE 1.0 (H) 02/02/2021 GLUCOSE 77 02/02/2021    CALCIUM 9.2 02/02/2021    PROT 6.8 02/02/2021    LABALBU 4.1 02/02/2021    BILITOT 0.3 02/02/2021    ALKPHOS 71 02/02/2021    AST 17 02/02/2021    ALT 14 02/02/2021    LABGLOM 55 (A) 02/02/2021    GFRAA >59 02/02/2021     Lab Results   Component Value Date    CHOL 162 12/22/2020    CHOL 174 06/22/2020    CHOL 138 (L) 12/23/2019     Lab Results   Component Value Date    TRIG 206 (H) 12/22/2020    TRIG 204 (H) 09/22/2020    TRIG 266 (H) 06/22/2020     Lab Results   Component Value Date    HDL 62 (L) 12/22/2020    HDL 55 (L) 06/22/2020    HDL 50 (L) 12/23/2019     Lab Results   Component Value Date    LDLCALC 59 12/22/2020    LDLCALC 66 06/22/2020    LDLCALC 44 12/23/2019     Lab Results   Component Value Date     02/02/2021    K 3.5 02/02/2021    CL 98 02/02/2021    CO2 25 02/02/2021    BUN 15 02/02/2021    CREATININE 1.0 02/02/2021    GLUCOSE 77 02/02/2021    CALCIUM 9.2 02/02/2021      Lab Results   Component Value Date    WBC 8.1 12/22/2020    HGB 13.5 12/22/2020    HCT 41.7 12/22/2020    MCV 92.7 12/22/2020     12/22/2020    LYMPHOPCT 39.1 12/22/2020    RBC 4.50 12/22/2020    MCH 30.0 12/22/2020    MCHC 32.4 (L) 12/22/2020    RDW 11.9 12/22/2020     Lab Results   Component Value Date    VITD25 >100.0 12/22/2020       Subjective:      Review of Systems   Skin: Positive for rash. Objective:     Physical Exam  Skin:            Comments: Fungal type rash to the above areas secondary cellulitis noted. /72   Pulse 77   SpO2 98%           Assessment:       Diagnosis Orders   1. Fungal rash of torso         Plan:        Patient given educational materials - see patient instructions. Discussed use, benefit, and side effects of prescribed medications. Allpatient questions answered. Pt voiced understanding. Reviewed health maintenance. Instructed to continue current medications, diet and exercise. Patient agreed with treatment plan. Follow up as directed. MEDICATIONS:  Orders Placed This Encounter   Medications    methylPREDNISolone acetate (DEPO-MEDROL) injection 80 mg         ORDERS:  No orders of the defined types were placed in this encounter. Follow-up:  No follow-ups on file. PATIENT INSTRUCTIONS:  Patient Instructions   1. Fungal rash  Medrol 80 IM today  Antifungal cream mixed with Benadryl cream the palm of your hand apply 4 times a day, you can use the Benadryl cream as often as you want to the area. Electronically signed by CHRIS Lorenz on 4/8/2021 at 10:29 AM        EMRDragon/transcription disclaimer:  Much of this encounter note is electronic transcription/translation of spoken language to printed texts. The electronic translation of spoken language may be erroneous, or at times,nonsensical words or phrases may be inadvertently transcribed.   Although I have reviewed the note for such errors, some may still exist.

## 2021-04-08 NOTE — TELEPHONE ENCOUNTER
Received call from pre-service center [facility] with Red Flag Complaint. Brief description of triage: Pt reports having red itchy rash on buttocks x4-5 days. Rates pain 6/10. Pt is using hydrocortisone cream and benadryl. States she recently started taking fish oil and is concerned that may be causing the rash. Triage indicates for patient to have appt with PCP today. Care advice provided, patient verbalizes understanding; denies any other questions or concerns; instructed to call back for any new or worsening symptoms. Writer provided warm transfer to Rony Beal at [facility] for appointment scheduling. Attention Provider: Thank you for allowing me to participate in the care of your patient. The patient was connected to triage in response to information provided to the E[facility]. Please do not respond through this encounter as the response is not directed to a shared pool. Reason for Disposition   Patient wants to be seen    Answer Assessment - Initial Assessment Questions  1. APPEARANCE of RASH: \"Describe the rash. \"       Red itchy rash with welps. 2. LOCATION: \"Where is the rash located? \"       Buttocks    3. NUMBER: \"How many spots are there? \"       Numerous    4. SIZE: \"How big are the spots? \" (Inches, centimeters or compare to size of a coin)       Small pea size rash spots on bilateral buttocks. 5. ONSET: \"When did the rash start? \"       4-5 days ago    6. ITCHING: \"Does the rash itch? \" If so, ask: \"How bad is the itch? \"  (Scale 1-10; or mild, moderate, severe)      6/10    7. PAIN: \"Does the rash hurt? \" If so, ask: \"How bad is the pain? \"  (Scale 1-10; or mild, moderate, severe)     6 /10    8. OTHER SYMPTOMS: \"Do you have any other symptoms? \" (e.g., fever)      Denies    9. PREGNANCY: \"Is there any chance you are pregnant? \" \"When was your last menstrual period? \"      N/A    Protocols used: RASH OR REDNESS - LOCALIZED-ADULT-[place]

## 2021-05-03 RX ORDER — THYROID 60 MG
TABLET ORAL
Qty: 35 TABLET | Refills: 2 | Status: SHIPPED | OUTPATIENT
Start: 2021-05-03 | End: 2021-05-05 | Stop reason: SDUPTHER

## 2021-05-03 NOTE — TELEPHONE ENCOUNTER
Nikkie called requesting a refill of the below medication which has been pended for you:     Requested Prescriptions     Pending Prescriptions Disp Refills    ARMOUR THYROID 60 MG tablet 35 tablet 2     Sig: TAKE 1 TABLET BY MOUTH ONCE DAILY EXCEPT  1  DAY  A  WEEK  TAKE  2  TABS       Last Appointment Date: 4/8/2021  Next Appointment Date: 6/29/2021    Allergies   Allergen Reactions    Compazine [Prochlorperazine Maleate] Other (See Comments)     Eyelids flipped up and mouth stretched to the side.      Thorazine [Chlorpromazine] Other (See Comments)     Eyelids flipped up and mouth stretched to the side

## 2021-05-05 RX ORDER — THYROID 60 MG
TABLET ORAL
Qty: 35 TABLET | Refills: 2 | Status: SHIPPED | OUTPATIENT
Start: 2021-05-05 | End: 2021-05-10

## 2021-05-05 RX ORDER — THYROID 60 MG
TABLET ORAL
Qty: 35 TABLET | Refills: 2 | Status: SHIPPED | OUTPATIENT
Start: 2021-05-05 | End: 2021-05-05 | Stop reason: SDUPTHER

## 2021-05-10 ENCOUNTER — OFFICE VISIT (OUTPATIENT)
Dept: INTERNAL MEDICINE | Age: 71
End: 2021-05-10
Payer: MEDICARE

## 2021-05-10 VITALS
DIASTOLIC BLOOD PRESSURE: 72 MMHG | OXYGEN SATURATION: 98 % | HEART RATE: 72 BPM | SYSTOLIC BLOOD PRESSURE: 146 MMHG | TEMPERATURE: 98.6 F

## 2021-05-10 DIAGNOSIS — R22.2 NODULE OF CHEST WALL: ICD-10-CM

## 2021-05-10 PROCEDURE — 1036F TOBACCO NON-USER: CPT | Performed by: NURSE PRACTITIONER

## 2021-05-10 PROCEDURE — G8427 DOCREV CUR MEDS BY ELIG CLIN: HCPCS | Performed by: NURSE PRACTITIONER

## 2021-05-10 PROCEDURE — 99212 OFFICE O/P EST SF 10 MIN: CPT | Performed by: NURSE PRACTITIONER

## 2021-05-10 PROCEDURE — 4040F PNEUMOC VAC/ADMIN/RCVD: CPT | Performed by: NURSE PRACTITIONER

## 2021-05-10 PROCEDURE — 1123F ACP DISCUSS/DSCN MKR DOCD: CPT | Performed by: NURSE PRACTITIONER

## 2021-05-10 PROCEDURE — G8417 CALC BMI ABV UP PARAM F/U: HCPCS | Performed by: NURSE PRACTITIONER

## 2021-05-10 PROCEDURE — G8399 PT W/DXA RESULTS DOCUMENT: HCPCS | Performed by: NURSE PRACTITIONER

## 2021-05-10 PROCEDURE — 3017F COLORECTAL CA SCREEN DOC REV: CPT | Performed by: NURSE PRACTITIONER

## 2021-05-10 PROCEDURE — 1090F PRES/ABSN URINE INCON ASSESS: CPT | Performed by: NURSE PRACTITIONER

## 2021-05-10 RX ORDER — LEVOTHYROXINE AND LIOTHYRONINE 38; 9 UG/1; UG/1
60 TABLET ORAL DAILY
COMMUNITY
End: 2021-10-26 | Stop reason: SDUPTHER

## 2021-05-10 ASSESSMENT — ENCOUNTER SYMPTOMS
EYE DISCHARGE: 0
ROS SKIN COMMENTS: NODULE
SORE THROAT: 0
TROUBLE SWALLOWING: 0
EYE ITCHING: 0
BLOOD IN STOOL: 0
DIARRHEA: 0
CONSTIPATION: 0
COUGH: 0
WHEEZING: 0
SHORTNESS OF BREATH: 0
ABDOMINAL PAIN: 0
ABDOMINAL DISTENTION: 0
COLOR CHANGE: 0
VOMITING: 0
CHOKING: 0
STRIDOR: 0
NAUSEA: 0

## 2021-05-10 NOTE — PATIENT INSTRUCTIONS
1.  Chest nodule just leave it alone for a few days see if the tenderness goes away and if the nodule remains in just let me know in a few days what is happening we may need to scan you but it may resolve on its own

## 2021-05-10 NOTE — PROGRESS NOTES
Fatty Acids (FISH OIL) 500 MG CAPS Take 2 capsules by mouth daily        No current facility-administered medications for this visit. Allergies   Allergen Reactions    Compazine [Prochlorperazine Maleate] Other (See Comments)     Eyelids flipped up and mouth stretched to the side.      Thorazine [Chlorpromazine] Other (See Comments)     Eyelids flipped up and mouth stretched to the side       Health Maintenance   Topic Date Due    DTaP/Tdap/Td vaccine (1 - Tdap) Never done    Colon cancer screen colonoscopy  12/16/2017    Shingles Vaccine (1 of 2) 06/29/2021 (Originally 3/24/2000)    Flu vaccine (Season Ended) 12/29/2021 (Originally 9/1/2021)    Lipid screen  12/22/2021    Annual Wellness Visit (AWV)  12/30/2021    Potassium monitoring  02/02/2022    Creatinine monitoring  02/02/2022    TSH testing  02/22/2022    Breast cancer screen  01/21/2023    DEXA (modify frequency per FRAX score)  Completed    Pneumococcal 65+ years Vaccine  Completed    COVID-19 Vaccine  Completed    Hepatitis C screen  Completed    Hepatitis A vaccine  Aged Out    Hepatitis B vaccine  Aged Out    Hib vaccine  Aged Out    Meningococcal (ACWY) vaccine  Aged Out       No results found for: LABA1C  No results found for: PSA, PSADIA  TSH   Date Value Ref Range Status   02/22/2021 2.050 0.270 - 4.200 uIU/mL Final   ]  Lab Results   Component Value Date     (L) 02/02/2021    K 3.5 02/02/2021    CL 98 02/02/2021    CO2 25 02/02/2021    BUN 15 02/02/2021    CREATININE 1.0 (H) 02/02/2021    GLUCOSE 77 02/02/2021    CALCIUM 9.2 02/02/2021    PROT 6.8 02/02/2021    LABALBU 4.1 02/02/2021    BILITOT 0.3 02/02/2021    ALKPHOS 71 02/02/2021    AST 17 02/02/2021    ALT 14 02/02/2021    LABGLOM 55 (A) 02/02/2021    GFRAA >59 02/02/2021     Lab Results   Component Value Date    CHOL 162 12/22/2020    CHOL 174 06/22/2020    CHOL 138 (L) 12/23/2019     Lab Results   Component Value Date    TRIG 206 (H) 12/22/2020    TRIG 204 (H) 09/22/2020    TRIG 266 (H) 06/22/2020     Lab Results   Component Value Date    HDL 62 (L) 12/22/2020    HDL 55 (L) 06/22/2020    HDL 50 (L) 12/23/2019     Lab Results   Component Value Date    LDLCALC 59 12/22/2020    LDLCALC 66 06/22/2020    LDLCALC 44 12/23/2019     Lab Results   Component Value Date     02/02/2021    K 3.5 02/02/2021    CL 98 02/02/2021    CO2 25 02/02/2021    BUN 15 02/02/2021    CREATININE 1.0 02/02/2021    GLUCOSE 77 02/02/2021    CALCIUM 9.2 02/02/2021      Lab Results   Component Value Date    WBC 8.1 12/22/2020    HGB 13.5 12/22/2020    HCT 41.7 12/22/2020    MCV 92.7 12/22/2020     12/22/2020    LYMPHOPCT 39.1 12/22/2020    RBC 4.50 12/22/2020    MCH 30.0 12/22/2020    MCHC 32.4 (L) 12/22/2020    RDW 11.9 12/22/2020     Lab Results   Component Value Date    VITD25 >100.0 12/22/2020       Subjective:      Review of Systems   Constitutional: Negative for fatigue, fever and unexpected weight change. HENT: Negative for ear discharge, ear pain, mouth sores, sore throat and trouble swallowing. Eyes: Negative for discharge, itching and visual disturbance. Respiratory: Negative for cough, choking, shortness of breath, wheezing and stridor. Cardiovascular: Negative for chest pain, palpitations and leg swelling. Gastrointestinal: Negative for abdominal distention, abdominal pain, blood in stool, constipation, diarrhea, nausea and vomiting. Endocrine: Negative for cold intolerance, polydipsia and polyuria. Genitourinary: Negative for difficulty urinating, dysuria, frequency and urgency. Musculoskeletal: Negative for arthralgias and gait problem. Skin: Negative for color change and rash. nodule   Allergic/Immunologic: Negative for food allergies and immunocompromised state. Neurological: Negative for dizziness, tremors, syncope, speech difficulty, weakness and headaches. Hematological: Negative for adenopathy. Does not bruise/bleed easily. Psychiatric/Behavioral: Negative for confusion and hallucinations. Objective:     Physical Exam  Chest:      Breasts:         Right: Normal.         Left: Normal.          Comments: Small hard nodule; Tender to touch       BP (!) 146/72   Pulse 72   Temp 98.6 °F (37 °C)   SpO2 98%           Assessment:      Problem List     Nodule of chest wall          Plan:        Patient given educational materials - see patient instructions. Discussed use, benefit, and side effects of prescribed medications. Allpatient questions answered. Pt voiced understanding. Reviewed health maintenance. Instructed to continue current medications, diet and exercise. Patient agreed with treatment plan. Follow up as directed. MEDICATIONS:  No orders of the defined types were placed in this encounter. ORDERS:  No orders of the defined types were placed in this encounter. Follow-up:  No follow-ups on file. PATIENT INSTRUCTIONS:  Patient Instructions   1. Chest nodule just leave it alone for a few days see if the tenderness goes away and if the nodule remains in just let me know in a few days what is happening we may need to scan you but it may resolve on its own    Electronically signed by CHRIS Doll on 5/10/2021 at 12:13 PM    @    [facility] WestDragon/transcription disclaimer:  Much of this encounter note is electronic transcription/translation of spoken language to printed texts. The electronic translation of spoken language may be erroneous, or at times,nonsensical words or phrases may be inadvertently transcribed.   Although I have reviewed the note for such errors, some may still exist.

## 2021-06-22 DIAGNOSIS — E55.9 VITAMIN D DEFICIENCY: ICD-10-CM

## 2021-06-22 DIAGNOSIS — E03.9 ACQUIRED HYPOTHYROIDISM: Chronic | ICD-10-CM

## 2021-06-22 DIAGNOSIS — N18.32 STAGE 3B CHRONIC KIDNEY DISEASE (HCC): ICD-10-CM

## 2021-06-22 DIAGNOSIS — E78.2 MIXED HYPERLIPIDEMIA: ICD-10-CM

## 2021-06-22 LAB
ALBUMIN SERPL-MCNC: 4 G/DL (ref 3.5–5.2)
ALP BLD-CCNC: 69 U/L (ref 35–104)
ALT SERPL-CCNC: 13 U/L (ref 5–33)
ANION GAP SERPL CALCULATED.3IONS-SCNC: 11 MMOL/L (ref 7–19)
AST SERPL-CCNC: 17 U/L (ref 5–32)
BASOPHILS ABSOLUTE: 0 K/UL (ref 0–0.2)
BASOPHILS RELATIVE PERCENT: 0.5 % (ref 0–1)
BILIRUB SERPL-MCNC: 0.4 MG/DL (ref 0.2–1.2)
BUN BLDV-MCNC: 25 MG/DL (ref 8–23)
CALCIUM SERPL-MCNC: 9.2 MG/DL (ref 8.8–10.2)
CHLORIDE BLD-SCNC: 99 MMOL/L (ref 98–111)
CO2: 27 MMOL/L (ref 22–29)
CREAT SERPL-MCNC: 1 MG/DL (ref 0.5–0.9)
EOSINOPHILS ABSOLUTE: 0.2 K/UL (ref 0–0.6)
EOSINOPHILS RELATIVE PERCENT: 3 % (ref 0–5)
GFR AFRICAN AMERICAN: >59
GFR NON-AFRICAN AMERICAN: 55
GLUCOSE BLD-MCNC: 86 MG/DL (ref 74–109)
HCT VFR BLD CALC: 39.4 % (ref 37–47)
HEMOGLOBIN: 13.2 G/DL (ref 12–16)
IMMATURE GRANULOCYTES #: 0 K/UL
LYMPHOCYTES ABSOLUTE: 2.9 K/UL (ref 1.1–4.5)
LYMPHOCYTES RELATIVE PERCENT: 36.2 % (ref 20–40)
MCH RBC QN AUTO: 30.7 PG (ref 27–31)
MCHC RBC AUTO-ENTMCNC: 33.5 G/DL (ref 33–37)
MCV RBC AUTO: 91.6 FL (ref 81–99)
MONOCYTES ABSOLUTE: 0.5 K/UL (ref 0–0.9)
MONOCYTES RELATIVE PERCENT: 5.7 % (ref 0–10)
NEUTROPHILS ABSOLUTE: 4.4 K/UL (ref 1.5–7.5)
NEUTROPHILS RELATIVE PERCENT: 54.4 % (ref 50–65)
PDW BLD-RTO: 12.2 % (ref 11.5–14.5)
PLATELET # BLD: 239 K/UL (ref 130–400)
PMV BLD AUTO: 9.9 FL (ref 9.4–12.3)
POTASSIUM SERPL-SCNC: 4.2 MMOL/L (ref 3.5–5)
RBC # BLD: 4.3 M/UL (ref 4.2–5.4)
SODIUM BLD-SCNC: 137 MMOL/L (ref 136–145)
TOTAL PROTEIN: 6.8 G/DL (ref 6.6–8.7)
TRIGL SERPL-MCNC: 115 MG/DL (ref 0–149)
TSH SERPL DL<=0.05 MIU/L-ACNC: 2.81 UIU/ML (ref 0.27–4.2)
VITAMIN D 25-HYDROXY: 78.7 NG/ML
WBC # BLD: 8.1 K/UL (ref 4.8–10.8)

## 2021-06-29 ENCOUNTER — OFFICE VISIT (OUTPATIENT)
Dept: INTERNAL MEDICINE | Age: 71
End: 2021-06-29
Payer: MEDICARE

## 2021-06-29 VITALS
OXYGEN SATURATION: 98 % | HEIGHT: 60 IN | WEIGHT: 132 LBS | HEART RATE: 62 BPM | BODY MASS INDEX: 25.91 KG/M2 | DIASTOLIC BLOOD PRESSURE: 64 MMHG | SYSTOLIC BLOOD PRESSURE: 134 MMHG

## 2021-06-29 DIAGNOSIS — N18.1 STAGE 1 CHRONIC KIDNEY DISEASE: ICD-10-CM

## 2021-06-29 DIAGNOSIS — E78.2 MIXED HYPERLIPIDEMIA: ICD-10-CM

## 2021-06-29 DIAGNOSIS — E55.9 VITAMIN D DEFICIENCY: ICD-10-CM

## 2021-06-29 DIAGNOSIS — I10 ESSENTIAL HYPERTENSION: Primary | ICD-10-CM

## 2021-06-29 DIAGNOSIS — E03.9 ACQUIRED HYPOTHYROIDISM: ICD-10-CM

## 2021-06-29 PROCEDURE — 4040F PNEUMOC VAC/ADMIN/RCVD: CPT | Performed by: NURSE PRACTITIONER

## 2021-06-29 PROCEDURE — G8427 DOCREV CUR MEDS BY ELIG CLIN: HCPCS | Performed by: NURSE PRACTITIONER

## 2021-06-29 PROCEDURE — 1123F ACP DISCUSS/DSCN MKR DOCD: CPT | Performed by: NURSE PRACTITIONER

## 2021-06-29 PROCEDURE — 99214 OFFICE O/P EST MOD 30 MIN: CPT | Performed by: NURSE PRACTITIONER

## 2021-06-29 PROCEDURE — G8417 CALC BMI ABV UP PARAM F/U: HCPCS | Performed by: NURSE PRACTITIONER

## 2021-06-29 PROCEDURE — G8399 PT W/DXA RESULTS DOCUMENT: HCPCS | Performed by: NURSE PRACTITIONER

## 2021-06-29 PROCEDURE — 1036F TOBACCO NON-USER: CPT | Performed by: NURSE PRACTITIONER

## 2021-06-29 PROCEDURE — 3017F COLORECTAL CA SCREEN DOC REV: CPT | Performed by: NURSE PRACTITIONER

## 2021-06-29 PROCEDURE — 1090F PRES/ABSN URINE INCON ASSESS: CPT | Performed by: NURSE PRACTITIONER

## 2021-06-29 SDOH — ECONOMIC STABILITY: FOOD INSECURITY: WITHIN THE PAST 12 MONTHS, YOU WORRIED THAT YOUR FOOD WOULD RUN OUT BEFORE YOU GOT MONEY TO BUY MORE.: NEVER TRUE

## 2021-06-29 SDOH — ECONOMIC STABILITY: FOOD INSECURITY: WITHIN THE PAST 12 MONTHS, THE FOOD YOU BOUGHT JUST DIDN'T LAST AND YOU DIDN'T HAVE MONEY TO GET MORE.: NEVER TRUE

## 2021-06-29 ASSESSMENT — ENCOUNTER SYMPTOMS
ABDOMINAL DISTENTION: 0
CHOKING: 0
COLOR CHANGE: 0
DIARRHEA: 0
CONSTIPATION: 0
WHEEZING: 0
COUGH: 0
STRIDOR: 0
SHORTNESS OF BREATH: 0
SORE THROAT: 0
BLOOD IN STOOL: 0
NAUSEA: 0
VOMITING: 0
TROUBLE SWALLOWING: 0
EYE ITCHING: 0
ABDOMINAL PAIN: 0
EYE DISCHARGE: 0

## 2021-06-29 ASSESSMENT — SOCIAL DETERMINANTS OF HEALTH (SDOH): HOW HARD IS IT FOR YOU TO PAY FOR THE VERY BASICS LIKE FOOD, HOUSING, MEDICAL CARE, AND HEATING?: NOT HARD AT ALL

## 2021-06-29 NOTE — PROGRESS NOTES
[address] [facility]  [address] [place] [address]  [address] [address]  Dept: [phone]  Dept Fax: [phone]: [phone]    Ravi Jha (:  1950) is a 70 y.o. female,Established patient, here for evaluation of the following chief complaint(s): 6 Month Follow-Up (states she is doing good)      Ravi Jha is a 70 y.o. female who presents today for her medical conditions/complaints as noted below. Ravi Jha is c/surendra 6 Month Follow-Up (states she is doing good)        HPI:     Chief Complaint   Patient presents with    6 Month Follow-Up     states she is doing good     HPI   1. Hypothyroidism;  stable with  brand synthroid    2. Hyperlipidemia; her trigs are down from 206 to 115 she has really been working on her diet; She is on mevacor day;    3. Vit d def;  sthabe with 78 today on 50,000 weekly   4. CKD  Stable up from 44 to 55 she is staying hydrated    5. Hypertension; stable with losartan 100 mg daily she takes as directed no side effects of the meds  Past Medical History:   Diagnosis Date    Acquired hypothyroidism 2017    Alopecia 2017    Breast implant capsular contracture     \"with cysts behind right breast\" per pt.      Essential hypertension 2017    Familial hypercholesterolemia 2017    PONV (postoperative nausea and vomiting)     Prolonged emergence from general anesthesia       Past Surgical History:   Procedure Laterality Date    BREAST ENHANCEMENT SURGERY Bilateral 2020    REMOVAL BILATERAL CONTRACTURED IMPLANTS WITH BILATERAL PECTORAL BLOCK performed by Paige Bloch, MD at [facility] 67 Bilateral     cyst removeal    BUNIONECTOMY      bunion correct osteotomy smith double-stem lesser MTP Impl    COLONOSCOPY      HEMORRHOID SURGERY      HYSTERECTOMY      KNEE SURGERY Right     RECTAL SURGERY      anal fissurectomy with sphincterotomy    MAICOL AND BSO Bilateral 1980    TUBAL LIGATION         2021 5/10/2021 2021 2021 2020 2020 2020   134 146 138 135 136 124 -   64 72 72 77 75 70 -            62 72 77 71 60 80 71   98.6 - 98.1 - - 98.4 -      98 98 98 - - - 99   132 lb - - 133 lb 132 lb - 129 lb   5' 0\" - - 5' 0\" 5' 0\" - 5' 0\"   25.78 kg/m2 - - 25.97 kg/m2 25.78 kg/m2 - 25.19 kg/m2                           Some recent data might be hidden       Family History   Problem Relation Age of Onset    High Blood Pressure Mother     Diabetes Mother         Type 2    High Blood Pressure Father     Diabetes Sister         Type 2    Diabetes Brother         Type 2    Cancer Brother         Blood form of cancer-not leukemia        Social History     Tobacco Use    Smoking status: Former Smoker     Packs/day: 1.00     Years: 8.00     Pack years: 8.00     Types: Cigarettes     Start date:      Quit date:      Years since quittin.5    Smokeless tobacco: Never Used   Substance Use Topics    Alcohol use: No      Current Outpatient Medications   Medication Sig Dispense Refill    thyroid (ARMOUR) 60 MG tablet Take 60 mg by mouth daily Compound from [facility]   t-4-60/t3-12      triamterene-hydroCHLOROthiazide (MAXZIDE-25) 37.5-25 MG per tablet       vitamin D (ERGOCALCIFEROL) 1.25 MG (06904 UT) CAPS capsule Take 1 capsule by mouth Twice a Week 24 capsule 5    estradiol (ESTRACE) 0.5 MG tablet Take 1 tablet by mouth daily 90 tablet 3    losartan (COZAAR) 100 MG tablet Take 1 tablet by mouth daily 90 tablet 3    lovastatin (MEVACOR) 40 MG tablet Take 1 tablet by mouth nightly 90 tablet 3    fluticasone-salmeterol (ADVAIR DISKUS) 500-50 MCG/DOSE diskus inhaler Inhale 1 puff into the lungs daily as needed       Biotin (BIOTIN 5000) 5 MG CAPS Take 2 tablets by mouth daily      calcium carbonate-vitamin D (CALCIUM 600+D) 600-200 MG-UNIT TABS Take 1 tablet by mouth 2 times daily      Omega-3 Fatty Acids (FISH OIL) 500 MG CAPS Take 2 capsules by mouth daily        No current facility-administered medications for this visit. Allergies   Allergen Reactions    Compazine [Prochlorperazine Maleate] Other (See Comments)     Eyelids flipped up and mouth stretched to the side.      Thorazine [Chlorpromazine] Other (See Comments)     Eyelids flipped up and mouth stretched to the side       Health Maintenance   Topic Date Due    DTaP/Tdap/Td vaccine (1 - Tdap) Never done    Colon cancer screen colonoscopy  12/16/2017    Shingles Vaccine (1 of 2) 06/29/2021 (Originally 3/24/2000)    Flu vaccine (Season Ended) 12/29/2021 (Originally 9/1/2021)    Lipid screen  12/22/2021    Annual Wellness Visit (AWV)  12/30/2021    TSH testing  06/22/2022    Potassium monitoring  06/22/2022    Creatinine monitoring  06/22/2022    Breast cancer screen  01/21/2023    DEXA (modify frequency per FRAX score)  Completed    Pneumococcal 65+ years Vaccine  Completed    COVID-19 Vaccine  Completed    Hepatitis C screen  Completed    Hepatitis A vaccine  Aged Out    Hepatitis B vaccine  Aged Out    Hib vaccine  Aged Out    Meningococcal (ACWY) vaccine  Aged Out       No results found for: LABA1C  No results found for: PSA, PSADIA  TSH   Date Value Ref Range Status   06/22/2021 2.810 0.270 - 4.200 uIU/mL Final   ]  Lab Results   Component Value Date     06/22/2021    K 4.2 06/22/2021    CL 99 06/22/2021    CO2 27 06/22/2021    BUN 25 (H) 06/22/2021    CREATININE 1.0 (H) 06/22/2021    GLUCOSE 86 06/22/2021    CALCIUM 9.2 06/22/2021    PROT 6.8 06/22/2021    LABALBU 4.0 06/22/2021    BILITOT 0.4 06/22/2021    ALKPHOS 69 06/22/2021    AST 17 06/22/2021    ALT 13 06/22/2021    LABGLOM 55 (A) 06/22/2021    GFRAA >59 06/22/2021     Lab Results   Component Value Date    CHOL 162 12/22/2020    CHOL 174 06/22/2020    CHOL 138 (L) 12/23/2019     Lab Results   Component Value Date    TRIG 115 06/22/2021    TRIG 206 (H) 12/22/2020    TRIG 204 (H) 09/22/2020     Lab Results   Component Value Date    HDL 62 (L) 12/22/2020    HDL 55 (L) 06/22/2020    HDL 50 (L) 12/23/2019     Lab Results   Component Value Date    LDLCALC 59 12/22/2020    LDLCALC 66 06/22/2020    LDLCALC 44 12/23/2019     Lab Results   Component Value Date     06/22/2021    K 4.2 06/22/2021    CL 99 06/22/2021    CO2 27 06/22/2021    BUN 25 06/22/2021    CREATININE 1.0 06/22/2021    GLUCOSE 86 06/22/2021    CALCIUM 9.2 06/22/2021      Lab Results   Component Value Date    WBC 8.1 06/22/2021    HGB 13.2 06/22/2021    HCT 39.4 06/22/2021    MCV 91.6 06/22/2021     06/22/2021    LYMPHOPCT 36.2 06/22/2021    RBC 4.30 06/22/2021    MCH 30.7 06/22/2021    MCHC 33.5 06/22/2021    RDW 12.2 06/22/2021     Lab Results   Component Value Date    VITD25 78.7 06/22/2021     Labs reviewed from 6/22/2021    Subjective:      Review of Systems   Constitutional: Negative for fatigue, fever and unexpected weight change. HENT: Negative for ear discharge, ear pain, mouth sores, sore throat and trouble swallowing. Eyes: Negative for discharge, itching and visual disturbance. Respiratory: Negative for cough, choking, shortness of breath, wheezing and stridor. Cardiovascular: Negative for chest pain, palpitations and leg swelling. Gastrointestinal: Negative for abdominal distention, abdominal pain, blood in stool, constipation, diarrhea, nausea and vomiting. Endocrine: Negative for cold intolerance, polydipsia and polyuria. Genitourinary: Negative for difficulty urinating, dysuria, frequency and urgency. Musculoskeletal: Negative for arthralgias and gait problem. Skin: Negative for color change and rash. Allergic/Immunologic: Negative for food allergies and immunocompromised state. Neurological: Negative for dizziness, tremors, syncope, speech difficulty, weakness and headaches. Hematological: Negative for adenopathy. Does not bruise/bleed easily.    Psychiatric/Behavioral: Negative for confusion and hallucinations. Objective:     Physical Exam  Constitutional:       General: She is not in acute distress. Appearance: She is well-developed. HENT:      Head: Normocephalic and atraumatic. Eyes:      General: No scleral icterus. Right eye: No discharge. Left eye: No discharge. Pupils: Pupils are equal, round, and reactive to light. Neck:      Thyroid: No thyromegaly. Vascular: No JVD. Cardiovascular:      Rate and Rhythm: Normal rate and regular rhythm. Heart sounds: Normal heart sounds. No murmur heard. Pulmonary:      Effort: Pulmonary effort is normal. No respiratory distress. Breath sounds: Normal breath sounds. No wheezing or rales. Abdominal:      General: Bowel sounds are normal. There is no distension. Palpations: Abdomen is soft. There is no mass. Tenderness: There is no abdominal tenderness. There is no guarding or rebound. Musculoskeletal:         General: No tenderness. Normal range of motion. Cervical back: Normal range of motion and neck supple. Skin:     General: Skin is warm and dry. Findings: No erythema or rash. Neurological:      Mental Status: She is alert and oriented to person, place, and time. Cranial Nerves: No cranial nerve deficit. Coordination: Coordination normal.      Deep Tendon Reflexes: Reflexes are normal and symmetric. Reflexes normal.   Psychiatric:         Mood and Affect: Mood is not depressed. Behavior: Behavior normal.         Thought Content:  Thought content normal.         Judgment: Judgment normal.       /64   Pulse 62   Ht 5' (1.524 m)   Wt 132 lb (59.9 kg)   SpO2 98%   BMI 25.78 kg/m²           Assessment:      Problem List     Acquired hypothyroidism (Chronic)     Stable with [place] Thyroid 60 mg daily that she gets compounded for [facility]         Relevant Medications    thyroid (ARMOUR) 60 MG tablet    Essential hypertension - Primary (Chronic)      Stable on losartan 100 mg daily         Relevant Orders    CBC Auto Differential    Comprehensive Metabolic Panel    Urinalysis Reflex to Culture    Mixed hyperlipidemia     Has greatly improved her transfer down to 115 from over 200 she is on omega-3 2 capsules daily and says she has been really following a strict diet          Relevant Medications    losartan (COZAAR) 100 MG tablet    lovastatin (MEVACOR) 40 MG tablet    triamterene-hydroCHLOROthiazide (MAXZIDE-25) 37.5-25 MG per tablet    Other Relevant Orders    Lipid Panel    Stage 1 chronic kidney disease     Her GFR had been down to 44 she is back up to 55 trying to stay hydrated and doing much better          Vitamin D deficiency     Levels are good at 78 she is on 50,000 twice a week          Relevant Orders    Vitamin D 25 Hydroxy          Plan:        Patient given educational materials - see patient instructions. Discussed use, benefit, and side effects of prescribed medications. Allpatient questions answered. Pt voiced understanding. Reviewed health maintenance. Instructed to continue current medications, diet and exercise. Patient agreed with treatment plan. Follow up as directed. MEDICATIONS:  No orders of the defined types were placed in this encounter. ORDERS:  Orders Placed This Encounter   Procedures    CBC Auto Differential    Comprehensive Metabolic Panel    Lipid Panel    Vitamin D 25 Hydroxy    Urinalysis Reflex to Culture       Follow-up:  Return in about 3 months (around 9/29/2021) for have labs done prior to appt. PATIENT INSTRUCTIONS:  Patient Instructions   1. Hypothyroidism; The current medical regimen is effective;  continue present plan and medications. 2.  Hyperlipidemia; The current medical regimen is effective;  continue present plan and medications. 3.  Vit d def; The current medical regimen is effective;  continue present plan and medications. 4.  CKD;   Much better  Keep hydrated; And we will see labs again in 3 weeks   5. Hypertension The current medical regimen is effective;  continue present plan and medications. Electronically signed by Berdine Alpers, APRN on 6/29/2021 at 9:38 AM        EMRDragon/transcription disclaimer:  Much of this encounter note is electronic transcription/translation of spoken language to printed texts. The electronic translation of spoken language may be erroneous, or at times,nonsensical words or phrases may be inadvertently transcribed.   Although I have reviewed the note for such errors, some may still exist.

## 2021-06-29 NOTE — ASSESSMENT & PLAN NOTE
Has greatly improved her transfer down to 115 from over 200 she is on omega-3 2 capsules daily and says she has been really following a strict diet

## 2021-06-29 NOTE — PATIENT INSTRUCTIONS
1.  Hypothyroidism; The current medical regimen is effective;  continue present plan and medications. 2.  Hyperlipidemia; The current medical regimen is effective;  continue present plan and medications. 3.  Vit d def; The current medical regimen is effective;  continue present plan and medications. 4.  CKD; Much better  Keep hydrated; And we will see labs again in 3 weeks   5. Hypertension The current medical regimen is effective;  continue present plan and medications.

## 2021-07-22 RX ORDER — ESTRADIOL 0.5 MG/1
TABLET ORAL
Qty: 90 TABLET | Refills: 3 | Status: SHIPPED | OUTPATIENT
Start: 2021-07-22 | End: 2022-06-28 | Stop reason: SDUPTHER

## 2021-07-22 RX ORDER — LOSARTAN POTASSIUM 100 MG/1
TABLET ORAL
Qty: 90 TABLET | Refills: 3 | Status: SHIPPED | OUTPATIENT
Start: 2021-07-22 | End: 2021-12-28 | Stop reason: SDUPTHER

## 2021-07-22 RX ORDER — LOVASTATIN 40 MG/1
TABLET ORAL
Qty: 90 TABLET | Refills: 3 | Status: SHIPPED | OUTPATIENT
Start: 2021-07-22 | End: 2021-12-28 | Stop reason: SDUPTHER

## 2021-07-22 NOTE — TELEPHONE ENCOUNTER
Nikkie called requesting a refill of the below medication which has been pended for you:     Requested Prescriptions     Pending Prescriptions Disp Refills    lovastatin (MEVACOR) 40 MG tablet [Pharmacy Med Name: LOVASTATIN 40 MG Tablet] 90 tablet 3     Sig: TAKE 1 TABLET EVERY NIGHT    estradiol (ESTRACE) 0.5 MG tablet [Pharmacy Med Name: ESTRADIOL 0.5 MG Tablet] 90 tablet 3     Sig: TAKE 1 TABLET EVERY DAY    losartan (COZAAR) 100 MG tablet [Pharmacy Med Name: LOSARTAN POTASSIUM 100 MG Tablet] 90 tablet 3     Sig: TAKE 1 TABLET EVERY DAY       Last Appointment Date: 6/29/2021  Next Appointment Date: 9/28/2021    Allergies   Allergen Reactions    Compazine [Prochlorperazine Maleate] Other (See Comments)     Eyelids flipped up and mouth stretched to the side.      Thorazine [Chlorpromazine] Other (See Comments)     Eyelids flipped up and mouth stretched to the side

## 2021-09-27 ASSESSMENT — ENCOUNTER SYMPTOMS
STRIDOR: 0
EYE ITCHING: 0
SHORTNESS OF BREATH: 0
WHEEZING: 0
CHOKING: 0
SORE THROAT: 0
TROUBLE SWALLOWING: 0
EYE DISCHARGE: 0
ABDOMINAL PAIN: 0
VOMITING: 0
COUGH: 0
COLOR CHANGE: 0
ABDOMINAL DISTENTION: 0
BLOOD IN STOOL: 0
NAUSEA: 0
DIARRHEA: 0
CONSTIPATION: 0

## 2021-09-27 NOTE — PROGRESS NOTES
[facility] INTERNAL MEDICINE  [address] [address]  [address] [address]  Dept: [phone]  Dept Fax: [phone]: [phone]    Obdulia Nick (:  1950) is a 70 y.o. female,Established patient, here for evaluation of the following chief complaint(s): 3 Month Follow-Up      Obdulia Nick is a 70 y.o. female who presents today for her medical conditions/complaints as noted below. Obdulia Nick is c/surendra 3 Month Follow-Up        HPI:     Chief Complaint   Patient presents with    3 Month Follow-Up     HPI     1. Hypertension  Stable with losartan   2. Hypothyroidism stable labs OK   3. Vitamin D deficiency  Good at 70   She had MAICOL in her 30;/s   4. Hyperlipidemia  She is on fish oil 60705 in the am and lovastatin 40   In the evening  cholesterol 156  Triglycerides; up form 115 to 223 ; she is takin meds in the am   5. Health maintenance;  dexa is due ; She is going to [facility] to set up C-scope     Past Medical History:   Diagnosis Date    Acquired hypothyroidism 2017    Alopecia 2017    Breast implant capsular contracture     \"with cysts behind right breast\" per pt.      Essential hypertension 2017    Familial hypercholesterolemia 2017    PONV (postoperative nausea and vomiting)     Prolonged emergence from general anesthesia       Past Surgical History:   Procedure Laterality Date    BREAST ENHANCEMENT SURGERY Bilateral 2020    REMOVAL BILATERAL CONTRACTURED IMPLANTS WITH BILATERAL PECTORAL BLOCK performed by Nannette Izaguirre MD at [facility] 67 Bilateral     cyst removeal    BUNIONECTOMY      bunion correct osteotomy luis double-stem lesser MTP Impl    COLONOSCOPY      HEMORRHOID SURGERY      HYSTERECTOMY      KNEE SURGERY Right     RECTAL SURGERY      anal fissurectomy with sphincterotomy    MAICOL AND BSO Bilateral     TUBAL LIGATION         Vitals 2021 2021 5/10/2021 2021 2021    SYSTOLIC 014 260 141 [phone]   DIASTOLIC 70 64 72 72 77 75   Site - - - - Left Upper Arm -   Position - - - - Sitting -   Cuff Size - - - - Medium Adult -   Pulse 68 62 72 77 71 60   Temp 98.4 - 98.6 - 98.1 -   Resp - - - - - -   SpO2 98 98 98 98 - -   Weight 135 lb 132 lb - - 133 lb 132 lb   Height 5' 0\" 5' 0\" - - 5' 0\" 5' 0\"   Body mass index 26.36 kg/m2 25.78 kg/m2 - - 25.97 kg/m2 25.78 kg/m2   Some recent data might be hidden       Family History   Problem Relation Age of Onset    High Blood Pressure Mother     Diabetes Mother         Type 2    High Blood Pressure Father     Diabetes Sister         Type 2    Diabetes Brother         Type 2    Cancer Brother         Blood form of cancer-not leukemia        Social History     Tobacco Use    Smoking status: Former Smoker     Packs/day: 1.00     Years: 8.00     Pack years: 8.00     Types: Cigarettes     Start date:      Quit date:      Years since quittin.7    Smokeless tobacco: Never Used   Substance Use Topics    Alcohol use: No      Current Outpatient Medications   Medication Sig Dispense Refill    lovastatin (MEVACOR) 40 MG tablet TAKE 1 TABLET EVERY NIGHT 90 tablet 3    estradiol (ESTRACE) 0.5 MG tablet TAKE 1 TABLET EVERY DAY 90 tablet 3    losartan (COZAAR) 100 MG tablet TAKE 1 TABLET EVERY DAY 90 tablet 3    thyroid (ARMOUR) 60 MG tablet Take 60 mg by mouth daily Compound from [employer]   t-4-60/t3-12      triamterene-hydroCHLOROthiazide (MAXZIDE-25) 37.5-25 MG per tablet       vitamin D (ERGOCALCIFEROL) 1.25 MG (76888 UT) CAPS capsule Take 1 capsule by mouth Twice a Week 24 capsule 5    fluticasone-salmeterol (ADVAIR DISKUS) 500-50 MCG/DOSE diskus inhaler Inhale 1 puff into the lungs daily as needed       Biotin (BIOTIN 5000) 5 MG CAPS Take 2 tablets by mouth daily      calcium carbonate-vitamin D (CALCIUM 600+D) 600-200 MG-UNIT TABS Take 1 tablet by mouth 2 times daily      Omega-3 Fatty Acids (FISH OIL) 500 MG CAPS Take 4 capsules by mouth daily       No current facility-administered medications for this visit. Allergies   Allergen Reactions    Compazine [Prochlorperazine Maleate] Other (See Comments)     Eyelids flipped up and mouth stretched to the side.      Thorazine [Chlorpromazine] Other (See Comments)     Eyelids flipped up and mouth stretched to the side       Health Maintenance   Topic Date Due    DTaP/Tdap/Td vaccine (1 - Tdap) 06/29/2022 (Originally [birth year]/24/1969)    Flu vaccine (1) 09/28/2022 (Originally 9/1/2021)    Shingles Vaccine (1 of 2) 09/28/2022 (Originally 3/24/2000)    Annual Wellness Visit (AWV)  12/30/2021    TSH testing  06/22/2022    Lipid screen  09/20/2022    Potassium monitoring  09/20/2022    Creatinine monitoring  09/20/2022    Breast cancer screen  01/21/2023    Colon cancer screen colonoscopy  12/16/2024    DEXA (modify frequency per FRAX score)  Completed    Pneumococcal 65+ years Vaccine  Completed    COVID-19 Vaccine  Completed    Hepatitis C screen  Completed    Hepatitis A vaccine  Aged Out    Hepatitis B vaccine  Aged Out    Hib vaccine  Aged Out    Meningococcal (ACWY) vaccine  Aged Out       No results found for: LABA1C  No results found for: PSA, PSADIA  TSH   Date Value Ref Range Status   06/22/2021 2.810 0.270 - 4.200 uIU/mL Final   ]  Lab Results   Component Value Date     09/20/2021    K 4.8 09/20/2021     09/20/2021    CO2 25 09/20/2021    BUN 15 09/20/2021    CREATININE 0.9 09/20/2021    GLUCOSE 86 09/20/2021    CALCIUM 8.9 09/20/2021    PROT 6.3 (L) 09/20/2021    LABALBU 4.0 09/20/2021    BILITOT 0.3 09/20/2021    ALKPHOS 72 09/20/2021    AST 17 09/20/2021    ALT 11 09/20/2021    LABGLOM >60 09/20/2021    GFRAA >59 09/20/2021     Lab Results   Component Value Date    CHOL 156 (L) 09/20/2021    CHOL 162 12/22/2020    CHOL 174 06/22/2020     Lab Results   Component Value Date    TRIG 223 (H) 09/20/2021    TRIG 115 06/22/2021    TRIG 206 (H) 12/22/2020     Lab Results   Component Value Date    HDL 53 (L) 09/20/2021    HDL 62 (L) 12/22/2020    HDL 55 (L) 06/22/2020     Lab Results   Component Value Date    LDLCALC 58 09/20/2021    LDLCALC 59 12/22/2020    LDLCALC 66 06/22/2020     Lab Results   Component Value Date     09/20/2021    K 4.8 09/20/2021     09/20/2021    CO2 25 09/20/2021    BUN 15 09/20/2021    CREATININE 0.9 09/20/2021    GLUCOSE 86 09/20/2021    CALCIUM 8.9 09/20/2021      Lab Results   Component Value Date    WBC 9.6 09/20/2021    HGB 12.9 09/20/2021    HCT 40.9 09/20/2021    MCV 94.7 09/20/2021     09/20/2021    LYMPHOPCT 30.3 09/20/2021    RBC 4.32 09/20/2021    MCH 29.9 09/20/2021    MCHC 31.5 (L) 09/20/2021    RDW 12.0 09/20/2021     Lab Results   Component Value Date    VITD25 71.8 09/20/2021     Labs reviewed from 9/20/2021  Diagnostics reviewed from 2 years ago on her DEXA to reveal normal  Subjective:      Review of Systems   Constitutional: Negative for fatigue, fever and unexpected weight change. HENT: Negative for ear discharge, ear pain, mouth sores, sore throat and trouble swallowing. Eyes: Negative for discharge, itching and visual disturbance. Respiratory: Negative for cough, choking, shortness of breath, wheezing and stridor. Cardiovascular: Negative for chest pain, palpitations and leg swelling. Gastrointestinal: Negative for abdominal distention, abdominal pain, blood in stool, constipation, diarrhea, nausea and vomiting. Endocrine: Negative for cold intolerance, polydipsia and polyuria. Genitourinary: Negative for difficulty urinating, dysuria, frequency and urgency. Musculoskeletal: Negative for arthralgias and gait problem. Skin: Negative for color change and rash. Allergic/Immunologic: Negative for food allergies and immunocompromised state.    Neurological: Negative for dizziness, tremors, syncope, speech difficulty, weakness and headaches. Hematological: Negative for adenopathy. Does not bruise/bleed easily. Psychiatric/Behavioral: Negative for confusion and hallucinations. Objective:     Physical Exam  Constitutional:       General: She is not in acute distress. Appearance: She is well-developed. HENT:      Head: Normocephalic and atraumatic. Eyes:      General: No scleral icterus. Right eye: No discharge. Left eye: No discharge. Pupils: Pupils are equal, round, and reactive to light. Neck:      Thyroid: No thyromegaly. Vascular: No JVD. Cardiovascular:      Rate and Rhythm: Normal rate and regular rhythm. Heart sounds: Normal heart sounds. No murmur heard. Pulmonary:      Effort: Pulmonary effort is normal. No respiratory distress. Breath sounds: Normal breath sounds. No wheezing or rales. Abdominal:      General: Bowel sounds are normal. There is no distension. Palpations: Abdomen is soft. There is no mass. Tenderness: There is no abdominal tenderness. There is no guarding or rebound. Musculoskeletal:         General: No tenderness. Normal range of motion. Cervical back: Normal range of motion and neck supple. Skin:     General: Skin is warm and dry. Findings: No erythema or rash. Neurological:      Mental Status: She is alert and oriented to person, place, and time. Cranial Nerves: No cranial nerve deficit. Coordination: Coordination normal.      Deep Tendon Reflexes: Reflexes are normal and symmetric. Reflexes normal.   Psychiatric:         Mood and Affect: Mood is not depressed. Behavior: Behavior normal.         Thought Content:  Thought content normal.         Judgment: Judgment normal.       /70   Pulse 68   Temp 98.4 °F (36.9 °C)   Ht 5' (1.524 m)   Wt 135 lb (61.2 kg)   SpO2 98%   BMI 26.37 kg/m²           Assessment:      Problem List     Acquired hypothyroidism (Chronic)     She wants [place] Thyroid she is on 60 mg daily          Relevant Medications    thyroid (ARMOUR) 60 MG tablet    Other Relevant Orders    TSH without Reflex    Essential hypertension (Chronic)     Currently on losartan 100 mg daily and triamterene HCTZ 37.5/25          Relevant Orders    CBC Auto Differential    Comprehensive Metabolic Panel    Urinalysis Reflex to Culture    Mixed hyperlipidemia     Start fish oil and lovastatin at bedtime           Relevant Medications    triamterene-hydroCHLOROthiazide (MAXZIDE-25) 37.5-25 MG per tablet    lovastatin (MEVACOR) 40 MG tablet    losartan (COZAAR) 100 MG tablet    Other Relevant Orders    Triglyceride    Vitamin D deficiency     She is on 50,000 twice weekly          Relevant Orders    Vitamin D 25 Hydroxy          Plan:        Patient given educational materials - see patient instructions. Discussed use, benefit, and side effects of prescribed medications. Allpatient questions answered. Pt voiced understanding. Reviewed health maintenance. Instructed to continue current medications, diet and exercise. Patient agreed with treatment plan. Follow up as directed. MEDICATIONS:  No orders of the defined types were placed in this encounter. ORDERS:  Orders Placed This Encounter   Procedures    DEXA BONE DENSITY 2 SITES    CBC Auto Differential    Comprehensive Metabolic Panel    Triglyceride    Vitamin D 25 Hydroxy    Urinalysis Reflex to Culture    TSH without Reflex       Follow-up:  Return in 3 months (on 12/28/2021) for awv, have labs done prior to appt. PATIENT INSTRUCTIONS:  Patient Instructions   1. Hypertension stable on current dose of losartan no changes necessary   2. Hypothyroidism labs are good no changes are necessary  3. Vitamin D deficiency 5 days level is good continue current dose  4. Hyperlipidemia taking fish oil with your lovastatin at bedtime  5.   Health maintenance we will schedule DEXA    Electronically signed by CHRIS Jain on 9/28/2021

## 2021-09-27 NOTE — PATIENT INSTRUCTIONS
1.  Hypertension stable on current dose of losartan no changes necessary   2. Hypothyroidism labs are good no changes are necessary  3. Vitamin D deficiency 5 days level is good continue current dose  4. Hyperlipidemia taking fish oil with your lovastatin at bedtime  5.   Health maintenance we will schedule DEXA

## 2021-09-28 ENCOUNTER — OFFICE VISIT (OUTPATIENT)
Dept: INTERNAL MEDICINE | Age: 71
End: 2021-09-28
Payer: MEDICARE

## 2021-09-28 VITALS
TEMPERATURE: 98.4 F | HEART RATE: 68 BPM | OXYGEN SATURATION: 98 % | SYSTOLIC BLOOD PRESSURE: 138 MMHG | DIASTOLIC BLOOD PRESSURE: 70 MMHG | HEIGHT: 60 IN | WEIGHT: 135 LBS | BODY MASS INDEX: 26.5 KG/M2

## 2021-09-28 DIAGNOSIS — E55.9 VITAMIN D DEFICIENCY: ICD-10-CM

## 2021-09-28 DIAGNOSIS — I10 ESSENTIAL HYPERTENSION: Chronic | ICD-10-CM

## 2021-09-28 DIAGNOSIS — Z78.0 POSTMENOPAUSAL: Primary | ICD-10-CM

## 2021-09-28 DIAGNOSIS — E78.2 MIXED HYPERLIPIDEMIA: ICD-10-CM

## 2021-09-28 DIAGNOSIS — E03.9 ACQUIRED HYPOTHYROIDISM: Chronic | ICD-10-CM

## 2021-09-28 PROCEDURE — 1036F TOBACCO NON-USER: CPT | Performed by: NURSE PRACTITIONER

## 2021-09-28 PROCEDURE — 1090F PRES/ABSN URINE INCON ASSESS: CPT | Performed by: NURSE PRACTITIONER

## 2021-09-28 PROCEDURE — 3017F COLORECTAL CA SCREEN DOC REV: CPT | Performed by: NURSE PRACTITIONER

## 2021-09-28 PROCEDURE — 1123F ACP DISCUSS/DSCN MKR DOCD: CPT | Performed by: NURSE PRACTITIONER

## 2021-09-28 PROCEDURE — G8417 CALC BMI ABV UP PARAM F/U: HCPCS | Performed by: NURSE PRACTITIONER

## 2021-09-28 PROCEDURE — G8399 PT W/DXA RESULTS DOCUMENT: HCPCS | Performed by: NURSE PRACTITIONER

## 2021-09-28 PROCEDURE — G8427 DOCREV CUR MEDS BY ELIG CLIN: HCPCS | Performed by: NURSE PRACTITIONER

## 2021-09-28 PROCEDURE — 4040F PNEUMOC VAC/ADMIN/RCVD: CPT | Performed by: NURSE PRACTITIONER

## 2021-09-28 PROCEDURE — 99214 OFFICE O/P EST MOD 30 MIN: CPT | Performed by: NURSE PRACTITIONER

## 2021-10-05 ENCOUNTER — HOSPITAL ENCOUNTER (OUTPATIENT)
Dept: ULTRASOUND IMAGING | Age: 71
Discharge: HOME | End: 2021-10-05
Payer: MEDICARE

## 2021-10-05 DIAGNOSIS — Z78.0 POSTMENOPAUSAL: ICD-10-CM

## 2021-10-05 PROCEDURE — 77080 DXA BONE DENSITY AXIAL: CPT

## 2021-10-11 ENCOUNTER — OFFICE VISIT (OUTPATIENT)
Dept: INTERNAL MEDICINE | Age: 71
End: 2021-10-11
Payer: MEDICARE

## 2021-10-11 VITALS
SYSTOLIC BLOOD PRESSURE: 144 MMHG | RESPIRATION RATE: 20 BRPM | OXYGEN SATURATION: 98 % | WEIGHT: 138.4 LBS | BODY MASS INDEX: 27.17 KG/M2 | HEIGHT: 60 IN | TEMPERATURE: 97.1 F | DIASTOLIC BLOOD PRESSURE: 68 MMHG | HEART RATE: 66 BPM

## 2021-10-11 DIAGNOSIS — R06.2 WHEEZING: ICD-10-CM

## 2021-10-11 PROCEDURE — 4040F PNEUMOC VAC/ADMIN/RCVD: CPT | Performed by: NURSE PRACTITIONER

## 2021-10-11 PROCEDURE — 1090F PRES/ABSN URINE INCON ASSESS: CPT | Performed by: NURSE PRACTITIONER

## 2021-10-11 PROCEDURE — G8484 FLU IMMUNIZE NO ADMIN: HCPCS | Performed by: NURSE PRACTITIONER

## 2021-10-11 PROCEDURE — G8427 DOCREV CUR MEDS BY ELIG CLIN: HCPCS | Performed by: NURSE PRACTITIONER

## 2021-10-11 PROCEDURE — 99213 OFFICE O/P EST LOW 20 MIN: CPT | Performed by: NURSE PRACTITIONER

## 2021-10-11 PROCEDURE — 1123F ACP DISCUSS/DSCN MKR DOCD: CPT | Performed by: NURSE PRACTITIONER

## 2021-10-11 PROCEDURE — 1036F TOBACCO NON-USER: CPT | Performed by: NURSE PRACTITIONER

## 2021-10-11 PROCEDURE — G8417 CALC BMI ABV UP PARAM F/U: HCPCS | Performed by: NURSE PRACTITIONER

## 2021-10-11 PROCEDURE — G8399 PT W/DXA RESULTS DOCUMENT: HCPCS | Performed by: NURSE PRACTITIONER

## 2021-10-11 PROCEDURE — 3017F COLORECTAL CA SCREEN DOC REV: CPT | Performed by: NURSE PRACTITIONER

## 2021-10-11 PROCEDURE — 96372 THER/PROPH/DIAG INJ SC/IM: CPT | Performed by: NURSE PRACTITIONER

## 2021-10-11 RX ORDER — CEFDINIR 300 MG/1
300 CAPSULE ORAL 2 TIMES DAILY
Qty: 14 CAPSULE | Refills: 0 | Status: SHIPPED | OUTPATIENT
Start: 2021-10-11 | End: 2021-10-18

## 2021-10-11 RX ORDER — ALBUTEROL SULFATE 90 UG/1
2 AEROSOL, METERED RESPIRATORY (INHALATION) EVERY 6 HOURS PRN
Qty: 18 G | Refills: 3 | Status: SHIPPED | OUTPATIENT
Start: 2021-10-11

## 2021-10-11 RX ORDER — METHYLPREDNISOLONE ACETATE 80 MG/ML
80 INJECTION, SUSPENSION INTRA-ARTICULAR; INTRALESIONAL; INTRAMUSCULAR; SOFT TISSUE ONCE
Status: COMPLETED | OUTPATIENT
Start: 2021-10-11 | End: 2021-10-11

## 2021-10-11 RX ADMIN — METHYLPREDNISOLONE ACETATE 80 MG: 80 INJECTION, SUSPENSION INTRA-ARTICULAR; INTRALESIONAL; INTRAMUSCULAR; SOFT TISSUE at 16:46

## 2021-10-11 ASSESSMENT — ENCOUNTER SYMPTOMS
COUGH: 1
WHEEZING: 1

## 2021-10-11 NOTE — ASSESSMENT & PLAN NOTE
Expiratory wheezing we will send her proair   Medrol 80 IM today  Cefdinir 300 twice daily for 7 days.   I did suggest that she get a pulse oximeter just to have on hand at home but her O2 sat is 98% in the office today

## 2021-10-11 NOTE — PATIENT INSTRUCTIONS
1.  Wheezing we will do 80 IM of Medrol today cefdinir 300 twice a day for 7 days and ProAir inhaler 4 times a day for 3 days then 3 times a day for 3 days then twice daily for 3 days then daily for 3 days then stop

## 2021-10-11 NOTE — PROGRESS NOTES
[address] INTERNAL MEDICINE  [id number] [address]  Dept: [phone]  Dept Fax: [phone]: [phone]    Gabrielle Barroso (:  1950) is a 70 y.o. female,Established patient, here for evaluation of the following chief complaint(s): Shortness of Breath (possible bronchitis flare up) and Cough      Gabrielle Barroso is a 70 y.o. female who presents today for her medical conditions/complaints as noted below. Gabrielle Barroso is c/surendra Shortness of Breath (possible bronchitis flare up) and Cough        HPI:     Chief Complaint   Patient presents with    Shortness of Breath     possible bronchitis flare up    Cough     HPI   1. cough with chest congestion and wheezing; she did not sleep at all last night with the cough; She had some Advair at home but she thinks is empty. She is never had a rescue inhaler that she knows of. She said no fever or chills; she has not felt well over the weekend but last night was about not for her trying to sleep      Past Medical History:   Diagnosis Date    Acquired hypothyroidism 2017    Alopecia 2017    Breast implant capsular contracture     \"with cysts behind right breast\" per pt.      Essential hypertension 2017    Familial hypercholesterolemia 2017    PONV (postoperative nausea and vomiting)     Prolonged emergence from general anesthesia       Past Surgical History:   Procedure Laterality Date    BREAST ENHANCEMENT SURGERY Bilateral 2020    REMOVAL BILATERAL CONTRACTURED IMPLANTS WITH BILATERAL PECTORAL BLOCK performed by Rosalba Iraheta MD at [facility] 67 Bilateral     cyst removeal    BUNIONECTOMY      bunion correct osteotomy smith double-stem lesser MTP Impl    COLONOSCOPY      HEMORRHOID SURGERY      HYSTERECTOMY      KNEE SURGERY Right     RECTAL SURGERY      anal fissurectomy with sphincterotomy    MAICOL AND BSO Twice a Week 24 capsule 5    fluticasone-salmeterol (ADVAIR DISKUS) 500-50 MCG/DOSE diskus inhaler Inhale 1 puff into the lungs daily as needed       Biotin (BIOTIN 5000) 5 MG CAPS Take 2 tablets by mouth daily      calcium carbonate-vitamin D (CALCIUM 600+D) 600-200 MG-UNIT TABS Take 1 tablet by mouth 2 times daily      Omega-3 Fatty Acids (FISH OIL) 500 MG CAPS Take 4 capsules by mouth daily       Current Facility-Administered Medications   Medication Dose Route Frequency Provider Last Rate Last Admin    methylPREDNISolone acetate (DEPO-MEDROL) injection 80 mg  80 mg IntraMUSCular Once CHRIS Joyner         Allergies   Allergen Reactions    Compazine [Prochlorperazine Maleate] Other (See Comments)     Eyelids flipped up and mouth stretched to the side.      Thorazine [Chlorpromazine] Other (See Comments)     Eyelids flipped up and mouth stretched to the side       Health Maintenance   Topic Date Due    DTaP/Tdap/Td vaccine (1 - Tdap) 06/29/2022 (Originally 3/24/1969)    Flu vaccine (1) 09/28/2022 (Originally 9/1/2021)    Shingles Vaccine (1 of 2) 09/28/2022 (Originally 3/24/2000)    Annual Wellness Visit (AWV)  12/30/2021    TSH testing  06/22/2022    Lipid screen  09/20/2022    Potassium monitoring  09/20/2022    Creatinine monitoring  09/20/2022    Breast cancer screen  01/21/2023    Colon cancer screen colonoscopy  12/16/2024    DEXA (modify frequency per FRAX score)  Completed    Pneumococcal 65+ years Vaccine  Completed    COVID-19 Vaccine  Completed    Hepatitis C screen  Completed    Hepatitis A vaccine  Aged Out    Hepatitis B vaccine  Aged Out    Hib vaccine  Aged Out    Meningococcal (ACWY) vaccine  Aged Out       No results found for: LABA1C  No results found for: PSA, PSADIA  TSH   Date Value Ref Range Status   06/22/2021 2.810 0.270 - 4.200 uIU/mL Final   ]  Lab Results   Component Value Date     09/20/2021    K 4.8 09/20/2021     09/20/2021    CO2 25 rhythm. Heart sounds: Normal heart sounds. No murmur heard. Pulmonary:      Effort: Pulmonary effort is normal. No respiratory distress. Breath sounds: Wheezing present. No rales. Comments: Bilateral expiratory wheezing;;   Abdominal:      General: Bowel sounds are normal. There is no distension. Palpations: Abdomen is soft. There is no mass. Tenderness: There is no abdominal tenderness. There is no guarding or rebound. Musculoskeletal:         General: No tenderness. Normal range of motion. Cervical back: Normal range of motion and neck supple. Skin:     General: Skin is warm and dry. Findings: No erythema or rash. Neurological:      Mental Status: She is alert and oriented to person, place, and time. Cranial Nerves: No cranial nerve deficit. Coordination: Coordination normal.      Deep Tendon Reflexes: Reflexes are normal and symmetric. Reflexes normal.   Psychiatric:         Mood and Affect: Mood is not depressed. Behavior: Behavior normal.         Thought Content: Thought content normal.         Judgment: Judgment normal.       BP (!) 144/68   Pulse 66   Temp 97.1 °F (36.2 °C) (Temporal)   Resp 20   Ht 5' (1.524 m)   Wt 138 lb 6.4 oz (62.8 kg)   SpO2 98%   BMI 27.03 kg/m²           Assessment:      Problem List     Wheezing     Expiratory wheezing we will send her proair   Medrol 80 IM today  Cefdinir 300 twice daily for 7 days. I did suggest that she get a pulse oximeter just to have on hand at home but her O2 sat is 98% in the office today                 Plan:        Patient given educational materials - see patient instructions. Discussed use, benefit, and side effects of prescribed medications. Allpatient questions answered. Pt voiced understanding. Reviewed health maintenance. Instructed to continue current medications, diet and exercise. Patient agreed with treatment plan. Follow up as directed.    MEDICATIONS:  Orders Placed This Encounter   Medications    albuterol sulfate HFA (PROVENTIL HFA) 108 (90 Base) MCG/ACT inhaler     Sig: Inhale 2 puffs into the lungs every 6 hours as needed for Wheezing     Dispense:  18 g     Refill:  3    cefdinir (OMNICEF) 300 MG capsule     Sig: Take 1 capsule by mouth 2 times daily for 7 days     Dispense:  14 capsule     Refill:  0    methylPREDNISolone acetate (DEPO-MEDROL) injection 80 mg         ORDERS:  No orders of the defined types were placed in this encounter. Follow-up:  No follow-ups on file. PATIENT INSTRUCTIONS:  Patient Instructions   1. Wheezing we will do 80 IM of Medrol today cefdinir 300 twice a day for 7 days and ProAir inhaler 4 times a day for 3 days then 3 times a day for 3 days then twice daily for 3 days then daily for 3 days then stop    Electronically signed by CHRIS Carter on 10/11/2021 at 4:31 PM    @On this date 10/11/2021 I have spent 15 minutes reviewing previous notes, test results and face to face with the patient discussing the diagnosis and importance of compliance with the treatment plan as well as documenting on the day of the visit. [employer]MR[employer]/transcription disclaimer:  Much of this encounter note is electronic transcription/translation of spoken language to printed texts. The electronic translation of spoken language may be erroneous, or at times,nonsensical words or phrases may be inadvertently transcribed.   Although I have reviewed the note for such errors, some may still exist.

## 2021-10-26 RX ORDER — LEVOTHYROXINE AND LIOTHYRONINE 38; 9 UG/1; UG/1
60 TABLET ORAL DAILY
Qty: 30 TABLET | Refills: 3 | Status: SHIPPED | OUTPATIENT
Start: 2021-10-26 | End: 2021-12-28 | Stop reason: SDUPTHER

## 2021-11-01 ENCOUNTER — TELEPHONE (OUTPATIENT)
Dept: INTERNAL MEDICINE | Age: 71
End: 2021-11-01

## 2021-11-01 NOTE — TELEPHONE ENCOUNTER
Nikkie called requesting a refill of the below medication which has been pended for you:     Requested Prescriptions      No prescriptions requested or ordered in this encounter   THYROID (SR) T4-60/T3-12 COMPOUND  TAKE 1 CAPSULE BY MOUTH DAILY ON A EMPTY STOMACH    Last Appointment Date: 10/11/2021  Next Appointment Date: 12/28/2021    Allergies   Allergen Reactions    Compazine [Prochlorperazine Maleate] Other (See Comments)     Eyelids flipped up and mouth stretched to the side.      Thorazine [Chlorpromazine] Other (See Comments)     Eyelids flipped up and mouth stretched to the side         CALLED INTO [place] 11/1/21 2:34PM TO ADDIS AT [facility]

## 2021-12-20 DIAGNOSIS — E55.9 VITAMIN D DEFICIENCY: ICD-10-CM

## 2021-12-20 DIAGNOSIS — E78.2 MIXED HYPERLIPIDEMIA: ICD-10-CM

## 2021-12-20 DIAGNOSIS — I10 ESSENTIAL HYPERTENSION: Chronic | ICD-10-CM

## 2021-12-20 DIAGNOSIS — E03.9 ACQUIRED HYPOTHYROIDISM: Chronic | ICD-10-CM

## 2021-12-20 LAB
ALBUMIN SERPL-MCNC: 4 G/DL (ref 3.5–5.2)
ALP BLD-CCNC: 68 U/L (ref 35–104)
ALT SERPL-CCNC: 16 U/L (ref 5–33)
ANION GAP SERPL CALCULATED.3IONS-SCNC: 13 MMOL/L (ref 7–19)
AST SERPL-CCNC: 19 U/L (ref 5–32)
BASOPHILS ABSOLUTE: 0 K/UL (ref 0–0.2)
BASOPHILS RELATIVE PERCENT: 0.4 % (ref 0–1)
BILIRUB SERPL-MCNC: 0.4 MG/DL (ref 0.2–1.2)
BILIRUBIN URINE: NEGATIVE
BLOOD, URINE: NEGATIVE
BUN BLDV-MCNC: 18 MG/DL (ref 8–23)
CALCIUM SERPL-MCNC: 9.5 MG/DL (ref 8.8–10.2)
CHLORIDE BLD-SCNC: 100 MMOL/L (ref 98–111)
CLARITY: CLEAR
CO2: 25 MMOL/L (ref 22–29)
COLOR: YELLOW
CREAT SERPL-MCNC: 1 MG/DL (ref 0.5–0.9)
EOSINOPHILS ABSOLUTE: 0.2 K/UL (ref 0–0.6)
EOSINOPHILS RELATIVE PERCENT: 2.3 % (ref 0–5)
GFR AFRICAN AMERICAN: >59
GFR NON-AFRICAN AMERICAN: 55
GLUCOSE BLD-MCNC: 83 MG/DL (ref 74–109)
GLUCOSE URINE: NEGATIVE MG/DL
HCT VFR BLD CALC: 43.3 % (ref 37–47)
HEMOGLOBIN: 13.4 G/DL (ref 12–16)
IMMATURE GRANULOCYTES #: 0 K/UL
KETONES, URINE: NEGATIVE MG/DL
LEUKOCYTE ESTERASE, URINE: NEGATIVE
LYMPHOCYTES ABSOLUTE: 2.9 K/UL (ref 1.1–4.5)
LYMPHOCYTES RELATIVE PERCENT: 36.2 % (ref 20–40)
MCH RBC QN AUTO: 29.7 PG (ref 27–31)
MCHC RBC AUTO-ENTMCNC: 30.9 G/DL (ref 33–37)
MCV RBC AUTO: 96 FL (ref 81–99)
MONOCYTES ABSOLUTE: 0.4 K/UL (ref 0–0.9)
MONOCYTES RELATIVE PERCENT: 5.4 % (ref 0–10)
NEUTROPHILS ABSOLUTE: 4.4 K/UL (ref 1.5–7.5)
NEUTROPHILS RELATIVE PERCENT: 55.3 % (ref 50–65)
NITRITE, URINE: NEGATIVE
PDW BLD-RTO: 11.9 % (ref 11.5–14.5)
PH UA: 6.5 (ref 5–8)
PLATELET # BLD: 251 K/UL (ref 130–400)
PMV BLD AUTO: 9.7 FL (ref 9.4–12.3)
POTASSIUM SERPL-SCNC: 4.4 MMOL/L (ref 3.5–5)
PROTEIN UA: NEGATIVE MG/DL
RBC # BLD: 4.51 M/UL (ref 4.2–5.4)
SODIUM BLD-SCNC: 138 MMOL/L (ref 136–145)
SPECIFIC GRAVITY UA: 1 (ref 1–1.03)
TOTAL PROTEIN: 6.6 G/DL (ref 6.6–8.7)
TRIGL SERPL-MCNC: 187 MG/DL (ref 0–149)
TSH SERPL DL<=0.05 MIU/L-ACNC: 2.49 UIU/ML (ref 0.27–4.2)
UROBILINOGEN, URINE: 0.2 E.U./DL
VITAMIN D 25-HYDROXY: 69.2 NG/ML
WBC # BLD: 7.9 K/UL (ref 4.8–10.8)

## 2021-12-28 ENCOUNTER — OFFICE VISIT (OUTPATIENT)
Dept: INTERNAL MEDICINE | Age: 71
End: 2021-12-28
Payer: MEDICARE

## 2021-12-28 VITALS
WEIGHT: 135 LBS | TEMPERATURE: 97.6 F | SYSTOLIC BLOOD PRESSURE: 128 MMHG | BODY MASS INDEX: 26.5 KG/M2 | HEART RATE: 60 BPM | HEIGHT: 60 IN | DIASTOLIC BLOOD PRESSURE: 64 MMHG | OXYGEN SATURATION: 98 %

## 2021-12-28 DIAGNOSIS — Z00.00 ROUTINE GENERAL MEDICAL EXAMINATION AT A HEALTH CARE FACILITY: ICD-10-CM

## 2021-12-28 DIAGNOSIS — N18.1 STAGE 1 CHRONIC KIDNEY DISEASE: ICD-10-CM

## 2021-12-28 DIAGNOSIS — E03.9 ACQUIRED HYPOTHYROIDISM: Chronic | ICD-10-CM

## 2021-12-28 DIAGNOSIS — E78.2 MIXED HYPERLIPIDEMIA: ICD-10-CM

## 2021-12-28 DIAGNOSIS — E55.9 VITAMIN D DEFICIENCY: ICD-10-CM

## 2021-12-28 DIAGNOSIS — I10 ESSENTIAL HYPERTENSION: Primary | Chronic | ICD-10-CM

## 2021-12-28 PROBLEM — R06.2 WHEEZING: Status: RESOLVED | Noted: 2021-10-11 | Resolved: 2021-12-28

## 2021-12-28 PROCEDURE — 4040F PNEUMOC VAC/ADMIN/RCVD: CPT | Performed by: NURSE PRACTITIONER

## 2021-12-28 PROCEDURE — G0439 PPPS, SUBSEQ VISIT: HCPCS | Performed by: NURSE PRACTITIONER

## 2021-12-28 PROCEDURE — 1123F ACP DISCUSS/DSCN MKR DOCD: CPT | Performed by: NURSE PRACTITIONER

## 2021-12-28 PROCEDURE — 3017F COLORECTAL CA SCREEN DOC REV: CPT | Performed by: NURSE PRACTITIONER

## 2021-12-28 PROCEDURE — G8484 FLU IMMUNIZE NO ADMIN: HCPCS | Performed by: NURSE PRACTITIONER

## 2021-12-28 RX ORDER — LOVASTATIN 40 MG/1
TABLET ORAL
Qty: 90 TABLET | Refills: 3 | Status: SHIPPED | OUTPATIENT
Start: 2021-12-28 | End: 2022-06-28 | Stop reason: SDUPTHER

## 2021-12-28 RX ORDER — LOSARTAN POTASSIUM 100 MG/1
TABLET ORAL
Qty: 90 TABLET | Refills: 3 | Status: SHIPPED | OUTPATIENT
Start: 2021-12-28 | End: 2022-06-28 | Stop reason: SDUPTHER

## 2021-12-28 RX ORDER — LEVOTHYROXINE AND LIOTHYRONINE 38; 9 UG/1; UG/1
60 TABLET ORAL DAILY
Qty: 30 TABLET | Refills: 3 | Status: SHIPPED | OUTPATIENT
Start: 2021-12-28 | End: 2022-02-02 | Stop reason: SDUPTHER

## 2021-12-28 RX ORDER — TRIAMTERENE AND HYDROCHLOROTHIAZIDE 37.5; 25 MG/1; MG/1
1 TABLET ORAL DAILY
Qty: 90 TABLET | Refills: 3 | Status: SHIPPED | OUTPATIENT
Start: 2021-12-28 | End: 2022-02-02

## 2021-12-28 RX ORDER — ERGOCALCIFEROL 1.25 MG/1
50000 CAPSULE ORAL
Qty: 24 CAPSULE | Refills: 5 | Status: SHIPPED | OUTPATIENT
Start: 2021-12-30 | End: 2022-06-15

## 2021-12-28 ASSESSMENT — PATIENT HEALTH QUESTIONNAIRE - PHQ9
1. LITTLE INTEREST OR PLEASURE IN DOING THINGS: 0
2. FEELING DOWN, DEPRESSED OR HOPELESS: 0
SUM OF ALL RESPONSES TO PHQ QUESTIONS 1-9: 0
SUM OF ALL RESPONSES TO PHQ9 QUESTIONS 1 & 2: 0

## 2021-12-28 ASSESSMENT — LIFESTYLE VARIABLES: HOW OFTEN DO YOU HAVE A DRINK CONTAINING ALCOHOL: 0

## 2021-12-28 NOTE — PATIENT INSTRUCTIONS
Personalized Preventive Plan for Tisha Beth - 12/28/2021  Medicare offers a range of preventive health benefits. Some of the tests and screenings are paid in full while other may be subject to a deductible, co-insurance, and/or copay. Some of these benefits include a comprehensive review of your medical history including lifestyle, illnesses that may run in your family, and various assessments and screenings as appropriate. After reviewing your medical record and screening and assessments performed today your provider may have ordered immunizations, labs, imaging, and/or referrals for you. A list of these orders (if applicable) as well as your Preventive Care list are included within your After Visit Summary for your review. Other Preventive Recommendations:    · A preventive eye exam performed by an eye specialist is recommended every 1-2 years to screen for glaucoma; cataracts, macular degeneration, and other eye disorders. · A preventive dental visit is recommended every 6 months. · Try to get at least 150 minutes of exercise per week or 10,000 steps per day on a pedometer . · Order or download the FREE \"Exercise & Physical Activity: Your Everyday Guide\" from [employer]. Call 0-609.849.8637 or search [employer] online. · You need 6817-0085 mg of calcium and 2216-3789 IU of vitamin D per day. It is possible to meet your calcium requirement with diet alone, but a vitamin D supplement is usually necessary to meet this goal.  · When exposed to the sun, use a sunscreen that protects against both UVA and UVB radiation with an SPF of 30 or greater. Reapply every 2 to 3 hours or after sweating, drying off with a towel, or swimming. · Always wear a seat belt when traveling in a car. Always wear a helmet when riding a bicycle or motorcycle.

## 2021-12-28 NOTE — PROGRESS NOTES
Medicare Annual Wellness Visit  Name: Elina Robert Date: 2021   MRN: [id number] Sex: Female   Age: 70 y.o. Ethnicity: Non- / Non    : 1950 Race: White (non-)      Sammi Todd is here for Medicare AWV    Screenings for behavioral, psychosocial and functional/safety risks, and cognitive dysfunction are all negative except as indicated below. These results, as well as other patient data from the [address] [facility], [place], operated by [facility] form, are documented in Flowsheets linked to this Encounter. Allergies   Allergen Reactions    Compazine [Prochlorperazine Maleate] Other (See Comments)     Eyelids flipped up and mouth stretched to the side.  Thorazine [Chlorpromazine] Other (See Comments)     Eyelids flipped up and mouth stretched to the side         Prior to Visit Medications    Medication Sig Taking?  Authorizing Provider   losartan (COZAAR) 100 MG tablet TAKE 1 TABLET EVERY DAY Yes CHRIS David   triamterene-hydroCHLOROthiazide (GJJDNBD-75) 37.5-25 MG per tablet Take 1 tablet by mouth daily Yes CHRIS David   lovastatin (MEVACOR) 40 MG tablet TAKE 1 TABLET EVERY NIGHT Yes CHRIS David   vitamin D (ERGOCALCIFEROL) 1.25 MG (84910 UT) CAPS capsule Take 1 capsule by mouth Twice a Week Yes CHRIS David   thyroid (ARMOUR) 60 MG tablet Take 1 tablet by mouth daily Compound from [facility]   t-4-60/t3-12  CHRIS David   albuterol sulfate HFA (PROVENTIL HFA) 108 (90 Base) MCG/ACT inhaler Inhale 2 puffs into the lungs every 6 hours as needed for Wheezing  CHRIS David   estradiol (ESTRACE) 0.5 MG tablet TAKE 1 TABLET EVERY DAY  CHRIS David   fluticasone-salmeterol (ADVAIR DISKUS) 500-50 MCG/DOSE diskus inhaler Inhale 1 puff into the lungs daily as needed   Historical Provider, MD   Biotin (BIOTIN 5000) 5 MG CAPS Take 2 tablets by mouth daily  Historical Provider, MD   calcium carbonate-vitamin D (CALCIUM 600+D) 600-200 MG-UNIT TABS Take 1 tablet by mouth 2 times daily  Historical Provider, MD   Omega-3 Fatty Acids (FISH OIL) 500 MG CAPS Take 4 capsules by mouth daily  Historical Provider, MD         Past Medical History:   Diagnosis Date    Acquired hypothyroidism 8/20/2017    Alopecia 08/21/2017    Breast implant capsular contracture     \"with cysts behind right breast\" per pt.      Essential hypertension 8/20/2017    Familial hypercholesterolemia 8/20/2017    PONV (postoperative nausea and vomiting)     Prolonged emergence from general anesthesia        Past Surgical History:   Procedure Laterality Date    BREAST ENHANCEMENT SURGERY Bilateral 6/11/2020    REMOVAL BILATERAL CONTRACTURED IMPLANTS WITH BILATERAL PECTORAL BLOCK performed by Sam Cifuentes MD at [facility] Bilateral 2020    cyst removeal    BUNIONECTOMY      bunion correct osteotomy smith double-stem lesser MTP Impl    COLONOSCOPY      HEMORRHOID SURGERY      HYSTERECTOMY  1980    KNEE SURGERY Right     RECTAL SURGERY      anal fissurectomy with sphincterotomy    MAICOL AND BSO Bilateral 1980    TUBAL LIGATION           Family History   Problem Relation Age of Onset    High Blood Pressure Mother     Diabetes Mother         Type 2    High Blood Pressure Father     Diabetes Sister         Type 2    Diabetes Brother         Type 2    Cancer Brother         Blood form of cancer-not leukemia        CareTeam (Including outside providers/suppliers regularly involved in providing care):   Patient Care Team:  CHRIS Walton as PCP - General (Nurse Practitioner Acute Care)  CHRIS Walton [facility] P[facility] - [facility] Empaneled Provider    Wt Readings from Last 3 Encounters:   12/28/21 135 lb (61.2 kg)   10/11/21 138 lb 6.4 oz (62.8 kg)   09/28/21 135 lb (61.2 kg)     Vitals:    12/28/21 1109   BP: 128/64   Pulse: 60   Temp: 97.6 °F (36.4 °C)   SpO2: 98%   Weight: 135 lb (61.2 kg)   Height: 5' (1.524 m)     Body mass index is 26.37 kg/m². Based upon direct observation of the patient, evaluation of cognition reveals recent and remote memory intact. General Appearance: alert and oriented to person, place and time, well developed and well- nourished, in no acute distress  Skin: warm and dry, no rash or erythema  Head: normocephalic and atraumatic  Eyes: pupils equal, round, and reactive to light, extraocular eye movements intact, conjunctivae normal  ENT: tympanic membrane, external ear and ear canal normal bilaterally, nose without deformity, nasal mucosa and turbinates normal without polyps  Neck: supple and non-tender without mass, no thyromegaly or thyroid nodules, no cervical lymphadenopathy  Pulmonary/Chest: clear to auscultation bilaterally- no wheezes, rales or rhonchi, normal air movement, no respiratory distress  Cardiovascular: normal rate, regular rhythm, normal S1 and S2, no murmurs, rubs, clicks, or gallops, distal pulses intact, no carotid bruits  Abdomen: soft, non-tender, non-distended, normal bowel sounds, no masses or organomegaly  Extremities: no cyanosis, clubbing or edema  Musculoskeletal: normal range of motion, no joint swelling, deformity or tenderness  Neurologic: reflexes normal and symmetric, no cranial nerve deficit, gait, coordination and speech normal    Patient's complete Health Risk Assessment and screening values have been reviewed and are found in Flowsheets. The following problems were reviewed today and where indicated follow up appointments were made and/or referrals ordered. Positive Risk Factor Screenings with Interventions:              General Health and ACP:  General  In general, how would you say your health is?: Good  In the past 7 days, have you experienced any of the following?  New or Increased Pain, New or Increased Fatigue, Loneliness, Social Isolation, Stress or Anger?: None of These  Do you get the social and emotional support that you need?: Yes  Do you have a Living Will?: Yes  Advance Directives     [facility] Will ACP-Advance Directive ACP-Power of     Not on File Not on File Not on File Not on File      General Health Risk Interventions:  · na          Personalized Preventive Plan   Current Health Maintenance Status  Immunization History   Administered Date(s) Administered    COVID-19, Brink Peter, PF, 30mcg/0.3mL 02/14/2021, 03/07/2021, 10/04/2021    Pneumococcal Conjugate 13-valent ([employer] [employer]) 08/21/2017    Pneumococcal Polysaccharide ([id number]) 03/19/2019        Health Maintenance   Topic Date Due    Annual Wellness Visit (AWV)  12/30/2021    DTaP/Tdap/Td vaccine (1 - Tdap) 06/29/2022 (Originally 3/24/1969)    Flu vaccine (1) 09/28/2022 (Originally 9/1/2021)    Shingles Vaccine (1 of 2) 09/28/2022 (Originally 3/24/2000)    Lipid screen  09/20/2022    TSH testing  12/20/2022    Potassium monitoring  12/20/2022    Creatinine monitoring  12/20/2022    Breast cancer screen  01/21/2023    Colon cancer screen colonoscopy  12/16/2024    DEXA (modify frequency per FRAX score)  Completed    Pneumococcal 65+ years Vaccine  Completed    COVID-19 Vaccine  Completed    Hepatitis C screen  Completed    Hepatitis A vaccine  Aged Out    Hepatitis B vaccine  Aged Out    Hib vaccine  Aged Out    Meningococcal (ACWY) vaccine  Aged Out     Recommendations for [place] Due: see orders and patient instructions/AVS.  . Recommended screening schedule for the next 5-10 years is provided to the patient in written form: see Patient Instructions/AVS.    Oscar Hughes was seen today for medicare awv.     Diagnoses and all orders for this visit:    Essential hypertension    Stage 1 chronic kidney disease    Acquired hypothyroidism    Vitamin D deficiency    Mixed hyperlipidemia    Routine general medical examination at a health care facility    Other orders  -     losartan (COZAAR) 100 MG tablet; TAKE 1 TABLET EVERY DAY  -     triamterene-hydroCHLOROthiazide (MAXZIDE-25) 37.5-25 MG per tablet; Take 1 tablet by mouth daily  -     lovastatin (MEVACOR) 40 MG tablet; TAKE 1 TABLET EVERY NIGHT  -     vitamin D (ERGOCALCIFEROL) 1.25 MG (02364 UT) CAPS capsule; Take 1 capsule by mouth Twice a Week             Problem List Items Addressed This Visit     Acquired hypothyroidism (Chronic)     On 60 mg armour thyroid           Essential hypertension - Primary (Chronic)     Losartan 100 daily           Mixed hyperlipidemia     Fish oil and lovastatin 40  tireg are down from 22 to 175            Relevant Medications    losartan (COZAAR) 100 MG tablet    triamterene-hydroCHLOROthiazide (MAXZIDE-25) 37.5-25 MG per tablet    lovastatin (MEVACOR) 40 MG tablet    Stage 1 chronic kidney disease    Vitamin D deficiency     Level is good;   On 50,000 weekly             Other Visit Diagnoses     Routine general medical examination at a health care facility

## 2022-01-24 ENCOUNTER — TELEPHONE (OUTPATIENT)
Dept: INTERNAL MEDICINE | Age: 72
End: 2022-01-24

## 2022-01-24 NOTE — TELEPHONE ENCOUNTER
Patient woke up Wed. (19th) with sinus drainage. She tested positive on Saturday (22nd) with covid. She started taking mucinex, saline nasal spray, and vitamin C drink. Should she be doing anything else? She stated she is not running fever and denies any other symptoms. Should she quarantine from [facility]. Or from Saturday?

## 2022-01-24 NOTE — TELEPHONE ENCOUNTER
Quarantine from day of the test;  5 days and continued mask if any symptoms at all;  10 days if symptomatic

## 2022-01-24 NOTE — TELEPHONE ENCOUNTER
She doesn't need any other meds at this point;  but should she develop more symptoms please let us know

## 2022-02-02 ENCOUNTER — OFFICE VISIT (OUTPATIENT)
Dept: INTERNAL MEDICINE | Age: 72
End: 2022-02-02
Payer: MEDICARE

## 2022-02-02 VITALS
BODY MASS INDEX: 26.8 KG/M2 | DIASTOLIC BLOOD PRESSURE: 68 MMHG | OXYGEN SATURATION: 98 % | SYSTOLIC BLOOD PRESSURE: 120 MMHG | WEIGHT: 136.5 LBS | HEIGHT: 60 IN | HEART RATE: 82 BPM

## 2022-02-02 DIAGNOSIS — U07.1 COVID: ICD-10-CM

## 2022-02-02 DIAGNOSIS — E03.9 ACQUIRED HYPOTHYROIDISM: Primary | ICD-10-CM

## 2022-02-02 PROCEDURE — 1090F PRES/ABSN URINE INCON ASSESS: CPT | Performed by: NURSE PRACTITIONER

## 2022-02-02 PROCEDURE — 96372 THER/PROPH/DIAG INJ SC/IM: CPT | Performed by: NURSE PRACTITIONER

## 2022-02-02 PROCEDURE — 3017F COLORECTAL CA SCREEN DOC REV: CPT | Performed by: NURSE PRACTITIONER

## 2022-02-02 PROCEDURE — 1036F TOBACCO NON-USER: CPT | Performed by: NURSE PRACTITIONER

## 2022-02-02 PROCEDURE — 4040F PNEUMOC VAC/ADMIN/RCVD: CPT | Performed by: NURSE PRACTITIONER

## 2022-02-02 PROCEDURE — G8427 DOCREV CUR MEDS BY ELIG CLIN: HCPCS | Performed by: NURSE PRACTITIONER

## 2022-02-02 PROCEDURE — G8417 CALC BMI ABV UP PARAM F/U: HCPCS | Performed by: NURSE PRACTITIONER

## 2022-02-02 PROCEDURE — G8399 PT W/DXA RESULTS DOCUMENT: HCPCS | Performed by: NURSE PRACTITIONER

## 2022-02-02 PROCEDURE — G8484 FLU IMMUNIZE NO ADMIN: HCPCS | Performed by: NURSE PRACTITIONER

## 2022-02-02 PROCEDURE — 99213 OFFICE O/P EST LOW 20 MIN: CPT | Performed by: NURSE PRACTITIONER

## 2022-02-02 PROCEDURE — 1123F ACP DISCUSS/DSCN MKR DOCD: CPT | Performed by: NURSE PRACTITIONER

## 2022-02-02 RX ORDER — AZITHROMYCIN 250 MG/1
250 TABLET, FILM COATED ORAL SEE ADMIN INSTRUCTIONS
Qty: 6 TABLET | Refills: 0 | Status: SHIPPED | OUTPATIENT
Start: 2022-02-02 | End: 2022-02-07

## 2022-02-02 RX ORDER — LEVOTHYROXINE AND LIOTHYRONINE 38; 9 UG/1; UG/1
60 TABLET ORAL DAILY
Qty: 30 TABLET | Refills: 3 | Status: SHIPPED | OUTPATIENT
Start: 2022-02-02 | End: 2022-04-26 | Stop reason: SDUPTHER

## 2022-02-02 RX ORDER — METHYLPREDNISOLONE ACETATE 80 MG/ML
80 INJECTION, SUSPENSION INTRA-ARTICULAR; INTRALESIONAL; INTRAMUSCULAR; SOFT TISSUE ONCE
Status: COMPLETED | OUTPATIENT
Start: 2022-02-02 | End: 2022-02-02

## 2022-02-02 RX ADMIN — METHYLPREDNISOLONE ACETATE 80 MG: 80 INJECTION, SUSPENSION INTRA-ARTICULAR; INTRALESIONAL; INTRAMUSCULAR; SOFT TISSUE at 15:15

## 2022-02-02 ASSESSMENT — ENCOUNTER SYMPTOMS
NAUSEA: 0
WHEEZING: 1
VOMITING: 0
SHORTNESS OF BREATH: 0
ABDOMINAL PAIN: 0
SORE THROAT: 0
COLOR CHANGE: 0
CHOKING: 0
COUGH: 1
CONSTIPATION: 0
STRIDOR: 0
ABDOMINAL DISTENTION: 0
EYE DISCHARGE: 0
DIARRHEA: 0
TROUBLE SWALLOWING: 0
BLOOD IN STOOL: 0
EYE ITCHING: 0

## 2022-02-02 NOTE — PROGRESS NOTES
[address] [facility]  [address] [address] [place] [address]  Dept: [phone]  Dept Fax: [phone]: [phone]    Venda Hamman (:  1950) is a 70 y.o. female,Established patient  with green, here for evaluation of the following chief complaint(s): Cough (times one week ) and Chest Congestion      Venda Hamman is a 70 y.o. female who presents today for her medical conditions/complaints as noted below. Venda Hamman is c/surendra Cough (times one week ) and Chest Congestion        HPI:     Chief Complaint   Patient presents with    Cough     times one week     Chest Congestion     HPI   1. Head congestion; For a couple of days; She was tested at clinic; Was told positive; She now has cough and head congestion; Wheezing; No fever;        Past Medical History:   Diagnosis Date    Acquired hypothyroidism 2017    Alopecia 2017    Breast implant capsular contracture     \"with cysts behind right breast\" per pt.      Essential hypertension 2017    Familial hypercholesterolemia 2017    PONV (postoperative nausea and vomiting)     Prolonged emergence from general anesthesia       Past Surgical History:   Procedure Laterality Date    BREAST ENHANCEMENT SURGERY Bilateral 2020    REMOVAL BILATERAL CONTRACTURED IMPLANTS WITH BILATERAL PECTORAL BLOCK performed by Marck Barrett MD at [facility] Bilateral     cyst removeal    BUNIONECTOMY      bunion correct osteotomy smith double-stem lesser MTP Impl    COLONOSCOPY      HEMORRHOID SURGERY      HYSTERECTOMY      KNEE SURGERY Right     RECTAL SURGERY      anal fissurectomy with sphincterotomy    MAICOL AND BSO Bilateral     TUBAL LIGATION         Vitals 2022 2021 10/11/2021 2021 2021    SYSTOLIC [phone]   DIASTOLIC 68 64 68 70 64 72   Site - - - - - -   Position - - Fatty Acids (FISH OIL) 500 MG CAPS Take 4 capsules by mouth daily      fluticasone-salmeterol (ADVAIR DISKUS) 500-50 MCG/DOSE diskus inhaler Inhale 1 puff into the lungs daily as needed  (Patient not taking: Reported on 2/2/2022)       Current Facility-Administered Medications   Medication Dose Route Frequency Provider Last Rate Last Admin    methylPREDNISolone acetate (DEPO-MEDROL) injection 80 mg  80 mg IntraMUSCular Once CHRIS Chopra         Allergies   Allergen Reactions    Compazine [Prochlorperazine Maleate] Other (See Comments)     Eyelids flipped up and mouth stretched to the side.      Thorazine [Chlorpromazine] Other (See Comments)     Eyelids flipped up and mouth stretched to the side       Health Maintenance   Topic Date Due    DTaP/Tdap/Td vaccine (1 - Tdap) 06/29/2022 (Originally 3/24/1969)    Flu vaccine (1) 09/28/2022 (Originally 9/1/2021)    Shingles Vaccine (1 of 2) 09/28/2022 (Originally 3/24/2000)    Lipid screen  09/20/2022    TSH testing  12/20/2022    Potassium monitoring  12/20/2022    Creatinine monitoring  12/20/2022    Depression Screen  12/28/2022    Annual Wellness Visit (AWV)  12/29/2022    Breast cancer screen  01/21/2023    Colon cancer screen colonoscopy  12/16/2024    DEXA (modify frequency per FRAX score)  Completed    Pneumococcal 65+ years Vaccine  Completed    COVID-19 Vaccine  Completed    Hepatitis C screen  Completed    Hepatitis A vaccine  Aged Out    Hepatitis B vaccine  Aged Out    Hib vaccine  Aged Out    Meningococcal (ACWY) vaccine  Aged Out       No results found for: LABA1C  No results found for: PSA, PSADIA  TSH   Date Value Ref Range Status   12/20/2021 2.490 0.270 - 4.200 uIU/mL Final   ]  Lab Results   Component Value Date     12/20/2021    K 4.4 12/20/2021     12/20/2021    CO2 25 12/20/2021    BUN 18 12/20/2021    CREATININE 1.0 (H) 12/20/2021    GLUCOSE 83 12/20/2021    CALCIUM 9.5 12/20/2021    PROT 6.6 12/20/2021 LABALBU 4.0 12/20/2021    BILITOT 0.4 12/20/2021    ALKPHOS 68 12/20/2021    AST 19 12/20/2021    ALT 16 12/20/2021    LABGLOM 55 (A) 12/20/2021    GFRAA >59 12/20/2021     Lab Results   Component Value Date    CHOL 156 (L) 09/20/2021    CHOL 162 12/22/2020    CHOL 174 06/22/2020     Lab Results   Component Value Date    TRIG 187 (H) 12/20/2021    TRIG 223 (H) 09/20/2021    TRIG 115 06/22/2021     Lab Results   Component Value Date    HDL 53 (L) 09/20/2021    HDL 62 (L) 12/22/2020    HDL 55 (L) 06/22/2020     Lab Results   Component Value Date    LDLCALC 58 09/20/2021    LDLCALC 59 12/22/2020    LDLCALC 66 06/22/2020     Lab Results   Component Value Date     12/20/2021    K 4.4 12/20/2021     12/20/2021    CO2 25 12/20/2021    BUN 18 12/20/2021    CREATININE 1.0 12/20/2021    GLUCOSE 83 12/20/2021    CALCIUM 9.5 12/20/2021      Lab Results   Component Value Date    WBC 7.9 12/20/2021    HGB 13.4 12/20/2021    HCT 43.3 12/20/2021    MCV 96.0 12/20/2021     12/20/2021    LYMPHOPCT 36.2 12/20/2021    RBC 4.51 12/20/2021    MCH 29.7 12/20/2021    MCHC 30.9 (L) 12/20/2021    RDW 11.9 12/20/2021     Lab Results   Component Value Date    VITD25 69.2 12/20/2021       Subjective:      Review of Systems   Constitutional: Negative for fatigue, fever and unexpected weight change. HENT: Positive for congestion. Negative for ear discharge, ear pain, mouth sores, sore throat and trouble swallowing. Eyes: Negative for discharge, itching and visual disturbance. Respiratory: Positive for cough and wheezing. Negative for choking, shortness of breath and stridor. Cardiovascular: Negative for chest pain, palpitations and leg swelling. Gastrointestinal: Negative for abdominal distention, abdominal pain, blood in stool, constipation, diarrhea, nausea and vomiting. Endocrine: Negative for cold intolerance, polydipsia and polyuria.    Genitourinary: Negative for difficulty urinating, dysuria, frequency and urgency. Musculoskeletal: Negative for arthralgias and gait problem. Skin: Negative for color change and rash. Allergic/Immunologic: Negative for food allergies and immunocompromised state. Neurological: Negative for dizziness, tremors, syncope, speech difficulty, weakness and headaches. Hematological: Negative for adenopathy. Does not bruise/bleed easily. Psychiatric/Behavioral: Negative for confusion and hallucinations. Objective:     Physical Exam  Constitutional:       General: She is not in acute distress. Appearance: She is well-developed. HENT:      Head: Normocephalic and atraumatic. Eyes:      General: No scleral icterus. Right eye: No discharge. Left eye: No discharge. Pupils: Pupils are equal, round, and reactive to light. Neck:      Thyroid: No thyromegaly. Vascular: No JVD. Cardiovascular:      Rate and Rhythm: Normal rate and regular rhythm. Heart sounds: Normal heart sounds. No murmur heard. Pulmonary:      Effort: Pulmonary effort is normal. No respiratory distress. Breath sounds: Normal breath sounds. No wheezing or rales. Comments: Mild expiratory wheezing  Abdominal:      General: Bowel sounds are normal. There is no distension. Palpations: Abdomen is soft. There is no mass. Tenderness: There is no abdominal tenderness. There is no guarding or rebound. Musculoskeletal:         General: No tenderness. Normal range of motion. Cervical back: Normal range of motion and neck supple. Skin:     General: Skin is warm and dry. Findings: No erythema or rash. Neurological:      Mental Status: She is alert and oriented to person, place, and time. Cranial Nerves: No cranial nerve deficit. Coordination: Coordination normal.      Deep Tendon Reflexes: Reflexes are normal and symmetric. Reflexes normal.   Psychiatric:         Mood and Affect: Mood is not depressed.          Behavior: Behavior normal. Thought Content: Thought content normal.         Judgment: Judgment normal.       /68   Pulse 82   Ht 5' (1.524 m)   Wt 136 lb 8 oz (61.9 kg)   SpO2 98%   BMI 26.66 kg/m²           Assessment:      Problem List     Acquired hypothyroidism - Primary (Chronic)    Relevant Medications    thyroid (ARMOUR) 60 MG tablet    COVID     Due to her continued cough congestion we will get a send her a Z-Juan give her Medrol 80 saline nasal spray and continue with the Mucinex          Relevant Medications    azithromycin (ZITHROMAX) 250 MG tablet          Plan:        Patient given educational materials - see patient instructions. Discussed use, benefit, and side effects of prescribed medications. Allpatient questions answered. Pt voiced understanding. Reviewed health maintenance. Instructed to continue current medications, diet and exercise. Patient agreed with treatment plan. Follow up as directed. MEDICATIONS:  Orders Placed This Encounter   Medications    thyroid (ARMOUR) 60 MG tablet     Sig: Take 1 tablet by mouth daily Compound from [facility]   t-4-60/t3-12     Dispense:  30 tablet     Refill:  3    azithromycin (ZITHROMAX) 250 MG tablet     Sig: Take 1 tablet by mouth See Admin Instructions for 5 days 500mg on day 1 followed by 250mg on days 2 - 5     Dispense:  6 tablet     Refill:  0    methylPREDNISolone acetate (DEPO-MEDROL) injection 80 mg         ORDERS:  No orders of the defined types were placed in this encounter. Follow-up:  No follow-ups on file. PATIENT INSTRUCTIONS:  Patient Instructions   1.   COVID with continued symptoms  Z-Juan  Medrol 80 IM today   Saline nasal spray 4 times a day  Flonase nasal spray twice daily 5 minutes after the morning and not dose of saline  Mucinex 1200 mg twice daily with lots of water    Electronically signed by CHRIS Guzman on 2/2/2022 at 3:05 PM    @On this date 2/2/2022 I have spent 18 minutes reviewing previous notes, test results and face to face with the patient discussing the diagnosis and importance of compliance with the treatment plan as well as documenting on the day of the visit. EMRDragon/transcription disclaimer:  Much of this encounter note is electronic transcription/translation of spoken language to printed texts. The electronic translation of spoken language may be erroneous, or at times,nonsensical words or phrases may be inadvertently transcribed.   Although I have reviewed the note for such errors, some may still exist.

## 2022-02-02 NOTE — ASSESSMENT & PLAN NOTE
Due to her continued cough congestion we will get a send her a Z-Juan give her Medrol 80 saline nasal spray and continue with the Mucinex

## 2022-02-08 ENCOUNTER — HOSPITAL ENCOUNTER (OUTPATIENT)
Dept: WOMENS IMAGING | Age: 72
Discharge: HOME | End: 2022-02-08
Payer: MEDICARE

## 2022-02-08 DIAGNOSIS — Z12.31 SCREENING MAMMOGRAM, ENCOUNTER FOR: ICD-10-CM

## 2022-02-08 PROCEDURE — 77063 BREAST TOMOSYNTHESIS BI: CPT

## 2022-04-26 DIAGNOSIS — E03.9 ACQUIRED HYPOTHYROIDISM: ICD-10-CM

## 2022-04-26 NOTE — TELEPHONE ENCOUNTER
Nikkie called requesting a refill of the below medication which has been pended for you:     Requested Prescriptions     Pending Prescriptions Disp Refills    thyroid (ARMOUR) 60 MG tablet 90 tablet 1     Sig: Take 1 tablet by mouth daily Compound from [facility]   t-4-60/t3-12       Last Appointment Date: 2/2/2022  Next Appointment Date: 6/28/2022    Allergies   Allergen Reactions    Compazine [Prochlorperazine Maleate] Other (See Comments)     Eyelids flipped up and mouth stretched to the side.      Thorazine [Chlorpromazine] Other (See Comments)     Eyelids flipped up and mouth stretched to the side

## 2022-04-27 RX ORDER — LEVOTHYROXINE AND LIOTHYRONINE 38; 9 UG/1; UG/1
60 TABLET ORAL DAILY
Qty: 90 TABLET | Refills: 1 | Status: SHIPPED | OUTPATIENT
Start: 2022-04-27 | End: 2022-09-12 | Stop reason: SDUPTHER

## 2022-05-24 ENCOUNTER — TELEPHONE (OUTPATIENT)
Dept: PRIMARY CARE CLINIC | Age: 72
End: 2022-05-24

## 2022-05-24 NOTE — TELEPHONE ENCOUNTER
Pt is requesting thyroid compound at [facility]. How would you like to proceed?

## 2022-05-25 NOTE — TELEPHONE ENCOUNTER
I called [facility] and they informed me pt had 3 refills on hold I requested they get the refill ready for patient, I called pt to inform her that [facility] was working on her RF and she VU.

## 2022-06-15 RX ORDER — ERGOCALCIFEROL 1.25 MG/1
50000 CAPSULE ORAL
Qty: 24 CAPSULE | Refills: 0 | Status: SHIPPED | OUTPATIENT
Start: 2022-06-16 | End: 2022-06-28 | Stop reason: SDUPTHER

## 2022-06-15 NOTE — TELEPHONE ENCOUNTER
Last Appointment Date: 2/2/2022  Next Appointment Date: 6/28/2022    Allergies   Allergen Reactions    Compazine [Prochlorperazine Maleate] Other (See Comments)     Eyelids flipped up and mouth stretched to the side.      Thorazine [Chlorpromazine] Other (See Comments)     Eyelids flipped up and mouth stretched to the side

## 2022-06-20 ENCOUNTER — OFFICE VISIT (OUTPATIENT)
Dept: INTERNAL MEDICINE | Age: 72
End: 2022-06-20
Payer: MEDICARE

## 2022-06-20 VITALS
SYSTOLIC BLOOD PRESSURE: 140 MMHG | HEART RATE: 63 BPM | OXYGEN SATURATION: 96 % | TEMPERATURE: 96.9 F | DIASTOLIC BLOOD PRESSURE: 58 MMHG

## 2022-06-20 DIAGNOSIS — L23.7 POISON IVY DERMATITIS: ICD-10-CM

## 2022-06-20 PROCEDURE — 96372 THER/PROPH/DIAG INJ SC/IM: CPT | Performed by: NURSE PRACTITIONER

## 2022-06-20 PROCEDURE — G8417 CALC BMI ABV UP PARAM F/U: HCPCS | Performed by: NURSE PRACTITIONER

## 2022-06-20 PROCEDURE — 1036F TOBACCO NON-USER: CPT | Performed by: NURSE PRACTITIONER

## 2022-06-20 PROCEDURE — 1090F PRES/ABSN URINE INCON ASSESS: CPT | Performed by: NURSE PRACTITIONER

## 2022-06-20 PROCEDURE — G8427 DOCREV CUR MEDS BY ELIG CLIN: HCPCS | Performed by: NURSE PRACTITIONER

## 2022-06-20 PROCEDURE — 1123F ACP DISCUSS/DSCN MKR DOCD: CPT | Performed by: NURSE PRACTITIONER

## 2022-06-20 PROCEDURE — 3017F COLORECTAL CA SCREEN DOC REV: CPT | Performed by: NURSE PRACTITIONER

## 2022-06-20 PROCEDURE — G8399 PT W/DXA RESULTS DOCUMENT: HCPCS | Performed by: NURSE PRACTITIONER

## 2022-06-20 PROCEDURE — 99213 OFFICE O/P EST LOW 20 MIN: CPT | Performed by: NURSE PRACTITIONER

## 2022-06-20 RX ORDER — DEXAMETHASONE SODIUM PHOSPHATE 10 MG/ML
10 INJECTION INTRAMUSCULAR; INTRAVENOUS ONCE
Status: COMPLETED | OUTPATIENT
Start: 2022-06-20 | End: 2022-06-20

## 2022-06-20 RX ADMIN — DEXAMETHASONE SODIUM PHOSPHATE 10 MG: 10 INJECTION INTRAMUSCULAR; INTRAVENOUS at 10:35

## 2022-06-20 ASSESSMENT — ENCOUNTER SYMPTOMS
CHOKING: 0
WHEEZING: 0
NAUSEA: 0
EYE ITCHING: 0
CONSTIPATION: 0
VOMITING: 0
SHORTNESS OF BREATH: 0
BLOOD IN STOOL: 0
SORE THROAT: 0
TROUBLE SWALLOWING: 0
COUGH: 0
ABDOMINAL DISTENTION: 0
COLOR CHANGE: 0
STRIDOR: 0
EYE DISCHARGE: 0
DIARRHEA: 0
ABDOMINAL PAIN: 0

## 2022-06-20 NOTE — PROGRESS NOTES
[address] [facility]  [address] [place] [id number] [address] [address]  Dept: [phone]  Dept Fax: [phone]: [phone]    Prasanna Akhtar (:  1950) is a 67 y.o. female,Established patient  with green, here for evaluation of the following chief complaint(s): Gilbert Akhtar is a 67 y.o. female who presents today for her medical conditions/complaints as noted below. Prasanna Akhtar is c/surendra Rash        HPI:     Chief Complaint   Patient presents with    Rash     HPI   /.  Poison ivy rash on face and arms; Has been using OTC meds it has spread from her arms and hands to her face; Past Medical History:   Diagnosis Date    Acquired hypothyroidism 2017    Alopecia 2017    Breast implant capsular contracture     \"with cysts behind right breast\" per pt.      Essential hypertension 2017    Familial hypercholesterolemia 2017    PONV (postoperative nausea and vomiting)     Prolonged emergence from general anesthesia       Past Surgical History:   Procedure Laterality Date    BREAST CYST EXCISION      BREAST ENHANCEMENT SURGERY Bilateral 2020    REMOVAL BILATERAL CONTRACTURED IMPLANTS WITH BILATERAL PECTORAL BLOCK performed by Deric Appiah MD at [facility] 180      bunion correct osteotomy smith double-stem lesser MTP Impl    COLONOSCOPY      HEMORRHOID SURGERY      HYSTERECTOMY (CERVIX STATUS UNKNOWN)      KNEE SURGERY Right     RECTAL SURGERY      anal fissurectomy with sphincterotomy    MAICOL AND BSO (CERVIX REMOVED) Bilateral     TUBAL LIGATION         Vitals 2022 2022 2021 10/11/2021 2021 9101   SYSTOLIC [phone]   DIASTOLIC 58 68 64 68 70 64   Site Right Upper Arm - - - - -   Position Sitting - - - - -   Cuff Size Large Adult - - - - -   Pulse 63 82 60 66 68 62   Temp 96.9 - 97.6 97.1 98.4 -   Resp - - - 20 - -   SpO2 96 98 98 98 98 98   Weight - 136 lb 8 oz 135 lb 138 lb 6.4 oz 135 lb 132 lb   Height - 5' 0\" 5' 0\" 5' 0\" 5' 0\" 5' 0\"   Body mass index - 26.66 kg/m2 26.36 kg/m2 27.03 kg/m2 26.36 kg/m2 25.78 kg/m2   Some recent data might be hidden       Family History   Problem Relation Age of Onset    High Blood Pressure Mother     Diabetes Mother         Type 2    High Blood Pressure Father     Diabetes Sister         Type 2    Diabetes Brother         Type 2    Cancer Brother         Blood form of cancer-not leukemia        Social History     Tobacco Use    Smoking status: Former Smoker     Packs/day: 1.00     Years: 8.00     Pack years: 8.00     Types: Cigarettes     Start date:      Quit date:      Years since quittin.4    Smokeless tobacco: Never Used   Substance Use Topics    Alcohol use: No      Current Outpatient Medications   Medication Sig Dispense Refill    vitamin D (ERGOCALCIFEROL) 1.25 MG (46691 UT) CAPS capsule TAKE 1 CAPSULE BY MOUTH TWICE A WEEK 24 capsule 0    thyroid (ARMOUR) 60 MG tablet Take 1 tablet by mouth daily Compound from [employer] [place]   t-4-60/t3-12 90 tablet 1    losartan (COZAAR) 100 MG tablet TAKE 1 TABLET EVERY DAY 90 tablet 3    lovastatin (MEVACOR) 40 MG tablet TAKE 1 TABLET EVERY NIGHT 90 tablet 3    albuterol sulfate HFA (PROVENTIL HFA) 108 (90 Base) MCG/ACT inhaler Inhale 2 puffs into the lungs every 6 hours as needed for Wheezing 18 g 3    estradiol (ESTRACE) 0.5 MG tablet TAKE 1 TABLET EVERY DAY 90 tablet 3    fluticasone-salmeterol (ADVAIR DISKUS) 500-50 MCG/DOSE diskus inhaler Inhale 1 puff into the lungs daily as needed  (Patient not taking: Reported on 2022)      Biotin (BIOTIN 5000) 5 MG CAPS Take 2 tablets by mouth daily      calcium carbonate-vitamin D (CALCIUM 600+D) 600-200 MG-UNIT TABS Take 1 tablet by mouth 2 times daily      Omega-3 Fatty Acids (FISH OIL) 500 MG CAPS Take 4 capsules by mouth daily       No current facility-administered medications for this visit. Allergies   Allergen Reactions    Compazine [Prochlorperazine Maleate] Other (See Comments)     Eyelids flipped up and mouth stretched to the side.      Thorazine [Chlorpromazine] Other (See Comments)     Eyelids flipped up and mouth stretched to the side       Health Maintenance   Topic Date Due    DTaP/Tdap/Td vaccine (1 - Tdap) 06/29/2022 (Originally [birth year]/24/1969)    Flu vaccine (Season Ended) 09/28/2022 (Originally 9/1/2022)    Shingles vaccine (1 of 2) 09/28/2022 (Originally 3/24/2000)    Lipids  09/20/2022    Depression Screen  12/28/2022    Annual Wellness Visit (AWV)  12/29/2022    Breast cancer screen  02/08/2024    Colorectal Cancer Screen  12/16/2024    DEXA (modify frequency per FRAX score)  Completed    Pneumococcal 65+ years Vaccine  Completed    COVID-19 Vaccine  Completed    Hepatitis C screen  Completed    Hepatitis A vaccine  Aged Out    Hepatitis B vaccine  Aged Out    Hib vaccine  Aged Out    Meningococcal (ACWY) vaccine  Aged Out       No results found for: LABA1C  No results found for: PSA, PSADIA  TSH   Date Value Ref Range Status   12/20/2021 2.490 0.270 - 4.200 uIU/mL Final   ]  Lab Results   Component Value Date     12/20/2021    K 4.4 12/20/2021     12/20/2021    CO2 25 12/20/2021    BUN 18 12/20/2021    CREATININE 1.0 (H) 12/20/2021    GLUCOSE 83 12/20/2021    CALCIUM 9.5 12/20/2021    PROT 6.6 12/20/2021    LABALBU 4.0 12/20/2021    BILITOT 0.4 12/20/2021    ALKPHOS 68 12/20/2021    AST 19 12/20/2021    ALT 16 12/20/2021    LABGLOM 55 (A) 12/20/2021    GFRAA >59 12/20/2021     Lab Results   Component Value Date    CHOL 156 (L) 09/20/2021    CHOL 162 12/22/2020    CHOL 174 06/22/2020     Lab Results   Component Value Date    TRIG 187 (H) 12/20/2021    TRIG 223 (H) 09/20/2021    TRIG 115 06/22/2021     Lab Results   Component Value Date    HDL 53 (L) 09/20/2021    HDL 62 (L) 12/22/2020    HDL 55 (L) 06/22/2020     Lab Results   Component Value Date    LDLCALC 58 09/20/2021    LDLCALC 59 12/22/2020    LDLCALC 66 06/22/2020     Lab Results   Component Value Date     12/20/2021    K 4.4 12/20/2021     12/20/2021    CO2 25 12/20/2021    BUN 18 12/20/2021    CREATININE 1.0 12/20/2021    GLUCOSE 83 12/20/2021    CALCIUM 9.5 12/20/2021      Lab Results   Component Value Date    WBC 7.9 12/20/2021    HGB 13.4 12/20/2021    HCT 43.3 12/20/2021    MCV 96.0 12/20/2021     12/20/2021    LYMPHOPCT 36.2 12/20/2021    RBC 4.51 12/20/2021    MCH 29.7 12/20/2021    MCHC 30.9 (L) 12/20/2021    RDW 11.9 12/20/2021     Lab Results   Component Value Date    VITD25 69.2 12/20/2021       Subjective:      Review of Systems   Constitutional: Negative for fatigue, fever and unexpected weight change. HENT: Negative for ear discharge, ear pain, mouth sores, sore throat and trouble swallowing. Eyes: Negative for discharge, itching and visual disturbance. Respiratory: Negative for cough, choking, shortness of breath, wheezing and stridor. Cardiovascular: Negative for chest pain, palpitations and leg swelling. Gastrointestinal: Negative for abdominal distention, abdominal pain, blood in stool, constipation, diarrhea, nausea and vomiting. Endocrine: Negative for cold intolerance, polydipsia and polyuria. Genitourinary: Negative for difficulty urinating, dysuria, frequency and urgency. Musculoskeletal: Negative for arthralgias and gait problem. Skin: Positive for rash. Negative for color change. Allergic/Immunologic: Negative for food allergies and immunocompromised state. Neurological: Negative for dizziness, tremors, syncope, speech difficulty, weakness and headaches. Hematological: Negative for adenopathy. Does not bruise/bleed easily. Psychiatric/Behavioral: Negative for confusion and hallucinations. Objective:     Physical Exam  Constitutional:       General: She is not in acute distress. Appearance: She is well-developed. Pt voiced understanding. Reviewed health maintenance. Instructed to continue current medications, diet and exercise. Patient agreed with treatment plan. Follow up as directed. MEDICATIONS:  Orders Placed This Encounter   Medications    dexamethasone (DECADRON) injection 10 mg         ORDERS:  No orders of the defined types were placed in this encounter. Follow-up:  No follow-ups on file. PATIENT INSTRUCTIONS:  Patient Instructions   1. Poison ivy   Mix hydrocortisone and Benadryl together and use 4 times a day. You can use the Benadryl cream as often as you need in between times. Electronically signed by CHRIS Guaman on 6/20/2022 at 11:53 AM        EMRDragon/transcription disclaimer:  Much of this encounter note is electronic transcription/translation of spoken language to printed texts. The electronic translation of spoken language may be erroneous, or at times,nonsensical words or phrases may be inadvertently transcribed.   Although I have reviewed the note for such errors, some may still exist.

## 2022-06-20 NOTE — PATIENT INSTRUCTIONS
1.  Poison ivy   Mix hydrocortisone and Benadryl together and use 4 times a day. You can use the Benadryl cream as often as you need in between times.

## 2022-06-28 ENCOUNTER — OFFICE VISIT (OUTPATIENT)
Dept: INTERNAL MEDICINE | Age: 72
End: 2022-06-28
Payer: MEDICARE

## 2022-06-28 VITALS
HEIGHT: 60 IN | BODY MASS INDEX: 26.82 KG/M2 | HEART RATE: 62 BPM | SYSTOLIC BLOOD PRESSURE: 136 MMHG | OXYGEN SATURATION: 100 % | DIASTOLIC BLOOD PRESSURE: 62 MMHG | WEIGHT: 136.6 LBS | RESPIRATION RATE: 20 BRPM

## 2022-06-28 DIAGNOSIS — E03.9 ACQUIRED HYPOTHYROIDISM: Chronic | ICD-10-CM

## 2022-06-28 DIAGNOSIS — E78.2 MIXED HYPERLIPIDEMIA: ICD-10-CM

## 2022-06-28 DIAGNOSIS — N18.1 STAGE 1 CHRONIC KIDNEY DISEASE: ICD-10-CM

## 2022-06-28 DIAGNOSIS — I10 ESSENTIAL HYPERTENSION: Chronic | ICD-10-CM

## 2022-06-28 DIAGNOSIS — E55.9 VITAMIN D DEFICIENCY: ICD-10-CM

## 2022-06-28 LAB
ALBUMIN SERPL-MCNC: 4.2 G/DL (ref 3.5–5.2)
ALP BLD-CCNC: 72 U/L (ref 35–104)
ALT SERPL-CCNC: 13 U/L (ref 5–33)
ANION GAP SERPL CALCULATED.3IONS-SCNC: 16 MMOL/L (ref 7–19)
AST SERPL-CCNC: 18 U/L (ref 5–32)
BASOPHILS ABSOLUTE: 0 K/UL (ref 0–0.2)
BASOPHILS RELATIVE PERCENT: 0.4 % (ref 0–1)
BILIRUB SERPL-MCNC: 0.3 MG/DL (ref 0.2–1.2)
BUN BLDV-MCNC: 23 MG/DL (ref 8–23)
CALCIUM SERPL-MCNC: 9.9 MG/DL (ref 8.8–10.2)
CHLORIDE BLD-SCNC: 104 MMOL/L (ref 98–111)
CHOLESTEROL, TOTAL: 161 MG/DL (ref 160–199)
CO2: 23 MMOL/L (ref 22–29)
CREAT SERPL-MCNC: 1 MG/DL (ref 0.5–0.9)
EOSINOPHILS ABSOLUTE: 0.3 K/UL (ref 0–0.6)
EOSINOPHILS RELATIVE PERCENT: 2.8 % (ref 0–5)
GFR AFRICAN AMERICAN: >59
GFR NON-AFRICAN AMERICAN: 54
GLUCOSE BLD-MCNC: 95 MG/DL (ref 74–109)
HCT VFR BLD CALC: 42.9 % (ref 37–47)
HDLC SERPL-MCNC: 55 MG/DL (ref 65–121)
HEMOGLOBIN: 13.6 G/DL (ref 12–16)
IMMATURE GRANULOCYTES #: 0.1 K/UL
LDL CHOLESTEROL CALCULATED: 70 MG/DL
LYMPHOCYTES ABSOLUTE: 3.3 K/UL (ref 1.1–4.5)
LYMPHOCYTES RELATIVE PERCENT: 34.1 % (ref 20–40)
MCH RBC QN AUTO: 30.4 PG (ref 27–31)
MCHC RBC AUTO-ENTMCNC: 31.7 G/DL (ref 33–37)
MCV RBC AUTO: 95.8 FL (ref 81–99)
MONOCYTES ABSOLUTE: 0.5 K/UL (ref 0–0.9)
MONOCYTES RELATIVE PERCENT: 5.6 % (ref 0–10)
NEUTROPHILS ABSOLUTE: 5.5 K/UL (ref 1.5–7.5)
NEUTROPHILS RELATIVE PERCENT: 56.6 % (ref 50–65)
PDW BLD-RTO: 12.3 % (ref 11.5–14.5)
PLATELET # BLD: 251 K/UL (ref 130–400)
PMV BLD AUTO: 9.8 FL (ref 9.4–12.3)
POTASSIUM SERPL-SCNC: 4.5 MMOL/L (ref 3.5–5)
RBC # BLD: 4.48 M/UL (ref 4.2–5.4)
SODIUM BLD-SCNC: 143 MMOL/L (ref 136–145)
TOTAL PROTEIN: 6.9 G/DL (ref 6.6–8.7)
TRIGL SERPL-MCNC: 182 MG/DL (ref 0–149)
TSH SERPL DL<=0.05 MIU/L-ACNC: 3.6 UIU/ML (ref 0.27–4.2)
VITAMIN D 25-HYDROXY: 71.6 NG/ML
WBC # BLD: 9.7 K/UL (ref 4.8–10.8)

## 2022-06-28 PROCEDURE — G8427 DOCREV CUR MEDS BY ELIG CLIN: HCPCS | Performed by: NURSE PRACTITIONER

## 2022-06-28 PROCEDURE — 3017F COLORECTAL CA SCREEN DOC REV: CPT | Performed by: NURSE PRACTITIONER

## 2022-06-28 PROCEDURE — 1036F TOBACCO NON-USER: CPT | Performed by: NURSE PRACTITIONER

## 2022-06-28 PROCEDURE — G8417 CALC BMI ABV UP PARAM F/U: HCPCS | Performed by: NURSE PRACTITIONER

## 2022-06-28 PROCEDURE — 1123F ACP DISCUSS/DSCN MKR DOCD: CPT | Performed by: NURSE PRACTITIONER

## 2022-06-28 PROCEDURE — 1090F PRES/ABSN URINE INCON ASSESS: CPT | Performed by: NURSE PRACTITIONER

## 2022-06-28 PROCEDURE — G8399 PT W/DXA RESULTS DOCUMENT: HCPCS | Performed by: NURSE PRACTITIONER

## 2022-06-28 PROCEDURE — 99214 OFFICE O/P EST MOD 30 MIN: CPT | Performed by: NURSE PRACTITIONER

## 2022-06-28 RX ORDER — CALCIUM CARBONATE/VITAMIN D3 600 MG-10
TABLET ORAL 2 TIMES DAILY
COMMUNITY

## 2022-06-28 RX ORDER — ESTRADIOL 0.5 MG/1
TABLET ORAL
Qty: 90 TABLET | Refills: 3 | Status: SHIPPED | OUTPATIENT
Start: 2022-06-28

## 2022-06-28 RX ORDER — LOSARTAN POTASSIUM 100 MG/1
TABLET ORAL
Qty: 90 TABLET | Refills: 3 | Status: SHIPPED | OUTPATIENT
Start: 2022-06-28

## 2022-06-28 RX ORDER — METHYLPREDNISOLONE 4 MG/1
TABLET ORAL
Qty: 1 KIT | Refills: 0 | Status: SHIPPED | OUTPATIENT
Start: 2022-06-28 | End: 2022-07-04

## 2022-06-28 RX ORDER — LOVASTATIN 40 MG/1
TABLET ORAL
Qty: 90 TABLET | Refills: 3 | Status: SHIPPED | OUTPATIENT
Start: 2022-06-28

## 2022-06-28 RX ORDER — ERGOCALCIFEROL 1.25 MG/1
50000 CAPSULE ORAL WEEKLY
Qty: 12 CAPSULE | Refills: 3 | Status: SHIPPED | OUTPATIENT
Start: 2022-06-28

## 2022-06-28 ASSESSMENT — PATIENT HEALTH QUESTIONNAIRE - PHQ9
1. LITTLE INTEREST OR PLEASURE IN DOING THINGS: 0
SUM OF ALL RESPONSES TO PHQ QUESTIONS 1-9: 0
SUM OF ALL RESPONSES TO PHQ QUESTIONS 1-9: 0
SUM OF ALL RESPONSES TO PHQ9 QUESTIONS 1 & 2: 0
SUM OF ALL RESPONSES TO PHQ QUESTIONS 1-9: 0
SUM OF ALL RESPONSES TO PHQ QUESTIONS 1-9: 0
2. FEELING DOWN, DEPRESSED OR HOPELESS: 0

## 2022-06-28 ASSESSMENT — ENCOUNTER SYMPTOMS
BLOOD IN STOOL: 0
WHEEZING: 0
SORE THROAT: 0
SHORTNESS OF BREATH: 0
ABDOMINAL DISTENTION: 0
STRIDOR: 0
TROUBLE SWALLOWING: 0
DIARRHEA: 0
ABDOMINAL PAIN: 0
VOMITING: 0
EYE ITCHING: 0
CONSTIPATION: 0
COUGH: 0
EYE DISCHARGE: 0
NAUSEA: 0
COLOR CHANGE: 0
CHOKING: 0

## 2022-06-28 NOTE — PATIENT INSTRUCTIONS
1.  Hypertension  The current medical regimen is effective;  continue present plan and medications. 2.  Hypothyroidism The current medical regimen is effective;  continue present plan and medications. 3.  CKD  Labs today   4. Hyperlipidemia; The current medical regimen is effective;  continue present plan and medications. 5.  Vit d def; The current medical regimen is effective;  continue present plan and medications.   6. Persistent poison ivy  Medrol dose perez;  Start today  getwhoel first row in

## 2022-06-28 NOTE — PROGRESS NOTES
[address] [facility]  [address] [facility] [address] [address]  Dept: [phone]  Dept Fax: [phone]: [phone]    Keri Pulliam (:  1950) is a 67 y.o. female,Established patient  with green, here for evaluation of the following chief complaint(s): 6 Month Follow-Up      Keri Pulliam is a 67 y.o. female who presents today for her medical conditions/complaints as noted below. Keri Pulliam is c/surendra 6 Month Follow-Up        HPI:     Chief Complaint   Patient presents with    6 Month Follow-Up     HPI   1. Hypertension stable with losartan 100 mg daily  2. Hypothyroidism she is on [place] Thyroid 60 daily  3.  ckd stable  For now;    4. Hyperlipidemia stable on Mevacor 40 daily  #5 vitamin D deficiency she is stable with 50,000 units twice a week  6. Persistent poison ivy; Had injection 10 days ago;  Still itching;      Past Medical History:   Diagnosis Date    Acquired hypothyroidism 2017    Alopecia 2017    Breast implant capsular contracture     \"with cysts behind right breast\" per pt.      Essential hypertension 2017    Familial hypercholesterolemia 2017    PONV (postoperative nausea and vomiting)     Prolonged emergence from general anesthesia       Past Surgical History:   Procedure Laterality Date    BREAST CYST EXCISION      BREAST ENHANCEMENT SURGERY Bilateral 2020    REMOVAL BILATERAL CONTRACTURED IMPLANTS WITH BILATERAL PECTORAL BLOCK performed by Nubia Chiu MD at [facility] 180      bunion correct osteotomy smith double-stem lesser MTP Impl    COLONOSCOPY      HEMORRHOID SURGERY      HYSTERECTOMY (CERVIX STATUS UNKNOWN)      KNEE SURGERY Right     RECTAL SURGERY      anal fissurectomy with sphincterotomy    MAICOL AND BSO (CERVIX REMOVED) Bilateral     TUBAL LIGATION         Vitals 2022 2022 2022 2021 10/11/2021 2021 SYSTOLIC [phone]   DIASTOLIC 62 58 68 64 68 70   Site Left Upper Arm Right Upper Arm - - - -   Position Sitting Sitting - - - -   Cuff Size - Large Adult - - - -   Pulse 62 63 82 60 66 68   Temp - 96.9 - 97.6 97.1 98.4   Resp 20 - - - 20 -   SpO2 100 96 98 98 98 98   Weight 136 lb 9.6 oz - 136 lb 8 oz 135 lb 138 lb 6.4 oz 135 lb   Height 5' 0\" - 5' 0\" 5' 0\" 5' 0\" 5' 0\"   Body mass index 26.67 kg/m2 - 26.66 kg/m2 26.36 kg/m2 27.03 kg/m2 26.36 kg/m2   Some recent data might be hidden       Family History   Problem Relation Age of Onset    High Blood Pressure Mother     Diabetes Mother         Type 2    High Blood Pressure Father     Diabetes Sister         Type 2    Diabetes Brother         Type 2    Cancer Brother         Blood form of cancer-not leukemia        Social History     Tobacco Use    Smoking status: Former Smoker     Packs/day: 1.00     Years: 8.00     Pack years: 8.00     Types: Cigarettes     Start date:      Quit date:      Years since quittin.5    Smokeless tobacco: Never Used   Substance Use Topics    Alcohol use: No      Current Outpatient Medications   Medication Sig Dispense Refill    Omega 3 1200 MG CAPS Take by mouth 2 times daily      lovastatin (MEVACOR) 40 MG tablet TAKE 1 TABLET EVERY NIGHT 90 tablet 3    losartan (COZAAR) 100 MG tablet TAKE 1 TABLET EVERY DAY 90 tablet 3    estradiol (ESTRACE) 0.5 MG tablet TAKE 1 TABLET EVERY DAY 90 tablet 3    vitamin D (ERGOCALCIFEROL) 1.25 MG (10031 UT) CAPS capsule Take 1 capsule by mouth once a week 12 capsule 3    methylPREDNISolone (MEDROL DOSEPACK) 4 MG tablet Take by mouth.  1 kit 0    thyroid (ARMOUR) 60 MG tablet Take 1 tablet by mouth daily Compound from [employer] [place]   t-4-60/t3-12 90 tablet 1    albuterol sulfate HFA (PROVENTIL HFA) 108 (90 Base) MCG/ACT inhaler Inhale 2 puffs into the lungs every 6 hours as needed for Wheezing 18 g 3    fluticasone-salmeterol (ADVAIR DISKUS) 500-50 MCG/DOSE diskus inhaler Inhale 1 puff into the lungs daily as needed       calcium carbonate-vitamin D (CALCIUM 600+D) 600-200 MG-UNIT TABS Take 1 tablet by mouth 2 times daily       No current facility-administered medications for this visit. Allergies   Allergen Reactions    Compazine [Prochlorperazine Maleate] Other (See Comments)     Eyelids flipped up and mouth stretched to the side.      Thorazine [Chlorpromazine] Other (See Comments)     Eyelids flipped up and mouth stretched to the side       Health Maintenance   Topic Date Due    DTaP/Tdap/Td vaccine (1 - Tdap) 06/29/2022 (Originally 3/24/1969)    Flu vaccine (Season Ended) 09/28/2022 (Originally 9/1/2022)    Shingles vaccine (1 of 2) 09/28/2022 (Originally 3/24/2000)    Lipids  09/20/2022    Depression Screen  12/28/2022    Annual Wellness Visit (AWV)  12/29/2022    Breast cancer screen  02/08/2024    Colorectal Cancer Screen  12/16/2024    DEXA (modify frequency per FRAX score)  Completed    Pneumococcal 65+ years Vaccine  Completed    COVID-19 Vaccine  Completed    Hepatitis C screen  Completed    Hepatitis A vaccine  Aged Out    Hepatitis B vaccine  Aged Out    Hib vaccine  Aged Out    Meningococcal (ACWY) vaccine  Aged Out       No results found for: LABA1C  No results found for: PSA, PSADIA  TSH   Date Value Ref Range Status   12/20/2021 2.490 0.270 - 4.200 uIU/mL Final   ]  Lab Results   Component Value Date     12/20/2021    K 4.4 12/20/2021     12/20/2021    CO2 25 12/20/2021    BUN 18 12/20/2021    CREATININE 1.0 (H) 12/20/2021    GLUCOSE 83 12/20/2021    CALCIUM 9.5 12/20/2021    PROT 6.6 12/20/2021    LABALBU 4.0 12/20/2021    BILITOT 0.4 12/20/2021    ALKPHOS 68 12/20/2021    AST 19 12/20/2021    ALT 16 12/20/2021    LABGLOM 55 (A) 12/20/2021    GFRAA >59 12/20/2021     Lab Results   Component Value Date    CHOL 156 (L) 09/20/2021    CHOL 162 12/22/2020    CHOL 174 06/22/2020     Lab Results   Component Value Date adenopathy. Does not bruise/bleed easily. Psychiatric/Behavioral: Negative for confusion and hallucinations. Objective:     Physical Exam  Constitutional:       General: She is not in acute distress. Appearance: She is well-developed. HENT:      Head: Normocephalic and atraumatic. Eyes:      General: No scleral icterus. Right eye: No discharge. Left eye: No discharge. Pupils: Pupils are equal, round, and reactive to light. Neck:      Thyroid: No thyromegaly. Vascular: No JVD. Cardiovascular:      Rate and Rhythm: Normal rate and regular rhythm. Heart sounds: Normal heart sounds. No murmur heard. Pulmonary:      Effort: Pulmonary effort is normal. No respiratory distress. Breath sounds: Normal breath sounds. No wheezing or rales. Abdominal:      General: Bowel sounds are normal. There is no distension. Palpations: Abdomen is soft. There is no mass. Tenderness: There is no abdominal tenderness. There is no guarding or rebound. Musculoskeletal:         General: No tenderness. Normal range of motion. Cervical back: Normal range of motion and neck supple. Skin:     General: Skin is warm and dry. Findings: Rash (persistent poison ivy on arms and hadnds) present. No erythema. Neurological:      Mental Status: She is alert and oriented to person, place, and time. Cranial Nerves: No cranial nerve deficit. Coordination: Coordination normal.      Deep Tendon Reflexes: Reflexes are normal and symmetric. Reflexes normal.   Psychiatric:         Mood and Affect: Mood is not depressed. Behavior: Behavior normal.         Thought Content:  Thought content normal.         Judgment: Judgment normal.       /62 (Site: Left Upper Arm, Position: Sitting)   Pulse 62   Resp 20   Ht 5' (1.524 m)   Wt 136 lb 9.6 oz (62 kg)   SpO2 100%   BMI 26.68 kg/m²           Assessment:      Problem List     Acquired hypothyroidism (Chronic) Stable with [place] Thyroid 60 daily          Relevant Medications    thyroid (ARMOUR) 60 MG tablet    Other Relevant Orders    CBC with Auto Differential    Comprehensive Metabolic Panel    TSH    Essential hypertension (Chronic)     Stable with losartan 100 daily          Relevant Orders    Lipid Panel    CBC with Auto Differential    Comprehensive Metabolic Panel    Urinalysis with Reflex to Culture    TSH    Mixed hyperlipidemia     Stable with Mevacor 40 daily          Relevant Medications    lovastatin (MEVACOR) 40 MG tablet    losartan (COZAAR) 100 MG tablet    Other Relevant Orders    Lipid Panel    Stage 1 chronic kidney disease               Vitamin D deficiency     Stable with 50,000 units twice weekly          Relevant Orders    Vitamin D 25 Hydroxy    Vitamin D 25 Hydroxy          Plan:        Patient given educational materials - see patient instructions. Discussed use, benefit, and side effects of prescribed medications. Allpatient questions answered. Pt voiced understanding. Reviewed health maintenance. Instructed to continue current medications, diet and exercise. Patient agreed with treatment plan. Follow up as directed. MEDICATIONS:  Orders Placed This Encounter   Medications    lovastatin (MEVACOR) 40 MG tablet     Sig: TAKE 1 TABLET EVERY NIGHT     Dispense:  90 tablet     Refill:  3    losartan (COZAAR) 100 MG tablet     Sig: TAKE 1 TABLET EVERY DAY     Dispense:  90 tablet     Refill:  3    estradiol (ESTRACE) 0.5 MG tablet     Sig: TAKE 1 TABLET EVERY DAY     Dispense:  90 tablet     Refill:  3    vitamin D (ERGOCALCIFEROL) 1.25 MG (98129 UT) CAPS capsule     Sig: Take 1 capsule by mouth once a week     Dispense:  12 capsule     Refill:  3    methylPREDNISolone (MEDROL DOSEPACK) 4 MG tablet     Sig: Take by mouth.      Dispense:  1 kit     Refill:  0         ORDERS:  Orders Placed This Encounter   Procedures    CBC with Auto Differential    Comprehensive Metabolic Panel    TSH    Vitamin D 25 Hydroxy    Lipid Panel    CBC with Auto Differential    Comprehensive Metabolic Panel    Lipid Panel    Vitamin D 25 Hydroxy    Urinalysis with Reflex to Culture    TSH       Follow-up:  Return in about 6 months (around 12/28/2022). PATIENT INSTRUCTIONS:  Patient Instructions   1. Hypertension  The current medical regimen is effective;  continue present plan and medications. 2.  Hypothyroidism The current medical regimen is effective;  continue present plan and medications. 3.  CKD  Labs today   4. Hyperlipidemia; The current medical regimen is effective;  continue present plan and medications. 5.  Vit d def; The current medical regimen is effective;  continue present plan and medications. 6. Persistent poison ivy  Medrol dose perez;  Start today  getwhoel first row in     Electronically signed by CHRIS Abraham on 6/28/2022 at 11:33 AM      EMRDragon/transcription disclaimer:  Much of this encounter note is electronic transcription/translation of spoken language to printed texts. The electronic translation of spoken language may be erroneous, or at times,nonsensical words or phrases may be inadvertently transcribed.   Although I have reviewed the note for such errors, some may still exist.

## 2022-06-28 NOTE — ASSESSMENT & PLAN NOTE
Advised to call immediately if any worsening distortion or blurring is noted. Stable with losartan 100 daily

## 2022-07-11 RX ORDER — METHYLPREDNISOLONE 4 MG/1
TABLET ORAL
Qty: 1 KIT | Refills: 0 | Status: SHIPPED | OUTPATIENT
Start: 2022-07-11 | End: 2022-07-17

## 2022-09-12 DIAGNOSIS — E03.9 ACQUIRED HYPOTHYROIDISM: ICD-10-CM

## 2022-09-12 RX ORDER — LEVOTHYROXINE AND LIOTHYRONINE 38; 9 UG/1; UG/1
60 TABLET ORAL DAILY
Qty: 90 TABLET | Refills: 1 | Status: SHIPPED | OUTPATIENT
Start: 2022-09-12

## 2022-09-12 NOTE — TELEPHONE ENCOUNTER
Nikkie called requesting a refill of the below medication which has been pended for you:     Requested Prescriptions     Pending Prescriptions Disp Refills    thyroid (ARMOUR) 60 MG tablet 90 tablet 1     Sig: Take 1 tablet by mouth daily Compound from [employer] [place]   t-4-60/t3-12       Last Appointment Date: 6/28/2022  Next Appointment Date: 12/27/2022    Allergies   Allergen Reactions    Compazine [Prochlorperazine Maleate] Other (See Comments)     Eyelids flipped up and mouth stretched to the side.      Thorazine [Chlorpromazine] Other (See Comments)     Eyelids flipped up and mouth stretched to the side

## 2022-12-01 ENCOUNTER — TELEPHONE (OUTPATIENT)
Dept: INTERNAL MEDICINE | Age: 72
End: 2022-12-01

## 2022-12-01 DIAGNOSIS — R05.1 ACUTE COUGH: Primary | ICD-10-CM

## 2022-12-01 RX ORDER — METHYLPREDNISOLONE 4 MG/1
TABLET ORAL
Qty: 1 KIT | Refills: 0 | Status: SHIPPED | OUTPATIENT
Start: 2022-12-01 | End: 2022-12-07

## 2022-12-01 RX ORDER — AZITHROMYCIN 250 MG/1
250 TABLET, FILM COATED ORAL SEE ADMIN INSTRUCTIONS
Qty: 6 TABLET | Refills: 0 | Status: SHIPPED | OUTPATIENT
Start: 2022-12-01 | End: 2022-12-06

## 2022-12-01 NOTE — TELEPHONE ENCOUNTER
Sw pt she has a cough , stuffy head , states she has not had a fever but would like to know if you could call something in to [address] [facility]

## 2022-12-06 ENCOUNTER — OFFICE VISIT (OUTPATIENT)
Dept: INTERNAL MEDICINE | Age: 72
End: 2022-12-06
Payer: MEDICARE

## 2022-12-06 ENCOUNTER — TELEPHONE (OUTPATIENT)
Dept: INTERNAL MEDICINE | Age: 72
End: 2022-12-06

## 2022-12-06 VITALS — HEART RATE: 76 BPM | DIASTOLIC BLOOD PRESSURE: 78 MMHG | SYSTOLIC BLOOD PRESSURE: 132 MMHG | OXYGEN SATURATION: 95 %

## 2022-12-06 DIAGNOSIS — L65.9 ALOPECIA: Chronic | ICD-10-CM

## 2022-12-06 DIAGNOSIS — J20.8 ACUTE BRONCHITIS DUE TO OTHER SPECIFIED ORGANISMS: ICD-10-CM

## 2022-12-06 PROCEDURE — G8427 DOCREV CUR MEDS BY ELIG CLIN: HCPCS | Performed by: NURSE PRACTITIONER

## 2022-12-06 PROCEDURE — G8417 CALC BMI ABV UP PARAM F/U: HCPCS | Performed by: NURSE PRACTITIONER

## 2022-12-06 PROCEDURE — 1036F TOBACCO NON-USER: CPT | Performed by: NURSE PRACTITIONER

## 2022-12-06 PROCEDURE — 3074F SYST BP LT 130 MM HG: CPT | Performed by: NURSE PRACTITIONER

## 2022-12-06 PROCEDURE — 96372 THER/PROPH/DIAG INJ SC/IM: CPT | Performed by: NURSE PRACTITIONER

## 2022-12-06 PROCEDURE — 3078F DIAST BP <80 MM HG: CPT | Performed by: NURSE PRACTITIONER

## 2022-12-06 PROCEDURE — 1123F ACP DISCUSS/DSCN MKR DOCD: CPT | Performed by: NURSE PRACTITIONER

## 2022-12-06 PROCEDURE — G8484 FLU IMMUNIZE NO ADMIN: HCPCS | Performed by: NURSE PRACTITIONER

## 2022-12-06 PROCEDURE — 3017F COLORECTAL CA SCREEN DOC REV: CPT | Performed by: NURSE PRACTITIONER

## 2022-12-06 PROCEDURE — 1090F PRES/ABSN URINE INCON ASSESS: CPT | Performed by: NURSE PRACTITIONER

## 2022-12-06 PROCEDURE — 99213 OFFICE O/P EST LOW 20 MIN: CPT | Performed by: NURSE PRACTITIONER

## 2022-12-06 PROCEDURE — G8399 PT W/DXA RESULTS DOCUMENT: HCPCS | Performed by: NURSE PRACTITIONER

## 2022-12-06 RX ORDER — METHYLPREDNISOLONE ACETATE 80 MG/ML
80 INJECTION, SUSPENSION INTRA-ARTICULAR; INTRALESIONAL; INTRAMUSCULAR; SOFT TISSUE ONCE
Status: COMPLETED | OUTPATIENT
Start: 2022-12-06 | End: 2022-12-06

## 2022-12-06 RX ORDER — ALBUTEROL SULFATE 90 UG/1
2 AEROSOL, METERED RESPIRATORY (INHALATION) EVERY 6 HOURS PRN
Qty: 18 G | Refills: 3 | Status: SHIPPED | OUTPATIENT
Start: 2022-12-06

## 2022-12-06 RX ORDER — CEFDINIR 300 MG/1
300 CAPSULE ORAL 2 TIMES DAILY
Qty: 14 CAPSULE | Refills: 0 | Status: SHIPPED | OUTPATIENT
Start: 2022-12-06 | End: 2022-12-13

## 2022-12-06 RX ADMIN — METHYLPREDNISOLONE ACETATE 80 MG: 80 INJECTION, SUSPENSION INTRA-ARTICULAR; INTRALESIONAL; INTRAMUSCULAR; SOFT TISSUE at 15:38

## 2022-12-06 SDOH — ECONOMIC STABILITY: FOOD INSECURITY: WITHIN THE PAST 12 MONTHS, YOU WORRIED THAT YOUR FOOD WOULD RUN OUT BEFORE YOU GOT MONEY TO BUY MORE.: NEVER TRUE

## 2022-12-06 SDOH — ECONOMIC STABILITY: FOOD INSECURITY: WITHIN THE PAST 12 MONTHS, THE FOOD YOU BOUGHT JUST DIDN'T LAST AND YOU DIDN'T HAVE MONEY TO GET MORE.: NEVER TRUE

## 2022-12-06 ASSESSMENT — ENCOUNTER SYMPTOMS
STRIDOR: 0
COLOR CHANGE: 0
SORE THROAT: 0
TROUBLE SWALLOWING: 0
COUGH: 1
CONSTIPATION: 0
WHEEZING: 0
CHOKING: 0
DIARRHEA: 0
EYE DISCHARGE: 0
VOMITING: 0
ABDOMINAL PAIN: 0
BLOOD IN STOOL: 0
NAUSEA: 0
EYE ITCHING: 0
SHORTNESS OF BREATH: 0
ABDOMINAL DISTENTION: 0

## 2022-12-06 ASSESSMENT — SOCIAL DETERMINANTS OF HEALTH (SDOH): HOW HARD IS IT FOR YOU TO PAY FOR THE VERY BASICS LIKE FOOD, HOUSING, MEDICAL CARE, AND HEATING?: NOT HARD AT ALL

## 2022-12-06 NOTE — PROGRESS NOTES
[address] [facility]  [address] [address] [place] [address]  [address] [address]  Dept: [phone]  Dept Fax: [phone]: [phone]    Mario Coe (:  1950) is a 67 y.o. female,Established patient  with green, here for evaluation of the following chief complaint(s): Cough      Mario Coe is a 67 y.o. female who presents today for her medical conditions/complaints as noted below. Mario Coe is c/surendra Cough        HPI:     Chief Complaint   Patient presents with    Cough     HPI    Cough with thick yellow sputum. She just finished a Medrol Dosepak and an antibiotic she is back today think she needs more antibiotics and more steroids. No getting sick over the weekend 1 were closed. Past Medical History:   Diagnosis Date    Acquired hypothyroidism 2017    Alopecia 2017    Breast implant capsular contracture     \"with cysts behind right breast\" per pt.      Essential hypertension 2017    Familial hypercholesterolemia 2017    PONV (postoperative nausea and vomiting)     Prolonged emergence from general anesthesia       Past Surgical History:   Procedure Laterality Date    BREAST CYST EXCISION      BREAST ENHANCEMENT SURGERY Bilateral 2020    REMOVAL BILATERAL CONTRACTURED IMPLANTS WITH BILATERAL PECTORAL BLOCK performed by Juli Gonzalez MD at [address]      bunion correct osteotomy smith double-stem lesser MTP Impl    COLONOSCOPY      HEMORRHOID SURGERY      HYSTERECTOMY (CERVIX STATUS UNKNOWN)      KNEE SURGERY Right     RECTAL SURGERY      anal fissurectomy with sphincterotomy    MAICOL AND BSO (CERVIX REMOVED) Bilateral     TUBAL LIGATION         Vitals 2021    SYSTOLIC [phone]   DIASTOLIC 78 62 58 68 64 68   Site - Left Upper Arm Right Upper Arm - - -   Position - Sitting Sitting - - -   Cuff Size - - Large Adult - - - Pulse 76 62 63 82 60 66   Temp - - 96.9 - 97.6 97.1   Resp - 20 - - - 20   SpO2 95 100 96 98 98 98   Weight - 136 lb 9.6 oz - 136 lb 8 oz 135 lb 138 lb 6.4 oz   Height - 5' 0\" - 5' 0\" 5' 0\" 5' 0\"   Body mass index - 26.67 kg/m2 - 26.66 kg/m2 26.36 kg/m2 27.03 kg/m2   Some recent data might be hidden       Family History   Problem Relation Age of Onset    High Blood Pressure Mother     Diabetes Mother         Type 2    High Blood Pressure Father     Diabetes Sister         Type 2    Diabetes Brother         Type 2    Cancer Brother         Blood form of cancer-not leukemia        Social History     Tobacco Use    Smoking status: Former     Packs/day: 1.00     Years: 8.00     Pack years: 8.00     Types: Cigarettes     Start date:      Quit date:      Years since quittin.9    Smokeless tobacco: Never   Substance Use Topics    Alcohol use: No      Current Outpatient Medications   Medication Sig Dispense Refill    albuterol sulfate HFA (PROVENTIL HFA) 108 (90 Base) MCG/ACT inhaler Inhale 2 puffs into the lungs every 6 hours as needed for Wheezing 18 g 3    cefdinir (OMNICEF) 300 MG capsule Take 1 capsule by mouth 2 times daily for 7 days 14 capsule 0    cefdinir (OMNICEF) 300 MG capsule Take 1 capsule by mouth 2 times daily for 7 days 14 capsule 0    azithromycin (ZITHROMAX) 250 MG tablet Take 1 tablet by mouth See Admin Instructions for 5 days 500mg on day 1 followed by 250mg on days 2 - 5 6 tablet 0    methylPREDNISolone (MEDROL DOSEPACK) 4 MG tablet Take by mouth.  1 kit 0    thyroid (ARMOUR) 60 MG tablet Take 1 tablet by mouth daily Compound from [employer] [place]   t-4-60/t3-12 90 tablet 1    Omega 3 1200 MG CAPS Take by mouth 2 times daily      lovastatin (MEVACOR) 40 MG tablet TAKE 1 TABLET EVERY NIGHT 90 tablet 3    losartan (COZAAR) 100 MG tablet TAKE 1 TABLET EVERY DAY 90 tablet 3    estradiol (ESTRACE) 0.5 MG tablet TAKE 1 TABLET EVERY DAY 90 tablet 3    vitamin D (ERGOCALCIFEROL) 1.25 MG (27642 UT) CAPS capsule Take 1 capsule by mouth once a week 12 capsule 3    fluticasone-salmeterol (ADVAIR DISKUS) 500-50 MCG/DOSE diskus inhaler Inhale 1 puff into the lungs daily as needed       calcium carbonate-vitamin D (CALCIUM 600+D) 600-200 MG-UNIT TABS Take 1 tablet by mouth 2 times daily       No current facility-administered medications for this visit. Allergies   Allergen Reactions    Compazine [Prochlorperazine Maleate] Other (See Comments)     Eyelids flipped up and mouth stretched to the side.      Thorazine [Chlorpromazine] Other (See Comments)     Eyelids flipped up and mouth stretched to the side       Health Maintenance   Topic Date Due    DTaP/Tdap/Td vaccine (1 - Tdap) Never done    Shingles vaccine (1 of 2) Never done    COVID-19 Vaccine (4 - Booster for Pfizer series) 11/29/2021    Flu vaccine (1) Never done    Annual Wellness Visit (AWV)  12/29/2022    Lipids  06/28/2023    Depression Screen  06/28/2023    Breast cancer screen  02/08/2024    Colorectal Cancer Screen  12/16/2024    DEXA (modify frequency per FRAX score)  Completed    Pneumococcal 65+ years Vaccine  Completed    Hepatitis C screen  Completed    Hepatitis A vaccine  Aged Out    Hib vaccine  Aged Out    Meningococcal (ACWY) vaccine  Aged Out       No results found for: LABA1C  No results found for: PSA, PSADIA  TSH   Date Value Ref Range Status   06/28/2022 3.600 0.270 - 4.200 uIU/mL Final   ]  Lab Results   Component Value Date     06/28/2022    K 4.5 06/28/2022     06/28/2022    CO2 23 06/28/2022    BUN 23 06/28/2022    CREATININE 1.0 (H) 06/28/2022    GLUCOSE 95 06/28/2022    CALCIUM 9.9 06/28/2022    PROT 6.9 06/28/2022    LABALBU 4.2 06/28/2022    BILITOT 0.3 06/28/2022    ALKPHOS 72 06/28/2022    AST 18 06/28/2022    ALT 13 06/28/2022    LABGLOM 54 (A) 06/28/2022    GFRAA >59 06/28/2022     Lab Results   Component Value Date    CHOL 161 06/28/2022    CHOL 156 (L) 09/20/2021    CHOL 162 12/22/2020     Lab Results Component Value Date    TRIG 182 (H) 06/28/2022    TRIG 187 (H) 12/20/2021    TRIG 223 (H) 09/20/2021     Lab Results   Component Value Date    HDL 55 (L) 06/28/2022    HDL 53 (L) 09/20/2021    HDL 62 (L) 12/22/2020     Lab Results   Component Value Date    LDLCALC 70 06/28/2022    LDLCALC 58 09/20/2021    LDLCALC 59 12/22/2020     Lab Results   Component Value Date/Time     06/28/2022 11:03 AM    K 4.5 06/28/2022 11:03 AM     06/28/2022 11:03 AM    CO2 23 06/28/2022 11:03 AM    BUN 23 06/28/2022 11:03 AM    CREATININE 1.0 06/28/2022 11:03 AM    GLUCOSE 95 06/28/2022 11:03 AM    CALCIUM 9.9 06/28/2022 11:03 AM      Lab Results   Component Value Date    WBC 9.7 06/28/2022    HGB 13.6 06/28/2022    HCT 42.9 06/28/2022    MCV 95.8 06/28/2022     06/28/2022    LYMPHOPCT 34.1 06/28/2022    RBC 4.48 06/28/2022    MCH 30.4 06/28/2022    MCHC 31.7 (L) 06/28/2022    RDW 12.3 06/28/2022     Lab Results   Component Value Date    VITD25 71.6 06/28/2022       Subjective:      Review of Systems   Constitutional:  Negative for fatigue, fever and unexpected weight change. HENT:  Positive for congestion. Negative for ear discharge, ear pain, mouth sores, sore throat and trouble swallowing. Eyes:  Negative for discharge, itching and visual disturbance. Respiratory:  Positive for cough. Negative for choking, shortness of breath, wheezing and stridor. Cardiovascular:  Negative for chest pain, palpitations and leg swelling. Gastrointestinal:  Negative for abdominal distention, abdominal pain, blood in stool, constipation, diarrhea, nausea and vomiting. Endocrine: Negative for cold intolerance, polydipsia and polyuria. Genitourinary:  Negative for difficulty urinating, dysuria, frequency and urgency. Musculoskeletal:  Negative for arthralgias and gait problem. Skin:  Negative for color change and rash. Allergic/Immunologic: Negative for food allergies and immunocompromised state.    Neurological: Negative for dizziness, tremors, syncope, speech difficulty, weakness and headaches. Hematological:  Negative for adenopathy. Does not bruise/bleed easily. Psychiatric/Behavioral:  Negative for confusion and hallucinations. Objective:     Physical Exam  Constitutional:       General: She is not in acute distress. Appearance: She is well-developed. HENT:      Head: Normocephalic and atraumatic. Eyes:      General: No scleral icterus. Right eye: No discharge. Left eye: No discharge. Pupils: Pupils are equal, round, and reactive to light. Neck:      Thyroid: No thyromegaly. Vascular: No JVD. Cardiovascular:      Rate and Rhythm: Normal rate and regular rhythm. Heart sounds: Normal heart sounds. No murmur heard. Pulmonary:      Effort: Pulmonary effort is normal. No respiratory distress. Breath sounds: Wheezing present. No rales. Abdominal:      General: Bowel sounds are normal. There is no distension. Palpations: Abdomen is soft. There is no mass. Tenderness: There is no abdominal tenderness. There is no guarding or rebound. Musculoskeletal:         General: No tenderness. Normal range of motion. Cervical back: Normal range of motion and neck supple. Skin:     General: Skin is warm and dry. Findings: No erythema or rash. Neurological:      Mental Status: She is alert and oriented to person, place, and time. Cranial Nerves: No cranial nerve deficit. Coordination: Coordination normal.      Deep Tendon Reflexes: Reflexes are normal and symmetric. Reflexes normal.   Psychiatric:         Mood and Affect: Mood is not depressed. Behavior: Behavior normal.         Thought Content:  Thought content normal.         Judgment: Judgment normal.     /78   Pulse 76   SpO2 95%           Assessment:      Problem List       Acute bronchitis due to other specified organisms     Bronchitis  MEDROL im TODAY   CEFdinit 30 bid for 7 days   Proair inhaler;   2 puffs 4 tmies a day until cough is gone  then use use 24 more hours, then start weaning down so every 3 days  by one dose  3 times daily for 3 , 2 times day for 3 days, then daily for 3 days, then stop              Alopecia (Chronic)     Her hair is coming backon the compounded meds from [facility]              Plan:        Patient given educational materials - see patient instructions. Discussed use, benefit, and side effects of prescribed medications. Allpatient questions answered. Pt voiced understanding. Reviewed health maintenance. Instructed to continue current medications, diet and exercise. Patient agreed with treatment plan. Follow up as directed. MEDICATIONS:  Orders Placed This Encounter   Medications    albuterol sulfate HFA (PROVENTIL HFA) 108 (90 Base) MCG/ACT inhaler     Sig: Inhale 2 puffs into the lungs every 6 hours as needed for Wheezing     Dispense:  18 g     Refill:  3    cefdinir (OMNICEF) 300 MG capsule     Sig: Take 1 capsule by mouth 2 times daily for 7 days     Dispense:  14 capsule     Refill:  0    methylPREDNISolone acetate (DEPO-MEDROL) injection 80 mg         ORDERS:  No orders of the defined types were placed in this encounter. Follow-up:  No follow-ups on file.     PATIENT INSTRUCTIONS:  Patient Instructions    Acute bronchitis with cough   Proair inhaler;   2 puffs 4 tmies a day until cough is gone  then use use 24 more hours, then start weaning down so every 3 days  by one dose  3 times daily for 3 , 2 times day for 3 days, then daily for 3 days, then stop    Acute sinusitis  Medrol 80 IM today   Cefdinir 300 twice  a day for 7 days  get 2 doses in today    Saline nasal spray 4 times a day both nares for 7 days  Steroid spray, rhinocort, nasocort, nasonex, flonase twice daily about 5 minutes after the saline   mucinex 600 mg twice daily for 7 days with plenty of water  Delsym cough syrup up to 3 times daily as needed for cough   Ricola cough drops, honey lemon in pink bag;  has echanesia to help build your immunity     Electronically signed by CHRIS Be on 12/6/2022 at 4:45 PM    @    Lauro/transcription disclaimer:  Much of this encounter note is electronic transcription/translation of spoken language to printed texts. The electronic translation of spoken language may be erroneous, or at times,nonsensical words or phrases may be inadvertently transcribed.   Although I have reviewed the note for such errors, some may still exist.

## 2022-12-06 NOTE — PATIENT INSTRUCTIONS
Acute bronchitis with cough   Proair inhaler;   2 puffs 4 tmies a day until cough is gone  then use use 24 more hours, then start weaning down so every 3 days  by one dose  3 times daily for 3 , 2 times day for 3 days, then daily for 3 days, then stop    Acute sinusitis  Medrol 80 IM today   Cefdinir 300 twice  a day for 7 days  get 2 doses in today    Saline nasal spray 4 times a day both nares for 7 days  Steroid spray, rhinocort, nasocort, nasonex, flonase twice daily about 5 minutes after the saline   mucinex 600 mg twice daily for 7 days with plenty of water  Delsym cough syrup up to 3 times daily as needed for cough   Ricola cough drops, honey lemon in pink bag;  has echanesia to help build your immunity

## 2022-12-06 NOTE — TELEPHONE ENCOUNTER
Sw pt she would like something sent in for her cold she feels like it's in her chest [facility] [address] [facility] [address]

## 2022-12-06 NOTE — ASSESSMENT & PLAN NOTE
Bronchitis  MEDROL im TODAY   CEFdinit 30 bid for 7 days   Proair inhaler;   2 puffs 4 tmies a day until cough is gone  then use use 24 more hours, then start weaning down so every 3 days  by one dose  3 times daily for 3 , 2 times day for 3 days, then daily for 3 days, then stop

## 2022-12-19 NOTE — TELEPHONE ENCOUNTER
Zen  called to request a refill on her medication.       Last office visit : 2022   Next office visit : 2022     Requested Prescriptions     Pending Prescriptions Disp Refills    vitamin D (ERGOCALCIFEROL) 1.25 MG (01181 UT) CAPS capsule [Pharmacy Med Name: Vitamin D (Ergocalciferol) 1.25 MG (94305 UT) Oral Capsule] 24 capsule 0     Sig: TAKE 1 CAPSULE BY MOUTH TWICE A WEEK            Afua Crimes, [place]

## 2022-12-20 RX ORDER — ERGOCALCIFEROL 1.25 MG/1
CAPSULE ORAL
Qty: 24 CAPSULE | Refills: 0 | Status: SHIPPED | OUTPATIENT
Start: 2022-12-20

## 2023-01-30 ENCOUNTER — APPOINTMENT (OUTPATIENT)
Dept: GENERAL RADIOLOGY | Age: 73
DRG: 181 | End: 2023-01-30
Payer: MEDICARE

## 2023-01-30 ENCOUNTER — HOSPITAL ENCOUNTER (INPATIENT)
Age: 73
LOS: 3 days | Discharge: HOME | DRG: 181 | End: 2023-02-02
Attending: HOSPITALIST | Admitting: HOSPITALIST
Payer: MEDICARE

## 2023-01-30 ENCOUNTER — APPOINTMENT (OUTPATIENT)
Dept: CT IMAGING | Age: 73
DRG: 181 | End: 2023-01-30
Payer: MEDICARE

## 2023-01-30 DIAGNOSIS — R91.8 LUNG MASS: Primary | ICD-10-CM

## 2023-01-30 DIAGNOSIS — G89.3 CANCER RELATED PAIN: ICD-10-CM

## 2023-01-30 DIAGNOSIS — K22.89 ESOPHAGEAL MASS: ICD-10-CM

## 2023-01-30 PROBLEM — J98.59 MASS OF MEDIASTINUM: Status: ACTIVE | Noted: 2023-01-30

## 2023-01-30 LAB
ALBUMIN SERPL-MCNC: 4.4 G/DL (ref 3.5–5.2)
ALP BLD-CCNC: 131 U/L (ref 35–104)
ALT SERPL-CCNC: 13 U/L (ref 5–33)
ANION GAP SERPL CALCULATED.3IONS-SCNC: 13 MMOL/L (ref 7–19)
APTT: 27.5 SEC (ref 26–36.2)
AST SERPL-CCNC: 21 U/L (ref 5–32)
BASOPHILS ABSOLUTE: 0.1 K/UL (ref 0–0.2)
BASOPHILS RELATIVE PERCENT: 0.6 % (ref 0–1)
BILIRUB SERPL-MCNC: <0.2 MG/DL (ref 0.2–1.2)
BUN BLDV-MCNC: 22 MG/DL (ref 8–23)
CALCIUM SERPL-MCNC: 9.9 MG/DL (ref 8.8–10.2)
CHLORIDE BLD-SCNC: 100 MMOL/L (ref 98–111)
CO2: 27 MMOL/L (ref 22–29)
CREAT SERPL-MCNC: 1 MG/DL (ref 0.5–0.9)
EOSINOPHILS ABSOLUTE: 0.4 K/UL (ref 0–0.6)
EOSINOPHILS RELATIVE PERCENT: 3.8 % (ref 0–5)
GFR SERPL CREATININE-BSD FRML MDRD: 60 ML/MIN/{1.73_M2}
GLUCOSE BLD-MCNC: 152 MG/DL (ref 74–109)
HCT VFR BLD CALC: 45.4 % (ref 37–47)
HEMOGLOBIN: 14.8 G/DL (ref 12–16)
IMMATURE GRANULOCYTES #: 0 K/UL
INR BLD: 0.96 (ref 0.88–1.18)
LYMPHOCYTES ABSOLUTE: 3.5 K/UL (ref 1.1–4.5)
LYMPHOCYTES RELATIVE PERCENT: 30.6 % (ref 20–40)
MCH RBC QN AUTO: 31 PG (ref 27–31)
MCHC RBC AUTO-ENTMCNC: 32.6 G/DL (ref 33–37)
MCV RBC AUTO: 95 FL (ref 81–99)
MONOCYTES ABSOLUTE: 0.6 K/UL (ref 0–0.9)
MONOCYTES RELATIVE PERCENT: 5.3 % (ref 0–10)
NEUTROPHILS ABSOLUTE: 6.8 K/UL (ref 1.5–7.5)
NEUTROPHILS RELATIVE PERCENT: 59.4 % (ref 50–65)
PDW BLD-RTO: 12 % (ref 11.5–14.5)
PLATELET # BLD: 326 K/UL (ref 130–400)
PMV BLD AUTO: 9.9 FL (ref 9.4–12.3)
POTASSIUM SERPL-SCNC: 4.3 MMOL/L (ref 3.5–5)
PROTHROMBIN TIME: 12.7 SEC (ref 12–14.6)
RBC # BLD: 4.78 M/UL (ref 4.2–5.4)
SARS-COV-2, NAAT: NOT DETECTED
SODIUM BLD-SCNC: 140 MMOL/L (ref 136–145)
TOTAL PROTEIN: 8 G/DL (ref 6.6–8.7)
TROPONIN: <0.01 NG/ML (ref 0–0.03)
WBC # BLD: 11.4 K/UL (ref 4.8–10.8)

## 2023-01-30 PROCEDURE — 80053 COMPREHEN METABOLIC PANEL: CPT

## 2023-01-30 PROCEDURE — 71275 CT ANGIOGRAPHY CHEST: CPT

## 2023-01-30 PROCEDURE — 1210000000 HC MED SURG R&B

## 2023-01-30 PROCEDURE — 84484 ASSAY OF TROPONIN QUANT: CPT

## 2023-01-30 PROCEDURE — 85610 PROTHROMBIN TIME: CPT

## 2023-01-30 PROCEDURE — 93005 ELECTROCARDIOGRAM TRACING: CPT | Performed by: PHYSICIAN ASSISTANT

## 2023-01-30 PROCEDURE — 71045 X-RAY EXAM CHEST 1 VIEW: CPT | Performed by: RADIOLOGY

## 2023-01-30 PROCEDURE — 71045 X-RAY EXAM CHEST 1 VIEW: CPT

## 2023-01-30 PROCEDURE — 85730 THROMBOPLASTIN TIME PARTIAL: CPT

## 2023-01-30 PROCEDURE — 6360000002 HC RX W HCPCS: Performed by: PHYSICIAN ASSISTANT

## 2023-01-30 PROCEDURE — 6360000002 HC RX W HCPCS: Performed by: HOSPITALIST

## 2023-01-30 PROCEDURE — 71275 CT ANGIOGRAPHY CHEST: CPT | Performed by: RADIOLOGY

## 2023-01-30 PROCEDURE — 96374 THER/PROPH/DIAG INJ IV PUSH: CPT

## 2023-01-30 PROCEDURE — 85025 COMPLETE CBC W/AUTO DIFF WBC: CPT

## 2023-01-30 PROCEDURE — 36415 COLL VENOUS BLD VENIPUNCTURE: CPT

## 2023-01-30 PROCEDURE — 6360000004 HC RX CONTRAST MEDICATION: Performed by: PHYSICIAN ASSISTANT

## 2023-01-30 PROCEDURE — 99285 EMERGENCY DEPT VISIT HI MDM: CPT

## 2023-01-30 RX ORDER — MORPHINE SULFATE 4 MG/ML
4 INJECTION, SOLUTION INTRAMUSCULAR; INTRAVENOUS ONCE
Status: COMPLETED | OUTPATIENT
Start: 2023-01-30 | End: 2023-01-30

## 2023-01-30 RX ORDER — ENOXAPARIN SODIUM 100 MG/ML
40 INJECTION SUBCUTANEOUS EVERY 24 HOURS
Status: DISCONTINUED | OUTPATIENT
Start: 2023-01-30 | End: 2023-02-02 | Stop reason: HOSPADM

## 2023-01-30 RX ADMIN — MORPHINE SULFATE 4 MG: 4 INJECTION, SOLUTION INTRAMUSCULAR; INTRAVENOUS at 23:48

## 2023-01-30 RX ADMIN — MORPHINE SULFATE 4 MG: 4 INJECTION, SOLUTION INTRAMUSCULAR; INTRAVENOUS at 21:22

## 2023-01-30 RX ADMIN — IOPAMIDOL 90 ML: 755 INJECTION, SOLUTION INTRAVENOUS at 20:32

## 2023-01-30 ASSESSMENT — PAIN SCALES - GENERAL
PAINLEVEL_OUTOF10: 10
PAINLEVEL_OUTOF10: 10

## 2023-01-31 ENCOUNTER — ANESTHESIA EVENT (OUTPATIENT)
Dept: ENDOSCOPY | Age: 73
DRG: 181 | End: 2023-01-31
Payer: MEDICARE

## 2023-01-31 ENCOUNTER — APPOINTMENT (OUTPATIENT)
Dept: CT IMAGING | Age: 73
DRG: 181 | End: 2023-01-31
Payer: MEDICARE

## 2023-01-31 ENCOUNTER — ANESTHESIA (OUTPATIENT)
Dept: ENDOSCOPY | Age: 73
DRG: 181 | End: 2023-01-31
Payer: MEDICARE

## 2023-01-31 PROBLEM — K22.89 ESOPHAGEAL MASS: Status: ACTIVE | Noted: 2023-01-31

## 2023-01-31 PROBLEM — G89.3 CANCER RELATED PAIN: Status: ACTIVE | Noted: 2023-01-31

## 2023-01-31 PROBLEM — D64.9 ANEMIA: Status: ACTIVE | Noted: 2023-01-30

## 2023-01-31 PROBLEM — R91.8 LUNG MASS: Status: ACTIVE | Noted: 2023-01-31

## 2023-01-31 PROBLEM — Z51.5 PALLIATIVE CARE PATIENT: Status: ACTIVE | Noted: 2023-01-31

## 2023-01-31 PROCEDURE — 74177 CT ABD & PELVIS W/CONTRAST: CPT

## 2023-01-31 PROCEDURE — 3700000001 HC ADD 15 MINUTES (ANESTHESIA): Performed by: INTERNAL MEDICINE

## 2023-01-31 PROCEDURE — 94640 AIRWAY INHALATION TREATMENT: CPT

## 2023-01-31 PROCEDURE — 99223 1ST HOSP IP/OBS HIGH 75: CPT | Performed by: INTERNAL MEDICINE

## 2023-01-31 PROCEDURE — 6360000004 HC RX CONTRAST MEDICATION: Performed by: INTERNAL MEDICINE

## 2023-01-31 PROCEDURE — 6360000002 HC RX W HCPCS: Performed by: HOSPITALIST

## 2023-01-31 PROCEDURE — 2580000003 HC RX 258: Performed by: INTERNAL MEDICINE

## 2023-01-31 PROCEDURE — 7100000000 HC PACU RECOVERY - FIRST 15 MIN: Performed by: INTERNAL MEDICINE

## 2023-01-31 PROCEDURE — 2700000000 HC OXYGEN THERAPY PER DAY

## 2023-01-31 PROCEDURE — 2580000003 HC RX 258: Performed by: NURSE ANESTHETIST, CERTIFIED REGISTERED

## 2023-01-31 PROCEDURE — 99221 1ST HOSP IP/OBS SF/LOW 40: CPT | Performed by: INTERNAL MEDICINE

## 2023-01-31 PROCEDURE — 3609012400 HC EGD TRANSORAL BIOPSY SINGLE/MULTIPLE: Performed by: INTERNAL MEDICINE

## 2023-01-31 PROCEDURE — 6360000002 HC RX W HCPCS: Performed by: INTERNAL MEDICINE

## 2023-01-31 PROCEDURE — 99223 1ST HOSP IP/OBS HIGH 75: CPT

## 2023-01-31 PROCEDURE — 1210000000 HC MED SURG R&B

## 2023-01-31 PROCEDURE — 6370000000 HC RX 637 (ALT 250 FOR IP): Performed by: HOSPITALIST

## 2023-01-31 PROCEDURE — 87635 SARS-COV-2 COVID-19 AMP PRB: CPT

## 2023-01-31 PROCEDURE — 6360000002 HC RX W HCPCS: Performed by: NURSE ANESTHETIST, CERTIFIED REGISTERED

## 2023-01-31 PROCEDURE — 0DJ08ZZ INSPECTION OF UPPER INTESTINAL TRACT, VIA NATURAL OR ARTIFICIAL OPENING ENDOSCOPIC: ICD-10-PCS | Performed by: INTERNAL MEDICINE

## 2023-01-31 PROCEDURE — 2500000003 HC RX 250 WO HCPCS: Performed by: NURSE ANESTHETIST, CERTIFIED REGISTERED

## 2023-01-31 PROCEDURE — 3700000000 HC ANESTHESIA ATTENDED CARE: Performed by: INTERNAL MEDICINE

## 2023-01-31 PROCEDURE — 6370000000 HC RX 637 (ALT 250 FOR IP): Performed by: INTERNAL MEDICINE

## 2023-01-31 PROCEDURE — 7100000001 HC PACU RECOVERY - ADDTL 15 MIN: Performed by: INTERNAL MEDICINE

## 2023-01-31 PROCEDURE — 2709999900 HC NON-CHARGEABLE SUPPLY: Performed by: INTERNAL MEDICINE

## 2023-01-31 PROCEDURE — 94760 N-INVAS EAR/PLS OXIMETRY 1: CPT

## 2023-01-31 PROCEDURE — 74177 CT ABD & PELVIS W/CONTRAST: CPT | Performed by: RADIOLOGY

## 2023-01-31 PROCEDURE — 43235 EGD DIAGNOSTIC BRUSH WASH: CPT | Performed by: INTERNAL MEDICINE

## 2023-01-31 PROCEDURE — 6360000002 HC RX W HCPCS: Performed by: NURSE PRACTITIONER

## 2023-01-31 RX ORDER — LOSARTAN POTASSIUM 100 MG/1
100 TABLET ORAL DAILY
Status: DISCONTINUED | OUTPATIENT
Start: 2023-01-31 | End: 2023-02-02 | Stop reason: HOSPADM

## 2023-01-31 RX ORDER — ARFORMOTEROL TARTRATE 15 UG/2ML
15 SOLUTION RESPIRATORY (INHALATION) 2 TIMES DAILY
Status: DISCONTINUED | OUTPATIENT
Start: 2023-01-31 | End: 2023-02-02 | Stop reason: HOSPADM

## 2023-01-31 RX ORDER — HYDROMORPHONE HYDROCHLORIDE 1 MG/ML
0.5 INJECTION, SOLUTION INTRAMUSCULAR; INTRAVENOUS; SUBCUTANEOUS EVERY 4 HOURS PRN
Status: DISCONTINUED | OUTPATIENT
Start: 2023-01-31 | End: 2023-02-02 | Stop reason: HOSPADM

## 2023-01-31 RX ORDER — ACETAMINOPHEN 325 MG/1
650 TABLET ORAL EVERY 4 HOURS PRN
Status: DISCONTINUED | OUTPATIENT
Start: 2023-01-31 | End: 2023-02-02 | Stop reason: HOSPADM

## 2023-01-31 RX ORDER — POTASSIUM CHLORIDE 7.45 MG/ML
10 INJECTION INTRAVENOUS PRN
Status: DISCONTINUED | OUTPATIENT
Start: 2023-01-31 | End: 2023-02-02 | Stop reason: HOSPADM

## 2023-01-31 RX ORDER — KETOROLAC TROMETHAMINE 30 MG/ML
15 INJECTION, SOLUTION INTRAMUSCULAR; INTRAVENOUS EVERY 6 HOURS PRN
Status: DISCONTINUED | OUTPATIENT
Start: 2023-01-31 | End: 2023-02-01

## 2023-01-31 RX ORDER — POTASSIUM CHLORIDE 20 MEQ/1
40 TABLET, EXTENDED RELEASE ORAL PRN
Status: DISCONTINUED | OUTPATIENT
Start: 2023-01-31 | End: 2023-02-02 | Stop reason: HOSPADM

## 2023-01-31 RX ORDER — SODIUM CHLORIDE, SODIUM LACTATE, POTASSIUM CHLORIDE, CALCIUM CHLORIDE 600; 310; 30; 20 MG/100ML; MG/100ML; MG/100ML; MG/100ML
INJECTION, SOLUTION INTRAVENOUS CONTINUOUS PRN
Status: DISCONTINUED | OUTPATIENT
Start: 2023-01-31 | End: 2023-01-31 | Stop reason: SDUPTHER

## 2023-01-31 RX ORDER — MAGNESIUM SULFATE IN WATER 40 MG/ML
2000 INJECTION, SOLUTION INTRAVENOUS PRN
Status: DISCONTINUED | OUTPATIENT
Start: 2023-01-31 | End: 2023-02-02 | Stop reason: HOSPADM

## 2023-01-31 RX ORDER — FENTANYL CITRATE 50 UG/ML
INJECTION, SOLUTION INTRAMUSCULAR; INTRAVENOUS PRN
Status: DISCONTINUED | OUTPATIENT
Start: 2023-01-31 | End: 2023-01-31 | Stop reason: SDUPTHER

## 2023-01-31 RX ORDER — LIDOCAINE HYDROCHLORIDE 10 MG/ML
INJECTION, SOLUTION EPIDURAL; INFILTRATION; INTRACAUDAL; PERINEURAL PRN
Status: DISCONTINUED | OUTPATIENT
Start: 2023-01-31 | End: 2023-01-31 | Stop reason: SDUPTHER

## 2023-01-31 RX ORDER — OXYCODONE HYDROCHLORIDE AND ACETAMINOPHEN 5; 325 MG/1; MG/1
1 TABLET ORAL EVERY 4 HOURS PRN
Status: DISCONTINUED | OUTPATIENT
Start: 2023-01-31 | End: 2023-02-02 | Stop reason: HOSPADM

## 2023-01-31 RX ORDER — LEVOTHYROXINE AND LIOTHYRONINE 38; 9 UG/1; UG/1
60 TABLET ORAL
Status: DISCONTINUED | OUTPATIENT
Start: 2023-01-31 | End: 2023-02-02 | Stop reason: HOSPADM

## 2023-01-31 RX ORDER — CALCIUM CARBONATE/VITAMIN D3 600 MG-10
1 TABLET ORAL 2 TIMES DAILY
Status: DISCONTINUED | OUTPATIENT
Start: 2023-01-31 | End: 2023-01-31

## 2023-01-31 RX ORDER — SODIUM CHLORIDE 9 MG/ML
INJECTION, SOLUTION INTRAVENOUS PRN
Status: DISCONTINUED | OUTPATIENT
Start: 2023-01-31 | End: 2023-02-02 | Stop reason: HOSPADM

## 2023-01-31 RX ORDER — BUDESONIDE 0.5 MG/2ML
0.5 INHALANT ORAL EVERY 12 HOURS
Status: DISCONTINUED | OUTPATIENT
Start: 2023-01-31 | End: 2023-02-02 | Stop reason: HOSPADM

## 2023-01-31 RX ORDER — ONDANSETRON 2 MG/ML
INJECTION INTRAMUSCULAR; INTRAVENOUS PRN
Status: DISCONTINUED | OUTPATIENT
Start: 2023-01-31 | End: 2023-01-31 | Stop reason: SDUPTHER

## 2023-01-31 RX ORDER — SODIUM CHLORIDE 0.9 % (FLUSH) 0.9 %
5-40 SYRINGE (ML) INJECTION PRN
Status: DISCONTINUED | OUTPATIENT
Start: 2023-01-31 | End: 2023-02-02 | Stop reason: HOSPADM

## 2023-01-31 RX ORDER — KETOROLAC TROMETHAMINE 30 MG/ML
15 INJECTION, SOLUTION INTRAMUSCULAR; INTRAVENOUS ONCE
Status: COMPLETED | OUTPATIENT
Start: 2023-01-31 | End: 2023-01-31

## 2023-01-31 RX ORDER — CALCIUM CARBONATE 200(500)MG
500 TABLET,CHEWABLE ORAL 3 TIMES DAILY PRN
Status: DISCONTINUED | OUTPATIENT
Start: 2023-01-31 | End: 2023-02-02 | Stop reason: HOSPADM

## 2023-01-31 RX ORDER — ONDANSETRON 2 MG/ML
4 INJECTION INTRAMUSCULAR; INTRAVENOUS EVERY 6 HOURS PRN
Status: DISCONTINUED | OUTPATIENT
Start: 2023-01-31 | End: 2023-02-02 | Stop reason: HOSPADM

## 2023-01-31 RX ORDER — MECOBALAMIN 5000 MCG
5 TABLET,DISINTEGRATING ORAL NIGHTLY PRN
Status: DISCONTINUED | OUTPATIENT
Start: 2023-01-31 | End: 2023-02-02 | Stop reason: HOSPADM

## 2023-01-31 RX ORDER — SODIUM CHLORIDE 0.9 % (FLUSH) 0.9 %
5-40 SYRINGE (ML) INJECTION EVERY 12 HOURS SCHEDULED
Status: DISCONTINUED | OUTPATIENT
Start: 2023-01-31 | End: 2023-02-02 | Stop reason: HOSPADM

## 2023-01-31 RX ORDER — ESTRADIOL 1 MG/1
0.5 TABLET ORAL DAILY
Status: DISCONTINUED | OUTPATIENT
Start: 2023-01-31 | End: 2023-01-31

## 2023-01-31 RX ORDER — PROPOFOL 10 MG/ML
INJECTION, EMULSION INTRAVENOUS PRN
Status: DISCONTINUED | OUTPATIENT
Start: 2023-01-31 | End: 2023-01-31

## 2023-01-31 RX ORDER — ALBUTEROL SULFATE 2.5 MG/3ML
2.5 SOLUTION RESPIRATORY (INHALATION) EVERY 4 HOURS PRN
Status: DISCONTINUED | OUTPATIENT
Start: 2023-01-31 | End: 2023-02-02 | Stop reason: HOSPADM

## 2023-01-31 RX ORDER — PROPOFOL 10 MG/ML
INJECTION, EMULSION INTRAVENOUS PRN
Status: DISCONTINUED | OUTPATIENT
Start: 2023-01-31 | End: 2023-01-31 | Stop reason: SDUPTHER

## 2023-01-31 RX ORDER — ACETAMINOPHEN 650 MG/1
650 SUPPOSITORY RECTAL EVERY 4 HOURS PRN
Status: DISCONTINUED | OUTPATIENT
Start: 2023-01-31 | End: 2023-02-02 | Stop reason: HOSPADM

## 2023-01-31 RX ORDER — POLYETHYLENE GLYCOL 3350 17 G/17G
17 POWDER, FOR SOLUTION ORAL DAILY PRN
Status: DISCONTINUED | OUTPATIENT
Start: 2023-01-31 | End: 2023-02-01

## 2023-01-31 RX ORDER — ATORVASTATIN CALCIUM 10 MG/1
10 TABLET, FILM COATED ORAL NIGHTLY
Status: DISCONTINUED | OUTPATIENT
Start: 2023-01-31 | End: 2023-02-02 | Stop reason: HOSPADM

## 2023-01-31 RX ORDER — ONDANSETRON 4 MG/1
4 TABLET, ORALLY DISINTEGRATING ORAL EVERY 8 HOURS PRN
Status: DISCONTINUED | OUTPATIENT
Start: 2023-01-31 | End: 2023-02-02 | Stop reason: HOSPADM

## 2023-01-31 RX ADMIN — Medication 1 TABLET: at 09:40

## 2023-01-31 RX ADMIN — BUDESONIDE 500 MCG: 0.5 SUSPENSION RESPIRATORY (INHALATION) at 19:07

## 2023-01-31 RX ADMIN — ARFORMOTEROL TARTRATE 15 MCG: 15 SOLUTION RESPIRATORY (INHALATION) at 19:07

## 2023-01-31 RX ADMIN — PROPOFOL 100 MG: 10 INJECTION, EMULSION INTRAVENOUS at 14:28

## 2023-01-31 RX ADMIN — ONDANSETRON 4 MG: 2 INJECTION INTRAMUSCULAR; INTRAVENOUS at 14:24

## 2023-01-31 RX ADMIN — KETOROLAC TROMETHAMINE 15 MG: 30 INJECTION, SOLUTION INTRAMUSCULAR; INTRAVENOUS at 02:46

## 2023-01-31 RX ADMIN — ATORVASTATIN CALCIUM 10 MG: 10 TABLET, FILM COATED ORAL at 21:34

## 2023-01-31 RX ADMIN — HYDROMORPHONE HYDROCHLORIDE 0.5 MG: 1 INJECTION, SOLUTION INTRAMUSCULAR; INTRAVENOUS; SUBCUTANEOUS at 10:38

## 2023-01-31 RX ADMIN — FENTANYL CITRATE 50 MCG: 50 INJECTION INTRAMUSCULAR; INTRAVENOUS at 14:23

## 2023-01-31 RX ADMIN — OXYCODONE HYDROCHLORIDE AND ACETAMINOPHEN 1 TABLET: 5; 325 TABLET ORAL at 15:36

## 2023-01-31 RX ADMIN — BUDESONIDE 500 MCG: 0.5 SUSPENSION RESPIRATORY (INHALATION) at 07:05

## 2023-01-31 RX ADMIN — ARFORMOTEROL TARTRATE 15 MCG: 15 SOLUTION RESPIRATORY (INHALATION) at 07:05

## 2023-01-31 RX ADMIN — IOPAMIDOL 70 ML: 755 INJECTION, SOLUTION INTRAVENOUS at 14:03

## 2023-01-31 RX ADMIN — ENOXAPARIN SODIUM 40 MG: 100 INJECTION SUBCUTANEOUS at 01:25

## 2023-01-31 RX ADMIN — OXYCODONE HYDROCHLORIDE AND ACETAMINOPHEN 1 TABLET: 5; 325 TABLET ORAL at 22:19

## 2023-01-31 RX ADMIN — SODIUM CHLORIDE, SODIUM LACTATE, POTASSIUM CHLORIDE, AND CALCIUM CHLORIDE: 600; 310; 30; 20 INJECTION, SOLUTION INTRAVENOUS at 14:19

## 2023-01-31 RX ADMIN — SODIUM CHLORIDE, PRESERVATIVE FREE 10 ML: 5 INJECTION INTRAVENOUS at 09:00

## 2023-01-31 RX ADMIN — Medication 1 TABLET: at 21:34

## 2023-01-31 RX ADMIN — LIDOCAINE HYDROCHLORIDE 40 MG: 10 INJECTION, SOLUTION EPIDURAL; INFILTRATION; INTRACAUDAL; PERINEURAL at 14:28

## 2023-01-31 RX ADMIN — LOSARTAN POTASSIUM 100 MG: 100 TABLET, FILM COATED ORAL at 09:40

## 2023-01-31 ASSESSMENT — PAIN DESCRIPTION - LOCATION
LOCATION: BACK
LOCATION: CHEST
LOCATION: BACK;CHEST
LOCATION: BACK;CHEST

## 2023-01-31 ASSESSMENT — PAIN DESCRIPTION - ONSET: ONSET: ON-GOING

## 2023-01-31 ASSESSMENT — PAIN - FUNCTIONAL ASSESSMENT: PAIN_FUNCTIONAL_ASSESSMENT: PREVENTS OR INTERFERES SOME ACTIVE ACTIVITIES AND ADLS

## 2023-01-31 ASSESSMENT — PAIN DESCRIPTION - ORIENTATION
ORIENTATION: RIGHT;UPPER
ORIENTATION: RIGHT
ORIENTATION: RIGHT;MID
ORIENTATION: RIGHT

## 2023-01-31 ASSESSMENT — ENCOUNTER SYMPTOMS
WHEEZING: 0
NAUSEA: 0
COUGH: 0
RECTAL PAIN: 0
SHORTNESS OF BREATH: 0
ANAL BLEEDING: 0
CHOKING: 0
ABDOMINAL DISTENTION: 0
ALLERGIC/IMMUNOLOGIC NEGATIVE: 1
RESPIRATORY NEGATIVE: 1
CONSTIPATION: 1
CHEST TIGHTNESS: 0
BLOOD IN STOOL: 0
DIARRHEA: 0
EYES NEGATIVE: 1
GASTROINTESTINAL NEGATIVE: 1
VOMITING: 0
ABDOMINAL PAIN: 0
BACK PAIN: 1

## 2023-01-31 ASSESSMENT — PAIN DESCRIPTION - DESCRIPTORS
DESCRIPTORS: SHARP
DESCRIPTORS: STABBING
DESCRIPTORS: ACHING;SHARP
DESCRIPTORS: ACHING

## 2023-01-31 ASSESSMENT — PAIN SCALES - GENERAL
PAINLEVEL_OUTOF10: 5
PAINLEVEL_OUTOF10: 9
PAINLEVEL_OUTOF10: 9
PAINLEVEL_OUTOF10: 1
PAINLEVEL_OUTOF10: 7

## 2023-01-31 ASSESSMENT — PAIN DESCRIPTION - FREQUENCY
FREQUENCY: INTERMITTENT
FREQUENCY: INTERMITTENT

## 2023-01-31 ASSESSMENT — PAIN DESCRIPTION - PAIN TYPE
TYPE: ACUTE PAIN
TYPE: ACUTE PAIN

## 2023-01-31 ASSESSMENT — PAIN DESCRIPTION - DIRECTION: RADIATING_TOWARDS: AROUND TO BACK

## 2023-01-31 NOTE — PROGRESS NOTES
Pt c/o R back pain and center chest pain. States is stabbing. Rates at 8-9 on scale 1-10. Dilaudid 0.5mg IV given as pt is having CT of abdomen and is NPO at present. Will monitor for relief.  Electronically signed by Annia Humphries RN on 1/31/2023 at 10:41 AM

## 2023-01-31 NOTE — CONSULTS
MEDICAL ONCOLOGY CONSULTATION    Pt Name: Too Dyer  MRN: [id number]  YOB: 1950  Date of evaluation: 1/31/2023    REASON FOR CONSULTATION: Lung mass/esophageal mass? ?  REQUESTING PHYSICIAN: Hospitalist    History Obtained From:    patient, electronic medical record    HISTORY OF PRESENT ILLNESS:   Garett Payne was first seen by me on 1/31/2023. I was consulted for abnormal CT scan findings of a lung mass, mediastinal adenopathy and a lower esophageal mass. She presented to the ER department on 1/30/2023 at [facility] with complaints of back pain. She has a history of hypertension, hypothyroidism, obesity, hyperlipidemia and chronic kidney disease. The pain started about 2-3 days before she comes to the hospital.  Denies any shortness of breath. Denies any fever or chills. Imaging studies were performed emergency. 1/30/2023-CTA chest showed no evidence of pulmonary embolism. Lungs: Enhancing solid lesion posteriorly within the left lower lobe. Measures 3.2 x 4.3 x 3.7 cm. Subcarinal lymph node measures 1.3 x 1.2 cm. Asymmetric an irregular mucosal thickening of the distal esophagus. Esophageal mucosal mass is suspected in the distal left aspect measuring 2.4 x 1.8 cm. Diffuse metastatic disease suspected to the liver. I was consulted for the above findings. Past Medical History:    Past Medical History:   Diagnosis Date    Acquired hypothyroidism 08/20/2017    Alopecia 08/21/2017    Breast implant capsular contracture     \"with cysts behind right breast\" per pt.      Essential hypertension 08/20/2017    Familial hypercholesterolemia 08/20/2017    PONV (postoperative nausea and vomiting)     Prolonged emergence from general anesthesia     Tobacco abuse, in remission        Past Surgical History:    Past Surgical History:   Procedure Laterality Date    BREAST CYST EXCISION  2022    BREAST ENHANCEMENT SURGERY Bilateral 06/11/2020    REMOVAL BILATERAL CONTRACTURED IMPLANTS WITH BILATERAL PECTORAL BLOCK performed by Agata Espitia MD at [address] Bilateral 2008    bunion correct osteotomy smith double-stem lesser MTP Impl    COLONOSCOPY  2017    HEMORRHOID SURGERY  2005    ~2005    HYSTERECTOMY (CERVIX STATUS UNKNOWN)  1980    KNEE SURGERY Right 2007    RECTAL SURGERY  2000    anal fissurectomy with sphincterotomy, ~2000    MAICOL AND BSO (CERVIX REMOVED) Bilateral 1980    both ovaries removed, had surgery as a result of endometriosis    TUBAL LIGATION  1975    ~1975       Social History:    Marital status:  Smoking status:Former smoker, quit 1988  ETOH status: Yes  Resides:[place], [place]    Family History:   Family History   Problem Relation Age of Onset    High Blood Pressure Mother     Diabetes Mother         Type 2    Obesity Mother     High Blood Pressure Father     Diabetes Sister         Type 2    Obesity Sister     Diabetes Sister     Obesity Sister     Diabetes Sister     Obesity Sister     Diabetes Brother         Type 2    Cancer Brother         Blood form of cancer-not leukemia     Obesity Brother     No Known Problems Son     No Known Problems Son        Current Hospital Medications:    Current Facility-Administered Medications   Medication Dose Route Frequency Provider Last Rate Last Admin    albuterol (PROVENTIL) nebulizer solution 2.5 mg  2.5 mg Nebulization Q4H PRN Rashawn Tao MD        budesonide (PULMICORT) nebulizer suspension 500 mcg  0.5 mg Nebulization Q12H Rashawn Tao MD        arformoterol tartrate (BROVANA) nebulizer solution 15 mcg  15 mcg Nebulization BID Rashawn Toa MD        thyroid (ARMOUR) tablet 60 mg  60 mg Oral QAM AC Rashawn Tao MD        vitamin D (CHOLECALCIFEROL) capsule 5,000 Units  5,000 Units Oral Daily Rashawn Tao MD        atorvastatin (LIPITOR) tablet 10 mg  10 mg Oral Nightly Rashawn Tao MD        losartan (COZAAR) tablet 100 mg  100 mg Oral Daily Rashawn Tao MD        oyster shell calcium w/D 500-5 MG-MCG tablet 1 tablet  1 tablet Oral BID Madhu Gonzales MD        estradiol (ESTRACE) tablet 0.5 mg  0.5 mg Oral Daily Madhu Gonzales MD        sodium chloride flush 0.9 % injection 5-40 mL  5-40 mL IntraVENous 2 times per day Madhu Gonzales MD        sodium chloride flush 0.9 % injection 5-40 mL  5-40 mL IntraVENous PRN Madhu Gonzales MD        0.9 % sodium chloride infusion   IntraVENous PRN Madhu Gonzales MD        ondansetron (ZOFRAN-ODT) disintegrating tablet 4 mg  4 mg Oral Q8H PRN Madhu Gonzales MD        Or    ondansetron [facility]) injection 4 mg  4 mg IntraVENous Q6H PRFRANTZ Gonzales MD        acetaminophen (TYLENOL) tablet 650 mg  650 mg Oral Q4H PRN Madhu Gonzales MD        Or    acetaminophen (TYLENOL) suppository 650 mg  650 mg Rectal Q4H PRN Madhu Gonzales MD        potassium chloride (KLOR-CON M) extended release tablet 40 mEq  40 mEq Oral PRN Madhu Gonzales MD        Or    potassium bicarb-citric acid (EFFER-K) effervescent tablet 40 mEq  40 mEq Oral PRN Madhu Gonzales MD        Or    potassium chloride 10 mEq/100 mL IVPB (Peripheral Line)  10 mEq IntraVENous PRN Madhu Gonzales MD        magnesium sulfate 2000 mg in 50 mL IVPB premix  2,000 mg IntraVENous PRN Madhu Gonzales MD        polyethylene glycol (GLYCOLAX) packet 17 g  17 g Oral Daily PRN Madhu Gonzales MD        melatonin disintegrating tablet 5 mg  5 mg Oral Nightly PRN Madhu Gonzales MD        calcium carbonate (TUMS) chewable tablet 500 mg  500 mg Oral TID PRN Madhu Gonzales MD        enoxaparin (LOVENOX) injection 40 mg  40 mg SubCUTAneous Q24H Madhu Gonzales MD   40 mg at 01/31/23 0125       Allergies: Allergies   Allergen Reactions    Compazine [Prochlorperazine Maleate] Other (See Comments)     Eyelids flipped up and mouth stretched to the side.      Thorazine [Chlorpromazine] Other (See Comments)     Eyelids flipped up and mouth stretched to the side         Subjective   REVIEW OF SYSTEMS:   Constitutional:  Negative for chills, diaphoresis, fatigue and fever. Respiratory:  Negative for cough, choking, chest tightness, shortness of breath and wheezing. Cardiovascular:  Positive for chest pain. Negative for palpitations and leg swelling. Gastrointestinal:  Positive for constipation (life long and unchanged). Negative for abdominal distention, abdominal pain, anal bleeding, blood in stool, diarrhea, nausea, rectal pain and vomiting. Genitourinary:  Negative for dysuria, frequency and urgency. Musculoskeletal:  Positive for back pain (with movement). Negative for neck pain. Neurological:  Negative for syncope, weakness, light-headedness and headaches. Psychiatric/Behavioral:  Negative for confusion. Objective   BP (!) 146/66   Pulse 69   Temp 99 °F (37.2 °C) (Temporal)   Resp 16   Ht 5' (1.524 m)   Wt 128 lb 3.2 oz (58.2 kg)   SpO2 100%   BMI 25.04 kg/m²     PHYSICAL EXAM:  CONSTITUTIONAL: Alert, appropriate, no acute distress  EYES: Non icteric, EOM intact, pupils equal round   ENT: Mucus membranes moist,external inspection of ears and nose are normal  NECK: Supple, no masses. No palpable thyroid mass  CHEST/LUNGS: CTA bilaterally, normal respiratory effort   CARDIOVASCULAR: RRR, no murmurs. No lower extremity edema  ABDOMEN: soft non-tender, active bowel sounds, no HSM. No palpable masses  EXTREMITIES: warm, full ROM in all 4 extremities, no focal weakness. SKIN: warm, dry with no rashes or lesions  LYMPH: No cervical, clavicular, axillary, or inguinal lymphadenopathy  NEUROLOGIC: follows commands, non focal   PSYCH: mood and affect appropriate.  Alert and oriented to time, place, person        LABORATORY RESULTS REVIEWED/ANALYZED BY ME:  Recent Labs     01/30/23  1850   WBC 11.4*   HGB 14.8   HCT 45.4   MCV 95.0          Lab Results   Component Value Date     01/30/2023    K 4.3 01/30/2023     01/30/2023    CO2 27 01/30/2023    BUN 22 01/30/2023    CREATININE 1.0 (H) 01/30/2023    GLUCOSE 152 (H) 01/30/2023    CALCIUM 9.9 01/30/2023    PROT 8.0 01/30/2023    LABALBU 4.4 01/30/2023    BILITOT <0.2 01/30/2023    ALKPHOS 131 (H) 01/30/2023    AST 21 01/30/2023    ALT 13 01/30/2023    LABGLOM 60 (A) 01/30/2023    GFRAA >59 06/28/2022       Lab Results   Component Value Date    INR 0.96 01/30/2023    PROTIME 12.7 01/30/2023       RADIOLOGY STUDIES REPORT/REVIEWED AND INTERPRETED BY ME:  XR CHEST PORTABLE    Result Date: 1/30/2023  NO PRIOR REPORT AVAILABLE Exam: X-RAY OF [facility] Clinical data:Chest pain. Technique:Single frontal portable view of the chest. Prior studies: No prior studies submitted. Findings: The lungs are grossly clear; noevidence of acute infiltrate or pleural effusion. Cardiac silhouette is within normal limits. No acute osseous abnormality is detected. Unremarkable chest x-ray. Recommendation: Follow up as clinically indicated. Dictated and Electronically Signed by Fito Carmichael MD, SA CERTIFIED at [id number] 08:53:53 PM EST             CTA PULMONARY W CONTRAST    Result Date: 1/30/2023  NO PRIOR REPORT AVAILABLE She Exam: CTA OF THE CHEST WITH CONTRAST Clinical data: Severe right back pain and pain between shoulder blades. Technique: Axial CT angiography images through the lungs were acquired with contrast and imaged using soft tissue and lung algorithms. Coronal, sagittal, and 3D volume reconstructions were performed. Reformatted/3D-MPR images were performed. Radiation dose: CTDIvol =25.84 mGy, DLP =407 mGy x cm. Contrast Used:  Optiray-350. Prior studies: Radiographs of the chest done on same day. Findings: Lungs: Enhancing solid lesion posteriorly within the left lower lobe. Measures 3.2 x 4.3 x 3.7 cm. No pulmonary infiltrate identified. No pleural effusions identified. No soft tissue pulmonary nodules. Increase in bronchopulmonary markings. Peribronchial cuffing. No pneumothorax. The airway is clear.  Soft Tissues: A prominent subcarinal lymph node measures 1.3 x 1.2 cm. Otherwise, no enlarged mediastinal, axillary or supraclavicular adenopathy isidentified. Vascular: No filling defect within the pulmonary arteries to the segmental branch level. Unremarkable aorta. Grossly unremarkable sized heart. No definite abnormality seen on 3D reformatted images. Bony structures: No acute or destructive abnormality. Degenerative changes of the spine. No lytic or blastic lesion. Upper Abdomen: Small hiatal hernia with asymmetric an irregular mucosal thickening of the distal esophagus. Esophageal mucosal mass is suspected in the distal left aspect measuring 2.4 x 1.8 cm. Image number 98 on series number 4. Diffuse metastatic disease suspected to the liver. 1. No acute pulmonary embolism. 2. Enhancing solid lesion posteriorly within the left lower lobe. Measures 3.2 x 4.3 x 3.7 cm and is most consistent with lung neoplasm. Metastatic subcarinal lymph node is suspected. 3. Asymmetric mucosal thickening of the distal esophagus with masslike abnormality in the left lateral aspect measuring 2.4 x 1.8 cm. An esophageal mass is suspected. Metastatic disease here is not excluded. 4. Diffuse metastatic disease of the liver. 5. No lytic or blastic lesion. Recommendation: Follow up as clinically indicated. All CT scans at this facility utilize dose modulation, iterative reconstruction, and/or weight based dosing when appropriate to reduce radiation dose to as low as reasonably achievable. Dictated and Electronically Signed by Jonas Anand MD, SA CERTIFIED at [id number] 10:32:29 PM EST                  My interpretation: Left lower lobe lung mass, hypodense lesions in the liver concerning for a metastatic process. ASSESSMENT:  LLL lung mass with mediastinal adenopathy-likely lung malignancy. Very remote history of smoking. She quit 4 years ago. Liver lesions-concern for metastatic disease  Paraesophageal mass versus adenopathy. This could represent periesophageal adenopathy.   Poor prognosis-likely malignancy with stage IV disease with possible liver metastasis. Inpatient consultation to palliative care  Neutrophil leukocytosis-mild. Continue to monitor        PLAN:  CT-guided biopsy to obtain tissue for diagnosis  Patient consultation to palliative care  Please avoid to give the patient any NSAIDs as this will delay possible biopsies. CT abdomen/pelvis  Discussed with pulmonary,     I have seen, examined and reviewed this patient medication list, appropriate labs and imaging studies. I reviewed relevant medical records and others physicians notes. I discussed the plans of care with the patient. I answered all the questions to the patients satisfaction. I have also reviewed the chief complaint (CC) and part of the history (History of Present Illness (HPI), Past Family Social History [facility]), or Review of Systems (ROS) and made changes when appropriated.        (Please note that portions of this note were completed with a voice recognition program. Efforts were made to edit the dictations but occasionally words are mis-transcribed.)        Adela Newberry MD    01/31/23  6:59 AM

## 2023-01-31 NOTE — PROGRESS NOTES
4 Eyes Skin Assessment    Nikkie Banks is being assessed upon: Admission    I agree that I, Rosa Maria Oswald, RN, along with Heydi Johnson, STANLEY (either 2 RN's or 1 LPN and 1 RN) have performed a thorough Head to Toe Skin Assessment on the patient. ALL assessment sites listed below have been assessed.      Areas assessed by both nurses:     [x]   Head, Face, and Ears   [x]   Shoulders, Back, and Chest  [x]   Arms, Elbows, and Hands   [x]   Coccyx, Sacrum, and Ischium  [x]   Legs, Feet, and Heels    Does the Patient have Skin Breakdown? No    Gamal Prevention initiated: No  Wound Care Orders initiated: NA    [facility] nurse consulted for Pressure Injury (Stage 3,4, Unstageable, DTI, NWPT, and Complex wounds) and New or Established Ostomies: NA        Primary Nurse eSignature: Rosa Maria Oswald RN on 1/31/2023 at 12:58 AM      Co-Signer eSignature: Electronically signed by Heydi Johnson RN on 1/31/23 at 1:06 AM CST

## 2023-01-31 NOTE — ANESTHESIA POSTPROCEDURE EVALUATION
Department of Anesthesiology  Postprocedure Note    Patient: Braxton Zhao  MRN: [id number]  YOB: 1950  Date of evaluation: 1/31/2023      Procedure Summary     Date: 01/31/23 Room / Location: [address]    Anesthesia Start: 1419 Anesthesia Stop:     Procedure: EGD BIOPSY Diagnosis:       Anemia, unspecified type      (Anemia, unspecified type [D64.9])    Surgeons: Lennox Peak, MD Responsible Provider: CHRIS Owens CRNA    Anesthesia Type: general, TIVA ASA Status: 3          Anesthesia Type: No value filed.     Reena Phase I:      Reena Phase II:        Anesthesia Post Evaluation    Patient location during evaluation: bedside  Patient participation: complete - patient participated  Level of consciousness: sleepy but conscious  Pain score: 0  Airway patency: patent  Nausea & Vomiting: no nausea and no vomiting  Complications: no  Cardiovascular status: hemodynamically stable and blood pressure returned to baseline  Respiratory status: acceptable and nasal cannula  Hydration status: stable

## 2023-01-31 NOTE — PROGRESS NOTES
Pt returned from CT and Endo at 1515. Now back in bed, stood and ambulated to bathroom after return. Pt also c/o pain with all the moving around. Percocet 5mg PO now given. Pt was given a sandwich and fruit cup with a drink. Pt stated she was very hungry. Will monitor for pain relief.  Electronically signed by Dot Grullon RN on 1/31/2023 at 3:39 PM

## 2023-01-31 NOTE — PROGRESS NOTES
PHARMACY NOTE  Nikkie Garvey was ordered Omega 3 capsules. Per the [address], this medication is non-formulary and not stocked by pharmacy. It has been discontinued. The medication can be reordered at discharge.      Electronically signed by DANIEL Leon [facility] on 1/31/2023 at 12:42 AM

## 2023-01-31 NOTE — PROGRESS NOTES
Patient is scheduled for a ct guided lung biopsy. H/P reviewed and medications and allergies reviewed and verified. Patient MAR reviewed and patient received lovenox 40mg at 0125 am which is a 24 hour hold. Calling to notify [facility] RN about the lovenox.

## 2023-01-31 NOTE — PROGRESS NOTES
Physical Therapy    Eval attempted. Nurse states pt just received pain meds and has multiple tests scheduled for today. Pt has been up to BR with assist. Will cont to follow.     Electronically signed by Cary Thompson PT on 1/31/2023 at 10:56 AM

## 2023-01-31 NOTE — ACP (ADVANCE CARE PLANNING)
Advance Care Planning   Healthcare Decision Maker:    Primary Decision Maker: Jayne Mi - [phone]    Patient already has a designated Primary Decision Maker, see above.       Electronically signed by Alicia Bright MA [facility] on 1/31/2023 at 11:34 AM

## 2023-01-31 NOTE — CONSULTS
Pulmonary and Critical Care Consult Note    Napolean Pain    MRN# [id number]    Acct# [de-identified]  1/31/2023   3:59 PM CST    Referring Litzy Loera MD      Chief Complaint: Lung mass    Requesting physician: Dr. Bindu Johnson. Reason for consult: Lung mass. HPI: We have been consulted to see this 67y.o. year old female born on 1950. Patient presented to the hospital due to back and chest pain. Work-up in the emergency room included a CT pulmonary angiogram that showed left lower lobe lung mass with mediastinal adenopathy and evidence of sclerotic lesions on the spine suggestive of metastatic disease with multiple hypodense lesions in the liver suggestive of metastatic disease to the liver. There was also evidence of possible distal esophageal mass. The patient quit smoking about 40 years ago. Denies weight loss. Denies hemoptysis. I was asked to see her regarding the above. CT-guided needle aspiration of the lung mass was requested by Dr. Carroll Story. Past Medical History      Past Medical History:   Diagnosis Date    Acquired hypothyroidism 08/20/2017    Alopecia 08/21/2017    Breast implant capsular contracture     \"with cysts behind right breast\" per pt.      Essential hypertension 08/20/2017    Familial hypercholesterolemia 08/20/2017    PONV (postoperative nausea and vomiting)     Prolonged emergence from general anesthesia     Stage 1 chronic kidney disease 6/29/2021    Tobacco abuse, in remission      SurgicalHistory  Past Surgical History:   Procedure Laterality Date    BREAST CYST EXCISION  2022    BREAST ENHANCEMENT SURGERY Bilateral 06/11/2020    REMOVAL BILATERAL CONTRACTURED IMPLANTS WITH BILATERAL PECTORAL BLOCK performed by Paz Favre, MD at [address] Bilateral 2008    bunion correct osteotomy smith double-stem lesser MTP Impl    COLONOSCOPY  2017    HEMORRHOID SURGERY  2005    ~2005    HYSTERECTOMY (CERVIX STATUS UNKNOWN)  1980    KNEE SURGERY Right 2007    RECTAL SURGERY  2000    anal fissurectomy with sphincterotomy, ~2000    MAICOL AND BSO (CERVIX REMOVED) Bilateral 1980    both ovaries removed, had surgery as a result of endometriosis    TUBAL LIGATION  1975    ~1975     Allergies  Allergies   Allergen Reactions    Compazine [Prochlorperazine Maleate] Other (See Comments)     Eyelids flipped up and mouth stretched to the side. Thorazine [Chlorpromazine] Other (See Comments)     Eyelids flipped up and mouth stretched to the side     Medications    budesonide, 0.5 mg, Nebulization, Q12H    arformoterol tartrate, 15 mcg, Nebulization, BID    thyroid, 60 mg, Oral, QAM AC    atorvastatin, 10 mg, Oral, Nightly    losartan, 100 mg, Oral, Daily    oyster shell calcium w/D, 1 tablet, Oral, BID    sodium chloride flush, 5-40 mL, IntraVENous, 2 times per day    [START ON 2/3/2023] vitamin D, 5,000 Units, Oral, Weekly    enoxaparin, 40 mg, SubCUTAneous, Q24H   Social History   reports that she quit smoking about 35 years ago. Her smoking use included cigarettes. She started smoking about 50 years ago. She has a 30.00 pack-year smoking history. She has never used smokeless tobacco. She reports current alcohol use. She reports that she does not use drugs. Family History  family history includes Cancer in her brother; Diabetes in her brother, mother, sister, sister, and sister; High Blood Pressure in her father and mother; No Known Problems in her son and son; Obesity in her brother, mother, sister, sister, and sister.     Review of Systems:  12 point review of systems is negative except as below:  CONSTITUTIONAL:  positive for  fatigue and malaise  RESPIRATORY:  positive for  chest pain  CARDIOVASCULAR:  positive for  chest pain, dyspnea    Physical Exam:  BP (!) 169/70   Pulse 90   Temp 97.3 °F (36.3 °C) (Temporal)   Resp 14 Ht 5' (1.524 m)   Wt 128 lb 3.2 oz (58.2 kg)   SpO2 94%   BMI 25.04 kg/m² No intake or output data in the 24 hours ending 01/31/23 1559    General appearance: Elderly female in no distress   HEENT: Normocephalic atraumatic  Heart: S1-S2 distant sounds no murmurs  Lungs: Diminished bilaterally no rubs or tenderness on dullness to percussion  Abdomen: Soft nontender no organomegalies normal bowel sounds  Extremities: No clubbing cyanosis or edema  Neuro: No focal findings  Skin: Intact        Labs:  Recent Labs     01/30/23  1850   WBC 11.4*   RBC 4.78   HGB 14.8   HCT 45.4      MCV 95.0   MCH 31.0   MCHC 32.6*   RDW 12.0      Recent Labs     01/30/23  1850      K 4.3      CO2 27   BUN 22   CREATININE 1.0*   CALCIUM 9.9   GLUCOSE 152*      No results for input(s): PHART, GXV2XQQ, PO2ART, CYR1FBY, O2CIOIOA, BEART in the last 72 hours. Recent Labs     01/30/23  1850   AST 21   ALT 13   ALKPHOS 131*   BILITOT <0.2   CALCIUM 9.9   TROPONINI <0.01   INR 0.96     No results for input(s): BC, LABGRAM, CULTRESP, BFCX in the last 72 hours. Radiograph: CT ABDOMEN PELVIS W IV CONTRAST Additional Contrast? Oral    Result Date: 1/31/2023  Impression: 1. Multiple hepatic metastases. No other metastatic findings within the abdomen or pelvis. 2.Left lower lobe pulmonary mass, partially visualized. Dedicated CT chest is suggested. 3.Distal esophageal mass. Follow-up endoscopy is suggested. XR CHEST PORTABLE    Result Date: 1/30/2023  Unremarkable chest x-ray. Recommendation: Follow up as clinically indicated. Dictated and Electronically Signed by Prince Neto NIETO, SA CERTIFIED at [id number] 08:53:53 PM EST             CTA PULMONARY W CONTRAST    Result Date: 1/30/2023  1. No acute pulmonary embolism. 2. Enhancing solid lesion posteriorly within the left lower lobe. Measures 3.2 x 4.3 x 3.7 cm and is most consistent with lung neoplasm. Metastatic subcarinal lymph node is suspected.  3. Asymmetric mucosal thickening of the distal esophagus with masslike abnormality in the left lateral aspect measuring 2.4 x 1.8 cm. An esophageal mass is suspected. Metastatic disease here is not excluded. 4. Diffuse metastatic disease of the liver. 5. No lytic or blastic lesion. Recommendation: Follow up as clinically indicated. All CT scans at this facility utilize dose modulation, iterative reconstruction, and/or weight based dosing when appropriate to reduce radiation dose to as low as reasonably achievable. Dictated and Electronically Signed by Arnav Dillon MD, [facility] CERTIFIED at [id number] 10:32:29 PM EST                My radiograph interpretation/independent review of imaging: CT of the chest reviewed by myself. Problem list generated by [facility] Problems             Last Modified POA    * (Principal) Mass of mediastinum 1/30/2023 Yes    Palliative care patient 1/31/2023 Yes    Esophageal mass 1/31/2023 Yes    Lung mass 1/31/2023 Yes    Cancer related pain 1/31/2023 Yes    Essential hypertension (Chronic) 1/31/2023 Yes    Acquired hypothyroidism (Chronic) 1/31/2023 Yes    Mixed hyperlipidemia 1/31/2023 Yes    Anemia 1/31/2023 Unknown    Overview Signed 1/31/2023 12:52 PM by Rivera Maldonado MD     Added automatically from request for surgery [id number]               Pulmonary Assessment/plan:    Lung mass left lower lobe with mediastinal adenopathy and possible bone and evidence of metastatic disease to the liver. Discussed biopsy with Dr. Sudhir Shoemaker from radiology. CT-guided biopsy of the liver lesion is safer than lung biopsy and would offer diagnosis and staging. We will proceed with liver biopsy. Back pain likely secondary to metastatic disease to the bone. Esophageal mass suggested on the CT of the chest however no evidence of mass was seen on upper endoscopy. Anemia per hematology. DVT and GI prophylaxis.            David Wallace MD, FCCP, DAB    The above note was generated using voice recognition software. Inadvertent typographical errors in transcription may have occurred.     Electronically signed by Xochilt Collins MD on 01/31/23 at 3:59 PM

## 2023-01-31 NOTE — CONSULTS
Palliative Care Consult Note    1/31/2023 9:56 AM  Subjective:  Admit Date: 1/30/2023  PCP: CHRIS Conn    Date of Service: 1/31/2023    Reason for Consultation:  Goals of Care, Code Status, Family Support     History Obtained From: EMR/Patient and their Family    History Of Present Illness: The patient is a 67 y.o. female with PMH HTN, HLD, hypothyroid, CKD, and other comorbidities who presented to [address] [place] [address] ED 1/30/2023 complaining of back pain x2-3 days. Patient reported pain was initially a mild aching that has progressed to severe. Denied any associated SOB. Workup in ED revealed creatinine/BUN 1.0/22, troponin negative, and WBC 11.4. Rapid COVID screening was negative. CXR was unremarkable. CTA chest completed with no evidence of PE but revealing an enhancing solid lesion posteriorly within the left lower lobe measuring 3.2 x 4.3 x 3.7 cm, asymmetric an irregular mucosal thickening of the distal esophagus, and suspected diffuse metastatic disease to the liver. Patient was admitted under hospitalist services with oncology evaluation. Concern for advanced metastatic disease. Plans for CT guided biopsy to obtain tissue for diagnosis. Palliative care consulted for code status discussion and goals of care. Past Medical History:        Diagnosis Date    Acquired hypothyroidism 08/20/2017    Alopecia 08/21/2017    Breast implant capsular contracture     \"with cysts behind right breast\" per pt.      Essential hypertension 08/20/2017    Familial hypercholesterolemia 08/20/2017    PONV (postoperative nausea and vomiting)     Prolonged emergence from general anesthesia     Stage 1 chronic kidney disease 6/29/2021    Tobacco abuse, in remission        Past Surgical History:        Procedure Laterality Date    BREAST CYST EXCISION  2022    BREAST ENHANCEMENT SURGERY Bilateral 06/11/2020    REMOVAL BILATERAL CONTRACTURED IMPLANTS WITH BILATERAL PECTORAL BLOCK performed by Gosia Robb MD at [address] [place] [address] Bilateral 2008    bunion correct osteotomy smith double-stem lesser MTP Impl    COLONOSCOPY  2017    HEMORRHOID SURGERY  2005    ~2005    HYSTERECTOMY (CERVIX STATUS UNKNOWN)  1980    KNEE SURGERY Right 2007    RECTAL SURGERY  2000    anal fissurectomy with sphincterotomy, ~2000    MAICOL AND BSO (CERVIX REMOVED) Bilateral 1980    both ovaries removed, had surgery as a result of endometriosis    TUBAL LIGATION  1975    ~1975       Home Medications:  Prior to Admission medications    Medication Sig Start Date End Date Taking? Authorizing Provider   vitamin D (ERGOCALCIFEROL) 1.25 MG (40054 UT) CAPS capsule TAKE 1 CAPSULE BY MOUTH TWICE A WEEK 12/20/22   Jordana Proper, APRN   albuterol sulfate HFA (PROVENTIL HFA) 108 (90 Base) MCG/ACT inhaler Inhale 2 puffs into the lungs every 6 hours as needed for Wheezing 12/6/22   Jordana Proper, APRN   thyroid (ARMOUR) 60 MG tablet Take 1 tablet by mouth daily Compound from strawberry HIll   t-4-60/t3-12 9/12/22   Jordana Proper, APRN   Omega 3 1200 MG CAPS Take by mouth 2 times daily    Historical Provider, MD   lovastatin (MEVACOR) 40 MG tablet TAKE 1 TABLET EVERY NIGHT 6/28/22   Jordana Proper, APRN   losartan (COZAAR) 100 MG tablet TAKE 1 TABLET EVERY DAY 6/28/22   Jordana Proper, APRN   estradiol (ESTRACE) 0.5 MG tablet TAKE 1 TABLET EVERY DAY 6/28/22   Jordana Proper, APRN   fluticasone-salmeterol (ADVAIR DISKUS) 500-50 MCG/DOSE diskus inhaler Inhale 1 puff into the lungs daily as needed     Historical Provider, MD   calcium carbonate-vitamin D (CALCIUM 600+D) 600-200 MG-UNIT TABS Take 1 tablet by mouth 2 times daily    Historical Provider, MD     Allergies:    Compazine [prochlorperazine maleate] and Thorazine [chlorpromazine]    Social History:    The patient currently lives at home with spouse  Tobacco:   reports that she quit smoking about 35 years ago. Her smoking use included cigarettes. She started smoking about 50 years ago.  She has a 30.00 pack-year smoking history. She has never used smokeless tobacco.  Alcohol:   reports current alcohol use. Illicit Drugs: None    Family History:      Problem Relation Age of Onset    High Blood Pressure Mother     Diabetes Mother         Type 2    Obesity Mother     High Blood Pressure Father     Diabetes Sister         Type 2    Obesity Sister     Diabetes Sister     Obesity Sister     Diabetes Sister     Obesity Sister     Diabetes Brother         Type 2    Cancer Brother         Blood form of cancer-not leukemia     Obesity Brother     No Known Problems Son     No Known Problems Son      Review of Systems:   Constitutional / general:  Denies fever / chills / sweats  Head:  Denies headache / neck stiffness / trauma / visual change  Eyes:  Denies blurry vision / acute visual change or loss / itching / redness  ENT: Denies sore throat / hoarseness / nasal drainage / ear pain  CV:  Denies palpitations/ orthopnea / +chest pain  Pulmonary:  Denies cough / shortness of breath / sputum / hemoptysis  GI: Denies nausea / vomiting / abdominal pain / diarrhea / +chronic constipation  :  Denies dysuria / hesitancy / urgency / hematuria   Neuro: Denies paralysis / syncope / seizure / dysphagia / headache / paresthesias  Musculoskeletal:  Denies muscle weakness /joint stiffness / +back pain  Vascular: Denies edema / claudication / varicosities  Heme / endocrine: Denies easy bruising / bleeding / excessive sweating / heat or cold intolerance  Psychiatric:  Denies depression / anxiety / insomnia / mood changes  Skin:  Denies new rashes / lesions / skin hair or nail changes    14 point review of systems is negative except as specifically addressed above.     Physical Examination:  BP (!) 169/68   Pulse 70   Temp 99 °F (37.2 °C) (Temporal)   Resp 18   Ht 5' (1.524 m)   Wt 128 lb 3.2 oz (58.2 kg)   SpO2 99%   BMI 25.04 kg/m²     General appearance: 66 yo female, ill appearing, no acute distress  Head: Normocephalic, without obvious abnormality, atraumatic  Eyes: conjunctivae/corneas clear. PERRL, EOM's intact. Ears/Nose: normal external ears and nose  Neck/Throat: supple, symmetrical, trachea midline  Pulmonary: diminished to auscultation bilaterally, unlabored on room air, shallow inspiration  Cardiovascular: regular rate and rhythm, S1, S2 normal, no murmur  Gastrointestinal:soft, non-tender; non-distended, bowel sounds present  Musculoskeletal:No lower extremity edema,  No erythema, no tenderness to palpation  Skin: Warm, dry  Neurologic: Alert and oriented X 4, normal strength and tone, no focal deficits  Psychiatric: Calm and cooperative    Diagnostic Data:  CBC:  Recent Labs     01/30/23 1850   WBC 11.4*   HGB 14.8   HCT 45.4        BMP:  Recent Labs     01/30/23 1850      K 4.3      CO2 27   BUN 22   CREATININE 1.0*   CALCIUM 9.9     Recent Labs     01/30/23 1850   AST 21   ALT 13   BILITOT <0.2   ALKPHOS 131*     XR CHEST PORTABLE  Result Date: 1/30/2023  Unremarkable chest x-ray. Recommendation: Follow up as clinically indicated. Dictated and Electronically Signed by Fito Carmichael MD, MQSA CERTIFIED at [id number] 08:53:53 PM EST             CTA PULMONARY W CONTRAST  Result Date: 1/30/2023  1. No acute pulmonary embolism. 2. Enhancing solid lesion posteriorly within the left lower lobe. Measures 3.2 x 4.3 x 3.7 cm and is most consistent with lung neoplasm. Metastatic subcarinal lymph node is suspected. 3. Asymmetric mucosal thickening of the distal esophagus with masslike abnormality in the left lateral aspect measuring 2.4 x 1.8 cm. An esophageal mass is suspected. Metastatic disease here is not excluded. 4. Diffuse metastatic disease of the liver. 5. No lytic or blastic lesion. Recommendation: Follow up as clinically indicated.  All CT scans at this facility utilize dose modulation, iterative reconstruction, and/or weight based dosing when appropriate to reduce radiation dose to as low as reasonably achievable. Dictated and Electronically Signed by Donovan Burns MD, MQSA CERTIFIED at [phone] 10:32:29 PM EST             Palliative Review of Advance Directives:     Surrogate Decision Maker:at this time, Agustin Lester pts spouse is legal [facility] decision maker. Pt is considering listing her son as HCS as her  does have dementia    Durable Power of : No    Advanced Directives/Living Dash: No, pt requesting to complete living will this stay. Notified PC  to complete ACP documents    Out of hospital medical orders in place to reflect resuscitation status (MOLST/POLST): No    Information Sharing:  Patient's awareness of illness:  [] Terminal [x] Life-Threatening [] Serious [] Non life-threatening [] Not serious   [] Not discussed    Family awareness of illness:   [] Terminal [] Life-Threatening [] Serious [] Nonlife-threatening [] Not serious   [x] Not discussed    Palliative Performance Scale:  [x] 80% Full ambulation  Some disease: Normal activity w/ some effort  Full self-care  Normal/reduced intake  Full LOC    ECOG:(1) Restricted in physically strenuous activity, ambulatory and able to do work of light nature    CLINICAL PAIN ASSESSMENT:   Score 1-10 (if verbal):  7  Location:   chest and back  Character:  sharp  Frequency:  continuously  What makes it worse?:   activity  What makes it better?:  pain medication    Assessment/Plan:  Principal Problem: Mass of mediastinum  Active Problems:    Palliative care patient    Essential hypertension    Acquired hypothyroidism    Mixed hyperlipidemia  Resolved Problems:    * No resolved hospital problems. *       Visit Summary:  Chart reviewed, patient discussed with nursing staff. Reviewed health issues, work up and treatment plan as well as factors that lead to hospitalization. Ms. Faviola See is seen at bedside this morning with her , Kirill Lyon, present. Pt is alert, oriented, and able to communicate her needs well.  She is complaining of ongoing chest/back pain but did just receive IVP pain medications with some relief. I introduced myself, explained the role of palliative care, and the reason for consult. We discussed events leading to hospitalization, current status and workup, and plan of care. Pulmonology also rounds during my visit and discusses imaging results, CT guided biopsy, and potential treatment courses with pt. We discussed likelihood of requiring palliative chemo/radiation therapies and not curative treatments. She denies any other symptoms before pain occurred. No significant weight loss, activity change, or SOB/etc noted per pt. She reports being a former smoker but she quit 30-40 years ago. We reviewed plans for CT of the abdomen today as well to further evaluate concern for metastatic disease to the liver. I explained pain regimen currently ordered for patient and when she is able to take in p.o. attempting to utilize as needed Percocet for pain and IV medication for breakthrough symptoms to assess if Percocet will be effective for her at discharge and she verbalized understanding. She endorses chronic issues with constipation and utilizes MiraLAX as needed at home. Bowel regimen is in place and I instructed her to continue OTC MiraLAX at discharge and to report worsening constipation symptoms once home and taking prn opioids. Pt and I also discuss her husbands health and underlying dementia. He has been staying overnight at the hospital with pt as he is unable to stay at home alone. She reports that their son and her sisters live locally and providing additional support as able but patient is her 's primary caregiver. Discussed outpatient palliative care and the ability for [facility] NPs to follow patient at home once discharged for continued symptom management monitoring, goals of care conversations pending work-up/treatment recommendations, and additional supportive care.   Explained that palliative care would be focused on patient's quality of life as she pursues treatment. Patient is agreeable to referral. She reports having a living will at [facility], [place], operated by [facility] and agreeable to complete living will document here this stay as she feels she needs to list her son as HCS due to her husbands dementia. I have asked Gale lynlain to complete this with pt. Education provided on full code vs DNR status. At this time patient wishes to remain a full code in the event of cardiac arrest or respiratory arrest/decline but would not want life-sustaining measures prolonged if it appeared there was no chance of recovery or quality of life. Patient reports that her family does know her medical wishes. Candidate for [facility]: Yes, referral order placed 1/31/2023 for outpatient palliative care to follow at home once discharged    Recommendations:     Palliative Care- GOC prolong life, continue all current medical treatment/work-up and monitor for improvement. D/C planning based on hospital course. Agreeable for [facility] to follow. Code status- FULL CODE  LLL Lung mass- oncology and pulmonology following. Concern for malignancy and metastatic disease. CT guided lung biopsy ordered  Liver lesions- concern for mets. CT abdomen/pelvis ordered per oncology  Paraesophageal mass- vs adenopathy. GI consulted. Potential plans for EGD. Cancer related pain- Agree with percocet  5/325 mg Q4h prn when pt is no longer NPO for procedures. Continue IVP dilaudid 0.5 mg Q4h prn for breakthrough symptoms and wean as able. Monitor for symptom control. Constipation- chronic issue. Bowel regimen in place. Monitor for need to escalate regimen    Thank you for consulting palliative care and allowing us to participate in the care of the patient.     CounselingTopics: Goals of care, Code Status, Disease process education, pt/family support                                     Total Time Spent with patient assessment, interview of independent historian/HCS, workup/treatment review, discussion with medical team, review of current and home medications, review of care everywhere and placement of orders/preparation of this note: 88 minutes    Electronically signed by CHRIS Wang CNP on 1/31/2023 at 9:56 AM    (Please note that portions of this note were completed with a voice recognition program.  Efforts were made to edit the dictations but occasionally words are mis-transcribed.)

## 2023-01-31 NOTE — PROGRESS NOTES
Jori Hawkins arrived to room # 515. Presented with: Rt chest pain  Mental Status: Patient is oriented, alert, coherent, logical, thought processes intact, and able to concentrate and follow conversation. Vitals:    01/31/23 0035   BP: (!) 174/73   Pulse: 70   Resp: 18   Temp: 98.7 °F (37.1 °C)   SpO2: 100%     Patient safety contract and falls prevention contract reviewed with patient Yes. Oriented Patient and Family to room. Call light within reach. Yes.   Needs, issues or concerns expressed at this time: no.      Electronically signed by Aguilar Watkins RN on 1/31/2023 at 12:57 AM

## 2023-01-31 NOTE — CONSULTS
Pt Name: Yunior Freeman  MRN: [id number]  [id number]  YOB: 1950  Admit Date: 1/30/2023  6:46 PM  Date of evaluation: 1/31/2023  Primary Care Physician: Magdaline Meigs, APRN   [id number]       Requesting Provider: Dr. Greg Castaneda. GI Consult    Indication: Abnormal imaging study. History:  The patient is a 67 y.o. female admitted to the hospital for back pain. Patient was having back pain on the right side for 2 to 3 days. Last night the pain got worse and was radiating to her right shoulder and right arm. Pain was constant in nature and she decided come to the hospital.  Imaging studies were done and multiple abnormalities were noted suggestive of malignancy. She denies any GI symptoms. No heartburns or regurgitation a regular basis. No dysphagia or odynophagia. No nausea or vomiting. No abdominal pain, cramping, gas or bloating. No constipation or diarrhea on a regular basis. No hematochezia or melanotic stool. No anorexia or weight loss. No fever or chills. No chest pain or palpitation. Her last colonoscopy was few years back and was normal.  She never had any upper GI endoscopy examination. CT scan other than metastatic disease noted to have thickening of the esophagus. Again, patient denies any reflux symptoms or dysphagia. Past Medical History:        Diagnosis Date    Acquired hypothyroidism 08/20/2017    Alopecia 08/21/2017    Breast implant capsular contracture     \"with cysts behind right breast\" per pt.      Essential hypertension 08/20/2017    Familial hypercholesterolemia 08/20/2017    PONV (postoperative nausea and vomiting)     Prolonged emergence from general anesthesia     Stage 1 chronic kidney disease 6/29/2021    Tobacco abuse, in remission      Past Surgical History:        Procedure Laterality Date    BREAST CYST EXCISION  2022    BREAST ENHANCEMENT SURGERY Bilateral 06/11/2020    REMOVAL BILATERAL CONTRACTURED IMPLANTS WITH BILATERAL PECTORAL BLOCK performed by Luigi De Leon MD at [address] Bilateral 2008    bunion correct osteotomy smith double-stem lesser MTP Impl    COLONOSCOPY  2017    HEMORRHOID SURGERY  2005    ~2005    HYSTERECTOMY (CERVIX STATUS UNKNOWN)  1980    KNEE SURGERY Right 2007    RECTAL SURGERY  2000    anal fissurectomy with sphincterotomy, ~2000    MAICOL AND BSO (CERVIX REMOVED) Bilateral 1980    both ovaries removed, had surgery as a result of endometriosis    TUBAL LIGATION  1975    ~1975     Allergies:  Compazine [prochlorperazine maleate] and Thorazine [chlorpromazine]  Home Meds:  Prior to Admission medications    Medication Sig Start Date End Date Taking?  Authorizing Provider   vitamin D (ERGOCALCIFEROL) 1.25 MG (12070 UT) CAPS capsule TAKE 1 CAPSULE BY MOUTH TWICE A WEEK 12/20/22   Syliva Glassing, APRN   albuterol sulfate HFA (PROVENTIL HFA) 108 (90 Base) MCG/ACT inhaler Inhale 2 puffs into the lungs every 6 hours as needed for Wheezing 12/6/22   Syliva Glassing, APRN   thyroid (ARMOUR) 60 MG tablet Take 1 tablet by mouth daily Compound from strawberry [place]   t-4-60/t3-12 9/12/22   Syliva Glassing, APRN   Omega 3 1200 MG CAPS Take by mouth 2 times daily    Historical Provider, MD   lovastatin (MEVACOR) 40 MG tablet TAKE 1 TABLET EVERY NIGHT 6/28/22   Syliva Glassing, APRN   losartan (COZAAR) 100 MG tablet TAKE 1 TABLET EVERY DAY 6/28/22   Syliva Glassing, APRN   estradiol (ESTRACE) 0.5 MG tablet TAKE 1 TABLET EVERY DAY 6/28/22   Syliva Glassing, APRN   fluticasone-salmeterol (ADVAIR DISKUS) 500-50 MCG/DOSE diskus inhaler Inhale 1 puff into the lungs daily as needed     Historical Provider, MD   calcium carbonate-vitamin D (CALCIUM 600+D) 600-200 MG-UNIT TABS Take 1 tablet by mouth 2 times daily    Historical Provider, MD        Current Meds:       budesonide  0.5 mg Nebulization Q12H    arformoterol tartrate  15 mcg Nebulization BID    thyroid  60 mg Oral QAM AC    vitamin D  5,000 Units Oral Daily    atorvastatin 10 mg Oral Nightly    losartan  100 mg Oral Daily    oyster shell calcium w/D  1 tablet Oral BID    sodium chloride flush  5-40 mL IntraVENous 2 times per day    enoxaparin  40 mg SubCUTAneous Q24H        sodium chloride           Social History:   Social History     Socioeconomic History    Marital status:      Spouse name: Mr. Tanja Carey    Number of children: 2    Years of education: 14    Highest education level: Not on file   Occupational History     Employer: RETIRED    Occupation: [address] [place] [address] [employer] [address]: retired   Tobacco Use    Smoking status: Former     Packs/day: 2.00     Years: 15.00     Pack years: 30.00     Types: Cigarettes     Start date:      Quit date:      Years since quittin.1    Smokeless tobacco: Never   Vaping Use    Vaping Use: Never used   Substance and Sexual Activity    Alcohol use: Yes     Comment: rare social occasions    Drug use: Never    Sexual activity: Yes     Partners: Male     Comment: has 2 sons   Other Topics Concern    Not on file   Social History Narrative    CODE STATUS: Full Code    HEALTH CARE PROXY: Mr. Tanja Carey, her , [phone]    AMBULATES: independent    DOMICILED: has no stair sin the home, has 1 step to enter the home, has no pets and lives alone with her      Social Determinants of Health     Financial Resource Strain: Low Risk     Difficulty of Paying Living Expenses: Not hard at all   Food Insecurity: No Food Insecurity    Worried About Running Out of Food in the Last Year: Never true    Ran Out of Food in the Last Year: Never true   Transportation Needs: Not on file   Physical Activity: Not on file   Stress: Not on file   Social Connections: Not on file   Intimate Partner Violence: Not on file   Housing Stability: Not on file       Family History:   Family History   Problem Relation Age of Onset    High Blood Pressure Mother     Diabetes Mother         Type 2    Obesity Mother     High Blood Pressure Father     Diabetes Sister         Type 2    Obesity Sister     Diabetes Sister     Obesity Sister     Diabetes Sister     Obesity Sister     Diabetes Brother         Type 2    Cancer Brother         Blood form of cancer-not leukemia     Obesity Brother     No Known Problems Son     No Known Problems Son          ROS:  None other than back pain. Physical Exam:  BP (!) 169/68   Pulse 70   Temp 99 °F (37.2 °C) (Temporal)   Resp 18   Ht 5' (1.524 m)   Wt 128 lb 3.2 oz (58.2 kg)   SpO2 99%   BMI 25.04 kg/m²     General appearance: alert and cooperative with exam, appears stated age, no acute distress   Head: normal cephalic, atraumatic. EOMI bilaterally, no neck lymphadenopathy appreciated, no carotid bruits noted  Lungs: Clear to percussion and auscultation. No wheezing or rales. Heart: S1 S2 are audible. No added sound. Abdomen: Soft, non distended and non tender. No hepatosplenomegaly. Bowel sounds are audible. No masses palpable. Skin: warm, dry, no obvious rash, non-jaundice  Extremities: No cyanosis or clubbing or peripheral edema noted. Dorsalis pedis pulses were intact. Labs:     Recent Labs     01/30/23 1850   WBC 11.4*   RBC 4.78   HGB 14.8   HCT 45.4   MCV 95.0   MCH 31.0   MCHC 32.6*        Recent Labs     01/30/23  1850      K 4.3   ANIONGAP 13      CO2 27   BUN 22   CREATININE 1.0*   GLUCOSE 152*   CALCIUM 9.9     No results for input(s): MG, PHOS in the last 72 hours.   Recent Labs     01/30/23  1850   AST 21   ALT 13   BILITOT <0.2   ALKPHOS 131*     HgBA1c:  No components found for: HGBA1C  FLP:    Lab Results   Component Value Date/Time    TRIG 182 06/28/2022 11:03 AM    HDL 55 06/28/2022 11:03 AM    LDLCALC 70 06/28/2022 11:03 AM     TSH:    Lab Results   Component Value Date/Time    TSH 3.600 06/28/2022 11:03 AM     Troponin T:   Recent Labs     01/30/23  1850   TROPONINI <0.01     INR:   Recent Labs     01/30/23  1850   INR 0.96       No results for input(s): LIPASE, AMYLASE in the last 72 hours. Radiology:    XR CHEST PORTABLE    Result Date: 1/30/2023  Unremarkable chest x-ray. Recommendation: Follow up as clinically indicated. Dictated and Electronically Signed by JUAN Cabello MD CERTIFIED at [id number] 08:53:53 PM EST             CTA PULMONARY W CONTRAST    Result Date: 1/30/2023  1. No acute pulmonary embolism. 2. Enhancing solid lesion posteriorly within the left lower lobe. Measures 3.2 x 4.3 x 3.7 cm and is most consistent with lung neoplasm. Metastatic subcarinal lymph node is suspected. 3. Asymmetric mucosal thickening of the distal esophagus with masslike abnormality in the left lateral aspect measuring 2.4 x 1.8 cm. An esophageal mass is suspected. Metastatic disease here is not excluded. 4. Diffuse metastatic disease of the liver. 5. No lytic or blastic lesion. Recommendation: Follow up as clinically indicated. All CT scans at this facility utilize dose modulation, iterative reconstruction, and/or weight based dosing when appropriate to reduce radiation dose to as low as reasonably achievable. Dictated and Electronically Signed by JUAN Cabello MD CERTIFIED at [id number] 10:32:29 PM EST                 Assessment:  Patient admitted to the hospital with symptoms of back pain. Work-up revealed metastatic disease to lung, liver, lymph nodes and possible esophagus. The primary is unknown. She is scheduled for liver biopsy most likely for tomorrow. Based on her imaging study in my opinion upper GI endoscopy examination indicated to rule out esophageal malignancy. Impression:  1. Abnormal imaging study. 2.  Back pain. Plan:  1. Differential diagnosis patient's symptoms and imaging study was briefly discussed with her in the presence of her family member. 2.  I will schedule patient for upper GI endoscopy examination and based on EGD finding further recommendation be done.   3.  Agree with liver biopsy if her endoscopy is normal for esophageal malignancy.     Elena Mccormack MD  1/31/2023, 12:39 PM

## 2023-01-31 NOTE — ED NOTES
Waiting on covid swab to result before taking pt to room on 5th floor.      [facility]  01/30/23 0351

## 2023-01-31 NOTE — ANESTHESIA PRE PROCEDURE
Department of Anesthesiology  Preprocedure Note       Name:  Jori Hawkins   Age:  67 y.o.  :  1950                                          MRN:  [id number]         Date:  2023      Surgeon: Linda Cobos): Kaylie Garcia MD    Procedure: Procedure(s):  EGD DIAGNOSTIC ONLY    Medications prior to admission:   Prior to Admission medications    Medication Sig Start Date End Date Taking?  Authorizing Provider   vitamin D (ERGOCALCIFEROL) 1.25 MG (95354 UT) CAPS capsule TAKE 1 CAPSULE BY MOUTH TWICE A WEEK 22 Geary, APRN   albuterol sulfate HFA (PROVENTIL HFA) 108 (90 Base) MCG/ACT inhaler Inhale 2 puffs into the lungs every 6 hours as needed for Wheezing 22, APRN   thyroid (ARMOUR) 60 MG tablet Take 1 tablet by mouth daily Compound from strawberry [place]   t-4-60/t3-12 22, APRN   Omega 3 1200 MG CAPS Take by mouth 2 times daily    Historical Provider, MD   lovastatin (MEVACOR) 40 MG tablet TAKE 1 TABLET EVERY NIGHT 22, APRN   losartan (COZAAR) 100 MG tablet TAKE 1 TABLET EVERY DAY 22, APRN   estradiol (ESTRACE) 0.5 MG tablet TAKE 1 TABLET EVERY DAY 22, APRN   fluticasone-salmeterol (ADVAIR DISKUS) 500-50 MCG/DOSE diskus inhaler Inhale 1 puff into the lungs daily as needed     Historical Provider, MD   calcium carbonate-vitamin D (CALCIUM 600+D) 600-200 MG-UNIT TABS Take 1 tablet by mouth 2 times daily    Historical Provider, MD       Current medications:    Current Facility-Administered Medications   Medication Dose Route Frequency Provider Last Rate Last Admin    albuterol (PROVENTIL) nebulizer solution 2.5 mg  2.5 mg Nebulization Q4H PRN Too Reilly MD        budesonide (PULMICORT) nebulizer suspension 500 mcg  0.5 mg Nebulization Q12H Too Reilly MD   500 mcg at 23 0705    arformoterol tartrate (BROVANA) nebulizer solution 15 mcg  15 mcg Nebulization BID Alvera Cross Cyrus Brewster MD   15 mcg at 01/31/23 3273    thyroid (ARMOUR) tablet 60 mg  60 mg Oral QAM AC Alem Hicks MD        vitamin D (CHOLECALCIFEROL) capsule 5,000 Units  5,000 Units Oral Daily Alem Hicks MD        atorvastatin (LIPITOR) tablet 10 mg  10 mg Oral Nightly Alem Hicks MD        losartan (COZAAR) tablet 100 mg  100 mg Oral Daily Alem Hicks MD   100 mg at 01/31/23 0940    oyster shell calcium w/D 500-5 MG-MCG tablet 1 tablet  1 tablet Oral BID Alem Hicks MD   1 tablet at 01/31/23 0940    sodium chloride flush 0.9 % injection 5-40 mL  5-40 mL IntraVENous 2 times per day Alem Hicks MD        sodium chloride flush 0.9 % injection 5-40 mL  5-40 mL IntraVENous PRN Alem Hicks MD        0.9 % sodium chloride infusion   IntraVENous PRN Alem Hicks MD        ondansetron (ZOFRAN-ODT) disintegrating tablet 4 mg  4 mg Oral Q8H PRN Alem Hicks MD        Or    ondansetron [facility]) injection 4 mg  4 mg IntraVENous Q6H PRN Alem Hicks MD        acetaminophen (TYLENOL) tablet 650 mg  650 mg Oral Q4H PRN Alem Hicks MD        Or    acetaminophen (TYLENOL) suppository 650 mg  650 mg Rectal Q4H PRN Alem Hicks MD        potassium chloride (KLOR-CON M) extended release tablet 40 mEq  40 mEq Oral PRN Alem Hicks MD        Or    potassium bicarb-citric acid (EFFER-K) effervescent tablet 40 mEq  40 mEq Oral PRN Alem Hicks MD        Or    potassium chloride 10 mEq/100 mL IVPB (Peripheral Line)  10 mEq IntraVENous PRN Alem Hicks MD        magnesium sulfate 2000 mg in 50 mL IVPB premix  2,000 mg IntraVENous PRN Alem Hicks MD        polyethylene glycol [facility]) packet 17 g  17 g Oral Daily PRN Alem Hicks MD        melatonin disintegrating tablet 5 mg  5 mg Oral Nightly PRN Alem Hicks MD        calcium carbonate (TUMS) chewable tablet 500 mg  500 mg Oral TID JOCELINE Hicks MD        oxyCODONE-acetaminophen (PERCOCET) 5-325 MG per tablet 1 tablet  1 tablet Oral Q4H PRN Aysha Expose, APRN - CNP        HYDROmorphone HCl PF (DILAUDID) injection 0.5 mg  0.5 mg IntraVENous Q4H PRN Aysha Expose, APRN - CNP   0.5 mg at 01/31/23 1038    enoxaparin (LOVENOX) injection 40 mg  40 mg SubCUTAneous Q24H Kelly Chavez MD   40 mg at 01/31/23 0125       Allergies: Allergies   Allergen Reactions    Compazine [Prochlorperazine Maleate] Other (See Comments)     Eyelids flipped up and mouth stretched to the side.  Thorazine [Chlorpromazine] Other (See Comments)     Eyelids flipped up and mouth stretched to the side       Problem List:    Patient Active Problem List   Diagnosis Code    Essential hypertension I10    Acquired hypothyroidism E03.9    Alopecia L65.9    Vitamin D deficiency E55.9    Mixed hyperlipidemia E78.2    Breast implant capsular contracture T85.44XA    Status post implant removal from both breasts 6/11/2020 Z98.86    Nodule of chest wall R22.2    Stage 1 chronic kidney disease N18.1    COVID U07.1    Poison ivy dermatitis L23.7    Acute bronchitis due to other specified organisms J20.8    Mass of mediastinum J98.59    Palliative care patient Z51.5    Esophageal mass K22.89    Anemia D64.9       Past Medical History:        Diagnosis Date    Acquired hypothyroidism 08/20/2017    Alopecia 08/21/2017    Breast implant capsular contracture     \"with cysts behind right breast\" per pt.      Essential hypertension 08/20/2017    Familial hypercholesterolemia 08/20/2017    PONV (postoperative nausea and vomiting)     Prolonged emergence from general anesthesia     Stage 1 chronic kidney disease 6/29/2021    Tobacco abuse, in remission        Past Surgical History:        Procedure Laterality Date    BREAST CYST EXCISION  2022    BREAST ENHANCEMENT SURGERY Bilateral 06/11/2020    REMOVAL BILATERAL CONTRACTURED IMPLANTS WITH BILATERAL PECTORAL BLOCK performed by Ryan Hernandez [facility] at [facility] Bilateral 2008    bunion correct osteotomy smith double-stem lesser MTP Impl    COLONOSCOPY  2017    HEMORRHOID SURGERY  2005    ~2005    HYSTERECTOMY (CERVIX STATUS UNKNOWN)  1980    KNEE SURGERY Right 2007    RECTAL SURGERY  2000    anal fissurectomy with sphincterotomy, ~2000    MAICOL AND BSO (CERVIX REMOVED) Bilateral     both ovaries removed, had surgery as a result of endometriosis    TUBAL LIGATION      ~       Social History:    Social History     Tobacco Use    Smoking status: Former     Packs/day: 2.00     Years: 15.00     Pack years: 30.00     Types: Cigarettes     Start date:      Quit date:      Years since quittin.1    Smokeless tobacco: Never   Substance Use Topics    Alcohol use: Yes     Comment: rare social occasions                                Counseling given: Not Answered      Vital Signs (Current):   Vitals:    23 0035 23 0403 23 0705 23 0734   BP: (!) 174/73 (!) 146/66  (!) 169/68   Pulse: 70 69 72 70   Resp: 18 16 18 18   Temp: 98.7 °F (37.1 °C) 99 °F (37.2 °C)  99 °F (37.2 °C)   TempSrc: Temporal Temporal  Temporal   SpO2: 100% 100% 98% 99%   Weight: 128 lb 3.2 oz (58.2 kg)      Height: 5' (1.524 m)                                                 BP Readings from Last 3 Encounters:   23 (!) 169/68   22 132/78   22 136/62       NPO Status:                                                                                 BMI:   Wt Readings from Last 3 Encounters:   23 128 lb 3.2 oz (58.2 kg)   22 136 lb 9.6 oz (62 kg)   22 136 lb 8 oz (61.9 kg)     Body mass index is 25.04 kg/m².     CBC:   Lab Results   Component Value Date/Time    WBC 11.4 2023 06:50 PM    RBC 4.78 2023 06:50 PM    HGB 14.8 2023 06:50 PM    HCT 45.4 2023 06:50 PM    MCV 95.0 2023 06:50 PM    RDW 12.0 2023 06:50 PM     2023 06:50 PM       CMP:   Lab Results   Component Value Date/Time     2023 06:50 PM K 4.3 01/30/2023 06:50 PM     01/30/2023 06:50 PM    CO2 27 01/30/2023 06:50 PM    BUN 22 01/30/2023 06:50 PM    CREATININE 1.0 01/30/2023 06:50 PM    GFRAA >59 06/28/2022 11:03 AM    LABGLOM 60 01/30/2023 06:50 PM    GLUCOSE 152 01/30/2023 06:50 PM    PROT 8.0 01/30/2023 06:50 PM    CALCIUM 9.9 01/30/2023 06:50 PM    BILITOT <0.2 01/30/2023 06:50 PM    ALKPHOS 131 01/30/2023 06:50 PM    AST 21 01/30/2023 06:50 PM    ALT 13 01/30/2023 06:50 PM       POC Tests: No results for input(s): POCGLU, POCNA, POCK, POCCL, POCBUN, POCHEMO, POCHCT in the last 72 hours. Coags:   Lab Results   Component Value Date/Time    PROTIME 12.7 01/30/2023 06:50 PM    INR 0.96 01/30/2023 06:50 PM    APTT 27.5 01/30/2023 06:50 PM       HCG (If Applicable): No results found for: PREGTESTUR, PREGSERUM, HCG, HCGQUANT     ABGs: No results found for: PHART, PO2ART, JWG1YND, FNK3VJC, BEART, L6HAUWPF     Type & Screen (If Applicable):  No results found for: LABABO, LABRH    Drug/Infectious Status (If Applicable):  No results found for: HIV, HEPCAB    COVID-19 Screening (If Applicable):   Lab Results   Component Value Date/Time    COVID19 Not Detected 01/30/2023 11:05 PM    COVID19 Not Detected 06/07/2020 11:06 AM           Anesthesia Evaluation  Patient summary reviewed   history of anesthetic complications: PONV. Airway: Mallampati: II  TM distance: >3 FB   Neck ROM: full  Mouth opening: < 3 FB   Dental: normal exam         Pulmonary:normal exam                              ROS comment: Former smoker   Cardiovascular:  Exercise tolerance: no interval change,   (+) hypertension:,          Beta Blocker:  Not on Beta Blocker         Neuro/Psych:   Negative Neuro/Psych ROS              GI/Hepatic/Renal:   (+) renal disease: CRI,          ROS comment: Abnormal imaging  Impression  1. No acute pulmonary embolism. 2. Enhancing solid lesion posteriorly within the left lower lobe.  Measures 3.2 x 4.3 x 3.7 cm and is most consistent with lung neoplasm. Metastatic subcarinal lymph node is suspected. 3. Asymmetric mucosal thickening of the distal esophagus with masslike abnormality in the left lateral aspect measuring 2.4 x 1.8 cm. An esophageal mass is suspected. Metastatic disease here is not excluded. 4. Diffuse metastatic disease of the liver. 5. No lytic or blastic lesion. Recommendation: Follow up as clinically indicated. All CT scans at this facility utilize dose modulation, iterative reconstruction, and/or weight based dosing when appropriate to reduce radiation dose to as low as reasonably achievable. Dictated and Electronically Signed by Katt Coats MD, SA CERTIFIED at [id number] 10:32:29 PM EST             .   Endo/Other:    (+) hypothyroidism::., .                 Abdominal:             Vascular: negative vascular ROS. Other Findings:           Anesthesia Plan      general and TIVA     ASA 3       Induction: intravenous. Anesthetic plan and risks discussed with patient.                         CHRIS Colbert - CRNA   1/31/2023

## 2023-01-31 NOTE — H&P
[facility] History and Physical    Patient Information:  Patient: Barry Mccauley  MRN: [id number]   Acct: [de-identified]  YOB: 1950  Admit Date: 1/30/2023      Date of Service: 1/31/2023  Primary Care Physician: CHRIS Nichols  Advance Directive: Full Code  Health Care Proxy: Mr. Yogesh De Jesus, her , [phone]        SUBJECTIVE:    Chief Complaint   Patient presents with    Chest Pain     EP Sign Out:  Mediastinal Mass with involvement of Eso and Trach  Has not seen oncology before  Does not have a lung doc  Anxious to establish care    HPI:  Mrs. Barry Mccauley states that a few day ago she started to have pain under and along the right shoulder blade. She sates that it felt like an ache at first. The pain was a 6/10 when it started but was a \"10+\"10 at it;'s worst. She has never had pain like this before. Review of Systems:   Review of Systems   Constitutional:  Negative for chills, diaphoresis, fatigue and fever. Respiratory:  Negative for cough, choking, chest tightness, shortness of breath and wheezing. Cardiovascular:  Positive for chest pain. Negative for palpitations and leg swelling. Gastrointestinal:  Positive for constipation (life long and unchanged). Negative for abdominal distention, abdominal pain, anal bleeding, blood in stool, diarrhea, nausea, rectal pain and vomiting. Genitourinary:  Negative for dysuria, frequency and urgency. Musculoskeletal:  Positive for back pain (with movement). Negative for neck pain. Neurological:  Negative for syncope, weakness, light-headedness and headaches. Psychiatric/Behavioral:  Negative for confusion. Past Medical History:   Diagnosis Date    Acquired hypothyroidism 08/20/2017    Alopecia 08/21/2017    Breast implant capsular contracture     \"with cysts behind right breast\" per pt.      Essential hypertension 08/20/2017    Familial hypercholesterolemia 08/20/2017    PONV (postoperative nausea and vomiting)     Prolonged emergence from general anesthesia     Tobacco abuse, in remission      Past Psychiatric History:  Denies any    Past Surgical History:   Procedure Laterality Date    BREAST CYST EXCISION  2022    BREAST ENHANCEMENT SURGERY Bilateral 06/11/2020    REMOVAL BILATERAL CONTRACTURED IMPLANTS WITH BILATERAL PECTORAL BLOCK performed by Liana Vo MD at [address] Bilateral 2008    bunion correct osteotomy smith double-stem lesser MTP Impl    COLONOSCOPY  2017    HEMORRHOID SURGERY  2005    ~2005    HYSTERECTOMY (CERVIX STATUS UNKNOWN)  1980    KNEE SURGERY Right 2007    RECTAL SURGERY  2000    anal fissurectomy with sphincterotomy, ~2000    MAICOL AND BSO (CERVIX REMOVED) Bilateral 1980    both ovaries removed, had surgery as a result of endometriosis    TUBAL LIGATION  1975    ~1975     Social History       Tobacco History       Smoking Status  Former Smoking Start Date  1973 Quit Date  1988 Smoking Frequency  2.00 packs/day for 15.00 years (30.00 pk-yrs)    Smoking Tobacco Type  Cigarettes from 1973 to [place] Use  Never              Alcohol History       Alcohol Use Status  Yes Comment  rare social occasions              Drug Use       Drug Use Status  Never              Sexual Activity       Sexually Active  Yes Partners  Male Comment  has 2 sons             CODE STATUS: Full Code  HEALTH CARE PROXY: Mr. Red Galloway, her , [phone]  AMBULATES: independent  DOMICILED: has no stair sin the home, has 1 step to enter the home, has no pets and lives alone with her      Family History   Problem Relation Age of Onset    High Blood Pressure Mother     Diabetes Mother         Type 2    Obesity Mother     High Blood Pressure Father     Diabetes Sister         Type 2    Obesity Sister     Diabetes Sister     Obesity Sister     Diabetes Sister     Obesity Sister     Diabetes Brother         Type 2    Cancer Brother Blood form of cancer-not leukemia     Obesity Brother     No Known Problems Son     No Known Problems Son      Allergies   Allergen Reactions    Compazine [Prochlorperazine Maleate] Other (See Comments)     Eyelids flipped up and mouth stretched to the side. Thorazine [Chlorpromazine] Other (See Comments)     Eyelids flipped up and mouth stretched to the side     Home Medications:  Prior to Admission medications    Medication Sig Start Date End Date Taking?  Authorizing Provider   vitamin D (ERGOCALCIFEROL) 1.25 MG (31691 UT) CAPS capsule TAKE 1 CAPSULE BY MOUTH TWICE A WEEK 12/20/22   Syliva Glassing, APRN   albuterol sulfate HFA (PROVENTIL HFA) 108 (90 Base) MCG/ACT inhaler Inhale 2 puffs into the lungs every 6 hours as needed for Wheezing 12/6/22   Syliva Glassing, APRN   thyroid (ARMOUR) 60 MG tablet Take 1 tablet by mouth daily Compound from strawberry [place]   t-4-60/t3-12 9/12/22   Syliva Glassing, APRN   Omega 3 1200 MG CAPS Take by mouth 2 times daily    Historical Provider, MD   lovastatin (MEVACOR) 40 MG tablet TAKE 1 TABLET EVERY NIGHT 6/28/22   Syliva Glassing, APRN   losartan (COZAAR) 100 MG tablet TAKE 1 TABLET EVERY DAY 6/28/22   Syliva Glassing, APRN   estradiol (ESTRACE) 0.5 MG tablet TAKE 1 TABLET EVERY DAY 6/28/22   Syliva Glassing, APRN   fluticasone-salmeterol (ADVAIR DISKUS) 500-50 MCG/DOSE diskus inhaler Inhale 1 puff into the lungs daily as needed     Historical Provider, MD   calcium carbonate-vitamin D (CALCIUM 600+D) 600-200 MG-UNIT TABS Take 1 tablet by mouth 2 times daily    Historical Provider, MD         OBJECTIVE:     Peer:    01/30/23 2939   BP: (!) 156/65   Pulse: 80   Resp:    Temp:    SpO2: 98%   Breathing room air    BP (!) 156/65   Pulse 80   Temp 97.6 °F (36.4 °C) (Oral)   Resp 23   Ht 5' (1.524 m)   Wt 130 lb (59 kg)   SpO2 98%   BMI 25.39 kg/m²     No intake or output data in the 24 hours ending 01/31/23 0009    Physical Exam  Constitutional:       General: She is not in acute distress. Appearance: Normal appearance. She is normal weight. She is not ill-appearing or toxic-appearing. HENT:      Head: Normocephalic and atraumatic. Nose: No congestion or rhinorrhea. Eyes:      General:         Right eye: No discharge. Left eye: No discharge. Extraocular Movements: Extraocular movements intact. Pupils: Pupils are equal, round, and reactive to light. Neck:      Comments: Trachea appears midline  Cardiovascular:      Rate and Rhythm: Normal rate and regular rhythm. Heart sounds: No murmur heard. No friction rub. No gallop. Pulmonary:      Effort: Pulmonary effort is normal. No respiratory distress. Breath sounds: No stridor. No wheezing, rhonchi or rales. Chest:      Chest wall: No tenderness. Abdominal:      General: Bowel sounds are normal.      Palpations: Abdomen is soft. Tenderness: There is no abdominal tenderness. There is no guarding or rebound. Musculoskeletal:         General: No tenderness or signs of injury. Cervical back: Neck supple. Right lower leg: No edema. Left lower leg: No edema. Skin:     General: Skin is warm. Comments: nondiaphoretic   Neurological:      Mental Status: She is alert and oriented to person, place, and time. Psychiatric:         Mood and Affect: Mood normal.         Behavior: Behavior normal.        LABORATORY DATA:    CBC:   Recent Labs     01/30/23  1850   WBC 11.4*   HGB 14.8   HCT 45.4        BMP:   Recent Labs     01/30/23  1850      K 4.3      CO2 27   BUN 22   CREATININE 1.0*   CALCIUM 9.9     Hepatic Profile:   Recent Labs     01/30/23  1850   AST 21   ALT 13   BILITOT <0.2   ALKPHOS 131*     Coag Panel:   Recent Labs     01/30/23  1850   INR 0.96   PROTIME 12.7   APTT 27.5     Cardiac Enzymes:   Recent Labs     01/30/23  1850   TROPONINI <0.01     IMAGING:  XR CHEST PORTABLE  Result Date: 1/30/2023  Unremarkable chest x-ray.  Recommendation: Follow up as clinically indicated. Dictated and Electronically Signed by Klaus Wolfe MD, MQSA CERTIFIED at [id number] 08:53:53 PM EST           CTA PULMONARY W CONTRAST  Result Date: 1/30/2023  1. No acute pulmonary embolism. 2. Enhancing solid lesion posteriorly within the left lower lobe. Measures 3.2 x 4.3 x 3.7 cm and is most consistent with lung neoplasm. Metastatic subcarinal lymph node is suspected. 3. Asymmetric mucosal thickening of the distal esophagus with masslike abnormality in the left lateral aspect measuring 2.4 x 1.8 cm. An esophageal mass is suspected. Metastatic disease here is not excluded. 4. Diffuse metastatic disease of the liver. 5. No lytic or blastic lesion. Recommendation: Follow up as clinically indicated. All CT scans at this facility utilize dose modulation, iterative reconstruction, and/or weight based dosing when appropriate to reduce radiation dose to as low as reasonably achievable. Dictated and Electronically Signed by Klaus Wolfe MD, MQSA CERTIFIED at [id number] 10:32:29 PM EST                 ASESSMENTS & PLANS:    Mediastinal Mass:  Admit to medical [facility]  Oncology consultation  Pulmonology Consultation  NPO from MN  CBC w/ Diff QAM x 3  BMP with Mag reflex QAM x 3    Chronic Medical Problems:  Continue Home regimen as indicated    Supportive and Prophylactic Txx:   DVT PPx: Lovenox SQ  GI (PUD) PPx: Not indicated  PT: indicated as per age and admitted status  NPO except ice chips and sips with meds      Care time of >55 minutes  Pt seen/examined and admitted to inpatient status. Inpatient status is used for patients with an expected LOS extending past two midnights due to medical therapy and or critical care needs, otherwise patients are placed to OBServation status. Signed:  Electronically signed by Brendon Frye MD on 1/31/23 at 12:20 AM CST.

## 2023-01-31 NOTE — PROGRESS NOTES
[facility]      Progress Note    Patient:  Yunior Freeman  YOB: 1950  Date of Service: 1/31/2023  MRN: [id number]   Acct: [de-identified]   Primary Care Physician: Magdaline Meigs, APRN  Advance Directive: Full Code  Admit Date: 1/30/2023       Hospital Day: 1    Portions of this note have been copied forward, however, updated to reflect the most current clinical status of this patient. CHIEF COMPLAINT Right scapula pain    SUBJECTIVE:  Pain improved  CUMULATIVE HOSPITAL COURSE: Ms. Issac Robbins is a 80-year-old female with past medical history of hypothyroidism, hypertension, hyperlipidemia, status post implant removal of both breast in 2020  . Patient presented to the emergency room on date of admission with complaint of pain under her right shoulder blade that started out as an ache. It progressed to a more intense severe pain a 10 out of 10. Is radiating down her arm and she was concerned it might be cardiac. Never had anything like this previously. It is worse with movement. She had no dyspnea, diff swallowing or n/v.  Electrolytes normal except for small elevation in creatinine BUN is normal.  Glucose 152 troponin negative liver functions normal except for mildly elevated alk phos. White count 11.4 hemoglobin 14.8 hematocrit 45.4. CTA no PE noted she does have an enhancing solid lesion in the left lower lobe measurement of 3.2 x 4.3 x 3.7 consistent with neoplasm she also has subcarinal lymph nodes. Patient has an asymmetrical mucosal thickening in the distal esophagus with masslike abnormality in the left lateral aspect measuring 2.4 to 1.8 cm mass is suspected she also appears to have metastatic disease in the liver. Patient has no prior history of cancer she is a former smoker but had a approximately 30-pack-year history has not smoked since mid [de-identified]. Review of Systems   Constitutional: Negative. HENT: Negative. Eyes: Negative. Respiratory: Negative. Cardiovascular: Negative. Gastrointestinal: Negative. Endocrine: Negative. Musculoskeletal: Negative. Allergic/Immunologic: Negative. Neurological: Negative. Hematological: Negative. Psychiatric/Behavioral: Negative. Objective:   VITALS:  BP (!) 169/68   Pulse 70   Temp 99 °F (37.2 °C) (Temporal)   Resp 18   Ht 5' (1.524 m)   Wt 128 lb 3.2 oz (58.2 kg)   SpO2 99%   BMI 25.04 kg/m²   24HR INTAKE/OUTPUT:  No intake or output data in the 24 hours ending 01/31/23 1354        Physical Exam  Vitals and nursing note reviewed. HENT:      Head: Normocephalic and atraumatic. Nose: Nose normal.      Mouth/Throat:      Mouth: Mucous membranes are moist.   Eyes:      Extraocular Movements: Extraocular movements intact. Cardiovascular:      Rate and Rhythm: Normal rate and regular rhythm. Pulses: Normal pulses. Heart sounds: Normal heart sounds. Pulmonary:      Effort: Pulmonary effort is normal.      Breath sounds: Normal breath sounds. Abdominal:      General: Bowel sounds are normal.      Palpations: Abdomen is soft. Musculoskeletal:         General: Normal range of motion. Cervical back: Neck supple. Skin:     General: Skin is warm and dry. Neurological:      General: No focal deficit present. Mental Status: She is alert.    Psychiatric:         Mood and Affect: Mood normal.          Medications:      sodium chloride        budesonide  0.5 mg Nebulization Q12H    arformoterol tartrate  15 mcg Nebulization BID    thyroid  60 mg Oral QAM AC    vitamin D  5,000 Units Oral Daily    atorvastatin  10 mg Oral Nightly    losartan  100 mg Oral Daily    oyster shell calcium w/D  1 tablet Oral BID    sodium chloride flush  5-40 mL IntraVENous 2 times per day    enoxaparin  40 mg SubCUTAneous Q24H     albuterol, sodium chloride flush, sodium chloride, ondansetron **OR** ondansetron, acetaminophen **OR** acetaminophen, potassium chloride **OR** potassium alternative oral replacement **OR** potassium chloride, magnesium sulfate, polyethylene glycol, melatonin, calcium carbonate, oxyCODONE-acetaminophen, HYDROmorphone  Diet NPO Exceptions are: Sips of Water with Meds, Ice Chips     Lab and other Data:     Recent Labs     01/30/23 1850   WBC 11.4*   HGB 14.8        Recent Labs     01/30/23 1850      K 4.3      CO2 27   BUN 22   CREATININE 1.0*   GLUCOSE 152*     Recent Labs     01/30/23 1850   AST 21   ALT 13   BILITOT <0.2   ALKPHOS 131*     Troponin T:   Recent Labs     01/30/23 1850   TROPONINI <0.01     Pro-BNP: No results for input(s): BNP in the last 72 hours. INR:   Recent Labs     01/30/23 1850   INR 0.96     UA:No results for input(s): NITRITE, COLORU, PHUR, LABCAST, WBCUA, RBCUA, MUCUS, TRICHOMONAS, YEAST, BACTERIA, CLARITYU, SPECGRAV, LEUKOCYTESUR, UROBILINOGEN, BILIRUBINUR, BLOODU, GLUCOSEU, AMORPHOUS in the last 72 hours. Invalid input(s): Romy Diver  A1C: No results for input(s): LABA1C in the last 72 hours. ABG:No results for input(s): PHART, NVZ0ZQZ, PO2ART, AIZ2WEZ, BEART, HGBAE, P8SUENIN, CARBOXHGBART in the last 72 hours. RAD:   XR CHEST PORTABLE    Result Date: 1/30/2023  NO PRIOR REPORT AVAILABLE Exam: X-RAY OF [facility] Clinical data:Chest pain. Technique:Single frontal portable view of the chest. Prior studies: No prior studies submitted. Findings: The lungs are grossly clear; noevidence of acute infiltrate or pleural effusion. Cardiac silhouette is within normal limits. No acute osseous abnormality is detected. Unremarkable chest x-ray. Recommendation: Follow up as clinically indicated. Dictated and Electronically Signed by Tiara Quezada MD, MQSA CERTIFIED at [id number] 08:53:53 PM EST             CTA PULMONARY W CONTRAST    Result Date: 1/30/2023  NO PRIOR REPORT AVAILABLE She Exam: CTA OF THE CHEST WITH CONTRAST Clinical data: Severe right back pain and pain between shoulder blades.  Technique: Axial CT angiography images through the lungs were acquired with contrast and imaged using soft tissue and lung algorithms. Coronal, sagittal, and 3D volume reconstructions were performed. Reformatted/3D-MPR images were performed. Radiation dose: CTDIvol =25.84 mGy, DLP =407 mGy x cm. Contrast Used:  Optiray-350. Prior studies: Radiographs of the chest done on same day. Findings: Lungs: Enhancing solid lesion posteriorly within the left lower lobe. Measures 3.2 x 4.3 x 3.7 cm. No pulmonary infiltrate identified. No pleural effusions identified. No soft tissue pulmonary nodules. Increase in bronchopulmonary markings. Peribronchial cuffing. No pneumothorax. The airway is clear. Soft Tissues: A prominent subcarinal lymph node measures 1.3 x 1.2 cm. Otherwise, no enlarged mediastinal, axillary or supraclavicular adenopathy isidentified. Vascular: No filling defect within the pulmonary arteries to the segmental branch level. Unremarkable aorta. Grossly unremarkable sized heart. No definite abnormality seen on 3D reformatted images. Bony structures: No acute or destructive abnormality. Degenerative changes of the spine. No lytic or blastic lesion. Upper Abdomen: Small hiatal hernia with asymmetric an irregular mucosal thickening of the distal esophagus. Esophageal mucosal mass is suspected in the distal left aspect measuring 2.4 x 1.8 cm. Image number 98 on series number 4. Diffuse metastatic disease suspected to the liver. 1. No acute pulmonary embolism. 2. Enhancing solid lesion posteriorly within the left lower lobe. Measures 3.2 x 4.3 x 3.7 cm and is most consistent with lung neoplasm. Metastatic subcarinal lymph node is suspected. 3. Asymmetric mucosal thickening of the distal esophagus with masslike abnormality in the left lateral aspect measuring 2.4 x 1.8 cm. An esophageal mass is suspected. Metastatic disease here is not excluded. 4. Diffuse metastatic disease of the liver. 5. No lytic or blastic lesion. Recommendation: Follow up as clinically indicated. All CT scans at this facility utilize dose modulation, iterative reconstruction, and/or weight based dosing when appropriate to reduce radiation dose to as low as reasonably achievable. Dictated and Electronically Signed by Prince Neto NIETO, [facility] CERTIFIED at [id number] 10:32:29 PM EST             EGD    Result Date: 1/31/2023  No dictation               Assessment/Plan   Principal Problem: Mass of mediastinum   Consult Oncology   Consult palliative   Consult pulm-[place] scheduled   Consult GI-Endo scheduled today   Pain control  Active Problems:    Palliative care patient    Essential hypertension   Cont home meds   Moniter for need to adjust    Acquired hypothyroidism   Cont same dose    Mixed hyperlipidemia   Cont lipitor  Resolved Problems:    * No resolved hospital problems. *      DVT Prophylaxis: lovenox       Discharge planning: TBD       Further Orders per Clinical course/attending. Electronically signed by CHRIS Rivas CNP on 1/31/2023 at 1:54 PM       EMR Dragon/Transcription disclaimer:   Much of this encounter note is an electronic transcription/translation of spoken language to printed text.  The electronic translation of spoken language may permit erroneous, or at times, nonsensical words or phrases to be inadvertently transcribed; although attempts have made to review the note for such errors, some may still exist.

## 2023-01-31 NOTE — ED PROVIDER NOTES
[facility] EMERGENCY DEPT  eMERGENCY dEPARTMENT eNCOUnter      Pt Name: Fela Bashir  MRN: [id number]  [place] 1950  Date of evaluation: 1/30/2023  Provider: Chelsea Lemon       Chief Complaint   Patient presents with    Chest Pain         HISTORY OF PRESENT ILLNESS   (Location/Symptom, Timing/Onset,Context/Setting, Quality, Duration, Modifying Factors, Severity)  Note limiting factors. Fela Bashir is a 67 y.o. female with history including hypertension, hypothyroidism, alopecia, hyperlipidemia, and CKD who presents to the emergency department with complaint of back pain. The patient explains that she has pain between her shoulder blades as well as in the right posterior near the shoulder blade. She denies any associated shortness of breath. She states that she first noticed the pain about 2 to 3 days ago. It was mild at that time but has significantly worsened. She denies any reproducible pain with palpation but does state that certain movements will exacerbate the pain. She denies any active chest pain. She denies headache or dizziness. NursingNotes were reviewed. REVIEW OF SYSTEMS    (2-9 systems for level 4, 10 or more for level 5)     Review of Systems   Constitutional:  Negative for chills and fever. Respiratory:  Negative for shortness of breath. Cardiovascular:  Negative for chest pain. Gastrointestinal:  Negative for abdominal pain, nausea and vomiting. Musculoskeletal:  Positive for back pain. Neurological:  Negative for dizziness and headaches. All other systems reviewed and are negative. PAST MEDICALHISTORY     Past Medical History:   Diagnosis Date    Acquired hypothyroidism 08/20/2017    Alopecia 08/21/2017    Breast implant capsular contracture     \"with cysts behind right breast\" per pt.      Essential hypertension 08/20/2017    Familial hypercholesterolemia 08/20/2017    PONV (postoperative nausea and vomiting)     Prolonged emergence from general anesthesia     Tobacco abuse, in remission          SURGICAL HISTORY       Past Surgical History:   Procedure Laterality Date    BREAST CYST EXCISION  2022    BREAST ENHANCEMENT SURGERY Bilateral 06/11/2020    REMOVAL BILATERAL CONTRACTURED IMPLANTS WITH BILATERAL PECTORAL BLOCK performed by Liana Vo MD at [address] Bilateral 2008    bunion correct osteotomy smith double-stem lesser MTP Impl    COLONOSCOPY  2017    HEMORRHOID SURGERY  2005    ~2005    HYSTERECTOMY (CERVIX STATUS UNKNOWN)  1980    KNEE SURGERY Right 2007    RECTAL SURGERY  2000    anal fissurectomy with sphincterotomy, ~2000    MAICOL AND BSO (CERVIX REMOVED) Bilateral 1980    both ovaries removed, had surgery as a result of endometriosis    TUBAL LIGATION  1975    ~1975         CURRENT MEDICATIONS     Previous Medications    ALBUTEROL SULFATE HFA (PROVENTIL HFA) 108 (90 BASE) MCG/ACT INHALER    Inhale 2 puffs into the lungs every 6 hours as needed for Wheezing    CALCIUM CARBONATE-VITAMIN D (CALCIUM 600+D) 600-200 MG-UNIT TABS    Take 1 tablet by mouth 2 times daily    ESTRADIOL (ESTRACE) 0.5 MG TABLET    TAKE 1 TABLET EVERY DAY    FLUTICASONE-SALMETEROL (ADVAIR DISKUS) 500-50 MCG/DOSE DISKUS INHALER    Inhale 1 puff into the lungs daily as needed     LOSARTAN (COZAAR) 100 MG TABLET    TAKE 1 TABLET EVERY DAY    LOVASTATIN (MEVACOR) 40 MG TABLET    TAKE 1 TABLET EVERY NIGHT    OMEGA 3 1200 MG CAPS    Take by mouth 2 times daily    THYROID (ARMOUR) 60 MG TABLET    Take 1 tablet by mouth daily Compound from strawberry Enderlin   t-4-60/t3-12    VITAMIN D (ERGOCALCIFEROL) 1.25 MG (08155 UT) CAPS CAPSULE    TAKE 1 CAPSULE BY MOUTH TWICE A WEEK       ALLERGIES     Compazine [prochlorperazine maleate] and Thorazine [chlorpromazine]    FAMILY HISTORY       Family History   Problem Relation Age of Onset    High Blood Pressure Mother     Diabetes Mother         Type 2    Obesity Mother     High Blood Pressure Father Diabetes Sister         Type 2    Obesity Sister     Diabetes Sister     Obesity Sister     Diabetes Sister     Obesity Sister     Diabetes Brother         Type 2    Cancer Brother         Blood form of cancer-not leukemia     Obesity Brother           SOCIAL HISTORY       Social History     Socioeconomic History    Marital status:      Spouse name: jammie kaiser    Number of children: 2    Years of education: 14    Highest education level: None   Occupational History     Employer: RETIRED   Tobacco Use    Smoking status: Former     Packs/day: 2.00     Years: 15.00     Pack years: 30.00     Types: Cigarettes     Start date:      Quit date:      Years since quittin.1    Smokeless tobacco: Never   Vaping Use    Vaping Use: Never used   Substance and Sexual Activity    Alcohol use: Yes     Comment: rare social occasions    Drug use: Never    Sexual activity: Yes     Partners: Male     Social Determinants of Health     Financial Resource Strain: Low Risk     Difficulty of Paying Living Expenses: Not hard at all   Food Insecurity: No Food Insecurity    Worried About [address] in the Last Year: Never true    [facility] [employer] in the Last Year: Never true       SCREENINGS    [place] Coma Scale  Eye Opening: Spontaneous  Best Verbal Response: Oriented  Best Motor Response: Obeys commands  [place] Coma Scale Score: 15        PHYSICAL EXAM    (up to 7 for level 4, 8 or more for level 5)     ED Triage Vitals [23 1845]   BP Temp Temp Source Heart Rate Resp SpO2 Height Weight   (!) 206/93 97.6 °F (36.4 °C) Oral 97 23 98 % 5' (1.524 m) 130 lb (59 kg)       Physical Exam  Vitals and nursing note reviewed. Constitutional:       General: She is not in acute distress. Appearance: Normal appearance. She is normal weight. She is not ill-appearing, toxic-appearing or diaphoretic. Comments: Appears uncomfortable   HENT:      Head: Normocephalic and atraumatic.    Cardiovascular:      Rate and Rhythm: Normal rate and regular rhythm. Pulses: Normal pulses. Pulmonary:      Effort: Pulmonary effort is normal. No respiratory distress. Comments: Breath sounds decreased in lower lungs, winces with breathing  Chest:      Chest wall: No tenderness. Abdominal:      General: There is no distension. Palpations: Abdomen is soft. Tenderness: There is no abdominal tenderness. Musculoskeletal:      Cervical back: Normal range of motion and neck supple. No swelling, deformity, rigidity, tenderness or bony tenderness. No pain with movement. Normal range of motion. Thoracic back: No swelling, deformity, tenderness or bony tenderness. Normal range of motion. Lumbar back: No swelling, deformity, tenderness or bony tenderness. Normal range of motion. Comments: Extremities non-tender, NV intact   Lymphadenopathy:      Cervical: No cervical adenopathy. Skin:     General: Skin is warm and dry. Neurological:      General: No focal deficit present. Mental Status: She is alert and oriented to person, place, and time. DIAGNOSTIC RESULTS     EKG: All EKG's areinterpreted by the Emergency Department Physician who either signs or Co-signs this chart in the absence of a cardiologist.    EKG interpreted by attending, sinus rhythm at a rate of 92, , QTc 464, no STEMI or acute ischemia    RADIOLOGY:  Non-plain film images such as CT, Ultrasound and MRI are read by the radiologist. Plain radiographic images are visualized and preliminarily interpreted bythe emergency physician with the below findings:    CTA PULMONARY W CONTRAST   Final Result   1. No acute pulmonary embolism. 2. Enhancing solid lesion posteriorly within the left lower lobe. Measures 3.2 x 4.3 x 3.7 cm and is most consistent with lung neoplasm. Metastatic subcarinal lymph node is suspected.    3. Asymmetric mucosal thickening of the distal esophagus with masslike abnormality in the left lateral aspect measuring 2.4 x 1.8 cm. An esophageal mass is suspected. Metastatic disease here is not excluded. 4. Diffuse metastatic disease of the liver. 5. No lytic or blastic lesion. Recommendation: Follow up as clinically indicated. All CT scans at this facility utilize dose modulation, iterative reconstruction, and/or weight based dosing when appropriate to reduce radiation dose to as low as reasonably achievable. Dictated and Electronically Signed by Radha Nguyễn MD, MQSA CERTIFIED at [id number] 10:32:29 PM EST               XR CHEST PORTABLE   Final Result   Unremarkable chest x-ray. Recommendation: Follow up as clinically indicated. Dictated and Electronically Signed by Radha Nguyễn MD MQSA CERTIFIED at [id number] 08:53:53 PM EST                       LABS:  Labs Reviewed   COMPREHENSIVE METABOLIC PANEL - Abnormal; Notable for the following components:       Result Value    Glucose 152 (*)     Creatinine 1.0 (*)     Est, Glom Filt Rate 60 (*)     Alkaline Phosphatase 131 (*)     All other components within normal limits   CBC WITH AUTO DIFFERENTIAL - Abnormal; Notable for the following components:    WBC 11.4 (*)     MCHC 32.6 (*)     All other components within normal limits   COVID-19, RAPID   TROPONIN   PROTIME-INR   APTT       All other labs were within normal range or not returned as of this dictation. EMERGENCY DEPARTMENT COURSE and DIFFERENTIAL DIAGNOSIS/MDM:   Vitals:    Vitals:    01/30/23 2129 01/30/23 2159 01/30/23 2229 01/30/23 2259   BP: (!) 163/66 (!) 151/70 (!) 188/78 (!) 156/65   Pulse: 76 75 92 80   Resp:       Temp:       TempSrc:       SpO2: 100% 98% 100% 98%   Weight:       Height:           MDM  Patient is a 77-year-old female presents to the ER with complaint of severe pain between the shoulder blades into the right. EKG did not show STEMI. She had a negative troponin. She does not have active chest pain so will not continue trending in the ER.   Coags normal.  CBC did show mild leukocytosis without anemia. CMP was negative for electrolyte disturbance, AURELIANO, or LFT elevation. Chest x-ray was unremarkable. Due to her severe pain and pleuritic pain, CTA of the chest was obtained which did show concerning finding of a mass in the left lung as well as a mass in the lower esophageal region. I suspect this is the cause of her back pain. I have discussed these findings with her in detail. I also discussed the case with hospitalist, Dr. Horacio Navarro. He has agreed to admission for oncology consultation. Patient's vital signs have otherwise remained stable in the ER. We gave her medication and her pain was able to be controlled in the ER setting    CONSULTS:  Dr Horacio Navarro, Hospitalist    FINAL IMPRESSION      1. Lung mass    2.  Esophageal mass          DISPOSITION/PLAN   DISPOSITION Admitted 01/30/2023 10:51:35 PM        (Please note that portions of this note were completed with a voice recognition program.  Efforts were made to edit thedictations but occasionally words are mis-transcribed.)    HIRAL Shah (electronically signed)     Amy Shah  01/31/23 0010

## 2023-01-31 NOTE — PROCEDURES
Endoscopic Procedure Note    Patient: Abiel Jamshid: 1950  [facility] Rec#: [id number] Acc#: [id number]     Primary Care Provider CHRIS Gallardo    Referring Provider: Francisco Farfan, nurse practitioner. Endoscopist: Jon Swan MD    Date of Procedure: 1/31/2023    Pre-Op Diagnosis: Rule out esophageal mass. Post-Op Diagnosis: Normal upper GI endoscopy examination. Procedure(s):  EGD BIOPSY      Indications:    Abnormal imaging study suggesting esophageal mass. Anesthesia:  Sedation was administered by anesthesia who monitored the patient during the procedure. Estimated Blood Loss: none    Procedure:   After reviewing the patient's chart and obtaining informed consent, the patient was placed in the left lateral decubitus position. A forward-viewing Olympus endoscope was lubricated and inserted through the mouth into the oropharynx. Under direct visualization, the upper esophagus was intubated. The scope was advanced to the level of the second portion of duodenum without any difficulty. Scope was slowly withdrawn with careful inspection of the mucosal surfaces. The scope was retroflexed for inspection of the gastric fundus and incisura. The patient tolerated the procedure well. The scope was removed. Findings:     Esophagus: The esophageal mucosa was normal.  There was no evidence of inflammation, ulceration or any masses. No evidence of any Miranda's esophagus, hiatal hernia or stricture. No esophageal varices are noted. The scope was passed without difficulty in the stomach. Stomach: The gastric mucosa in the lower body and the antrum appears to be normal and was free of inflammation, ulceration or any masses. No evidence of any gastric outlet obstruction. Retroflexion was done. The gastric fundus cardia and the upper part of gastric body was normal.  There was no evidence of any hiatal hernia or gastric varices.       Duodenum: Duodenal bulb, first and second portion was normal.  There was no evidence of any ulceration, inflammation or any masses. The scope was slowly withdrawn. Multiple pictures taken. Specimens: None. Complications: None    Impression:  Normal upper GI endoscopy examination. Recommendations:    1. I will resume patient's diet. 2.  Proceed with a liver biopsy for tomorrow. 3.  I will follow patient up on as-needed basis.       Robert Arriaga MD  1/31/2023, 2:41 PM

## 2023-02-01 ENCOUNTER — APPOINTMENT (OUTPATIENT)
Dept: CT IMAGING | Age: 73
DRG: 181 | End: 2023-02-01
Payer: MEDICARE

## 2023-02-01 LAB
ANION GAP SERPL CALCULATED.3IONS-SCNC: 8 MMOL/L (ref 7–19)
BASOPHILS ABSOLUTE: 0.1 K/UL (ref 0–0.2)
BASOPHILS RELATIVE PERCENT: 0.6 % (ref 0–1)
BUN BLDV-MCNC: 23 MG/DL (ref 8–23)
CALCIUM SERPL-MCNC: 10.1 MG/DL (ref 8.8–10.2)
CHLORIDE BLD-SCNC: 103 MMOL/L (ref 98–111)
CO2: 29 MMOL/L (ref 22–29)
CREAT SERPL-MCNC: 1.2 MG/DL (ref 0.5–0.9)
EKG P AXIS: 13 DEGREES
EKG P-R INTERVAL: 136 MS
EKG Q-T INTERVAL: 336 MS
EKG QRS DURATION: 94 MS
EKG QTC CALCULATION (BAZETT): 395 MS
EKG T AXIS: 75 DEGREES
EOSINOPHILS ABSOLUTE: 0.3 K/UL (ref 0–0.6)
EOSINOPHILS RELATIVE PERCENT: 3 % (ref 0–5)
GFR SERPL CREATININE-BSD FRML MDRD: 48 ML/MIN/{1.73_M2}
GLUCOSE BLD-MCNC: 108 MG/DL (ref 74–109)
HCT VFR BLD CALC: 39.3 % (ref 37–47)
HEMOGLOBIN: 12.9 G/DL (ref 12–16)
IMMATURE GRANULOCYTES #: 0 K/UL
LYMPHOCYTES ABSOLUTE: 2.7 K/UL (ref 1.1–4.5)
LYMPHOCYTES RELATIVE PERCENT: 29.6 % (ref 20–40)
MCH RBC QN AUTO: 30.9 PG (ref 27–31)
MCHC RBC AUTO-ENTMCNC: 32.8 G/DL (ref 33–37)
MCV RBC AUTO: 94 FL (ref 81–99)
MONOCYTES ABSOLUTE: 0.7 K/UL (ref 0–0.9)
MONOCYTES RELATIVE PERCENT: 8.1 % (ref 0–10)
NEUTROPHILS ABSOLUTE: 5.3 K/UL (ref 1.5–7.5)
NEUTROPHILS RELATIVE PERCENT: 58.6 % (ref 50–65)
PDW BLD-RTO: 12 % (ref 11.5–14.5)
PLATELET # BLD: 276 K/UL (ref 130–400)
PMV BLD AUTO: 9.4 FL (ref 9.4–12.3)
POTASSIUM REFLEX MAGNESIUM: 5.1 MMOL/L (ref 3.5–5)
RBC # BLD: 4.18 M/UL (ref 4.2–5.4)
SODIUM BLD-SCNC: 140 MMOL/L (ref 136–145)
WBC # BLD: 9 K/UL (ref 4.8–10.8)

## 2023-02-01 PROCEDURE — 6370000000 HC RX 637 (ALT 250 FOR IP): Performed by: INTERNAL MEDICINE

## 2023-02-01 PROCEDURE — 6360000002 HC RX W HCPCS: Performed by: RADIOLOGY

## 2023-02-01 PROCEDURE — 85025 COMPLETE CBC W/AUTO DIFF WBC: CPT

## 2023-02-01 PROCEDURE — 94760 N-INVAS EAR/PLS OXIMETRY 1: CPT

## 2023-02-01 PROCEDURE — 88334 PATH CONSLTJ SURG CYTO XM EA: CPT

## 2023-02-01 PROCEDURE — 6360000002 HC RX W HCPCS: Performed by: INTERNAL MEDICINE

## 2023-02-01 PROCEDURE — 94761 N-INVAS EAR/PLS OXIMETRY MLT: CPT

## 2023-02-01 PROCEDURE — 2700000000 HC OXYGEN THERAPY PER DAY

## 2023-02-01 PROCEDURE — 6370000000 HC RX 637 (ALT 250 FOR IP)

## 2023-02-01 PROCEDURE — 88342 IMHCHEM/IMCYTCHM 1ST ANTB: CPT

## 2023-02-01 PROCEDURE — 94640 AIRWAY INHALATION TREATMENT: CPT

## 2023-02-01 PROCEDURE — 88307 TISSUE EXAM BY PATHOLOGIST: CPT

## 2023-02-01 PROCEDURE — 2709999900 CT GUIDED NEEDLE PLACEMENT

## 2023-02-01 PROCEDURE — 88341 IMHCHEM/IMCYTCHM EA ADD ANTB: CPT

## 2023-02-01 PROCEDURE — 2580000003 HC RX 258: Performed by: INTERNAL MEDICINE

## 2023-02-01 PROCEDURE — 80048 BASIC METABOLIC PNL TOTAL CA: CPT

## 2023-02-01 PROCEDURE — 93010 ELECTROCARDIOGRAM REPORT: CPT | Performed by: INTERNAL MEDICINE

## 2023-02-01 PROCEDURE — 99232 SBSQ HOSP IP/OBS MODERATE 35: CPT

## 2023-02-01 PROCEDURE — 88333 PATH CONSLTJ SURG CYTO XM 1: CPT

## 2023-02-01 PROCEDURE — 0FB13ZX EXCISION OF RIGHT LOBE LIVER, PERCUTANEOUS APPROACH, DIAGNOSTIC: ICD-10-PCS | Performed by: RADIOLOGY

## 2023-02-01 PROCEDURE — 36415 COLL VENOUS BLD VENIPUNCTURE: CPT

## 2023-02-01 PROCEDURE — 1210000000 HC MED SURG R&B

## 2023-02-01 RX ORDER — MIDAZOLAM HYDROCHLORIDE 2 MG/2ML
INJECTION, SOLUTION INTRAMUSCULAR; INTRAVENOUS
Status: COMPLETED | OUTPATIENT
Start: 2023-02-01 | End: 2023-02-01

## 2023-02-01 RX ORDER — POLYETHYLENE GLYCOL 3350 17 G/17G
17 POWDER, FOR SOLUTION ORAL DAILY
Status: DISCONTINUED | OUTPATIENT
Start: 2023-02-01 | End: 2023-02-02 | Stop reason: HOSPADM

## 2023-02-01 RX ORDER — FENTANYL CITRATE 50 UG/ML
INJECTION, SOLUTION INTRAMUSCULAR; INTRAVENOUS
Status: COMPLETED | OUTPATIENT
Start: 2023-02-01 | End: 2023-02-01

## 2023-02-01 RX ORDER — MIDAZOLAM HYDROCHLORIDE 1 MG/ML
INJECTION INTRAMUSCULAR; INTRAVENOUS
Status: DISPENSED
Start: 2023-02-01 | End: 2023-02-01

## 2023-02-01 RX ORDER — FENTANYL CITRATE 50 UG/ML
INJECTION, SOLUTION INTRAMUSCULAR; INTRAVENOUS
Status: DISPENSED
Start: 2023-02-01 | End: 2023-02-01

## 2023-02-01 RX ADMIN — Medication 1 TABLET: at 20:41

## 2023-02-01 RX ADMIN — ATORVASTATIN CALCIUM 10 MG: 10 TABLET, FILM COATED ORAL at 20:41

## 2023-02-01 RX ADMIN — FENTANYL CITRATE 50 MCG: 50 INJECTION, SOLUTION INTRAMUSCULAR; INTRAVENOUS at 10:03

## 2023-02-01 RX ADMIN — OXYCODONE HYDROCHLORIDE AND ACETAMINOPHEN 1 TABLET: 5; 325 TABLET ORAL at 08:38

## 2023-02-01 RX ADMIN — BUDESONIDE 500 MCG: 0.5 SUSPENSION RESPIRATORY (INHALATION) at 18:49

## 2023-02-01 RX ADMIN — BUDESONIDE 500 MCG: 0.5 SUSPENSION RESPIRATORY (INHALATION) at 06:13

## 2023-02-01 RX ADMIN — OXYCODONE HYDROCHLORIDE AND ACETAMINOPHEN 1 TABLET: 5; 325 TABLET ORAL at 22:10

## 2023-02-01 RX ADMIN — POLYETHYLENE GLYCOL 3350 17 G: 17 POWDER, FOR SOLUTION ORAL at 16:40

## 2023-02-01 RX ADMIN — ENOXAPARIN SODIUM 40 MG: 100 INJECTION SUBCUTANEOUS at 22:11

## 2023-02-01 RX ADMIN — SODIUM CHLORIDE, PRESERVATIVE FREE 10 ML: 5 INJECTION INTRAVENOUS at 20:41

## 2023-02-01 RX ADMIN — ARFORMOTEROL TARTRATE 15 MCG: 15 SOLUTION RESPIRATORY (INHALATION) at 06:12

## 2023-02-01 RX ADMIN — OXYCODONE HYDROCHLORIDE AND ACETAMINOPHEN 1 TABLET: 5; 325 TABLET ORAL at 16:39

## 2023-02-01 RX ADMIN — ARFORMOTEROL TARTRATE 15 MCG: 15 SOLUTION RESPIRATORY (INHALATION) at 18:49

## 2023-02-01 RX ADMIN — MIDAZOLAM HYDROCHLORIDE 1 MG: 1 INJECTION, SOLUTION INTRAMUSCULAR; INTRAVENOUS at 10:03

## 2023-02-01 ASSESSMENT — ENCOUNTER SYMPTOMS
RESPIRATORY NEGATIVE: 1
GASTROINTESTINAL NEGATIVE: 1
ALLERGIC/IMMUNOLOGIC NEGATIVE: 1
EYES NEGATIVE: 1

## 2023-02-01 ASSESSMENT — PAIN SCALES - GENERAL
PAINLEVEL_OUTOF10: 9
PAINLEVEL_OUTOF10: 5

## 2023-02-01 ASSESSMENT — PAIN DESCRIPTION - DESCRIPTORS: DESCRIPTORS: ACHING

## 2023-02-01 ASSESSMENT — PAIN DESCRIPTION - ORIENTATION: ORIENTATION: RIGHT

## 2023-02-01 NOTE — PROGRESS NOTES
MEDICAL ONCOLOGY PROGRESS NOTE     Pt Name: Barry Mccauley  MRN: [id number]  YOB: 1950  Date of evaluation: 2/1/2023    Subjective-No new complaints this am. Had upper EGD yesterday. Discussed with pulmonary. Awaiting Ct guided biopsy    HISTORY OF PRESENT ILLNESS:   Katia Castaneda was first seen by me on 1/31/2023. I was consulted for abnormal CT scan findings of a lung mass, mediastinal adenopathy and a lower esophageal mass. She presented to the ER department on 1/30/2023 at [facility] with complaints of back pain. She has a history of hypertension, hypothyroidism, obesity, hyperlipidemia and chronic kidney disease. The pain started about 2-3 days before she comes to the hospital.  Denies any shortness of breath. Denies any fever or chills. Imaging studies were performed emergency. 1/30/2023-CTA chest showed no evidence of pulmonary embolism. Lungs: Enhancing solid lesion posteriorly within the left lower lobe. Measures 3.2 x 4.3 x 3.7 cm. Subcarinal lymph node measures 1.3 x 1.2 cm. Asymmetric an irregular mucosal thickening of the distal esophagus. Esophageal mucosal mass is suspected in the distal left aspect measuring 2.4 x 1.8 cm. Diffuse metastatic disease suspected to the liver. I was consulted for the above findings. 1/31/2023-she was seen by GI at [facility]. Endoscopy showed no evidence of esophageal lesion. 1/31/2023-CT abdomen/pelvis with oral/IV contrast showed multiple liver lesions with the largest lesion measured 2.5 cm and 1.9 cm in the right hepatic lobe. Past Medical History:    Past Medical History:   Diagnosis Date    Acquired hypothyroidism 08/20/2017    Alopecia 08/21/2017    Breast implant capsular contracture     \"with cysts behind right breast\" per pt.      Essential hypertension 08/20/2017    Familial hypercholesterolemia 08/20/2017    PONV (postoperative nausea and vomiting)     Prolonged emergence from general anesthesia     Stage 1 chronic kidney disease 6/29/2021    Tobacco abuse, in remission        Past Surgical History:    Past Surgical History:   Procedure Laterality Date    BREAST CYST EXCISION  2022    BREAST ENHANCEMENT SURGERY Bilateral 06/11/2020    REMOVAL BILATERAL CONTRACTURED IMPLANTS WITH BILATERAL PECTORAL BLOCK performed by Tenzin Jiménez MD at [address] Bilateral 2008    bunion correct osteotomy smith double-stem lesser MTP Impl    COLONOSCOPY  2017    HEMORRHOID SURGERY  2005    ~2005    HYSTERECTOMY (CERVIX STATUS UNKNOWN)  1980    KNEE SURGERY Right 2007    RECTAL SURGERY  2000    anal fissurectomy with sphincterotomy, ~2000    MAICOL AND BSO (CERVIX REMOVED) Bilateral 1980    both ovaries removed, had surgery as a result of endometriosis    TUBAL LIGATION  1975    ~1975    UPPER GASTROINTESTINAL ENDOSCOPY N/A 1/31/2023    EGD BIOPSY performed by Radha Cuevas MD at [facility]       Social History:    Marital status:  Smoking status:Former smoker, quit 1988  ETOH status: Yes  Resides:[place], [place]    Family History:   Family History   Problem Relation Age of Onset    High Blood Pressure Mother     Diabetes Mother         Type 2    Obesity Mother     High Blood Pressure Father     Diabetes Sister         Type 2    Obesity Sister     Diabetes Sister     Obesity Sister     Diabetes Sister     Obesity Sister     Diabetes Brother         Type 2    Cancer Brother         Blood form of cancer-not leukemia     Obesity Brother     No Known Problems Son     No Known Problems Son        Current Hospital Medications:    Current Facility-Administered Medications   Medication Dose Route Frequency Provider Last Rate Last Admin    albuterol (PROVENTIL) nebulizer solution 2.5 mg  2.5 mg Nebulization Q4H PRN Radha Cuevas MD        budesonide (PULMICORT) nebulizer suspension 500 mcg  0.5 mg Nebulization Q12H Radha Cuevas MD   500 mcg at 02/01/23 0613    arformoterol tartrate (BROVANA) nebulizer solution 15 mcg  15 mcg Nebulization BID Corrine Sicard, MD   15 mcg at 02/01/23 7426    thyroid (ARMOUR) tablet 60 mg  60 mg Oral QAM AC Corrine Sicard, MD        atorvastatin (LIPITOR) tablet 10 mg  10 mg Oral Nightly Corrine Sicard, MD   10 mg at 01/31/23 2134    losartan (COZAAR) tablet 100 mg  100 mg Oral Daily Corrine Sicard, MD   100 mg at 01/31/23 0940    oyster shell calcium w/D 500-5 MG-MCG tablet 1 tablet  1 tablet Oral BID Corrine Sicard, MD   1 tablet at 01/31/23 2134    sodium chloride flush 0.9 % injection 5-40 mL  5-40 mL IntraVENous 2 times per day Corrine Sicard, MD   10 mL at 01/31/23 0900    sodium chloride flush 0.9 % injection 5-40 mL  5-40 mL IntraVENous PRN Corrine Sicard, MD        0.9 % sodium chloride infusion   IntraVENous PRN Corrine Sicard, MD        ondansetron (ZOFRAN-ODT) disintegrating tablet 4 mg  4 mg Oral Q8H PRN Corrine Sicard, MD        Or    ondansetron [facility]) injection 4 mg  4 mg IntraVENous Q6H PRN Corrine Sicard, MD        acetaminophen (TYLENOL) tablet 650 mg  650 mg Oral Q4H PRN Corrine Sicard, MD        Or    acetaminophen (TYLENOL) suppository 650 mg  650 mg Rectal Q4H PRN Corrine Sicard, MD        potassium chloride (KLOR-CON M) extended release tablet 40 mEq  40 mEq Oral PRN Corrine Sicard, MD        Or    potassium bicarb-citric acid (EFFER-K) effervescent tablet 40 mEq  40 mEq Oral PRN Corrine Sicard, MD        Or    potassium chloride 10 mEq/100 mL IVPB (Peripheral Line)  10 mEq IntraVENous PRN Corrine Sicard, MD        magnesium sulfate 2000 mg in 50 mL IVPB premix  2,000 mg IntraVENous PRN Corrine Sicard, MD        polyethylene glycol (GLYCOLAX) packet 17 g  17 g Oral Daily PRN Corrine Sicard, MD        melatonin disintegrating tablet 5 mg  5 mg Oral Nightly PRN Corrine Sicard, MD        calcium carbonate (TUMS) chewable tablet 500 mg  500 mg Oral TID PRN Corrine Sicard, MD        oxyCODONE-acetaminophen (PERCOCET) 5-325 MG per tablet 1 tablet  1 tablet Oral Q4H PRN Radha Marley MD   1 tablet at 01/31/23 2219    HYDROmorphone HCl PF (DILAUDID) injection 0.5 mg  0.5 mg IntraVENous Q4H PRN Radha Marley MD   0.5 mg at 01/31/23 1038    ketorolac (TORADOL) injection 15 mg  15 mg IntraVENous Q6H PRN Radha Marley MD        [place] Welch ON 2/3/2023] vitamin D (CHOLECALCIFEROL) capsule 5,000 Units  5,000 Units Oral Weekly CHRIS Alvarenga - CNP        enoxaparin (LOVENOX) injection 40 mg  40 mg SubCUTAneous Q24H Radha Marley MD   40 mg at 01/31/23 0125       Allergies: Allergies   Allergen Reactions    Compazine [Prochlorperazine Maleate] Other (See Comments)     Eyelids flipped up and mouth stretched to the side. Thorazine [Chlorpromazine] Other (See Comments)     Eyelids flipped up and mouth stretched to the side        Objective   BP (!) 120/55   Pulse 63   Temp 97.3 °F (36.3 °C)   Resp 16   Ht 5' (1.524 m)   Wt 128 lb 3.2 oz (58.2 kg)   SpO2 96%   BMI 25.04 kg/m²     PHYSICAL EXAM:  CONSTITUTIONAL: Alert, appropriate, no acute distress  EYES: Non icteric, EOM intact, pupils equal round   ENT: Mucus membranes moist,external inspection of ears and nose are normal  NECK: Supple, no masses. No palpable thyroid mass  CHEST/LUNGS: CTA bilaterally, normal respiratory effort   CARDIOVASCULAR: RRR, no murmurs. No lower extremity edema  ABDOMEN: soft non-tender, active bowel sounds, no HSM. No palpable masses  EXTREMITIES: warm, full ROM in all 4 extremities, no focal weakness. SKIN: warm, dry with no rashes or lesions  LYMPH: No cervical, clavicular, axillary, or inguinal lymphadenopathy  NEUROLOGIC: follows commands, non focal   PSYCH: mood and affect appropriate.  Alert and oriented to time, place, person        LABORATORY RESULTS REVIEWED/ANALYZED BY ME:  Recent Labs     02/01/23  0223 01/30/23  1850   WBC 9.0 11.4*   HGB 12.9 14.8   HCT 39.3 45.4   MCV 94.0 95.0    326       Lab Results   Component Value Date     02/01/2023    K 5.1 (H) 02/01/2023     02/01/2023    CO2 29 02/01/2023    BUN 23 02/01/2023    CREATININE 1.2 (H) 02/01/2023    GLUCOSE 108 02/01/2023    CALCIUM 10.1 02/01/2023    PROT 8.0 01/30/2023    LABALBU 4.4 01/30/2023    BILITOT <0.2 01/30/2023    ALKPHOS 131 (H) 01/30/2023    AST 21 01/30/2023    ALT 13 01/30/2023    LABGLOM 48 (A) 02/01/2023    GFRAA >59 06/28/2022       Lab Results   Component Value Date    INR 0.96 01/30/2023    PROTIME 12.7 01/30/2023       RADIOLOGY STUDIES REPORT/REVIEWED AND INTERPRETED BY ME:  XR CHEST PORTABLE    Result Date: 1/30/2023  NO PRIOR REPORT AVAILABLE Exam: X-RAY OF [facility] Clinical data:Chest pain. Technique:Single frontal portable view of the chest. Prior studies: No prior studies submitted. Findings: The lungs are grossly clear; noevidence of acute infiltrate or pleural effusion. Cardiac silhouette is within normal limits. No acute osseous abnormality is detected. Unremarkable chest x-ray. Recommendation: Follow up as clinically indicated. Dictated and Electronically Signed by Lashaun Dan MD, SA CERTIFIED at [id number] 08:53:53 PM EST             CTA PULMONARY W CONTRAST    Result Date: 1/30/2023  NO PRIOR REPORT AVAILABLE She Exam: CTA OF THE CHEST WITH CONTRAST Clinical data: Severe right back pain and pain between shoulder blades. Technique: Axial CT angiography images through the lungs were acquired with contrast and imaged using soft tissue and lung algorithms. Coronal, sagittal, and 3D volume reconstructions were performed. Reformatted/3D-MPR images were performed. Radiation dose: CTDIvol =25.84 mGy, DLP =407 mGy x cm. Contrast Used:  Optiray-350. Prior studies: Radiographs of the chest done on same day. Findings: Lungs: Enhancing solid lesion posteriorly within the left lower lobe. Measures 3.2 x 4.3 x 3.7 cm. No pulmonary infiltrate identified. No pleural effusions identified. No soft tissue pulmonary nodules. Increase in bronchopulmonary markings. Peribronchial cuffing. No pneumothorax. The airway is clear. Soft Tissues: A prominent subcarinal lymph node measures 1.3 x 1.2 cm. Otherwise, no enlarged mediastinal, axillary or supraclavicular adenopathy isidentified. Vascular: No filling defect within the pulmonary arteries to the segmental branch level. Unremarkable aorta. Grossly unremarkable sized heart. No definite abnormality seen on 3D reformatted images. Bony structures: No acute or destructive abnormality. Degenerative changes of the spine. No lytic or blastic lesion. Upper Abdomen: Small hiatal hernia with asymmetric an irregular mucosal thickening of the distal esophagus. Esophageal mucosal mass is suspected in the distal left aspect measuring 2.4 x 1.8 cm. Image number 98 on series number 4. Diffuse metastatic disease suspected to the liver. 1. No acute pulmonary embolism. 2. Enhancing solid lesion posteriorly within the left lower lobe. Measures 3.2 x 4.3 x 3.7 cm and is most consistent with lung neoplasm. Metastatic subcarinal lymph node is suspected. 3. Asymmetric mucosal thickening of the distal esophagus with masslike abnormality in the left lateral aspect measuring 2.4 x 1.8 cm. An esophageal mass is suspected. Metastatic disease here is not excluded. 4. Diffuse metastatic disease of the liver. 5. No lytic or blastic lesion. Recommendation: Follow up as clinically indicated. All CT scans at this facility utilize dose modulation, iterative reconstruction, and/or weight based dosing when appropriate to reduce radiation dose to as low as reasonably achievable. Dictated and Electronically Signed by Betty Rondon MD, SA CERTIFIED at [id number] 10:32:29 PM EST                    My interpretation: Left lower lobe lung mass, multiple liver lesions highly concerning for metastatic disease. ASSESSMENT:  LLL lung mass with mediastinal adenopathy-likely lung malignancy. Very remote history of smoking. She quit 40 years ago.   Liver lesions-concern for metastatic disease. Paraesophageal mass versus adenopathy. This could represent periesophageal adenopathy. Upper endoscopy was normal.  Poor prognosis-likely malignancy with stage IV disease with possible liver metastasis. Inpatient consultation to palliative care  Neutrophil leukocytosis-mild. Continue to monitor        PLAN:  CT-guided biopsy to obtain tissue for diagnosis, CT-guided liver biopsy.   Discussed with pulmonary,         (Please note that portions of this note were completed with a voice recognition program. Efforts were made to edit the dictations but occasionally words are mis-transcribed.)        Barney Hickman MD    02/01/23  6:26 AM

## 2023-02-01 NOTE — PROGRESS NOTES
Physical Therapy    Eval attempted. Pt reports being up ad bella to bathroom without difficulty. Denies need for skilled therapy at this time. Encouraged to amb with nsg or family.     Electronically signed by Carlos Delgadillo PT on 2/1/2023 at 8:09 AM

## 2023-02-01 NOTE — PROGRESS NOTES
Beatrice Abdul, Palliative care nurse practitioner, asked this  to assist pt in completing an AD/LW. This  discussed decision makers and advance care planning decisions. Pt named her  Qing Payne as primary decision maker and her son Mae Schroeder as secondary. Pt also made advance care planning decisions. This  witnessed the pt initial and sign the document and then notarized it. Original and copies were given to pt, copy was placed in pt's soft chart, and a copy was emailed to Dali Gunter to be scanned into pt's EMR. Provided spiritual care with assistance with AD/LW, sustaining presence, nurtured hope, and prayer. Pt and  expressed gratitude for spiritual care.      Electronically signed by John Goldstein on 2/1/2023 at 11:40 AM

## 2023-02-01 NOTE — ACP (ADVANCE CARE PLANNING)
Advance Care Planning     Advance Care Planning (ACP) Physician/NP/PA (Provider) Conversation      Date of ACP Conversation: 2/1/2023    Conversation Conducted with: Competent patient and her  at [facility] 4:    Current Designated Health Care Decision Maker:     Primary Decision Maker: Levi Mario - Spouse - [phone]    Secondary Decision Maker: Rolly Good Child - [phone]      Care Preferences:  Ventilation: \"If you were in your present state of health and suddenly became very ill and were unable to breathe on your own, what would your preference be about the use of a ventilator (breathing machine) if it was available to you? \"    YES    \"If your health worsens and it becomes clear that your chance of recovery is unlikely, what would your preference be about the use of a ventilator (breathing machine) if it was available to you? \"   NO    Resuscitation:  \"CPR works best to restart the heart when there is a sudden event, like a heart attack, in someone who is otherwise healthy. Unfortunately, CPR does not typically restart the heart for people who have serious health conditions or who are very sick. \"    \"In the event your heart stopped as a result of an underlying serious health condition, would you want attempts to be made to restart your heart (answer \"yes\" for attempt to resuscitate) or would you prefer a natural death (answer \"no\" for do not attempt to resuscitate)? \"   YES    Conversation Outcomes / Follow-Up Plan:   Living will completed this stay and emergency contacts updates. Pt confirms her  is to remain her primary HCS and their son as secondary. Education provided on full code vs DNR status.   At this time patient wishes to remain a full code in the event of cardiac arrest or respiratory arrest/decline but would not want life-sustaining measures prolonged if it appeared there was no chance of recovery or quality of life    Length of Voluntary ACP Conversation in minutes:  13 minutes during follow up visit    Ashlie Caldwell, APRN - CNP

## 2023-02-01 NOTE — ADT AUTH CERT
Utilization Reviews       Pulmonary Disease GRG - Care Day 3 (2/1/2023) by Jeannette Sanchez RN       Review Status Review Entered   Completed 2/1/2023 1319       Created By   Jeannette Sanchez RN      Criteria Review      Care Day: 3 Care Date: 2/1/2023 Level of Care:    Guideline Day 2    Level Of Care    (X) Floor    2/1/2023 2:19 PM EST by Akbar Spear      med/surg    Clinical Status    ( ) * No ICU or intermediate care needs    (X) * No mechanical ventilation required    2/1/2023 2:19 PM EST by Akbar Spear      no mechanical ventilation    Interventions    (X) Inpatient interventions continue    2/1/2023 2:19 PM EST by Akbar Spear      regular diet  routine vitals q4hrs  strict i&o q8hrs  bedrest x4hrs, then up as tolerated  daily cbc, bmp  daily weights  up with assistance    * Milestone   Additional Notes   Ct guided needle biopsy: Percutaneous CT-guided biopsy of right lobe of the liver with total of 5 core biopsy is obtained. .Patient tolerated procedure well. Wbc 9.0, hgb 12.9, hct 39.3, creat 1.2, gfr 48, k 5.1         Bp: 139/57, pulse 68, reps 20, temp 97.3, o2 sat 96% on ra            Brovana nebs 15mcg bid   Lipitor 10mg po qhs   Pulmicort nebs 0.5mg q12hrs   Lovenox 40mg sub q daily   Cozaar 100mg po daily   Oyster shell calcium w/D 500-5 mg-mcg po bid   Glycolax 17grm po daily   [place] 60mg po qam      Percocet 5/325mg po q4hrs prn- has had 1 dose today               [employer]   Progress Note      CHIEF COMPLAINT Right scapula pain       SUBJECTIVE:  Pain improved   CUMULATIVE HOSPITAL COURSE: Ms. Issac Robbins is a 77-year-old female with past medical history of hypothyroidism, hypertension, hyperlipidemia, status post implant removal of both breast in 2020  . Patient presented to the emergency room on date of admission with complaint of pain under her right shoulder blade that started out as an ache. It progressed to a more intense severe pain a 10 out of 10.   Is radiating down her arm and she was concerned it might be cardiac. Never had anything like this previously. It is worse with movement. She had no dyspnea, diff swallowing or n/v.  Electrolytes normal except for small elevation in creatinine BUN is normal.  Glucose 152 troponin negative liver functions normal except for mildly elevated alk phos. White count 11.4 hemoglobin 14.8 hematocrit 45.4. CTA no PE noted she does have an enhancing solid lesion in the left lower lobe measurement of 3.2 x 4.3 x 3.7 consistent with neoplasm she also has subcarinal lymph nodes. Patient has an asymmetrical mucosal thickening in the distal esophagus with masslike abnormality in the left lateral aspect measuring 2.4 to 1.8 cm mass is suspected she also appears to have metastatic disease in the liver. Patient has no prior history of cancer she is a former smoker but had a approximately 30-pack-year history has not smoked since mid [de-identified]. EGD completed and no mass appreciated by GI. Liver bx completed as well. Small bump in creatinine and potassium . Cozaar held. Toradol dc'd. Will encourage her to hydrate. Assessment/Plan   Principal Problem: Mass of mediastinum               Consult Oncology               Consult palliative               Consult pulm-[facility] scheduled               Consult GI-Endo scheduled today               Pain control   Active Problems:     Palliative care patient     Essential hypertension               Cont home meds               Moniter for need to adjust     Acquired hypothyroidism               Cont same dose     Mixed hyperlipidemia               Cont lipitor   Acute renal insufficiency               Hold cozaar               Dc toradol               Encourage hydration               Labs in a.m. MEDICAL ONCOLOGY PROGRESS NOTE           Subjective-No new complaints this am. Had upper EGD yesterday. Discussed with pulmonary. Awaiting Ct guided biopsy      ASSESSMENT:   1.  LLL lung mass with mediastinal adenopathy-likely lung malignancy. Very remote history of smoking. She quit 40 years ago. 2. Liver lesions-concern for metastatic disease. 3. Paraesophageal mass versus adenopathy. This could represent periesophageal adenopathy. Upper endoscopy was normal.   4. Poor prognosis-likely malignancy with stage IV disease with possible liver metastasis. Inpatient consultation to palliative care   5. Neutrophil leukocytosis-mild. Continue to monitor           PLAN:   CT-guided biopsy to obtain tissue for diagnosis, CT-guided liver biopsy. Discussed with pulmonary,                   Palliative Care Progress Note      CHIEF COMPLAINT/REASON FOR CONSULTATION Goals of care, family support, Code status, symptom management        SUBJECTIVE:  Ms. Tre Knox is back to room following liver biopsy. She remains alert and communicative. Reports p.o. pain medication has been effective. She is hopeful to discharge tomorrow. HPI: The patient is a 67 y.o. female with PMH HTN, HLD, hypothyroid, CKD, and other comorbidities who presented to [facility] ED 1/30/2023 complaining of back pain x2-3 days. Patient reported pain was initially a mild aching that has progressed to severe. Denied any associated SOB. Workup in ED revealed creatinine/BUN 1.0/22, troponin negative, and WBC 11.4. Rapid COVID screening was negative. CXR was unremarkable. CTA chest completed with no evidence of PE but revealing an enhancing solid lesion posteriorly within the left lower lobe measuring 3.2 x 4.3 x 3.7 cm, asymmetric an irregular mucosal thickening of the distal esophagus, and suspected diffuse metastatic disease to the liver. Patient was admitted under hospitalist services with oncology evaluation. Concern for advanced metastatic disease. Plans for CT guided biopsy to obtain tissue for diagnosis. Palliative care consulted for code status discussion and goals of care.       Visit Summary:   Chart reviewed, patient discussed with nursing staff. Reviewed health issues, work up and treatment plan as well as factors that lead to hospitalization. Ms. Gladys Cherry is seen at bedside this morning with her , Koffi Espino, present. Report obtained from RN with no acute events overnight. EGD completed yesterday with no esophageal lesion/mass appreciated by GI. She is just underwent a liver biopsy this morning. Patient reports that her pain has been controlled with as needed Percocet. She is hopeful to discharge home tomorrow if she is medically stable. We discussed that if she is cleared by specialty services and hospitalist feel it is appropriate she potentially could discharge home with outpatient follow-up to further discuss biopsy results and treatment plan. Further explained outpatient palliative care and plans for [facility] NP to follow once patient is home continued symptom management monitoring, goals of care conversations pending work-up/treatment recommendations, and additional supportive care. She remains agreeable to referral. We discussed likelihood of requiring palliative chemo/radiation therapies and not curative treatments. Patient is flat during my visit today but does seem to be processing. Dara Sandoval completed today with PC . Opportunity for questions and emotional support provided. Will continue to follow. Pulmonary and Critical Care Consult Note         [facility]       HPI: We have been consulted to see this 67y.o. year old female born on 1950. Patient presented to the hospital due to back and chest pain. Work-up in the emergency room included a CT pulmonary angiogram that showed left lower lobe lung mass with mediastinal adenopathy and evidence of sclerotic lesions on the spine suggestive of metastatic disease with multiple hypodense lesions in the liver suggestive of metastatic disease to the liver. There was also evidence of possible distal esophageal mass.   The patient quit smoking about 40 years ago. Denies weight loss. Denies hemoptysis. I was asked to see her regarding the above. CT-guided needle aspiration of the lung mass was requested by Dr. Antelmo Morales. Pulmonary Assessment/plan:       1. Lung mass left lower lobe with mediastinal adenopathy and possible bone and evidence of metastatic disease to the liver. Discussed biopsy with Dr. Sudhir Shoemaker from radiology. CT-guided biopsy of the liver lesion is safer than lung biopsy and would offer diagnosis and staging. We will proceed with liver biopsy. 2. Back pain likely secondary to metastatic disease to the bone. 3. Esophageal mass suggested on the CT of the chest however no evidence of mass was seen on upper endoscopy. 4. Anemia per hematology. 5. DVT and GI prophylaxis. Endoscopic Procedure Note      Date of Procedure: 1/31/2023       Pre-Op Diagnosis: Rule out esophageal mass. Post-Op Diagnosis: Normal upper GI endoscopy examination. Procedure(s):   EGD BIOPSY           Indications:     Abnormal imaging study suggesting esophageal mass. Anesthesia:  Sedation was administered by anesthesia who monitored the patient during the procedure. Estimated Blood Loss: none       Complications: None       Impression:   Normal upper GI endoscopy examination. Recommendations:     1. I will resume patient's diet. 2.  Proceed with a liver biopsy for tomorrow. 3.  I will follow patient up on as-needed basis.         Pulmonary Disease GRG - Care Day 2 (1/31/2023) by Amy Ferguson RN       Review Status Review Entered   Completed 1/31/2023 1145       Created By   Amy Ferguson RN      Criteria Review      Care Day: 2 Care Date: 1/31/2023 Level of Care:    Guideline Day 2    Level Of Care    (X) Floor    1/31/2023 12:45 PM EST by Kenyatta Cooper      med/surg    Clinical Status    ( ) * No ICU or intermediate care needs    (X) * No mechanical ventilation required 1/31/2023 12:45 PM EST by Tiera aCrr      no mechanical ventilation    Interventions    (X) Inpatient interventions continue    1/31/2023 12:45 PM EST by Tiera Carr      daily cbc, bmp  ct abd/pelvis  palliative care consult  ct guided needle biopsy  gi consult  daily weights  strict i&o q8hrs  routine vitals q4hrs  npo  elevet hob 30 degrees  pulmonary consult  continuous telemetry monitoring  PT eval and treat    * Milestone   Additional Notes   Bp: 169/68, pulse 70, resp 18, temp 99.0, o2 sat 99% on ra         Brovana nebs 15mcg bid   Lipitor 10mg po qhs   Pulmicort nebs 0.5mg q12hrs   Lovenox 40mg sub q daily   Cozaar 100mg po daily   Oyster shell calcium w/D 500-5mg-mcg po bid   [place] 60mg po qam   Vit d 5000 units po daily      Dilaudid 0.5mg iv q4hrs prn- has had 1 dose today               [facility]   Progress Note      CHIEF COMPLAINT Right scapula pain       SUBJECTIVE:  Pain improved   CUMULATIVE HOSPITAL COURSE: Ms. Arjun Mendez is a 66-year-old female with past medical history of hypothyroidism, hypertension, hyperlipidemia, status post implant removal of both breast in 2020  . Patient presented to the emergency room on date of admission with complaint of pain under her right shoulder blade that started out as an ache. It progressed to a more intense severe pain a 10 out of 10. Is radiating down her arm and she was concerned it might be cardiac. Never had anything like this previously. It is worse with movement. She had no dyspnea, diff swallowing or n/v.  Electrolytes normal except for small elevation in creatinine BUN is normal.  Glucose 152 troponin negative liver functions normal except for mildly elevated alk phos. White count 11.4 hemoglobin 14.8 hematocrit 45.4. CTA no PE noted she does have an enhancing solid lesion in the left lower lobe measurement of 3.2 x 4.3 x 3.7 consistent with neoplasm she also has subcarinal lymph nodes.   Patient has an asymmetrical mucosal thickening in the distal esophagus with masslike abnormality in the left lateral aspect measuring 2.4 to 1.8 cm mass is suspected she also appears to have metastatic disease in the liver. Patient has no prior history of cancer she is a former smoker but had a approximately 30-pack-year history has not smoked since mid [de-identified]. Assessment/Plan   Principal Problem: Mass of mediastinum               Consult Oncology               Consult palliative               Consult pulm-[facility] scheduled               Consult GI-Endo scheduled today               Pain control   Active Problems:     Palliative care patient     Essential hypertension               Cont home meds               Moniter for need to adjust     Acquired hypothyroidism               Cont same dose     Mixed hyperlipidemia               Cont lipitor      DVT Prophylaxis: lovenox            Discharge planning: TBD                      MEDICAL ONCOLOGY CONSULTATION         HISTORY OF PRESENT ILLNESS:    Michael Coyle was first seen by me on 1/31/2023. I was consulted for abnormal CT scan findings of a lung mass, mediastinal adenopathy and a lower esophageal mass. She presented to the ER department on 1/30/2023 at [facility] with complaints of back pain. She has a history of hypertension, hypothyroidism, obesity, hyperlipidemia and chronic kidney disease. The pain started about 2-3 days before she comes to the hospital.  Denies any shortness of breath. Denies any fever or chills. Imaging studies were performed emergency. 1/30/2023-CTA chest showed no evidence of pulmonary embolism. Lungs: Enhancing solid lesion posteriorly within the left lower lobe. Measures 3.2 x 4.3 x 3.7 cm. Subcarinal lymph node measures 1.3 x 1.2 cm. Asymmetric an irregular mucosal thickening of the distal esophagus. Esophageal mucosal mass is suspected in the distal left aspect measuring 2.4 x 1.8 cm. Diffuse metastatic disease suspected to the liver.     I was consulted for the above findings. ASSESSMENT:   1. LLL lung mass with mediastinal adenopathy-likely lung malignancy. Very remote history of smoking. She quit 4 years ago. 2. Liver lesions-concern for metastatic disease   3. Paraesophageal mass versus adenopathy. This could represent periesophageal adenopathy. 4. Poor prognosis-likely malignancy with stage IV disease with possible liver metastasis. Inpatient consultation to palliative care   5. Neutrophil leukocytosis-mild. Continue to monitor               PLAN:   CT-guided biopsy to obtain tissue for diagnosis   Patient consultation to palliative care   Please avoid to give the patient any NSAIDs as this will delay possible biopsies. CT abdomen/pelvis   Discussed with pulmonary,                GI Consult       Indication: Abnormal imaging study. History:   The patient is a 67 y.o. female admitted to the hospital for back pain. Patient was having back pain on the right side for 2 to 3 days. Last night the pain got worse and was radiating to her right shoulder and right arm. Pain was constant in nature and she decided come to the hospital.  Imaging studies were done and multiple abnormalities were noted suggestive of malignancy. She denies any GI symptoms. No heartburns or regurgitation a regular basis. No dysphagia or odynophagia. No nausea or vomiting. No abdominal pain, cramping, gas or bloating. No constipation or diarrhea on a regular basis. No hematochezia or melanotic stool. No anorexia or weight loss. No fever or chills. No chest pain or palpitation. Her last colonoscopy was few years back and was normal.  She never had any upper GI endoscopy examination. CT scan other than metastatic disease noted to have thickening of the esophagus. Again, patient denies any reflux symptoms or dysphagia. Assessment:   Patient admitted to the hospital with symptoms of back pain.   Work-up revealed metastatic disease to lung, liver, lymph nodes and possible esophagus. The primary is unknown. She is scheduled for liver biopsy most likely for tomorrow. Based on her imaging study in my opinion upper GI endoscopy examination indicated to rule out esophageal malignancy. Impression:   1. Abnormal imaging study. 2.  Back pain. Plan:   1. Differential diagnosis patient's symptoms and imaging study was briefly discussed with her in the presence of her family member. 2.  I will schedule patient for upper GI endoscopy examination and based on EGD finding further recommendation be done. 3.  Agree with liver biopsy if her endoscopy is normal for esophageal malignancy. Nursing note:      Pt c/o R back pain and center chest pain. States is stabbing. Rates at 8-9 on scale 1-10. Dilaudid 0.5mg IV given as pt is having CT of abdomen and is NPO at present. Will monitor for relief. Patient is scheduled for a ct guided lung biopsy. H/P reviewed and medications and allergies reviewed and verified. Patient MAR reviewed and patient received lovenox 40mg at 0125 am which is a 24 hour hold. Calling to notify [facility] RN about the lovenox.

## 2023-02-01 NOTE — PROGRESS NOTES
Bedside report given to jyoti RN. Patient vitals stable on return to room and patient alert and oriented x 4.   Puncture site clean and dry

## 2023-02-01 NOTE — PROGRESS NOTES
Pt to CT and then to Endo with transport on stretcher.  Electronically signed by Olman Valdez RN on 1/31/2023 at 8:41 PM

## 2023-02-01 NOTE — PROGRESS NOTES
[facility]      Progress Note    Patient:  Sagar Park  YOB: 1950  Date of Service: 2/1/2023  MRN: [id number]   Acct: [de-identified]   Primary Care Physician: CHRIS Martines  Advance Directive: Full Code  Admit Date: 1/30/2023       Hospital Day: 3    Portions of this note have been copied forward, however, updated to reflect the most current clinical status of this patient. CHIEF COMPLAINT Right scapula pain    SUBJECTIVE:  Pain improved  CUMULATIVE HOSPITAL COURSE: Ms. Vivian Borjas is a 70-year-old female with past medical history of hypothyroidism, hypertension, hyperlipidemia, status post implant removal of both breast in 2020  . Patient presented to the emergency room on date of admission with complaint of pain under her right shoulder blade that started out as an ache. It progressed to a more intense severe pain a 10 out of 10. Is radiating down her arm and she was concerned it might be cardiac. Never had anything like this previously. It is worse with movement. She had no dyspnea, diff swallowing or n/v.  Electrolytes normal except for small elevation in creatinine BUN is normal.  Glucose 152 troponin negative liver functions normal except for mildly elevated alk phos. White count 11.4 hemoglobin 14.8 hematocrit 45.4. CTA no PE noted she does have an enhancing solid lesion in the left lower lobe measurement of 3.2 x 4.3 x 3.7 consistent with neoplasm she also has subcarinal lymph nodes. Patient has an asymmetrical mucosal thickening in the distal esophagus with masslike abnormality in the left lateral aspect measuring 2.4 to 1.8 cm mass is suspected she also appears to have metastatic disease in the liver. Patient has no prior history of cancer she is a former smoker but had a approximately 30-pack-year history has not smoked since mid [de-identified]. EGD completed and no mass appreciated by GI. Liver bx completed as well. Small bump in creatinine and potassium .   Cozaar held.  Toradol dc'd. Will encourage her to hydrate. Review of Systems   Constitutional: Negative. HENT: Negative. Eyes: Negative. Respiratory: Negative. Cardiovascular: Negative. Gastrointestinal: Negative. Endocrine: Negative. Musculoskeletal: Negative. Allergic/Immunologic: Negative. Neurological: Negative. Hematological: Negative. Psychiatric/Behavioral: Negative. Objective:   VITALS:  BP (!) 143/68   Pulse 62   Temp 97.7 °F (36.5 °C) (Temporal)   Resp 18   Ht 5' (1.524 m)   Wt 128 lb 3.2 oz (58.2 kg)   SpO2 98%   BMI 25.04 kg/m²   24HR INTAKE/OUTPUT:    Intake/Output Summary (Last 24 hours) at 2/1/2023 1038  Last data filed at 1/31/2023 1833  Gross per 24 hour   Intake 120 ml   Output --   Net 120 ml           Physical Exam  Vitals and nursing note reviewed. HENT:      Head: Normocephalic and atraumatic. Nose: Nose normal.      Mouth/Throat:      Mouth: Mucous membranes are moist.   Eyes:      Extraocular Movements: Extraocular movements intact. Cardiovascular:      Rate and Rhythm: Normal rate and regular rhythm. Pulses: Normal pulses. Heart sounds: Normal heart sounds. Pulmonary:      Effort: Pulmonary effort is normal.      Breath sounds: Normal breath sounds. Abdominal:      General: Bowel sounds are normal.      Palpations: Abdomen is soft. Musculoskeletal:         General: Normal range of motion. Cervical back: Neck supple. Skin:     General: Skin is warm and dry. Neurological:      General: No focal deficit present. Mental Status: She is alert.    Psychiatric:         Mood and Affect: Mood normal.          Medications:      sodium chloride        midazolam        fentaNYL        budesonide  0.5 mg Nebulization Q12H    arformoterol tartrate  15 mcg Nebulization BID    thyroid  60 mg Oral QAM AC    atorvastatin  10 mg Oral Nightly    [Held by provider] losartan  100 mg Oral Daily    oyster shell calcium w/D  1 tablet Oral BID    sodium chloride flush  5-40 mL IntraVENous 2 times per day    [START ON 2/3/2023] vitamin D  5,000 Units Oral Weekly    enoxaparin  40 mg SubCUTAneous Q24H     albuterol, sodium chloride flush, sodium chloride, ondansetron **OR** ondansetron, acetaminophen **OR** acetaminophen, potassium chloride **OR** potassium alternative oral replacement **OR** potassium chloride, magnesium sulfate, polyethylene glycol, melatonin, calcium carbonate, oxyCODONE-acetaminophen, HYDROmorphone  Diet NPO Exceptions are: Sips of Water with Meds, Ice Chips     Lab and other Data:     Recent Labs     01/30/23 1850 02/01/23 0223   WBC 11.4* 9.0   HGB 14.8 12.9    276       Recent Labs     01/30/23 1850 02/01/23 0223    140   K 4.3 5.1*    103   CO2 27 29   BUN 22 23   CREATININE 1.0* 1.2*   GLUCOSE 152* 108       Recent Labs     01/30/23 1850   AST 21   ALT 13   BILITOT <0.2   ALKPHOS 131*       Troponin T:   Recent Labs     01/30/23 1850   TROPONINI <0.01       Pro-BNP: No results for input(s): BNP in the last 72 hours. INR:   Recent Labs     01/30/23 1850   INR 0.96       UA:No results for input(s): NITRITE, COLORU, PHUR, LABCAST, WBCUA, RBCUA, MUCUS, TRICHOMONAS, YEAST, BACTERIA, CLARITYU, SPECGRAV, LEUKOCYTESUR, UROBILINOGEN, BILIRUBINUR, BLOODU, GLUCOSEU, AMORPHOUS in the last 72 hours. Invalid input(s): [place]  A1C: No results for input(s): LABA1C in the last 72 hours. ABG:No results for input(s): PHART, EZS8TBI, PO2ART, QRX0RPY, BEART, HGBAE, G4RUAUDU, CARBOXHGBART in the last 72 hours. RAD:   XR CHEST PORTABLE    Result Date: 1/30/2023  NO PRIOR REPORT AVAILABLE Exam: X-RAY OF THECHEST Clinical data:Chest pain. Technique:Single frontal portable view of the chest. Prior studies: No prior studies submitted. Findings: The lungs are grossly clear; noevidence of acute infiltrate or pleural effusion. Cardiac silhouette is within normal limits.  No acute osseous abnormality is detected. Unremarkable chest x-ray. Recommendation: Follow up as clinically indicated. Dictated and Electronically Signed by Capo Seymour MD, SA CERTIFIED at [id number] 08:53:53 PM EST             CTA PULMONARY W CONTRAST    Result Date: 1/30/2023  NO PRIOR REPORT AVAILABLE She Exam: CTA OF THE CHEST WITH CONTRAST Clinical data: Severe right back pain and pain between shoulder blades. Technique: Axial CT angiography images through the lungs were acquired with contrast and imaged using soft tissue and lung algorithms. Coronal, sagittal, and 3D volume reconstructions were performed. Reformatted/3D-MPR images were performed. Radiation dose: CTDIvol =25.84 mGy, DLP =407 mGy x cm. Contrast Used:  Optiray-350. Prior studies: Radiographs of the chest done on same day. Findings: Lungs: Enhancing solid lesion posteriorly within the left lower lobe. Measures 3.2 x 4.3 x 3.7 cm. No pulmonary infiltrate identified. No pleural effusions identified. No soft tissue pulmonary nodules. Increase in bronchopulmonary markings. Peribronchial cuffing. No pneumothorax. The airway is clear. Soft Tissues: A prominent subcarinal lymph node measures 1.3 x 1.2 cm. Otherwise, no enlarged mediastinal, axillary or supraclavicular adenopathy isidentified. Vascular: No filling defect within the pulmonary arteries to the segmental branch level. Unremarkable aorta. Grossly unremarkable sized heart. No definite abnormality seen on 3D reformatted images. Bony structures: No acute or destructive abnormality. Degenerative changes of the spine. No lytic or blastic lesion. Upper Abdomen: Small hiatal hernia with asymmetric an irregular mucosal thickening of the distal esophagus. Esophageal mucosal mass is suspected in the distal left aspect measuring 2.4 x 1.8 cm. Image number 98 on series number 4. Diffuse metastatic disease suspected to the liver. 1. No acute pulmonary embolism.  2. Enhancing solid lesion posteriorly within the left lower lobe. Measures 3.2 x 4.3 x 3.7 cm and is most consistent with lung neoplasm. Metastatic subcarinal lymph node is suspected. 3. Asymmetric mucosal thickening of the distal esophagus with masslike abnormality in the left lateral aspect measuring 2.4 x 1.8 cm. An esophageal mass is suspected. Metastatic disease here is not excluded. 4. Diffuse metastatic disease of the liver. 5. No lytic or blastic lesion. Recommendation: Follow up as clinically indicated. All CT scans at this facility utilize dose modulation, iterative reconstruction, and/or weight based dosing when appropriate to reduce radiation dose to as low as reasonably achievable. Dictated and Electronically Signed by Chris Devine MD, [facility] CERTIFIED at [id number] 10:32:29 PM EST             EGD    Result Date: 1/31/2023  No dictation               Assessment/Plan   Principal Problem: Mass of mediastinum   Consult Oncology   Consult palliative   Consult pulm-[place] scheduled   Consult GI-Endo scheduled today   Pain control  Active Problems:    Palliative care patient    Essential hypertension   Cont home meds   Moniter for need to adjust    Acquired hypothyroidism   Cont same dose    Mixed hyperlipidemia   Cont lipitor  Acute renal insufficiency   Hold cozaar   Dc toradol   Encourage hydration   Labs in a.m. Resolved Problems:    * No resolved hospital problems. *    DVT Prophylaxis: lovenox   Discharge planning: TBD     Further Orders per Clinical course/attending. Electronically signed by CHRIS Rogers CNP on 2/1/2023 at 10:38 AM       EMR Dragon/Transcription disclaimer:   Much of this encounter note is an electronic transcription/translation of spoken language to printed text.  The electronic translation of spoken language may permit erroneous, or at times, nonsensical words or phrases to be inadvertently transcribed; although attempts have made to review the note for such errors, some may still exist.

## 2023-02-01 NOTE — PROGRESS NOTES
Palliative Care Progress Note  2/1/2023 8:52 AM    Patient:  Jamshid Alcaraz  YOB: 1950  Primary Care Physician: CHRIS Hong  Advance Directive: Full Code  Admit Date: 1/30/2023       Hospital Day: 2  Portions of this note have been copied forward, however, changed to reflect the most current clinical status of this patient. CHIEF COMPLAINT/REASON FOR CONSULTATION Goals of care, family support, Code status, symptom management     SUBJECTIVE:  Ms. Eugene Hartley is back to room following liver biopsy. She remains alert and communicative. Reports p.o. pain medication has been effective. She is hopeful to discharge tomorrow. HPI: The patient is a 67 y.o. female with PMH HTN, HLD, hypothyroid, CKD, and other comorbidities who presented to [facility] ED 1/30/2023 complaining of back pain x2-3 days. Patient reported pain was initially a mild aching that has progressed to severe. Denied any associated SOB. Workup in ED revealed creatinine/BUN 1.0/22, troponin negative, and WBC 11.4. Rapid COVID screening was negative. CXR was unremarkable. CTA chest completed with no evidence of PE but revealing an enhancing solid lesion posteriorly within the left lower lobe measuring 3.2 x 4.3 x 3.7 cm, asymmetric an irregular mucosal thickening of the distal esophagus, and suspected diffuse metastatic disease to the liver. Patient was admitted under hospitalist services with oncology evaluation. Concern for advanced metastatic disease. Plans for CT guided biopsy to obtain tissue for diagnosis. Palliative care consulted for code status discussion and goals of care. Review of Systems:   14 point review of systems is negative except as specifically addressed above.     Objective:   VITALS:  BP (!) 166/74   Pulse 72   Temp 97.7 °F (36.5 °C) (Temporal)   Resp 18   Ht 5' (1.524 m)   Wt 128 lb 3.2 oz (58.2 kg)   SpO2 95%   BMI 25.04 kg/m²   24HR INTAKE/OUTPUT:    Intake/Output Summary (Last 24 hours) at 2/1/2023 0852  Last data filed at 1/31/2023 1833  Gross per 24 hour   Intake 120 ml   Output --   Net 120 ml     General appearance: 68 yo female, ill appearing, NAD  Head: Normocephalic, without obvious abnormality, atraumatic  Eyes: conjunctivae/corneas clear. PERRL, EOM's intact.    Ears/Nose: normal external ears and nose  Neck/Throat: supple, symmetrical, trachea midline  Pulmonary: Diminished to auscultation bilaterally, unlabored on room air, shallow inspiration  Cardiovascular: RRR, S1, S2 normal, no murmur  Gastrointestinal:soft, non-tender; non-distended, bowel sounds present  Musculoskeletal:No lower extremity edema,  No erythema, no tenderness to palpation  Skin: Warm, dry  Neurologic: Alert and oriented X 4, normal strength and tone, no focal deficits  Psychiatric: Calm and cooperative    Medications:      sodium chloride        budesonide  0.5 mg Nebulization Q12H    arformoterol tartrate  15 mcg Nebulization BID    thyroid  60 mg Oral QAM AC    atorvastatin  10 mg Oral Nightly    [Held by provider] losartan  100 mg Oral Daily    oyster shell calcium w/D  1 tablet Oral BID    sodium chloride flush  5-40 mL IntraVENous 2 times per day    [START ON 2/3/2023] vitamin D  5,000 Units Oral Weekly    enoxaparin  40 mg SubCUTAneous Q24H     albuterol, sodium chloride flush, sodium chloride, ondansetron **OR** ondansetron, acetaminophen **OR** acetaminophen, potassium chloride **OR** potassium alternative oral replacement **OR** potassium chloride, magnesium sulfate, polyethylene glycol, melatonin, calcium carbonate, oxyCODONE-acetaminophen, HYDROmorphone, ketorolac  Diet NPO Exceptions are: Sips of Water with Meds, Ice Chips     Lab and other Data:     Recent Labs     01/30/23 1850 02/01/23 0223   WBC 11.4* 9.0   HGB 14.8 12.9    276     Recent Labs     01/30/23 1850 02/01/23 0223    140   K 4.3 5.1*    103   CO2 27 29   BUN 22 23   CREATININE 1.0* 1.2*   GLUCOSE 152* 108     Recent Labs 01/30/23  1850   AST 21   ALT 13   BILITOT <0.2   ALKPHOS 131*     RAD:   CT ABDOMEN PELVIS W IV CONTRAST Additional Contrast? Oral  Result Date: 1/31/2023  Impression: 1. Multiple hepatic metastases. No other metastatic findings within the abdomen or pelvis. 2.Left lower lobe pulmonary mass, partially visualized. Dedicated CT chest is suggested. 3.Distal esophageal mass. Follow-up endoscopy is suggested. XR CHEST PORTABLE  Result Date: 1/30/2023  Unremarkable chest x-ray. Recommendation: Follow up as clinically indicated. Dictated and Electronically Signed by Racheal Joseph MD M[facility]SA CERTIFIED at [id number] 08:53:53 PM EST             CTA PULMONARY W CONTRAST  Result Date: 1/30/2023  1. No acute pulmonary embolism. 2. Enhancing solid lesion posteriorly within the left lower lobe. Measures 3.2 x 4.3 x 3.7 cm and is most consistent with lung neoplasm. Metastatic subcarinal lymph node is suspected. 3. Asymmetric mucosal thickening of the distal esophagus with masslike abnormality in the left lateral aspect measuring 2.4 x 1.8 cm. An esophageal mass is suspected. Metastatic disease here is not excluded. 4. Diffuse metastatic disease of the liver. 5. No lytic or blastic lesion. Recommendation: Follow up as clinically indicated. All CT scans at this facility utilize dose modulation, iterative reconstruction, and/or weight based dosing when appropriate to reduce radiation dose to as low as reasonably achievable.  Dictated and Electronically Signed by JUAN Jones MD CERTIFIED at [id number] 10:32:29 PM EST             EGD  Result Date: 1/31/2023  No dictation     Palliative Performance Scale:  [x] 80% Full ambulation  Some disease: Normal activity w/ some effort  Full self-care  Normal/reduced intake  Full LOC    ECOG:(0) Fully active, able to carry on all predisease performance without restriction    CLINICAL PAIN ASSESSMENT:   Score 1-10 (if verbal):  5  Location:   chest and back  Character: sharp  Frequency:  continuously  What makes it worse?:   activity  What makes it better?:  pain medication    Assessment/Plan   Principal Problem: Mass of mediastinum  Active Problems:    Palliative care patient    Esophageal mass    Lung mass    Cancer related pain    Essential hypertension    Acquired hypothyroidism    Mixed hyperlipidemia    Anemia  Resolved Problems:    * No resolved hospital problems. *      Visit Summary:  Chart reviewed, patient discussed with nursing staff. Reviewed health issues, work up and treatment plan as well as factors that lead to hospitalization. Ms. Yadira Hernandez is seen at bedside this morning with her , Anya Ryan, present. Report obtained from RN with no acute events overnight. EGD completed yesterday with no esophageal lesion/mass appreciated by GI. She is just underwent a liver biopsy this morning. Patient reports that her pain has been controlled with as needed Percocet. She is hopeful to discharge home tomorrow if she is medically stable. We discussed that if she is cleared by specialty services and hospitalist feel it is appropriate she potentially could discharge home with outpatient follow-up to further discuss biopsy results and treatment plan. Further explained outpatient palliative care and plans for SCOP NP to follow once patient is home continued symptom management monitoring, goals of care conversations pending work-up/treatment recommendations, and additional supportive care. She remains agreeable to referral. We discussed likelihood of requiring palliative chemo/radiation therapies and not curative treatments. Patient is flat during my visit today but does seem to be processing. Elaine Pereira completed today with PC . Opportunity for questions and emotional support provided. Will continue to follow.     Candidate for SCOP: Yes, referral order placed 1/31/2023 for outpatient palliative care to follow at home once discharged     Recommendations: Palliative Care- GOC prolong life, continue all current medical treatment/work-up and monitor for improvement. D/C planning based on hospital course. Agreeable for SCOP to follow. Code status- FULL CODE  LLL Lung mass- oncology and pulmonology following. Concern for malignancy and metastatic disease. Liver lesions- concern for mets. CT abdomen/pelvis ordered per oncology. Liver Biopsy completed 2/1/23  Paraesophageal mass- vs adenopathy. GI consulted. EGD 1/31/23 with no mass/lesion seen. Cancer related pain- Agree with percocet  5/325 mg Q4h prn, patient reports effective. Continue IVP dilaudid 0.5 mg Q4h prn for breakthrough symptoms and wean as able. Monitor for symptom control. Constipation- chronic issue. Bowel regimen in place. Monitor for need to escalate regimen    Thank you for consulting Palliative Care and allowing us to participate in the care of this patient.      Total Time Spent with patient assessment, interview of independent historian/HCS, workup/treatment review, discussion with medical team, review of current and home medications, and placement of orders/preparation of this note: 37 minutes                               Electronically signed by CHRIS Herman CNP on 2/1/2023 at 8:52 AM    (Please note that portions of this note were completed with a voice recognition program.  Amando Urena made to edit the dictations but occasionally words are mis-transcribed.)

## 2023-02-02 ENCOUNTER — TELEPHONE (OUTPATIENT)
Dept: HEMATOLOGY | Age: 73
End: 2023-02-02

## 2023-02-02 VITALS
HEART RATE: 64 BPM | HEIGHT: 60 IN | SYSTOLIC BLOOD PRESSURE: 161 MMHG | RESPIRATION RATE: 14 BRPM | OXYGEN SATURATION: 100 % | WEIGHT: 128.2 LBS | BODY MASS INDEX: 25.17 KG/M2 | DIASTOLIC BLOOD PRESSURE: 82 MMHG | TEMPERATURE: 98.2 F

## 2023-02-02 DIAGNOSIS — R91.8 MASS OF LOWER LOBE OF LEFT LUNG: Primary | ICD-10-CM

## 2023-02-02 DIAGNOSIS — K76.9 LIVER LESION: ICD-10-CM

## 2023-02-02 LAB
ANION GAP SERPL CALCULATED.3IONS-SCNC: 12 MMOL/L (ref 7–19)
BASOPHILS ABSOLUTE: 0.1 K/UL (ref 0–0.2)
BASOPHILS RELATIVE PERCENT: 0.6 % (ref 0–1)
BUN BLDV-MCNC: 24 MG/DL (ref 8–23)
CALCIUM SERPL-MCNC: 10.1 MG/DL (ref 8.8–10.2)
CHLORIDE BLD-SCNC: 101 MMOL/L (ref 98–111)
CO2: 26 MMOL/L (ref 22–29)
CREAT SERPL-MCNC: 1.1 MG/DL (ref 0.5–0.9)
EOSINOPHILS ABSOLUTE: 0.4 K/UL (ref 0–0.6)
EOSINOPHILS RELATIVE PERCENT: 4.2 % (ref 0–5)
GFR SERPL CREATININE-BSD FRML MDRD: 53 ML/MIN/{1.73_M2}
GLUCOSE BLD-MCNC: 101 MG/DL (ref 74–109)
HCT VFR BLD CALC: 41 % (ref 37–47)
HEMOGLOBIN: 13.2 G/DL (ref 12–16)
IMMATURE GRANULOCYTES #: 0 K/UL
LYMPHOCYTES ABSOLUTE: 3.3 K/UL (ref 1.1–4.5)
LYMPHOCYTES RELATIVE PERCENT: 31.8 % (ref 20–40)
MCH RBC QN AUTO: 30.5 PG (ref 27–31)
MCHC RBC AUTO-ENTMCNC: 32.2 G/DL (ref 33–37)
MCV RBC AUTO: 94.7 FL (ref 81–99)
MONOCYTES ABSOLUTE: 0.8 K/UL (ref 0–0.9)
MONOCYTES RELATIVE PERCENT: 7.8 % (ref 0–10)
NEUTROPHILS ABSOLUTE: 5.7 K/UL (ref 1.5–7.5)
NEUTROPHILS RELATIVE PERCENT: 55.3 % (ref 50–65)
PDW BLD-RTO: 11.9 % (ref 11.5–14.5)
PLATELET # BLD: 283 K/UL (ref 130–400)
PMV BLD AUTO: 9.8 FL (ref 9.4–12.3)
POTASSIUM REFLEX MAGNESIUM: 4.7 MMOL/L (ref 3.5–5)
RBC # BLD: 4.33 M/UL (ref 4.2–5.4)
SODIUM BLD-SCNC: 139 MMOL/L (ref 136–145)
WBC # BLD: 10.3 K/UL (ref 4.8–10.8)

## 2023-02-02 PROCEDURE — 6370000000 HC RX 637 (ALT 250 FOR IP): Performed by: INTERNAL MEDICINE

## 2023-02-02 PROCEDURE — 80048 BASIC METABOLIC PNL TOTAL CA: CPT

## 2023-02-02 PROCEDURE — 6360000002 HC RX W HCPCS: Performed by: INTERNAL MEDICINE

## 2023-02-02 PROCEDURE — 36415 COLL VENOUS BLD VENIPUNCTURE: CPT

## 2023-02-02 PROCEDURE — 85025 COMPLETE CBC W/AUTO DIFF WBC: CPT

## 2023-02-02 PROCEDURE — 2580000003 HC RX 258: Performed by: INTERNAL MEDICINE

## 2023-02-02 PROCEDURE — 99231 SBSQ HOSP IP/OBS SF/LOW 25: CPT

## 2023-02-02 PROCEDURE — 94640 AIRWAY INHALATION TREATMENT: CPT

## 2023-02-02 PROCEDURE — 94760 N-INVAS EAR/PLS OXIMETRY 1: CPT

## 2023-02-02 RX ORDER — HYDROCODONE BITARTRATE AND ACETAMINOPHEN 7.5; 325 MG/1; MG/1
1 TABLET ORAL EVERY 6 HOURS PRN
Qty: 18 TABLET | Refills: 0 | Status: SHIPPED | OUTPATIENT
Start: 2023-02-02 | End: 2023-02-05

## 2023-02-02 RX ORDER — HYDROCODONE BITARTRATE AND ACETAMINOPHEN 7.5; 325 MG/1; MG/1
1 TABLET ORAL EVERY 6 HOURS PRN
Qty: 18 TABLET | Refills: 0 | Status: SHIPPED | OUTPATIENT
Start: 2023-02-02 | End: 2023-02-02 | Stop reason: SDUPTHER

## 2023-02-02 RX ADMIN — BUDESONIDE 500 MCG: 0.5 SUSPENSION RESPIRATORY (INHALATION) at 06:35

## 2023-02-02 RX ADMIN — ARFORMOTEROL TARTRATE 15 MCG: 15 SOLUTION RESPIRATORY (INHALATION) at 06:35

## 2023-02-02 RX ADMIN — LEVOTHYROXINE, LIOTHYRONINE 60 MG: 38; 9 TABLET ORAL at 06:06

## 2023-02-02 RX ADMIN — OXYCODONE HYDROCHLORIDE AND ACETAMINOPHEN 1 TABLET: 5; 325 TABLET ORAL at 07:54

## 2023-02-02 RX ADMIN — SODIUM CHLORIDE, PRESERVATIVE FREE 10 ML: 5 INJECTION INTRAVENOUS at 07:57

## 2023-02-02 RX ADMIN — Medication 1 TABLET: at 07:54

## 2023-02-02 ASSESSMENT — PAIN DESCRIPTION - DESCRIPTORS: DESCRIPTORS: SHARP

## 2023-02-02 ASSESSMENT — PAIN DESCRIPTION - LOCATION: LOCATION: BACK

## 2023-02-02 ASSESSMENT — PAIN SCALES - GENERAL: PAINLEVEL_OUTOF10: 9

## 2023-02-02 NOTE — PROGRESS NOTES
CLINICAL PHARMACY NOTE: MEDS TO BEDS    Total # of Prescriptions Filled: 1   The following medications were delivered to the patient:  Discharge Medication List as of 2/2/2023 12:49 PM        Hydrocodone-APAP 7.5 - 325     Additional Documentation:   Delivered Rx's to patients room. Gave Rx's to patients spouse Xavier Martinez. Paid with credit card.

## 2023-02-02 NOTE — PLAN OF CARE
Problem: Discharge Planning  Goal: Discharge to home or other facility with appropriate resources  Outcome: Progressing  Flowsheets (Taken 2/2/2023 9250)  Discharge to home or other facility with appropriate resources:   Identify barriers to discharge with patient and caregiver   Arrange for needed discharge resources and transportation as appropriate   Identify discharge learning needs (meds, wound care, etc)   Refer to discharge planning if patient needs post-hospital services based on physician order or complex needs related to functional status, cognitive ability or social support system     Problem: Safety - Adult  Goal: Free from fall injury  Outcome: Progressing     Problem: ABCDS Injury Assessment  Goal: Absence of physical injury  Outcome: Progressing     Problem: Pain  Goal: Verbalizes/displays adequate comfort level or baseline comfort level  Outcome: Progressing

## 2023-02-02 NOTE — PROGRESS NOTES
Pt d/c instructions reviewed w/ pt at bedside, verbalizes understanding. Pt wishes to go to  by herself since her  is waiting for her there.

## 2023-02-02 NOTE — DISCHARGE SUMMARY
Hospitalist  Discharge Summary    Patient ID: Indio Ron      Patient's PCP: CHRIS Monahan    Admit Date: 1/30/2023     Discharge Date:   02/02/2023    Admitting Physician: No admitting provider for patient encounter. Discharge Physician: CHRIS Garcia - CNP     Discharge Diagnoses:  Principal Problem: Mass of mediastinum  Active Problems:    Palliative care patient    Esophageal mass    Lung mass    Cancer related pain    Essential hypertension    Acquired hypothyroidism    Mixed hyperlipidemia    Anemia  Resolved Problems:    * No resolved hospital problems. *       Active Hospital Problems    Diagnosis Date Noted    Palliative care patient [Z51.5] 01/31/2023     Priority: Medium    Esophageal mass [K22.89] 01/31/2023     Priority: Medium    Lung mass [R91.8] 01/31/2023     Priority: Medium    Cancer related pain [G89.3] 01/31/2023     Priority: Medium    Mass of mediastinum [J98.59] 01/30/2023     Priority: Medium    Anemia [D64.9] 01/30/2023    Mixed hyperlipidemia [E78.2] 03/19/2019    Acquired hypothyroidism [E03.9] 08/20/2017    Essential hypertension [I10] 08/20/2017       The patient was seen and examined on day of discharge and this discharge summary is in conjunction with any daily progress note from day of discharge. Hospital Course:   Ms. Roly uLna is a 77-year-old female with past medical history of hypothyroidism, hypertension, hyperlipidemia, status post implant removal of both breast in 2020  . Patient presented to the emergency room on date of admission with complaint of pain under her right shoulder blade that started out as an ache. It progressed to a more intense severe pain a 10 out of 10. Is radiating down her arm and she was concerned it might be cardiac. Never had anything like this previously. It is worse with movement.   She had no dyspnea, diff swallowing or n/v.  Electrolytes normal except for small elevation in creatinine BUN is normal.  Glucose 152 troponin negative liver functions normal except for mildly elevated alk phos. White count 11.4 hemoglobin 14.8 hematocrit 45.4. CTA no PE noted she does have an enhancing solid lesion in the left lower lobe measurement of 3.2 x 4.3 x 3.7 consistent with neoplasm she also has subcarinal lymph nodes. Patient has an asymmetrical mucosal thickening in the distal esophagus with masslike abnormality in the left lateral aspect measuring 2.4 to 1.8 cm mass is suspected she also appears to have metastatic disease in the liver. Patient has no prior history of cancer she is a former smoker but had a approximately 30-pack-year history has not smoked since mid [de-identified]. EGD completed and no mass appreciated by GI. Liver bx completed as well. Small bump in creatinine and potassium . Cozaar held. Toradol dc'd. Will encourage her to hydrate. She is ready to go home. She will fu with PCP and Claudino. Pallative has met with her. Her creatinine is 1.1 today. Will resume cozaar but she will continue to hydrate and no NSAIDS. Exam:   BP (!) 161/82   Pulse 64   Temp 98.2 °F (36.8 °C) (Temporal)   Resp 14   Ht 5' (1.524 m)   Wt 128 lb 3.2 oz (58.2 kg)   SpO2 100%   BMI 25.04 kg/m²   General appearance: alert, appears stated age, cooperative and no distress  Head: Normocephalic, without obvious abnormality, atraumatic  Eyes: conjunctivae/corneas clear. PERRL, EOM's intact.    Ears: normal external ears  Neck: no adenopathy, no carotid bruit, no JVD, supple, symmetrical, trachea midline and thyroid not enlarged, symmetric, no tenderness/mass/nodules  Lungs: clear to auscultation bilaterally,no rales or wheezes   Heart: regular rate and rhythm, S1, S2 normal, no murmur,  regular rate and rhythm   Abdomen:soft, non-tender; non-distended normal bowel sounds no masses, no organomegaly  Extremities:Pedal edema none  Homans sign is negative, no sign of DVT, warm and dry  Skin: Skin color, texture, turgor normal. No rashes or lesions  Lymphatic: No palpable lymph node enlargment  Neurologic: Alert and oriented X 3, normal strength and tone. Normal symmetric reflexes. Mental status: Alert, oriented, thought content appropriate  Cranial nerves:II-XII Grossly intact Sensory: normal Motor:grossly normal  Psychiatric:  Alert and oriented, thought content appropriate, normal insight, mood appropriate      Consults Made:   IP CONSULT TO PULMONOLOGY  IP CONSULT TO ONCOLOGY  IP CONSULT TO GI  IP CONSULT TO PALLIATIVE CARE      Significant Diagnostic Studies:     CT GUIDED NEEDLE PLACEMENT    Result Date: 2/1/2023  Percutaneous CT-guided biopsy of right lobe of liver lesion : CLINICAL history: 70-year-old female with lung lesion and multiple lesions in the right lobe of the liver to rule out metastatic disease. The risks and benefit was explained to the patient The risks include bleed infection . The patient considered to procedure. PROCEDURE : The right lobe of the liver was localized under CT guidance. After throat prepped and draped 1% Xylocaine was used for local anesthetic. Using 18 gauge Bard sheath was introduced into the right lobe lesion. .A total of 5 core biopsy was obtained using a coaxial technique with 18 gauge Bard sheath . Pathologist felt it was adequate tissue for diagnosis. Post-biopsy CT demonstrate no bleed identified. Percutaneous CT-guided biopsy of right lobe of the liver with total of 5 core biopsy is obtained. .Patient tolerated procedure well. CT ABDOMEN PELVIS W IV CONTRAST Additional Contrast? Oral    Result Date: 1/31/2023  CT of the abdomen and pelvis with contrast History: Rule out liver metastasis Prior exam: None Technique: Contiguous axial CT images of the abdomen and pelvis were obtained after the administration of IV contrast.Coronal and sagittal images were reconstructed. Contrast: 70 mL of Isovue IV. Findings: Lower chest: 4.1 x 3.2 cm mass in the left lower lobe. 2.1 x 1.5 cm rounded mass along the distal esophagus. Small right pleural effusion. Abdomen and Pelvis: Liver: Multiple liver lesions are present. Approximately 6-8 lesions are present. Representative lesions in the right hepatic lobe measuring 2.5 and 1.9 cm. . Gallbladder: Normal. Kidneys: Normal. Adrenal glands: Normal. Spleen: Normal. Pancreas: Normal. Aorta: Normal in caliber. Dense aortic atherosclerotic disease. Reproductive organs: Normal. Bladder: Normal. Gastrointestinal tract: Colonic diverticulosis. Peritoneum/mesentery: Normal. Lymph nodes: No adenopathy. Musculoskeletal: No acute or aggressive osseous findings. Impression: 1. Multiple hepatic metastases. No other metastatic findings within the abdomen or pelvis. 2.Left lower lobe pulmonary mass, partially visualized. Dedicated CT chest is suggested. 3.Distal esophageal mass. Follow-up endoscopy is suggested. XR CHEST PORTABLE    Result Date: 1/30/2023  NO PRIOR REPORT AVAILABLE Exam: X-RAY OF [facility] Clinical data:Chest pain. Technique:Single frontal portable view of the chest. Prior studies: No prior studies submitted. Findings: The lungs are grossly clear; noevidence of acute infiltrate or pleural effusion. Cardiac silhouette is within normal limits. No acute osseous abnormality is detected. Unremarkable chest x-ray. Recommendation: Follow up as clinically indicated. Dictated and Electronically Signed by Yulissa Joshua MD, SA CERTIFIED at [id number] 08:53:53 PM EST             CTA PULMONARY W CONTRAST    Result Date: 1/30/2023  NO PRIOR REPORT AVAILABLE She Exam: CTA OF THE CHEST WITH CONTRAST Clinical data: Severe right back pain and pain between shoulder blades. Technique: Axial CT angiography images through the lungs were acquired with contrast and imaged using soft tissue and lung algorithms. Coronal, sagittal, and 3D volume reconstructions were performed. Reformatted/3D-MPR images were performed. Radiation dose: CTDIvol =25.84 mGy, DLP =407 mGy x cm.   Contrast Used: Optiray-350. Prior studies: Radiographs of the chest done on same day. Findings: Lungs: Enhancing solid lesion posteriorly within the left lower lobe. Measures 3.2 x 4.3 x 3.7 cm. No pulmonary infiltrate identified. No pleural effusions identified. No soft tissue pulmonary nodules. Increase in bronchopulmonary markings. Peribronchial cuffing. No pneumothorax. The airway is clear. Soft Tissues: A prominent subcarinal lymph node measures 1.3 x 1.2 cm. Otherwise, no enlarged mediastinal, axillary or supraclavicular adenopathy isidentified. Vascular: No filling defect within the pulmonary arteries to the segmental branch level. Unremarkable aorta. Grossly unremarkable sized heart. No definite abnormality seen on 3D reformatted images. Bony structures: No acute or destructive abnormality. Degenerative changes of the spine. No lytic or blastic lesion. Upper Abdomen: Small hiatal hernia with asymmetric an irregular mucosal thickening of the distal esophagus. Esophageal mucosal mass is suspected in the distal left aspect measuring 2.4 x 1.8 cm. Image number 98 on series number 4. Diffuse metastatic disease suspected to the liver. 1. No acute pulmonary embolism. 2. Enhancing solid lesion posteriorly within the left lower lobe. Measures 3.2 x 4.3 x 3.7 cm and is most consistent with lung neoplasm. Metastatic subcarinal lymph node is suspected. 3. Asymmetric mucosal thickening of the distal esophagus with masslike abnormality in the left lateral aspect measuring 2.4 x 1.8 cm. An esophageal mass is suspected. Metastatic disease here is not excluded. 4. Diffuse metastatic disease of the liver. 5. No lytic or blastic lesion. Recommendation: Follow up as clinically indicated. All CT scans at this facility utilize dose modulation, iterative reconstruction, and/or weight based dosing when appropriate to reduce radiation dose to as low as reasonably achievable.  Dictated and Electronically Signed by Adelita Bashir MD,  CERTIFIED at [id number] 10:32:29 PM EST             EGD    Result Date: 1/31/2023  No dictation       Disposition:  Home    Discharge Instructions/Follow-up:  PCP and Oncology    Code Status:  Full Code     Activity: activity as tolerated    Diet: regular diet    Labs: For convenience and continuity at follow-up the following most recent labs are provided:  CBC:   Recent Labs     01/30/23  1850 02/01/23 0223 02/02/23  0234   WBC 11.4* 9.0 10.3   HGB 14.8 12.9 13.2   HCT 45.4 39.3 41.0    276 283       Renal:   Recent Labs     01/30/23 1850 02/01/23 0223 02/02/23  0234    140 139   K 4.3 5.1* 4.7    103 101   CO2 27 29 26   BUN 22 23 24*   CREATININE 1.0* 1.2* 1.1*   GLUCOSE 152* 108 101   CALCIUM 9.9 10.1 10.1        Other:  No results found for: LABA1C  No results found for: EAG  Lab Results   Component Value Date    TSH 3.600 06/28/2022     Lab Results   Component Value Date    CHOL 161 06/28/2022     Lab Results   Component Value Date    TRIG 182 (H) 06/28/2022     Lab Results   Component Value Date    HDL 55 (L) 06/28/2022     Lab Results   Component Value Date    LDLCALC 70 06/28/2022     No results found for: LABVLDL, VLDL  No results found for: CHOLHDLRATIO    ABGs:   No results found for: PHART, PO2ART, DYK3AQM    Culture:   No results for input(s): BC, BLOODCULT2, ORG in the last 72 hours. Discharge Medications:   Current Discharge Medication List             Details   HYDROcodone-acetaminophen (NORCO) 7.5-325 MG per tablet Take 1 tablet by mouth every 6 hours as needed for Pain for up to 3 days. Intended supply: 3 days.  Take lowest dose possible to manage pain Max Daily Amount: 4 tablets  Qty: 18 tablet, Refills: 0    Comments: Reduce doses taken as pain becomes manageable  Associated Diagnoses: Cancer related pain                Details   vitamin D (ERGOCALCIFEROL) 1.25 MG (70618 UT) CAPS capsule TAKE 1 CAPSULE BY MOUTH TWICE A WEEK  Qty: 24 capsule, Refills: 0      albuterol sulfate HFA (PROVENTIL HFA) 108 (90 Base) MCG/ACT inhaler Inhale 2 puffs into the lungs every 6 hours as needed for Wheezing  Qty: 18 g, Refills: 3      thyroid (ARMOUR) 60 MG tablet Take 1 tablet by mouth daily Compound from strawberry HIll   t-4-60/t3-12  Qty: 90 tablet, Refills: 1    Associated Diagnoses: Acquired hypothyroidism      Omega 3 1200 MG CAPS Take by mouth 2 times daily      lovastatin (MEVACOR) 40 MG tablet TAKE 1 TABLET EVERY NIGHT  Qty: 90 tablet, Refills: 3      losartan (COZAAR) 100 MG tablet TAKE 1 TABLET EVERY DAY  Qty: 90 tablet, Refills: 3      estradiol (ESTRACE) 0.5 MG tablet TAKE 1 TABLET EVERY DAY  Qty: 90 tablet, Refills: 3      fluticasone-salmeterol (ADVAIR DISKUS) 500-50 MCG/DOSE diskus inhaler Inhale 1 puff into the lungs daily as needed       calcium carbonate-vitamin D (CALCIUM 600+D) 600-200 MG-UNIT TABS Take 1 tablet by mouth 2 times daily           Current Facility-Administered Medications   Medication Dose Route Frequency Provider Last Rate Last Admin    polyethylene glycol (GLYCOLAX) packet 17 g  17 g Oral Daily CHRIS Shi - CNP   17 g at 02/01/23 1640    albuterol (PROVENTIL) nebulizer solution 2.5 mg  2.5 mg Nebulization Q4H PRN Kavya Parker MD        budesonide (PULMICORT) nebulizer suspension 500 mcg  0.5 mg Nebulization Q12H Kavya Parker MD   500 mcg at 02/02/23 0635    arformoterol tartrate (BROVANA) nebulizer solution 15 mcg  15 mcg Nebulization BID Kavya Parker MD   15 mcg at 02/02/23 0635    thyroid (ARMOUR) tablet 60 mg  60 mg Oral QAM AC Kavya Parker MD   60 mg at 02/02/23 0606    atorvastatin (LIPITOR) tablet 10 mg  10 mg Oral Nightly Kavya Parker MD   10 mg at 02/01/23 2041    [Held by provider] losartan (COZAAR) tablet 100 mg  100 mg Oral Daily Kavya Parker MD   100 mg at 01/31/23 0940    oyster shell calcium w/D 500-5 MG-MCG tablet 1 tablet  1 tablet Oral BID Kavya Parker MD   1 tablet at 02/02/23 0754    sodium chloride flush  0.9 % injection 5-40 mL  5-40 mL IntraVENous 2 times per day Dallin Go MD   10 mL at 02/02/23 0757    sodium chloride flush 0.9 % injection 5-40 mL  5-40 mL IntraVENous PRN Dallin Go MD        0.9 % sodium chloride infusion   IntraVENous PRN Dallin Go MD        ondansetron (ZOFRAN-ODT) disintegrating tablet 4 mg  4 mg Oral Q8H PRN Dallin Go MD        Or    ondansetron [facility]) injection 4 mg  4 mg IntraVENous Q6H PRN Dallin Go MD        acetaminophen (TYLENOL) tablet 650 mg  650 mg Oral Q4H PRN Dallin Go MD        Or    acetaminophen (TYLENOL) suppository 650 mg  650 mg Rectal Q4H PRN Dallin Go MD        potassium chloride (KLOR-CON M) extended release tablet 40 mEq  40 mEq Oral PRN Dallin Go MD        Or    potassium bicarb-citric acid (EFFER-K) effervescent tablet 40 mEq  40 mEq Oral PRN Dallin Go MD        Or    potassium chloride 10 mEq/100 mL IVPB (Peripheral Line)  10 mEq IntraVENous PRN Dallin Go MD        magnesium sulfate 2000 mg in 50 mL IVPB premix  2,000 mg IntraVENous PRN Dallin Go MD        melatonin disintegrating tablet 5 mg  5 mg Oral Nightly PRN Dallin Go MD        calcium carbonate (TUMS) chewable tablet 500 mg  500 mg Oral TID PRFRANTZ Go MD        oxyCODONE-acetaminophen (PERCOCET) 5-325 MG per tablet 1 tablet  1 tablet Oral Q4H PRFRANTZ Go MD   1 tablet at 02/02/23 0754    HYDROmorphone HCl PF (DILAUDID) injection 0.5 mg  0.5 mg IntraVENous Q4H PRFRANTZ Go MD   0.5 mg at 01/31/23 1038    [START ON 2/3/2023] vitamin D (CHOLECALCIFEROL) capsule 5,000 Units  5,000 Units Oral Weekly March Aspen, APRN - CNP        enoxaparin (LOVENOX) injection 40 mg  40 mg SubCUTAneous Q24H Dallin Go MD   40 mg at 02/01/23 2211          DISCHARGE STATUS:    Condition: Good    Extended Emergency Contact Information  Primary Emergency Contact: Soy Eldridge  Address: [facility], [address] Phone: [phone]  Relation: Spouse  Secondary Emergency Contact: Nba Mendieta  Mobile Phone: [phone]  Relation: Child         Time Spent on discharge is more than 30 minutes in the examination, evaluation, counseling and review of medications and discharge plan. CHRIS Mccord - CNP  Internal Medicine Hospitalist    Thank you CHRIS Lugo for the opportunity to be involved in this patient's care.  If you have any questions or concerns please feel free to contact me at [phone]

## 2023-02-02 NOTE — PROGRESS NOTES
Palliative Care Progress Note  2/2/2023 8:16 AM    Patient:  Nikkie Banks  YOB: 1950  Primary Care Physician: CHRIS Corcoran  Advance Directive: Full Code  Admit Date: 1/30/2023       Hospital Day: 3  Portions of this note have been copied forward, however, changed to reflect the most current clinical status of this patient.    CHIEF COMPLAINT/REASON FOR CONSULTATION Goals of care, family support, Code status, symptom management     SUBJECTIVE:  Ms. Banks is up in recliner chair. Feeling improved today. Pain is well controlled. Anxious to d/c home.     HPI: The patient is a 72 y.o. female with PMH HTN, HLD, hypothyroid, CKD, and other comorbidities who presented to [facility] ED 1/30/2023 complaining of back pain x2-3 days. Patient reported pain was initially a mild aching that has progressed to severe. Denied any associated SOB. Workup in ED revealed creatinine/BUN 1.0/22, troponin negative, and WBC 11.4. Rapid COVID screening was negative. CXR was unremarkable. CTA chest completed with no evidence of PE but revealing an enhancing solid lesion posteriorly within the left lower lobe measuring 3.2 x 4.3 x 3.7 cm, asymmetric an irregular mucosal thickening of the distal esophagus, and suspected diffuse metastatic disease to the liver. Patient was admitted under hospitalist services with oncology evaluation. Concern for advanced metastatic disease. Plans for CT guided biopsy to obtain tissue for diagnosis. Palliative care consulted for code status discussion and goals of care.    Review of Systems:   14 point review of systems is negative except as specifically addressed above.    Objective:   VITALS:  BP (!) 161/82   Pulse 64   Temp 98.2 °F (36.8 °C) (Temporal)   Resp 14   Ht 5' (1.524 m)   Wt 128 lb 3.2 oz (58.2 kg)   SpO2 100%   BMI 25.04 kg/m²   24HR INTAKE/OUTPUT:    Intake/Output Summary (Last 24 hours) at 2/2/2023 0816  Last data filed at 2/1/2023 1445  Gross per 24 hour  Intake 360 ml   Output --   Net 360 ml       General appearance: 68 yo female, ill appearing, NAD  Head: Normocephalic, without obvious abnormality, atraumatic  Eyes: conjunctivae/corneas clear. PERRL, EOM's intact. Ears/Nose: normal external ears and nose  Neck/Throat: supple, symmetrical, trachea midline  Pulmonary: diminished to auscultation bilaterally, unlabored on room air  Cardiovascular: RRR, S1, S2 normal, no murmur  Gastrointestinal:soft, non-tender; non-distended, bowel sounds present  Musculoskeletal:No lower extremity edema,  No erythema, no tenderness to palpation  Skin: Warm, dry  Neurologic: Alert and oriented X 4, normal strength and tone, no focal deficits  Psychiatric: Calm and cooperative    Medications:      sodium chloride        polyethylene glycol  17 g Oral Daily    budesonide  0.5 mg Nebulization Q12H    arformoterol tartrate  15 mcg Nebulization BID    thyroid  60 mg Oral QAM AC    atorvastatin  10 mg Oral Nightly    [Held by provider] losartan  100 mg Oral Daily    oyster shell calcium w/D  1 tablet Oral BID    sodium chloride flush  5-40 mL IntraVENous 2 times per day    [START ON 2/3/2023] vitamin D  5,000 Units Oral Weekly    enoxaparin  40 mg SubCUTAneous Q24H     albuterol, sodium chloride flush, sodium chloride, ondansetron **OR** ondansetron, acetaminophen **OR** acetaminophen, potassium chloride **OR** potassium alternative oral replacement **OR** potassium chloride, magnesium sulfate, melatonin, calcium carbonate, oxyCODONE-acetaminophen, HYDROmorphone  ADULT DIET;  Regular     Lab and other Data:     Recent Labs     01/30/23 1850 02/01/23 0223 02/02/23  0234   WBC 11.4* 9.0 10.3   HGB 14.8 12.9 13.2    276 283       Recent Labs     01/30/23  1850 02/01/23 0223 02/02/23  0234    140 139   K 4.3 5.1* 4.7    103 101   CO2 27 29 26   BUN 22 23 24*   CREATININE 1.0* 1.2* 1.1*   GLUCOSE 152* 108 101       Recent Labs     01/30/23 1850   AST 21   ALT 13 BILITOT <0.2   ALKPHOS 131*       RAD:   CT ABDOMEN PELVIS W IV CONTRAST Additional Contrast? Oral  Result Date: 1/31/2023  Impression: 1. Multiple hepatic metastases. No other metastatic findings within the abdomen or pelvis. 2.Left lower lobe pulmonary mass, partially visualized. Dedicated CT chest is suggested. 3.Distal esophageal mass. Follow-up endoscopy is suggested. XR CHEST PORTABLE  Result Date: 1/30/2023  Unremarkable chest x-ray. Recommendation: Follow up as clinically indicated. Dictated and Electronically Signed by Екатерина Mendez MD, MQSA CERTIFIED at [id number] 08:53:53 PM EST             CTA PULMONARY W CONTRAST  Result Date: 1/30/2023  1. No acute pulmonary embolism. 2. Enhancing solid lesion posteriorly within the left lower lobe. Measures 3.2 x 4.3 x 3.7 cm and is most consistent with lung neoplasm. Metastatic subcarinal lymph node is suspected. 3. Asymmetric mucosal thickening of the distal esophagus with masslike abnormality in the left lateral aspect measuring 2.4 x 1.8 cm. An esophageal mass is suspected. Metastatic disease here is not excluded. 4. Diffuse metastatic disease of the liver. 5. No lytic or blastic lesion. Recommendation: Follow up as clinically indicated. All CT scans at this facility utilize dose modulation, iterative reconstruction, and/or weight based dosing when appropriate to reduce radiation dose to as low as reasonably achievable.  Dictated and Electronically Signed by Екатерина Mendez MD, JUAN CERTIFIED at [id number] 10:32:29 PM EST             EGD  Result Date: 1/31/2023  No dictation     Palliative Performance Scale:  [x] 80% Full ambulation  Some disease: Normal activity w/ some effort  Full self-care  Normal/reduced intake  Full LOC    ECOG:(0) Fully active, able to carry on all predisease performance without restriction    CLINICAL PAIN ASSESSMENT:   Score 1-10 (if verbal):  6  Location:   chest and back  Character:  sharp  Frequency:  continuously  What makes it worse?:   activity  What makes it better?:  pain medication    Assessment/Plan   Principal Problem: Mass of mediastinum  Active Problems:    Palliative care patient    Esophageal mass    Lung mass    Cancer related pain    Essential hypertension    Acquired hypothyroidism    Mixed hyperlipidemia    Anemia  Resolved Problems:    * No resolved hospital problems. *      Visit Summary:  Chart reviewed. Ms. Prasanna Jesus is seen at bedside this morning with her , Isreal Donaldson, present. Report obtained from RN with no acute events overnight. Plans to d/c home today. Reviewed SCOP referral and explained outpatient palliative care team would call her to schedule first visit after discharge for continued symptom management monitoring, goals of care conversations pending work-up/treatment recommendations, and additional supportive care. Pt verbalized understanding. No additional needs noted or reported at this time. Will continue to follow. Candidate for SCOP: Yes, referral order placed 1/31/2023 for outpatient palliative care to follow at home once discharged     Recommendations:      Palliative Care- GOC prolong life, continue all current medical treatment/work-up and monitor for improvement. D/C home with spouse once medically stable with SCOP to follow. Code status- FULL CODE  LLL Lung mass- oncology and pulmonology following. Concern for malignancy and metastatic disease. Liver lesions- concern for mets. CT abdomen/pelvis ordered per oncology. Liver Biopsy completed 2/1/23  Paraesophageal mass- vs adenopathy. GI consulted. EGD 1/31/23 with no mass/lesion seen. Cancer related pain- Agree with percocet  5/325 mg Q4h prn, patient reports effective. Continue IVP dilaudid 0.5 mg Q4h prn for breakthrough symptoms and wean as able. Monitor for symptom control. Constipation- chronic issue. Bowel regimen in place.  Monitor for need to escalate regimen    Thank you for consulting Palliative Care and allowing us to participate in the care of this patient.      Total Time Spent with patient assessment, interview of independent historian/HCS, workup/treatment review, discussion with medical team, review of current and home medications, and placement of orders/preparation of this note: 31 minutes                               Electronically signed by CHRIS Vergara CNP on 2/2/2023 at 8:16 AM    (Please note that portions of this note were completed with a voice recognition program.  Yeisca Orellana made to edit the dictations but occasionally words are mis-transcribed.)

## 2023-02-02 NOTE — DISCHARGE INSTRUCTIONS
[address] [place] [address]  PATIENT EDUCATION  Liver Biopsy Discharge Instructions  Care Needed at Home:  · Resume regular diet  · Rest in bed or on the couch for the next 24 hours  · No heavy lifting, bending, twisting or straining for 24 hours  · You may remove bandage in the morning if no bleeding. · You may shower the next day. · Be sure to wash your hands before touching the wound or dressing. · You may need to limit your activity for 3 to 5 days depending on your normal daily routine. · Avoid sports or activities that involve rough contact with your belly. What Problems Could Happen? · Pain  · Bleeding  · Hematoma (collection of blood under the skin)  · Infection  · Collapsed lung  When Should I Call the Doctor? · Signs of infection: fever of 100.4 or higher, chills or non-healing wound. · Signs of a wound infection: swelling, redness, warmth around the wound; too much pain when  touched; yellowish, greenish or bloody discharge; foul smell coming from the cut site; cute site  opens up. · You are not feeling better in 2 to 3 days or you are feeling worse. · Signs of a hematoma: if skin around site turns dark red or purple, abdomen gets firm to the  touch, or if develop severe pain. · Signs of bleeding: blood on Band-Aid, if this occurs, roll up face towel and put at site, lie on that  side until bleeding stops or have someone put direct pressure on it until the bleeding stops.  If  you cannot get the bleeding to stop then call the doctor.  _____________________________________________________________________________________  Signature of Patient or Responsible Person Date Time  _____________________________________________________________________________________  Signature of Instructing Nurse Date Time

## 2023-02-02 NOTE — PROGRESS NOTES
MEDICAL ONCOLOGY PROGRESS NOTE     Pt Name: Saba Feliciano  MRN: [id number]  YOB: 1950  Date of evaluation: 2/2/2023    Subjective-she underwent CT-guided biopsy 2/1/2023. Denies any abdominal pain. HISTORY OF PRESENT ILLNESS:  Diagnosis  LLL lesion  Liver metastasis    Treatment summary  To be determined      Cancer history:   Qiana Brooks was first seen by me on 1/31/2023. I was consulted for abnormal CT scan findings of a lung mass, mediastinal adenopathy and a lower esophageal mass. She presented to the ER department on 1/30/2023 at [facility] with complaints of back pain. She has a history of hypertension, hypothyroidism, obesity, hyperlipidemia and chronic kidney disease. The pain started about 2-3 days before she comes to the hospital.  Denies any shortness of breath. Denies any fever or chills. Imaging studies were performed emergency. 1/30/2023-CTA chest showed no evidence of pulmonary embolism. Lungs: Enhancing solid lesion posteriorly within the left lower lobe. Measures 3.2 x 4.3 x 3.7 cm. Subcarinal lymph node measures 1.3 x 1.2 cm. Asymmetric an irregular mucosal thickening of the distal esophagus. Esophageal mucosal mass is suspected in the distal left aspect measuring 2.4 x 1.8 cm. Diffuse metastatic disease suspected to the liver. I was consulted for the above findings. 1/31/2023-she was seen by GI at [facility]. Endoscopy showed no evidence of esophageal lesion. 1/31/2023-CT abdomen/pelvis with oral/IV contrast showed multiple liver lesions with the largest lesion measured 2.5 cm and 1.9 cm in the right hepatic lobe. 2/1/2023-patient underwent CT-guided biopsy of liver lesions by interventional radiology at [facility]      Past Medical History:    Past Medical History:   Diagnosis Date    Acquired hypothyroidism 08/20/2017    Alopecia 08/21/2017    Breast implant capsular contracture     \"with cysts behind right breast\" per pt.      Essential hypertension 08/20/2017 Familial hypercholesterolemia 08/20/2017    PONV (postoperative nausea and vomiting)     Prolonged emergence from general anesthesia     Stage 1 chronic kidney disease 6/29/2021    Tobacco abuse, in remission        Past Surgical History:    Past Surgical History:   Procedure Laterality Date    BREAST CYST EXCISION  2022    BREAST ENHANCEMENT SURGERY Bilateral 06/11/2020    REMOVAL BILATERAL CONTRACTURED IMPLANTS WITH BILATERAL PECTORAL BLOCK performed by Yaima Lora MD at [address] Bilateral 2008    bunion correct osteotomy smith double-stem lesser MTP Impl    COLONOSCOPY  2017    HEMORRHOID SURGERY  2005    HYSTERECTOMY (CERVIX STATUS UNKNOWN)  1980    KNEE SURGERY Right 2007    RECTAL SURGERY  2000    anal fissurectomy with sphincterotomy    MAICOL AND BSO (CERVIX REMOVED) Bilateral 1980    both ovaries removed, had surgery as a result of endometriosis    TUBAL LIGATION  1975    UPPER GASTROINTESTINAL ENDOSCOPY N/A 01/31/2023    Dr Barton Severe History:    Marital status:  Smoking status:Former smoker, quit 1988  ETOH status: Yes  Resides:[place], [place]    Family History:   Family History   Problem Relation Age of Onset    High Blood Pressure Mother     Diabetes Mother         Type 2    Obesity Mother     High Blood Pressure Father     Diabetes Sister         Type 2    Obesity Sister     Diabetes Sister     Obesity Sister     Diabetes Sister     Obesity Sister     Diabetes Brother         Type 2    Cancer Brother         Blood form of cancer-not leukemia     Obesity Brother     No Known Problems Son     No Known Problems Son        Current Hospital Medications:    Current Facility-Administered Medications   Medication Dose Route Frequency Provider Last Rate Last Admin    polyethylene glycol (GLYCOLAX) packet 17 g  17 g Oral Daily CHRIS Shi CNP   17 g at 02/01/23 1640    albuterol (PROVENTIL) nebulizer solution 2.5 mg  2.5 mg Nebulization Q4H PRN Sonu Tripathi MD        budesonide (PULMICORT) nebulizer suspension 500 mcg  0.5 mg Nebulization Q12H Radha Hunt MD   500 mcg at 02/01/23 1849    arformoterol tartrate (BROVANA) nebulizer solution 15 mcg  15 mcg Nebulization BID Radha Hunt MD   15 mcg at 02/01/23 1849    thyroid (ARMOUR) tablet 60 mg  60 mg Oral QAM AC Radha Hunt MD        atorvastatin (LIPITOR) tablet 10 mg  10 mg Oral Nightly Radha Hunt MD   10 mg at 02/01/23 2041    [Held by provider] losartan (COZAAR) tablet 100 mg  100 mg Oral Daily Radha Hunt MD   100 mg at 01/31/23 0940    oyster shell calcium w/D 500-5 MG-MCG tablet 1 tablet  1 tablet Oral BID Radha Hunt MD   1 tablet at 02/01/23 2041    sodium chloride flush 0.9 % injection 5-40 mL  5-40 mL IntraVENous 2 times per day Radha Hunt MD   10 mL at 02/01/23 2041    sodium chloride flush 0.9 % injection 5-40 mL  5-40 mL IntraVENous PRN Radha Hunt MD        0.9 % sodium chloride infusion   IntraVENous PRN Radha Hunt MD        ondansetron (ZOFRAN-ODT) disintegrating tablet 4 mg  4 mg Oral Q8H PRN Radha Hunt MD        Or    ondansetron [facility]) injection 4 mg  4 mg IntraVENous Q6H PRN Radha Hunt MD        acetaminophen (TYLENOL) tablet 650 mg  650 mg Oral Q4H PRN Radha Hunt MD        Or    acetaminophen (TYLENOL) suppository 650 mg  650 mg Rectal Q4H PRN Radha Hunt MD        potassium chloride (KLOR-CON M) extended release tablet 40 mEq  40 mEq Oral PRFRANTZ Hunt MD        Or    potassium bicarb-citric acid (EFFER-K) effervescent tablet 40 mEq  40 mEq Oral PRFRANTZ Hunt MD        Or    potassium chloride 10 mEq/100 mL IVPB (Peripheral Line)  10 mEq IntraVENous PRN Radha Hunt MD        magnesium sulfate 2000 mg in 50 mL IVPB premix  2,000 mg IntraVENous PRN Radha Hunt MD        melatonin disintegrating tablet 5 mg  5 mg Oral Nightly PRN Radha Hunt MD        calcium carbonate (TUMS) chewable tablet 500 mg 500 mg Oral TID PRN Jude Noel MD        oxyCODONE-acetaminophen (PERCOCET) 5-325 MG per tablet 1 tablet  1 tablet Oral Q4H PRN Jude Noel MD   1 tablet at 02/01/23 2210    HYDROmorphone HCl PF (DILAUDID) injection 0.5 mg  0.5 mg IntraVENous Q4H PRN Jude Noel MD   0.5 mg at 01/31/23 1038    [START ON 2/3/2023] vitamin D (CHOLECALCIFEROL) capsule 5,000 Units  5,000 Units Oral Weekly Honey Barn, APRN - CNP        enoxaparin (LOVENOX) injection 40 mg  40 mg SubCUTAneous Q24H Jude Noel MD   40 mg at 02/01/23 2211       Allergies: Allergies   Allergen Reactions    Compazine [Prochlorperazine Maleate] Other (See Comments)     Eyelids flipped up and mouth stretched to the side. Thorazine [Chlorpromazine] Other (See Comments)     Eyelids flipped up and mouth stretched to the side        Objective   /66   Pulse 68   Temp 98.2 °F (36.8 °C) (Temporal)   Resp 16   Ht 5' (1.524 m)   Wt 128 lb 3.2 oz (58.2 kg)   SpO2 98%   BMI 25.04 kg/m²     PHYSICAL EXAM:  CONSTITUTIONAL: Alert, appropriate, no acute distress  EYES: Non icteric, EOM intact, pupils equal round   ENT: Mucus membranes moist,external inspection of ears and nose are normal  NECK: Supple, no masses. No palpable thyroid mass  CHEST/LUNGS: CTA bilaterally, normal respiratory effort   CARDIOVASCULAR: RRR, no murmurs. No lower extremity edema  ABDOMEN: soft non-tender, active bowel sounds, no HSM. No palpable masses  EXTREMITIES: warm, full ROM in all 4 extremities, no focal weakness. SKIN: warm, dry with no rashes or lesions  LYMPH: No cervical, clavicular, axillary, or inguinal lymphadenopathy  NEUROLOGIC: follows commands, non focal   PSYCH: mood and affect appropriate.  Alert and oriented to time, place, person        LABORATORY RESULTS REVIEWED/ANALYZED BY ME:  Recent Labs     02/02/23  0234 02/01/23  0223 01/30/23  1850   WBC 10.3 9.0 11.4*   HGB 13.2 12.9 14.8   HCT 41.0 39.3 45.4   MCV 94.7 94.0 95.0    276 326       Lab Results   Component Value Date     02/02/2023    K 4.7 02/02/2023     02/02/2023    CO2 26 02/02/2023    BUN 24 (H) 02/02/2023    CREATININE 1.1 (H) 02/02/2023    GLUCOSE 101 02/02/2023    CALCIUM 10.1 02/02/2023    PROT 8.0 01/30/2023    LABALBU 4.4 01/30/2023    BILITOT <0.2 01/30/2023    ALKPHOS 131 (H) 01/30/2023    AST 21 01/30/2023    ALT 13 01/30/2023    LABGLOM 53 (A) 02/02/2023    GFRAA >59 06/28/2022       Lab Results   Component Value Date    INR 0.96 01/30/2023    PROTIME 12.7 01/30/2023       RADIOLOGY STUDIES REPORT/REVIEWED AND INTERPRETED BY ME:  XR CHEST PORTABLE    Result Date: 1/30/2023  NO PRIOR REPORT AVAILABLE Exam: X-RAY OF [facility] Clinical data:Chest pain. Technique:Single frontal portable view of the chest. Prior studies: No prior studies submitted. Findings: The lungs are grossly clear; noevidence of acute infiltrate or pleural effusion. Cardiac silhouette is within normal limits. No acute osseous abnormality is detected. Unremarkable chest x-ray. Recommendation: Follow up as clinically indicated. Dictated and Electronically Signed by Donovan Burns MD, SA CERTIFIED at [id number] 08:53:53 PM EST             CTA PULMONARY W CONTRAST    Result Date: 1/30/2023  NO PRIOR REPORT AVAILABLE She Exam: CTA OF THE CHEST WITH CONTRAST Clinical data: Severe right back pain and pain between shoulder blades. Technique: Axial CT angiography images through the lungs were acquired with contrast and imaged using soft tissue and lung algorithms. Coronal, sagittal, and 3D volume reconstructions were performed. Reformatted/3D-MPR images were performed. Radiation dose: CTDIvol =25.84 mGy, DLP =407 mGy x cm. Contrast Used:  Optiray-350. Prior studies: Radiographs of the chest done on same day. Findings: Lungs: Enhancing solid lesion posteriorly within the left lower lobe. Measures 3.2 x 4.3 x 3.7 cm. No pulmonary infiltrate identified. No pleural effusions identified. No soft tissue pulmonary nodules. Increase in bronchopulmonary markings. Peribronchial cuffing. No pneumothorax. The airway is clear. Soft Tissues: A prominent subcarinal lymph node measures 1.3 x 1.2 cm. Otherwise, no enlarged mediastinal, axillary or supraclavicular adenopathy isidentified. Vascular: No filling defect within the pulmonary arteries to the segmental branch level. Unremarkable aorta. Grossly unremarkable sized heart. No definite abnormality seen on 3D reformatted images. Bony structures: No acute or destructive abnormality. Degenerative changes of the spine. No lytic or blastic lesion. Upper Abdomen: Small hiatal hernia with asymmetric an irregular mucosal thickening of the distal esophagus. Esophageal mucosal mass is suspected in the distal left aspect measuring 2.4 x 1.8 cm. Image number 98 on series number 4. Diffuse metastatic disease suspected to the liver. 1. No acute pulmonary embolism. 2. Enhancing solid lesion posteriorly within the left lower lobe. Measures 3.2 x 4.3 x 3.7 cm and is most consistent with lung neoplasm. Metastatic subcarinal lymph node is suspected. 3. Asymmetric mucosal thickening of the distal esophagus with masslike abnormality in the left lateral aspect measuring 2.4 x 1.8 cm. An esophageal mass is suspected. Metastatic disease here is not excluded. 4. Diffuse metastatic disease of the liver. 5. No lytic or blastic lesion. Recommendation: Follow up as clinically indicated. All CT scans at this facility utilize dose modulation, iterative reconstruction, and/or weight based dosing when appropriate to reduce radiation dose to as low as reasonably achievable. Dictated and Electronically Signed by Becki Moreno MD, SA CERTIFIED at [id number] 10:32:29 PM EST                    My interpretation: Left lower lobe lung mass, multiple liver lesions highly concerning for metastatic disease. ASSESSMENT:  LLL lung mass with mediastinal adenopathy-likely lung malignancy. Very remote history of smoking. She quit 40 years ago. Liver lesions-concern for metastatic disease. Status post CT-guided biopsy liver lesion 2/1/2023 by interventional radiology at [facility]. Hemoglobin is stable. Paraesophageal mass versus adenopathy. This could represent periesophageal adenopathy. Upper endoscopy was normal.  Poor prognosis-likely malignancy with stage IV disease with possible liver metastasis. Palliative care following. Neutrophil leukocytosis-mild. Continue to monitor  Disposition-okay to discharge from oncology standpoint. I will see the patient in the clinic to follow-up on results of biopsy and further treatment recommendations. Please arrange follow-up with palliative care as outpatient. PLAN:  Follow-up results of pathology  We will order bone scan to be performed before outpatient visit  We will arrange appointment with me next week to discuss results pathology  Discussion with the patient regarding staging. Discussion of treatment options will take place after results of biopsy and staging scans.    Discussed bedside RN    (Please note that portions of this note were completed with a voice recognition program. Efforts were made to edit the dictations but occasionally words are mis-transcribed.)        Josie Baird MD    02/02/23  4:54 AM

## 2023-02-02 NOTE — CARE COORDINATION
Case Management Assessment  Initial Evaluation    Date/Time of Evaluation: 2/2/2023 10:29 AM  Assessment Completed by: Yoli Guerrier RN    If patient is discharged prior to next notation, then this note serves as note for discharge by case management. Patient Name: Nicolasa Ames                   YOB: 1950  Diagnosis: Lung mass [R91.8]  Esophageal mass [K22.89]  Mass of mediastinum [J98.59]                   Date / Time: 1/30/2023  6:46 PM    Patient Admission Status: Inpatient   Readmission Risk (Low < 19, Mod (19-27), High > 27): Readmission Risk Score: 7.6    Current PCP: CHRIS Gallardo  PCP verified by CM? Yes    Chart Reviewed: Yes      History Provided by:    Patient Orientation: Alert and Oriented    Patient Cognition: Alert    Hospitalization in the last 30 days (Readmission):  No    If yes, Readmission Assessment in CM Navigator will be completed.     Advance Directives:      Code Status: Full Code   Patient's Primary Decision Maker is: Legal Next of Kin (spouse)    Primary Decision Maker: ChenteSoy - Spouse - [phone]    Secondary Decision Maker: Carroll Villa  Child - [phone]    Discharge Planning:    Patient lives with: (P) Spouse/Significant Other Type of Home: (P) House  Primary Care Giver: Self  Patient Support Systems include: Spouse/Significant Other   Current Financial resources: (P) Medicare  Current community resources: (P)  (none needed)  Current services prior to admission: (P) None            Current DME:              Type of Home Care services:  (P) None    ADLS  Prior functional level: (P) Independent in ADLs/IADLs  Current functional level: (P) Independent in ADLs/IADLs    PT AM-PAC:   /24  OT AM-PAC:   /24    Family can provide assistance at DC: (P) Yes  Would you like Case Management to discuss the discharge plan with any other family members/significant others, and if so, who? (P) No  Plans to Return to Present Housing: (P) Yes  Other Identified Issues/Barriers to RETURNING to current housing: none noted  Potential Assistance needed at discharge: (P) N/A            Potential DME:    Patient expects to discharge to: (P) [facility] for transportation at discharge: (P) Family    Financial    Payor: [employer] MEDICARE / Plan: David Leahy / Product Type: *No Product type* /     Does insurance require precert for SNF: Yes    Potential assistance Purchasing Medications: No  Meds-to-Beds request: Yes      [address] [facility] Annmarie Arzate [phone] - F [phone]  [address] [facility]. [address]  Phone: [phone] Fax: [address] [facility] [address] [facility] [phone] - f [phone]  [address] [facility]  [address] [place] [address]  Phone: [phone] Fax: [phone]    [address] [facility] [address] [facility], [facility] Javid Mitchelliredo [address] [facility] [phone] Critical access hospital [phone]  [address] [facility]  [address] [place] [address]  Phone: [phone] Fax: [phone]      Notes:    Factors facilitating achievement of predicted outcomes: Family support, Caregiver support, Cooperative, and Pleasant    Barriers to discharge: lung/esophageal/mediastinum mass    Additional Case Management Notes: Pt independent at baseline, ADL's/IADL's, and driving. At this time, not additional home care needs noted. Pt will f/u with   Dr. Rika Anand, most likely chemo/rad. Pt agreeable to SCOP referral    The Plan for Transition of Care is related to the following treatment goals of Lung mass [R91.8]  Esophageal mass [K22.89]  Mass of mediastinum [Y47.99]    IF APPLICABLE: The Patient and/or patient representative Nikkie and her family were provided with a choice of provider and agrees with the discharge plan.  Freedom of choice list with basic dialogue that supports the patient's individualized plan of care/goals and shares the quality data associated with the providers was provided to: patient Patient Representative Name: patient    The Patient and/or Patient Representative Agree with the Discharge Plan?       Rebecca Oneill RN  Case Management Department  Ph: [phone] Fax: [phone]

## 2023-02-02 NOTE — PROGRESS NOTES
Date of the Proposed Procedure:   2/1/2023     Proposed Procedure:  Liver Biopsy    Radiologist:  Ryan Romna MD    Indications:  Abnormal radiology scan and liver nodule    American Society of Anesthesiologists (ASA) Classification:  ASA 2 - Patient with mild systemic disease with no functional limitations    Plan of Sedation:  Moderate Sedation    Mallampati Classification: I (soft palate, uvula, fauces, tonsillar pillars visible)    Prior to procedure:  I have reviewed the recent H&P from the referring physician, or other provider, related to ordered procedure. No interval changes were found upon examination/review since the original History and Physical / Consult Note. I have performed a pre-procedure assessment of this patient on 2/1/2023, in the CT procedure room, in the presence of a  Registered Nurse and [address]. I discussed with the patient,  in detail,  the risks, benefits, and alternatives to this procedure. Patient/Guardian is aware there are risks associated with moderate sedation which includes transient drop in blood pressure and need for assisted ventilation. None    Plan for discharge:  Discharge patient after post procedure ordered recovery time. Post niyah score at pre-procedure baseline, score of at least 8 prior to discharge.       Ryan Roman MD  5/7/18807:59 PM

## 2023-02-02 NOTE — CARE COORDINATION
CM presented patient with copy of IMM letter. CM reviewed contents of IMM letter, and answered all questions/concerns. CM placed signed copy of IMM in patient chart.

## 2023-02-03 ENCOUNTER — CARE COORDINATION (OUTPATIENT)
Dept: CASE MANAGEMENT | Age: 73
End: 2023-02-03

## 2023-02-03 DIAGNOSIS — G89.3 CANCER RELATED PAIN: Primary | ICD-10-CM

## 2023-02-03 PROCEDURE — 1111F DSCHRG MED/CURRENT MED MERGE: CPT | Performed by: NURSE PRACTITIONER

## 2023-02-03 NOTE — CARE COORDINATION
[facility] Care Transitions Initial Follow Up Call    Call within 2 business days of discharge: Yes    Attempted to make contact with patient/caregiver for an initial Care Transitions Coordination follow up call post discharge from the hospital without success. Unable to leave a message regarding intent of call and call back information. Call was forwarded to an unidentifiable voice messaging system/mobile voice mail. CTN will follow up again at a later time. Any previously scheduled hospital follow up appointments as noted. Patient: Renaye Gitelman Patient : 1950   MRN: [id number]   Discharge Date: 23 RARS: Readmission Risk Score: 7.7      Last Discharge [address]       Date Complaint Diagnosis Description Type Department Provider    23 Chest Pain Lung mass . .. ED to Hosp-Admission (Discharged) (ADMITTED) [facility] 5 SURG Trinidad Beltran MD; Jesusita Ritchie. .. Was this an external facility discharge? No     Non-face-to-face services provided:  Reviewed encounter information for continuity of care prior to follow up Care Transitions Coordination phone call - chart notes, consults, progress notes, test results, med list, appointments, AVS, other information. Follow Up  Future Appointments   Date Time Provider Deepti Tapia   2023  9:45 AM CHRIS Sanchez [facility]   2/15/2023  9:00 AM [facility] NM INJECTION RM [facility] L[facility] NUCM [facility] Rad   2/15/2023 11:45 AM [facility] NUC MED RM1 [facility] LMP NUCM [facility] LMP Rad   3/13/2023  8:10 AM [facility] MAMMO RM 1 [facility] WOMENS [facility] Women's     Plan for next call: Second attempt at initial contact with patient following discharge from the hospital on 2023.     Lauri Reese RN

## 2023-02-03 NOTE — CARE COORDINATION
[facility] Care Transitions Initial Follow Up Call    Call within 2 business days of discharge: Yes    Care Transition Nurse contacted the patient by telephone to perform post hospital discharge assessment. Verified name and  with patient as identifiers. Provided introduction to self, and explanation of the Care Transition Nurse role. Patient: Yunior Freeman Patient : 1950   MRN: [id number]    Discharge Date: 23 RARS: Readmission Risk Score: 7.7      Last Discharge [address]       Date Complaint Diagnosis Description Type Department Provider    23 Chest Pain Lung mass . .. ED to Hosp-Admission (Discharged) (ADMITTED) [facility] 5 SURG Nathaly Crowder MD; Cristofer Falcon. .. Was this an external facility discharge? No     Challenges to be reviewed by the provider   Additional needs identified to be addressed with provider: No  none       Method of communication with provider: none. Care Transition Nurse reviewed discharge instructions, medical action plan, and red flags with patient who verbalized understanding. The patient was given an opportunity to ask questions and does not have any further questions or concerns at this time. Were discharge instructions available to patient? Yes. Reviewed appropriate site of care based on symptoms and resources available to patient including:  PCP  Specialist  Urgent care clinics  [facility]  When to call [address] The patient agrees to contact the PCP office for questions related to their healthcare. Advance Care Planning:   Does patient have an Advance Directive: reviewed and current. Advance Care Planning   Healthcare Decision Maker:    Primary Decision Maker: Jose Andujar - [phone]    Secondary Decision Maker: Stephen Moody - [phone]    Medication reconciliation was performed with patient, who verbalizes understanding of administration of home medications.  Medications reviewed, 1111F entered: yes    Was patient discharged with a pulse oximeter? no    Non-face-to-face services provided:  Reviewed encounter information for continuity of care prior to follow up Care Transitions Coordination phone call - chart notes, consults, progress notes, test results, med list, appointments, AVS, other information. Offered patient enrollment in the Remote Patient Monitoring (RPM) program for in-home monitoring: NA.    Care Transitions 24 Hour Call    Schedule Follow Up Appointment with PCP: Completed  Do you have a copy of your discharge instructions?: Yes  Do you have all of your prescriptions and are they filled?: Yes  Have you been contacted by a [facility] Pharmacist?: No  Have you scheduled your follow up appointment?: Yes  How are you going to get to your appointment?: Car - drive self  Do you feel like you have everything you need to keep you well at home?: Yes  Care Transitions Interventions         Follow Up  Future Appointments   Date Time Provider Deepti Tapia   2/8/2023  9:45 AM CHRIS Ramirez LPS [facility]   2/15/2023  9:00 AM [facility] L[facility] NM INJECTION RM [facility] LMP NUCM [facility] LMP Rad   2/15/2023 11:45 AM [facility] LMP NUC MED RM1 [facility] LMP NUCM [facility] LMP Rad   3/13/2023  8:10 AM [facility] MAMMO RM 1 [facility] WOMENS L Women's     Placed a call back to patient and spoke with her for an initial follow up call. She reported that she is doing fairly well this am.  She said she slept well last night. She said she took a pain pill around 10 pm and slept through most of the night. She said she feels good this morning. Said the pain meds are controlling the pain well at this time. Encouraged to let the providers know if it gets to a point where they are not. She said she would. She said she has all of her meds, taking them as ordered. Did a med review. 1111F completed. She sees her PCP for hospital follow up on 2/8.   She also has a whole body bone scan set up for 2/15 and then follows up with oncology after that.  She said her appetite is good, eating well, drinking well. She is getting around well in her home, strength is good. No other issues reported at this time. Informed/reminded of Care Transitions Coordination process, purpose, future calls, etc and is agreeable with appropriate follow up. Ensured to have CTN contact information to be used as needed. Encouraged to call for new/ongoing issues, questions, concerns, etc.  Encouraged to make contact with PCP as indicated for any further needs as well. Given the opportunity to ask questions and answered appropriately.   CTN to follow up as indicated in a few days for assessment of new/ongoing symptoms, pain, nausea, etc., management of self care needs in the home environment, teaching needs as indicated, etc.    Urvashi Belcher, RN   Care Transitions Nurse  [phone]

## 2023-02-06 DIAGNOSIS — C79.9 METASTATIC MALIGNANT NEOPLASM, UNSPECIFIED SITE (HCC): Primary | ICD-10-CM

## 2023-02-06 DIAGNOSIS — I10 ESSENTIAL HYPERTENSION: Chronic | ICD-10-CM

## 2023-02-06 DIAGNOSIS — E55.9 VITAMIN D DEFICIENCY: ICD-10-CM

## 2023-02-06 DIAGNOSIS — E78.2 MIXED HYPERLIPIDEMIA: ICD-10-CM

## 2023-02-06 LAB
ALBUMIN SERPL-MCNC: 3.9 G/DL (ref 3.5–5.2)
ALP BLD-CCNC: 117 U/L (ref 35–104)
ALT SERPL-CCNC: 18 U/L (ref 5–33)
ANION GAP SERPL CALCULATED.3IONS-SCNC: 13 MMOL/L (ref 7–19)
AST SERPL-CCNC: 28 U/L (ref 5–32)
BASOPHILS ABSOLUTE: 0.1 K/UL (ref 0–0.2)
BASOPHILS RELATIVE PERCENT: 0.8 % (ref 0–1)
BILIRUB SERPL-MCNC: 0.3 MG/DL (ref 0.2–1.2)
BILIRUBIN URINE: NEGATIVE
BLOOD, URINE: NEGATIVE
BUN BLDV-MCNC: 25 MG/DL (ref 8–23)
CALCIUM SERPL-MCNC: 9.6 MG/DL (ref 8.8–10.2)
CHLORIDE BLD-SCNC: 101 MMOL/L (ref 98–111)
CHOLESTEROL, TOTAL: 187 MG/DL (ref 160–199)
CLARITY: ABNORMAL
CO2: 26 MMOL/L (ref 22–29)
COLOR: YELLOW
CREAT SERPL-MCNC: 1 MG/DL (ref 0.5–0.9)
EOSINOPHILS ABSOLUTE: 0.4 K/UL (ref 0–0.6)
EOSINOPHILS RELATIVE PERCENT: 4.5 % (ref 0–5)
GFR SERPL CREATININE-BSD FRML MDRD: 60 ML/MIN/{1.73_M2}
GLUCOSE BLD-MCNC: 101 MG/DL (ref 74–109)
GLUCOSE URINE: NEGATIVE MG/DL
HCT VFR BLD CALC: 41.5 % (ref 37–47)
HDLC SERPL-MCNC: 54 MG/DL (ref 65–121)
HEMOGLOBIN: 13.4 G/DL (ref 12–16)
IMMATURE GRANULOCYTES #: 0 K/UL
KETONES, URINE: NEGATIVE MG/DL
LDL CHOLESTEROL CALCULATED: 86 MG/DL
LEUKOCYTE ESTERASE, URINE: NEGATIVE
LYMPHOCYTES ABSOLUTE: 2.4 K/UL (ref 1.1–4.5)
LYMPHOCYTES RELATIVE PERCENT: 26 % (ref 20–40)
MCH RBC QN AUTO: 31.2 PG (ref 27–31)
MCHC RBC AUTO-ENTMCNC: 32.3 G/DL (ref 33–37)
MCV RBC AUTO: 96.7 FL (ref 81–99)
MONOCYTES ABSOLUTE: 0.6 K/UL (ref 0–0.9)
MONOCYTES RELATIVE PERCENT: 6.7 % (ref 0–10)
NEUTROPHILS ABSOLUTE: 5.8 K/UL (ref 1.5–7.5)
NEUTROPHILS RELATIVE PERCENT: 61.7 % (ref 50–65)
NITRITE, URINE: NEGATIVE
PDW BLD-RTO: 12 % (ref 11.5–14.5)
PH UA: 5 (ref 5–8)
PLATELET # BLD: 315 K/UL (ref 130–400)
PMV BLD AUTO: 9.8 FL (ref 9.4–12.3)
POTASSIUM SERPL-SCNC: 4 MMOL/L (ref 3.5–5)
PROTEIN UA: NEGATIVE MG/DL
RBC # BLD: 4.29 M/UL (ref 4.2–5.4)
SODIUM BLD-SCNC: 140 MMOL/L (ref 136–145)
SPECIFIC GRAVITY UA: 1.03 (ref 1–1.03)
TOTAL PROTEIN: 7.2 G/DL (ref 6.6–8.7)
TRIGL SERPL-MCNC: 235 MG/DL (ref 0–149)
TSH SERPL DL<=0.05 MIU/L-ACNC: 3.92 UIU/ML (ref 0.27–4.2)
UROBILINOGEN, URINE: 0.2 E.U./DL
VITAMIN D 25-HYDROXY: 91.3 NG/ML
WBC # BLD: 9.3 K/UL (ref 4.8–10.8)

## 2023-02-06 RX ORDER — HYDROCODONE BITARTRATE AND ACETAMINOPHEN 7.5; 325 MG/1; MG/1
1 TABLET ORAL EVERY 6 HOURS PRN
Qty: 90 TABLET | Refills: 0 | Status: SHIPPED | OUTPATIENT
Start: 2023-02-06 | End: 2023-02-09

## 2023-02-08 ENCOUNTER — OFFICE VISIT (OUTPATIENT)
Dept: INTERNAL MEDICINE | Age: 73
End: 2023-02-08

## 2023-02-08 ENCOUNTER — CLINICAL DOCUMENTATION (OUTPATIENT)
Dept: INFUSION THERAPY | Age: 73
End: 2023-02-08

## 2023-02-08 VITALS
SYSTOLIC BLOOD PRESSURE: 120 MMHG | OXYGEN SATURATION: 98 % | BODY MASS INDEX: 24.54 KG/M2 | WEIGHT: 125 LBS | HEART RATE: 68 BPM | HEIGHT: 60 IN | DIASTOLIC BLOOD PRESSURE: 58 MMHG

## 2023-02-08 DIAGNOSIS — Z09 HOSPITAL DISCHARGE FOLLOW-UP: Primary | ICD-10-CM

## 2023-02-08 DIAGNOSIS — K22.89 ESOPHAGEAL MASS: ICD-10-CM

## 2023-02-08 DIAGNOSIS — J98.59 MASS OF MEDIASTINUM: ICD-10-CM

## 2023-02-08 DIAGNOSIS — R91.8 LUNG MASS: ICD-10-CM

## 2023-02-08 DIAGNOSIS — C22.0 LIVER CARCINOMA (HCC): ICD-10-CM

## 2023-02-08 PROBLEM — C78.7 NSCLC METASTATIC TO LIVER (HCC): Status: ACTIVE | Noted: 2023-02-08

## 2023-02-08 PROBLEM — C34.92 NSCLC OF LEFT LUNG (HCC): Status: ACTIVE | Noted: 2023-02-08

## 2023-02-08 PROBLEM — C34.90 NSCLC METASTATIC TO LIVER (HCC): Status: ACTIVE | Noted: 2023-02-08

## 2023-02-08 ASSESSMENT — PATIENT HEALTH QUESTIONNAIRE - PHQ9
SUM OF ALL RESPONSES TO PHQ QUESTIONS 1-9: 0
SUM OF ALL RESPONSES TO PHQ9 QUESTIONS 1 & 2: 0
1. LITTLE INTEREST OR PLEASURE IN DOING THINGS: 0
SUM OF ALL RESPONSES TO PHQ QUESTIONS 1-9: 0
2. FEELING DOWN, DEPRESSED OR HOPELESS: 0

## 2023-02-08 NOTE — PROGRESS NOTES
Per Dr Jazmine Rivas, schedule follow-up visit with Dr Jazmine Rivas after bone scan, order Caris and order Lucence liquid biopsy.

## 2023-02-08 NOTE — PROGRESS NOTES
Post-Discharge Transitional Care  Follow Up      Sandra Reilly   YOB: 1950    Date of Office Visit:  2/8/2023  Date of Hospital Admission: 1/30/23  Date of Hospital Discharge: 2/2/23  Risk of hospital readmission (high >=14%. Medium >=10%) :Readmission Risk Score: 7.7      Care management risk score Rising risk (score 2-5) and Complex Care (Scores >=6): No Risk Score On File     Non face to face  following discharge, date last encounter closed (first attempt may have been earlier): 02/03/2023    Call initiated 2 business days of discharge: Yes    ASSESSMENT/PLAN:   Hospital discharge follow-up  -     IA DISCHARGE MEDS RECONCILED W/ CURRENT OUTPATIENT MED LIST  Mass of mediastinum  Assessment & Plan:  Follow-up nuclear scan and oncology   Lung mass  Assessment & Plan:  Follow-up scans and oncology     Esophageal mass  Assessment & Plan:  Follow-up scans and oncology     Liver carcinoma [facility])  Assessment & Plan:  Scans and follow-up with Dr. Prieto Eye Making: moderate complexity  No follow-ups on file. On this date 2/8/2023 I have spent 45 minutes reviewing previous notes, test results and face to face with the patient discussing the diagnosis and importance of compliance with the treatment plan as well as documenting on the day of the visit.        Subjective:   HPI:  Follow up of Hospital problems/diagnosis(es):     She had presented to the ER with pain right shoulder blade;  10/10  which is very uncharacteristic for her;  this was of course a new pain for her;  she had do SOB or dysphagia;   with chest CTA a solid lesion of LLL was found with subcarinal lymph nodes;  there was also a distal esphogeal mass and also looks like mets to the liver as well; EGD showed no mass; she had a bump in cr with toradol    Mediastinal mass  Esophageal mass  Lung mass  Liver mets with biopsy positive for malignancy   Hypertension   Hypothyroidism  Hyperlipidemia  Anemia      Inpatient course: Discharge summary reviewed- see chart. Interval history/Current status:   She did meeti with Dr Michael Lemon in the hospital with a poor prognosis with stage IV with liver mets    Mediastinal mass  Esophageal mass  Lung mass  Hypertension   Hypothyroidism  Hyperlipidemia  Anemia    Never been told that she had a cancer. She is heart to believe that she has had no symptoms. She does have the screening, up with Dr. Michael Lemon. She does want to cancel the appointment with Elia Sweet right now because we do not know what the plan is this point but the pathology does show a malignancy of her liver. Patient Active Problem List   Diagnosis    Essential hypertension    Acquired hypothyroidism    Alopecia    Vitamin D deficiency    Mixed hyperlipidemia    Breast implant capsular contracture    Status post implant removal from both breasts 6/11/2020    Nodule of chest wall    Stage 1 chronic kidney disease    COVID    Poison ivy dermatitis    Acute bronchitis due to other specified organisms    Mass of mediastinum    Palliative care patient    Esophageal mass    Anemia    Lung mass    Cancer related pain    Liver carcinoma ([facility] [address]       Medications listed as ordered at the time of discharge from hospital     Medication List            Accurate as of February 8, 2023 10:50 AM. If you have any questions, ask your nurse or doctor. CONTINUE taking these medications      albuterol sulfate  (90 Base) MCG/ACT inhaler  Commonly known as: Proventil HFA  Inhale 2 puffs into the lungs every 6 hours as needed for Wheezing     calcium carbonate-vitamin D 600-200 MG-UNIT Tabs     estradiol 0.5 MG tablet  Commonly known as: ESTRACE  TAKE 1 TABLET EVERY DAY     fluticasone-salmeterol 500-50 MCG/DOSE diskus inhaler  Commonly known as: ADVAIR     HYDROcodone-acetaminophen 7.5-325 MG per tablet  Commonly known as: NORCO  Take 1 tablet by mouth every 6 hours as needed for Pain for up to 3 days.  Max Daily Amount: 4 tablets     losartan 100 MG tablet  Commonly known as: COZAAR  TAKE 1 TABLET EVERY DAY     lovastatin 40 MG tablet  Commonly known as: MEVACOR  TAKE 1 TABLET EVERY NIGHT     Omega 3 1200 MG Caps     thyroid 60 MG tablet  Commonly known as: ARMOUR  Take 1 tablet by mouth daily Compound from strawberry HIll   t-4-60/t3-12     vitamin D 1.25 MG (13844 UT) Caps capsule  Commonly known as: ERGOCALCIFEROL  TAKE 1 CAPSULE BY MOUTH TWICE A WEEK                Medications marked \"taking\" at this time  No outpatient medications have been marked as taking for the 2/8/23 encounter (Office Visit) with CHRIS Ferreira. Medications patient taking as of now reconciled against medications ordered at time of hospital discharge: Yes    A comprehensive review of systems was negative except for what was noted in the HPI.     Objective:    BP (!) 120/58   Pulse 68   Ht 5' (1.524 m)   Wt 125 lb (56.7 kg)   SpO2 98%   BMI 24.41 kg/m²   General Appearance: alert and oriented to person, place and time, well developed and well- nourished, in no acute distress  Skin: warm and dry, no rash or erythema  Head: normocephalic and atraumatic  Eyes: pupils equal, round, and reactive to light, extraocular eye movements intact, conjunctivae normal  ENT: tympanic membrane, external ear and ear canal normal bilaterally, nose without deformity, nasal mucosa and turbinates normal without polyps  Neck: supple and non-tender without mass, no thyromegaly or thyroid nodules, no cervical lymphadenopathy  Pulmonary/Chest: clear to auscultation bilaterally- no wheezes, rales or rhonchi, normal air movement, no respiratory distress  Cardiovascular: normal rate, regular rhythm, normal S1 and S2, no murmurs, rubs, clicks, or gallops, distal pulses intact, no carotid bruits  Abdomen: soft, non-tender, non-distended, normal bowel sounds, no masses or organomegaly  Extremities: no cyanosis, clubbing or edema  Musculoskeletal: normal range of motion, no joint swelling, deformity or tenderness  Neurologic: reflexes normal and symmetric, no cranial nerve deficit, gait, coordination and speech normal      An electronic signature was used to authenticate this note.   --Abe Apley, APRN

## 2023-02-09 ENCOUNTER — CARE COORDINATION (OUTPATIENT)
Dept: CASE MANAGEMENT | Age: 73
End: 2023-02-09

## 2023-02-09 ENCOUNTER — HOSPITAL ENCOUNTER (OUTPATIENT)
Dept: INFUSION THERAPY | Age: 73
Discharge: HOME | End: 2023-02-09

## 2023-02-09 NOTE — CARE COORDINATION
[facility] Care Transitions Follow Up Call    Patient Current Location:  Home: [facility]    LPN Care Coordinator contacted the patient by telephone to follow up after admission on -. Verified name and  with patient as identifiers. Patient: Juno Paiz  Patient : 1950   MRN: [id number]  Reason for Admission: lung mass  Discharge Date: 23 RARS: Readmission Risk Score: 7.7      Needs to be reviewed by the provider   Additional needs identified to be addressed with provider: No  none             Method of communication with provider: none. LPN CC spoke with patient. States she is doing good. Denies SOB, CP, cough, fever/chills. Has pain that is ok, manageable. Appetite good. Denies problems with bowels or bladder. Denies medication changes. Denies needs. F/u noted below. Jude Weeks LPN CC  Care Transitions  [phone]    Addressed changes since last contact:  none  Discussed follow-up appointments. If no appointment was previously scheduled, appointment scheduling offered: Yes. Is follow up appointment scheduled within 7 days of discharge? Yes. Follow Up  Future Appointments   Date Time Provider Deepti Tapia   2/15/2023  9:00 AM [facility] LMP NM INJECTION RM [facility] LMP NUCM [facility] LMP Rad   2/15/2023 11:45 AM [facility] LMP NUC MED RM1 [facility] LMP NUCM [facility] LMP Rad   2023 10:00 AM SCHEDULE, [facility] MED ONC MA [facility] MED ONC Ashia HOD   2023 10:15 AM Lieutenant Josinae MD N PAD [facility]C [facility]   3/13/2023  8:10 AM [facility] MAMMO RM 1 [facility] WOMENS [facility]   8/15/2023  3:00 PM CHRIS [place] [facility]     Non-BS follow up appointment(s): NA    LPN Care Coordinator reviewed medical action plan and red flags with patient and discussed any barriers to care and/or understanding of plan of care after discharge. Discussed appropriate site of care based on symptoms and resources available to patient including: PCP  Specialist  Urgent care clinics  When to call 911. The patient agrees to contact the PCP office for questions related to their healthcare. Advance Care Planning:   reviewed and current. Patients top risk factors for readmission: medical condition-lung mass  Interventions to address risk factors: Assessment and support for treatment adherence and medication management-denied medication changes    Offered patient enrollment in the Remote Patient Monitoring (RPM) program for in-home monitoring: Patient is not eligible for RPM program.     Care Transitions Subsequent and Final Call    Subsequent and Final Calls  Do you have any ongoing symptoms?: No  Have your medications changed?: No  Do you have any questions related to your medications?: No  Do you currently have any active services?: No  Do you have any needs or concerns that I can assist you with?: No  Identified Barriers: None  Care Transitions Interventions  Other Interventions:             LPN Care Coordinator provided contact information for future needs. Plan for follow-up call in 7-10 days based on severity of symptoms and risk factors. Plan for next call: symptom management-pain  self management-ADLs  follow-up appointment-onc 2/20  medication management-new or changed meds?     Jude Weeks LPN

## 2023-02-15 ENCOUNTER — HOSPITAL ENCOUNTER (OUTPATIENT)
Dept: NUCLEAR MEDICINE | Age: 73
Discharge: HOME | End: 2023-02-17
Payer: MEDICARE

## 2023-02-15 DIAGNOSIS — K76.9 LIVER LESION: ICD-10-CM

## 2023-02-15 DIAGNOSIS — R91.8 MASS OF LOWER LOBE OF LEFT LUNG: ICD-10-CM

## 2023-02-15 PROCEDURE — 3430000000 HC RX DIAGNOSTIC RADIOPHARMACEUTICAL: Performed by: NURSE PRACTITIONER

## 2023-02-15 PROCEDURE — 78306 BONE IMAGING WHOLE BODY: CPT | Performed by: INTERNAL MEDICINE

## 2023-02-15 PROCEDURE — A9503 TC99M MEDRONATE: HCPCS | Performed by: NURSE PRACTITIONER

## 2023-02-15 RX ORDER — TC 99M MEDRONATE 20 MG/10ML
20 INJECTION, POWDER, LYOPHILIZED, FOR SOLUTION INTRAVENOUS
Status: COMPLETED | OUTPATIENT
Start: 2023-02-15 | End: 2023-02-15

## 2023-02-15 RX ADMIN — TC 99M MEDRONATE 20 MILLICURIE: 20 INJECTION, POWDER, LYOPHILIZED, FOR SOLUTION INTRAVENOUS at 13:03

## 2023-02-16 ENCOUNTER — CARE COORDINATION (OUTPATIENT)
Dept: CASE MANAGEMENT | Age: 73
End: 2023-02-16

## 2023-02-16 DIAGNOSIS — C78.7 LIVER METASTASIS (HCC): Primary | ICD-10-CM

## 2023-02-16 NOTE — CARE COORDINATION
[facility] Transitions Follow Up Call    2023    Patient: Daniella Griggs  Patient : 1950   MRN: [id number]  Reason for Admission: lung mass  Discharge Date: 23 RARS: Readmission Risk Score: 7.7         Spoke with: Nikkie  Patient states she is feeling good. Eating and drinking good. Staying hydrated. No problems with urinary or bowel elimination. Taking medications as prescribed. She denies sob, chills, nv, cp, fever, cough, pain or swallowing difficulties. No needs or concerns voiced. Will continue to follow. Care Transitions Follow Up Call    Needs to be reviewed by the provider   Additional needs identified to be addressed with provider: No  none             Method of communication with provider : none      Care Transition Nurse (CTN) contacted the patient by telephone to follow up after admission on 2023. Verified name and  with patient as identifiers. Addressed changes since last contact: none  Discussed follow-up appointments. If no appointment was previously scheduled, appointment scheduling offered: na.   Is follow up appointment scheduled within 7 days of discharge? Yes. Advance Care Planning:   Does patient have an Advance Directive: reviewed and current. CTN provided contact information for future needs. Plan for follow-up call in 5-7 days based on severity of symptoms and risk factors. Plan for next call: self management-appetite,nv          Care Transitions Subsequent and Final Call    Subsequent and Final Calls  Do you have any ongoing symptoms?: No  Have your medications changed?: No  Do you have any questions related to your medications?: No  Do you currently have any active services?: No  Do you have any needs or concerns that I can assist you with?: No  Identified Barriers: None  Care Transitions Interventions  Other Interventions:              Follow Up  Future Appointments   Date Time Provider Deepti Tapia   2023  2:00 PM [facility]   2/20/2023  2:00 PM SCHEDULE, [facility] MED ONC MA [facility] MED ONC Ash[facility]   3/13/2023  8:10 AM [facility] MAMMO RM 1 [facility] WOMENS [facility] Women[facility]   8/15/2023  3:00 PM CHRIS Knott [facility]       Aicha Ann LPN

## 2023-02-17 NOTE — PROGRESS NOTES
MEDICAL ONCOLOGY PROGRESS NOTE                                                          Litzy Rehman   1950  2/20/2023     Chief Complaint   Patient presents with    Follow-up     Liver metastasis          INTERVAL HISTORY/HISTORY OF PRESENT ILLNESS:  Diagnosis  LLL lesion, Jan 2023  Liver metastasis  NSCLC-adenocarcinoma, Feb 2023  Moderately differentiated   Stage IV (liver mets)  Caris: ENID  NGS, Lucence: EGFR exon 19-positive     Treatment summary  Anticipated osimertinib 80 mg p.o. daily     Cancer history:   Sasha Linton was first seen by me on 1/31/2023. I was consulted for abnormal CT scan findings of a lung mass, mediastinal adenopathy and a lower esophageal mass. She presented to the ER department on 1/30/2023 at [facility] with complaints of back pain. She has a history of hypertension, hypothyroidism, obesity, hyperlipidemia and chronic kidney disease. The pain started about 2-3 days before she comes to the hospital.  Denies any shortness of breath. Denies any fever or chills. Imaging studies were performed emergency. 1/30/2023-CTA chest showed no evidence of pulmonary embolism. Lungs: Enhancing solid lesion posteriorly within the left lower lobe. Measures 3.2 x 4.3 x 3.7 cm. Subcarinal lymph node measures 1.3 x 1.2 cm. Asymmetric an irregular mucosal thickening of the distal esophagus. Esophageal mucosal mass is suspected in the distal left aspect measuring 2.4 x 1.8 cm. Diffuse metastatic disease suspected to the liver. I was consulted for the above findings. 1/31/2023-she was seen by GI at [facility]. Endoscopy showed no evidence of esophageal lesion. 1/31/2023-CT abdomen/pelvis with oral/IV contrast showed multiple liver lesions with the largest lesion measured 2.5 cm and 1.9 cm in the right hepatic lobe.   2/1/2023-patient underwent CT-guided biopsy of liver lesions by interventional radiology at [facility]  2/1/23 Liver, fine-needle core biopsies with touch imprint smears: Metastatic   moderately differentiated lung adenocarcinoma. 2/8/23 CARIS: UNABLE TO BE COMPLETED TO THE QNS  2/9/23 Lucence: EGFR-positive  2/15/23 CT ABDOMEN PELVIS W IV CONTRAST Markedly thickened irregular appearance of the distal esophagus at the GE junction may represent neoplasm. Left lower lobe soft tissue density mass. Multiple hepatic low-attenuation masses suggestive of metastatic disease. 2/15/23 NM BONE SCAN WHOLE BODY There is abnormal focal radiotracer accumulation corresponding to the RIGHT posterior rib cage. This corresponds to a pathologic fracture of the RIGHT posterior 6th rib with associated lytic lucency, seen on chest CT dated 01/30/2023 (see series 4, image 58 of prior study). 2/20/2023-I reviewed CT C/A/P, pathology. NGS liquid biopsy showed evidence of EGFR mutation exon 19. I have recommended frontline palliative treatment with osimertinib 80 mg p.o. daily. 2/8/23 CARIS: UNABLE TO BE COMPLETED TO THE QNS    2/9/23 Lucence-    PAST MEDICAL HISTORY:   Past Medical History:   Diagnosis Date    Acquired hypothyroidism 08/20/2017    Alopecia 08/21/2017    Breast implant capsular contracture     \"with cysts behind right breast\" per pt.      Essential hypertension 08/20/2017    Familial hypercholesterolemia 08/20/2017    PONV (postoperative nausea and vomiting)     Prolonged emergence from general anesthesia     Stage 1 chronic kidney disease 6/29/2021    Tobacco abuse, in remission           PAST SURGICAL HISTORY:  Past Surgical History:   Procedure Laterality Date    BREAST CYST EXCISION  2022    BREAST ENHANCEMENT SURGERY Bilateral 06/11/2020    REMOVAL BILATERAL CONTRACTURED IMPLANTS WITH BILATERAL PECTORAL BLOCK performed by Toyin Parsons MD at [address] [facility] Bilateral 2008    bunion correct osteotomy smith double-stem lesser MTP Impl    COLONOSCOPY  2017    HEMORRHOID SURGERY  2005    HYSTERECTOMY (CERVIX STATUS UNKNOWN)  1980    KNEE SURGERY Right 2007    RECTAL SURGERY      anal fissurectomy with sphincterotomy    MAICOL AND BSO (CERVIX REMOVED) Bilateral     both ovaries removed, had surgery as a result of endometriosis    TUBAL LIGATION      UPPER GASTROINTESTINAL ENDOSCOPY N/A 2023    Dr Ankur Prather        SOCIAL HISTORY:  Social History     Socioeconomic History    Marital status:      Spouse name: Mr. Herber Hurt    Number of children: 2    Years of education: 14    Highest education level: None   Occupational History     Employer: RETIRED    Occupation: [employer] [address] [place] [address]: retired   Tobacco Use    Smoking status: Former     Packs/day: 2.00     Years: 15.00     Pack years: 30.00     Types: Cigarettes     Start date:      Quit date:      Years since quittin.1    Smokeless tobacco: Never   Vaping Use    Vaping Use: Never used   Substance and Sexual Activity    Alcohol use: Yes     Comment: rare social occasions    Drug use: Never    Sexual activity: Yes     Partners: Male     Comment: has 2 sons   Social History Narrative    CODE STATUS: Full Code    HEALTH CARE PROXY: Mr. Herber Hurt, her , [phone]    AMBULATES: independent    DOMICILED: has no stair sin the home, has 1 step to enter the home, has no pets and lives alone with her      Social Determinants of Health     Financial Resource Strain: Low Risk     Difficulty of Paying Living Expenses: Not hard at all   Food Insecurity: No Food Insecurity    Worried About Running Out of Food in the Last Year: Never true    [address] in the Last Year: Never true       Social History:    Marital status:  Smoking status:Formerly; 1 pack daily;Quit   ETOH status:No  Resides:[place], [place]    FAMILY HISTORY:  Family History   Problem Relation Age of Onset    High Blood Pressure Mother     Diabetes Mother         Type 2    Obesity Mother     High Blood Pressure Father     Diabetes Sister         Type 2    Obesity Sister Diabetes Sister     Obesity Sister     Diabetes Sister     Obesity Sister     Diabetes Brother         Type 2    Cancer Brother         Blood form of cancer-not leukemia     Obesity Brother     No Known Problems Son     No Known Problems Son         Current Outpatient Medications   Medication Sig Dispense Refill    Osimertinib Mesylate (TAGRISSO) 80 MG TABS Take 80 mg by mouth daily 30 tablet 0    vitamin D (ERGOCALCIFEROL) 1.25 MG (53838 UT) CAPS capsule TAKE 1 CAPSULE BY MOUTH TWICE A WEEK 24 capsule 0    albuterol sulfate HFA (PROVENTIL HFA) 108 (90 Base) MCG/ACT inhaler Inhale 2 puffs into the lungs every 6 hours as needed for Wheezing 18 g 3    thyroid (ARMOUR) 60 MG tablet Take 1 tablet by mouth daily Compound from [facility]   t-4-60/t3-12 90 tablet 1    Omega 3 1200 MG CAPS Take by mouth 2 times daily      lovastatin (MEVACOR) 40 MG tablet TAKE 1 TABLET EVERY NIGHT 90 tablet 3    losartan (COZAAR) 100 MG tablet TAKE 1 TABLET EVERY DAY 90 tablet 3    estradiol (ESTRACE) 0.5 MG tablet TAKE 1 TABLET EVERY DAY 90 tablet 3    fluticasone-salmeterol (ADVAIR) 500-50 MCG/DOSE diskus inhaler Inhale 1 puff into the lungs daily as needed       calcium carbonate-vitamin D 600-200 MG-UNIT TABS Take 1 tablet by mouth 2 times daily       No current facility-administered medications for this visit. REVIEW OF SYSTEMS:    Constitutional: no fever, no night sweats,  fatigue;   HEENT: no blurring of vision, no double vision, no hearing difficulty, no tinnitus,no ulceration, no dysphagia  Lungs: no cough, no shortness of breath, no wheeze;   CVS: no palpitation, no chest pain, no shortness of breath;  GI: no abdominal pain, no nausea , no vomiting, no constipation;   CHAVA: no dysuria, frequency and urgency, no hematuria, no kidney stones;   Musculoskeletal: no joint pain, swelling , stiffness;   Endocrine: no polyuria, polydypsia, no cold or heat intolerence;    Hematology/lymphatic: no easy brusing or bleeding, no hx of clotting disorder; no peripheral adenopathy. Dermatology: no skin rash, no eczema, no pruritis;   Psychiatry: no depression, no anxiety,no panic attacks, no suicide ideation; Neurology: no syncope, no seizures, no numbness or tingling of hands, no numbness or tingling of feet, no paresis;     PHYSICAL EXAM:    Vitals signs:  /70 (Site: Left Upper Arm, Position: Sitting)   Pulse 89   Temp 98.3 °F (36.8 °C) (Oral)   Ht 5' 1\" (1.549 m)   Wt 126 lb 9.6 oz (57.4 kg)   SpO2 96%   BMI 23.92 kg/m²    Pain scale:  Pain Score:   2     CONSTITUTIONAL: Alert, appropriate, no acute distress,   EYES: Non icteric, EOM intact, pupils equal round and reactive to light and accommodation. ENT: Oral mucus membranes moist, no oral pharyngeal lesions. External inspection of ears and nose are normal.   NECK: Supple, no masses. No palpable thyroid mass    CHEST/LUNGS: CTA bilaterally, normal respiratory effort   CARDIOVASCULAR: RRR, no murmurs. No lower extremity edema   ABDOMEN: soft non-tender, active bowel sounds, no hepatosplenomegaly. No palpable masses. EXTREMITIES: warm, Full ROM of all fours extremities. No focal weakness. SKIN: warm, dry with no rashes or lesions  LYMPH: No cervical, clavicular, axillary, or inguinal lymphadenopathy  NEUROLOGIC: follows commands, non focal.   PSYCH: mood and affect appropriate. Alert and oriented to time and place and person. Relevant Lab findings/reviewed by me:  Lab Results   Component Value Date    WBC 12.23 (H) 02/20/2023    HGB 14.4 02/20/2023    HCT 44.2 02/20/2023    MCV 93.6 02/20/2023     02/20/2023     Lab Results   Component Value Date    NEUTROABS 8.50 (H) 02/20/2023       Relevant Imaging studies/reviewed by me:           ASSESSMENT    No orders of the defined types were placed in this encounter. Nikkie was seen today for follow-up.     Diagnoses and all orders for this visit:    NSCLC of left lung [facility])    NSCLC metastatic to liver [facility])    Care plan discussed with patient    Poor prognosis    Other orders  -     Osimertinib Mesylate (TAGRISSO) 80 MG TABS; Take 80 mg by mouth daily       NSCLC-adenocarcinoma, moderately differentiated, Stage IV, Feb 2023 EGFR exon 19 mutated  Very remote history of smoking. She quit 40 years ago.  -1/30/2023-CTA chest showed no evidence of pulmonary embolism. Lungs: Enhancing solid lesion posteriorly within the left lower lobe. Measures 3.2 x 4.3 x 3.7 cm. Subcarinal lymph node measures 1.3 x 1.2 cm. Asymmetric an irregular mucosal thickening of the distal esophagus. Esophageal mucosal mass is suspected in the distal left aspect measuring 2.4 x 1.8 cm. Diffuse metastatic disease suspected to the liver. I was consulted for the above findings.   -1/31/2023-CT abdomen/pelvis with oral/IV contrast showed multiple liver lesions with the largest lesion measured 2.5 cm and 1.9 cm in the right hepatic lobe.  -2/1/23 Liver, fine-needle core biopsies with touch imprint smears: Metastatic   moderately differentiated lung adenocarcinoma.   -2/8/23 CARIS: UNABLE TO BE COMPLETED TO THE [facility]  -2/9/23 Lucence: EGFR-positive I  -2/15/23 CT ABDOMEN PELVIS W IV CONTRAST Markedly thickened irregular appearance of the distal esophagus at the GE junction may represent neoplasm. Left lower lobe soft tissue density mass. Multiple hepatic low-attenuation masses suggestive of metastatic disease.   -2/15/23 NM BONE SCAN WHOLE BODY There is abnormal focal radiotracer accumulation corresponding to the RIGHT posterior rib cage. This corresponds to a pathologic fracture of the RIGHT posterior 6th rib with associated lytic lucency, seen on chest CT dated 01/30/2023 (see series 4, image 58 of prior study). 2/20/2023-I reviewed CT C/A/P, pathology. NGS liquid biopsy showed evidence of EGFR mutation exon 19. I have recommended frontline palliative treatment with osimertinib 80 mg p.o. daily. Plan:  -Tagrisso 80 mg p.o. daily.  Prescribed  on 02/20/23    Liver lesions-metastatic disease lung canceer    -1/31/2023-CT abdomen/pelvis with oral/IV contrast showed multiple liver lesions with the largest lesion measured 2.5 cm and 1.9 cm in the right hepatic lobe.  -2/1/23 Liver, fine-needle core biopsies with touch imprint smears: Metastatic   moderately differentiated lung adenocarcinoma.   -2/15/23 CT ABDOMEN PELVIS W IV CONTRAST Markedly thickened irregular appearance of the distal esophagus at the GE junction may represent neoplasm. Left lower lobe soft tissue density mass. Multiple hepatic low-attenuation masses suggestive of metastatic disease. Paraesophageal mass versus adenopathy. This could represent periesophageal adenopathy. Upper endoscopy was normal.  -2/15/23 CT ABDOMEN PELVIS W IV CONTRAST Markedly thickened irregular appearance of the distal esophagus at the GE junction may represent neoplasm. Left lower lobe soft tissue density mass. Multiple hepatic low-attenuation masses suggestive of metastatic disease. Lytic lesion-pathologic fracture right posterior 6th rib  -2/15/23 NM BONE SCAN WHOLE BODY There is abnormal focal radiotracer accumulation corresponding to the RIGHT posterior rib cage. This corresponds to a pathologic fracture of the RIGHT posterior 6th rib with associated lytic lucency, seen on chest CT dated 01/30/2023 (see series 4, image 58 of prior study). Toxicity education:  I have explained to the patient the possible side effects of therapy to include but not limited to increased risk of infections, alopecia, GI toxicity such as nausea, vomiting, diarrhea, constipation, allergic reactions, skin rashes, fatigue and rarely risk of fatal outcomes related to direct or indirect effects of treatment.  We discussed about strategies to lessen the side effects of treatment when required to include but not limited to doses/regimen modifications by provider, increased patient education awareness of certain toxicities, prompt notification to provider or nursing staff by the patient all certain side effects, proactive measures etc.  We also discussed about the importance of compliance with treatment with providers visits to assure early identification and management of side effects. PLAN:  RTC with MD in 1 month  Recommend Tagrisso 80 mg Daily-script sent  Treatment Consent signed   Treatment education provided. Follow Up:     Return in 4 weeks (on 3/20/2023) for CBC, Appointment with Dr. Linda Aguilera. Data Petr Ambriz am pre-charting as a registered nurse for Carolyn York MD. Electronically signed by Barbara Jimenez RN on 2/20/2023 at 5:32 PM CST. Juwan Quiroga am scribing for Carolyn York MD. Electronically signed by Barbara Jimenez RN on 2/20/2023 at 4:50 PM CST. I, Dr Rodrick Clinton, personally performed the services described in this documentation as scribed by Barbara Jimenez RN in my presence and is both accurate and complete. I have seen, examined and reviewed this patient medication list, appropriate labs and imaging studies. I reviewed relevant medical records and others physicians notes. I discussed the plans of care with the patient. I answered all the questions to the patients satisfaction. I have also reviewed the chief complaint (CC) and part of the history (History of Present Illness (HPI), Past Family Social History [facility]), or Review of Systems (ROS) and made changes when appropriated. (Please note that portions of this note were completed with a voice recognition program. Efforts were made to edit the dictations but occasionally words are mis-transcribed. )Electronically signed by Carolyn York MD on 2/20/2023 at 4:52 PM       The total time, 38 minI spent to see the patient today includes at least one or more of the following: preparing to see the patient by reviewing prior tests, prior notes or other relevant information, performing appropriate independent examination and evaluation, counseling, ordering of medications, tdocumenting clinic information in the electronic medical record or other health records, independently interpreting results of tests, managing test results and communicating the results to the patient/family or caregiver.

## 2023-02-20 ENCOUNTER — HOSPITAL ENCOUNTER (OUTPATIENT)
Dept: INFUSION THERAPY | Age: 73
Discharge: HOME | End: 2023-02-20
Payer: MEDICARE

## 2023-02-20 ENCOUNTER — OFFICE VISIT (OUTPATIENT)
Dept: HEMATOLOGY | Age: 73
End: 2023-02-20
Payer: MEDICARE

## 2023-02-20 VITALS
HEIGHT: 61 IN | HEART RATE: 89 BPM | DIASTOLIC BLOOD PRESSURE: 70 MMHG | BODY MASS INDEX: 23.9 KG/M2 | TEMPERATURE: 98.3 F | OXYGEN SATURATION: 96 % | SYSTOLIC BLOOD PRESSURE: 120 MMHG | WEIGHT: 126.6 LBS

## 2023-02-20 DIAGNOSIS — Z78.9 POOR PROGNOSIS: ICD-10-CM

## 2023-02-20 DIAGNOSIS — C78.7 NSCLC METASTATIC TO LIVER (HCC): ICD-10-CM

## 2023-02-20 DIAGNOSIS — C34.92 NSCLC OF LEFT LUNG (HCC): Primary | ICD-10-CM

## 2023-02-20 DIAGNOSIS — C34.90 NSCLC METASTATIC TO LIVER (HCC): ICD-10-CM

## 2023-02-20 DIAGNOSIS — C78.7 LIVER METASTASIS (HCC): ICD-10-CM

## 2023-02-20 DIAGNOSIS — Z71.89 CARE PLAN DISCUSSED WITH PATIENT: ICD-10-CM

## 2023-02-20 PROBLEM — C22.0 LIVER CARCINOMA (HCC): Status: RESOLVED | Noted: 2023-02-08 | Resolved: 2023-02-20

## 2023-02-20 LAB
BASOPHILS ABSOLUTE: 0.06 K/UL (ref 0.01–0.08)
BASOPHILS RELATIVE PERCENT: 0.5 % (ref 0.1–1.2)
EOSINOPHILS ABSOLUTE: 0.39 K/UL (ref 0.04–0.54)
EOSINOPHILS RELATIVE PERCENT: 3.2 % (ref 0.7–7)
HCT VFR BLD CALC: 44.2 % (ref 34.1–44.9)
HEMOGLOBIN: 14.4 G/DL (ref 11.2–15.7)
LYMPHOCYTES ABSOLUTE: 2.54 K/UL (ref 1.18–3.74)
LYMPHOCYTES RELATIVE PERCENT: 20.8 % (ref 19.3–53.1)
MCH RBC QN AUTO: 30.5 PG (ref 25.6–32.2)
MCHC RBC AUTO-ENTMCNC: 32.6 G/DL (ref 32.3–35.5)
MCV RBC AUTO: 93.6 FL (ref 79.4–94.8)
MONOCYTES ABSOLUTE: 0.71 K/UL (ref 0.24–0.82)
MONOCYTES RELATIVE PERCENT: 5.8 % (ref 4.7–12.5)
NEUTROPHILS ABSOLUTE: 8.5 K/UL (ref 1.56–6.13)
NEUTROPHILS RELATIVE PERCENT: 69.5 % (ref 34–71.1)
PDW BLD-RTO: 12.2 % (ref 11.7–14.4)
PLATELET # BLD: 278 K/UL (ref 182–369)
PMV BLD AUTO: 9.4 FL (ref 7.4–10.4)
RBC # BLD: 4.72 M/UL (ref 3.93–5.22)
WBC # BLD: 12.23 K/UL (ref 3.98–10.04)

## 2023-02-20 PROCEDURE — 85025 COMPLETE CBC W/AUTO DIFF WBC: CPT

## 2023-02-20 PROCEDURE — 3074F SYST BP LT 130 MM HG: CPT | Performed by: INTERNAL MEDICINE

## 2023-02-20 PROCEDURE — 1111F DSCHRG MED/CURRENT MED MERGE: CPT | Performed by: INTERNAL MEDICINE

## 2023-02-20 PROCEDURE — 3078F DIAST BP <80 MM HG: CPT | Performed by: INTERNAL MEDICINE

## 2023-02-20 PROCEDURE — 36415 COLL VENOUS BLD VENIPUNCTURE: CPT

## 2023-02-20 PROCEDURE — 1123F ACP DISCUSS/DSCN MKR DOCD: CPT | Performed by: INTERNAL MEDICINE

## 2023-02-20 PROCEDURE — 1090F PRES/ABSN URINE INCON ASSESS: CPT | Performed by: INTERNAL MEDICINE

## 2023-02-20 PROCEDURE — 99203 OFFICE O/P NEW LOW 30 MIN: CPT | Performed by: INTERNAL MEDICINE

## 2023-02-20 PROCEDURE — 3017F COLORECTAL CA SCREEN DOC REV: CPT | Performed by: INTERNAL MEDICINE

## 2023-02-20 PROCEDURE — G8420 CALC BMI NORM PARAMETERS: HCPCS | Performed by: INTERNAL MEDICINE

## 2023-02-20 PROCEDURE — 1036F TOBACCO NON-USER: CPT | Performed by: INTERNAL MEDICINE

## 2023-02-20 PROCEDURE — G8484 FLU IMMUNIZE NO ADMIN: HCPCS | Performed by: INTERNAL MEDICINE

## 2023-02-20 PROCEDURE — G8427 DOCREV CUR MEDS BY ELIG CLIN: HCPCS | Performed by: INTERNAL MEDICINE

## 2023-02-20 PROCEDURE — 99211 OFF/OP EST MAY X REQ PHY/QHP: CPT

## 2023-02-20 PROCEDURE — G8399 PT W/DXA RESULTS DOCUMENT: HCPCS | Performed by: INTERNAL MEDICINE

## 2023-02-20 RX ORDER — OSIMERTINIB 80 1/1
80 TABLET, FILM COATED ORAL DAILY
Qty: 30 TABLET | Refills: 0 | Status: ACTIVE | OUTPATIENT
Start: 2023-02-20

## 2023-02-20 ASSESSMENT — PROMIS GLOBAL HEALTH SCALE
IN GENERAL, PLEASE RATE HOW WELL YOU CARRY OUT YOUR USUAL SOCIAL ACTIVITIES (INCLUDES ACTIVITIES AT HOME, AT WORK, AND IN YOUR COMMUNITY, AND RESPONSIBILITIES AS A PARENT, CHILD, SPOUSE, EMPLOYEE, FRIEND, ETC) [ON A SCALE OF 1 (POOR) TO 5 (EXCELLENT)]?: 5
IN GENERAL, WOULD YOU SAY YOUR QUALITY OF LIFE IS...[ON A SCALE OF 1 (POOR) TO 5 (EXCELLENT)]: 5
IN THE PAST 7 DAYS, HOW WOULD YOU RATE YOUR FATIGUE ON AVERAGE [ON A SCALE FROM 1 (NONE) TO 5 (VERY SEVERE)]?: 4
TO WHAT EXTENT ARE YOU ABLE TO CARRY OUT YOUR EVERYDAY PHYSICAL ACTIVITIES SUCH AS WALKING, CLIMBING STAIRS, CARRYING GROCERIES, OR MOVING A CHAIR [ON A SCALE OF 1 (NOT AT ALL) TO 5 (COMPLETELY)]?: 5
IN THE PAST 7 DAYS, HOW WOULD YOU RATE YOUR PAIN ON AVERAGE [ON A SCALE FROM 0 (NO PAIN) TO 10 (WORST IMAGINABLE PAIN)]?: 2
IN GENERAL, WOULD YOU SAY YOUR HEALTH IS...[ON A SCALE OF 1 (POOR) TO 5 (EXCELLENT)]: 3
IN THE PAST 7 DAYS, HOW WOULD YOU RATE YOUR PAIN ON AVERAGE [ON A SCALE FROM 0 (NO PAIN) TO 10 (WORST IMAGINABLE PAIN)]?: 0
IN GENERAL, PLEASE RATE HOW WELL YOU CARRY OUT YOUR USUAL SOCIAL ACTIVITIES (INCLUDES ACTIVITIES AT HOME, AT WORK, AND IN YOUR COMMUNITY, AND RESPONSIBILITIES AS A PARENT, CHILD, SPOUSE, EMPLOYEE, FRIEND, ETC) [ON A SCALE OF 1 (POOR) TO 5 (EXCELLENT)]?: 4
SUM OF RESPONSES TO QUESTIONS 2, 4, 5, & 10: 14
IN GENERAL, HOW WOULD YOU RATE YOUR SATISFACTION WITH YOUR SOCIAL ACTIVITIES AND RELATIONSHIPS [ON A SCALE OF 1 (POOR) TO 5 (EXCELLENT)]?: 5
IN THE PAST 7 DAYS, HOW WOULD YOU RATE YOUR FATIGUE ON AVERAGE [ON A SCALE FROM 1 (NONE) TO 5 (VERY SEVERE)]?: 5
SUM OF RESPONSES TO QUESTIONS 2, 4, 5, & 10: 15
IN GENERAL, HOW WOULD YOU RATE YOUR SATISFACTION WITH YOUR SOCIAL ACTIVITIES AND RELATIONSHIPS [ON A SCALE OF 1 (POOR) TO 5 (EXCELLENT)]?: 5
SUM OF RESPONSES TO QUESTIONS 3, 6, 7, & 8: 15
SUM OF RESPONSES TO QUESTIONS 3, 6, 7, & 8: 15
IN GENERAL, WOULD YOU SAY YOUR HEALTH IS...[ON A SCALE OF 1 (POOR) TO 5 (EXCELLENT)]: 5
IN THE PAST 7 DAYS, HOW OFTEN HAVE YOU BEEN BOTHERED BY EMOTIONAL PROBLEMS, SUCH AS FEELING ANXIOUS, DEPRESSED, OR IRRITABLE [ON A SCALE FROM 1 (NEVER) TO 5 (ALWAYS)]?: 2
IN GENERAL, HOW WOULD YOU RATE YOUR PHYSICAL HEALTH [ON A SCALE OF 1 (POOR) TO 5 (EXCELLENT)]?: 4
IN THE PAST 7 DAYS, HOW OFTEN HAVE YOU BEEN BOTHERED BY EMOTIONAL PROBLEMS, SUCH AS FEELING ANXIOUS, DEPRESSED, OR IRRITABLE [ON A SCALE FROM 1 (NEVER) TO 5 (ALWAYS)]?: 1
IN GENERAL, HOW WOULD YOU RATE YOUR PHYSICAL HEALTH [ON A SCALE OF 1 (POOR) TO 5 (EXCELLENT)]?: 5
IN GENERAL, HOW WOULD YOU RATE YOUR MENTAL HEALTH, INCLUDING YOUR MOOD AND YOUR ABILITY TO THINK [ON A SCALE OF 1 (POOR) TO 5 (EXCELLENT)]?: 4
IN GENERAL, HOW WOULD YOU RATE YOUR MENTAL HEALTH, INCLUDING YOUR MOOD AND YOUR ABILITY TO THINK [ON A SCALE OF 1 (POOR) TO 5 (EXCELLENT)]?: 4
TO WHAT EXTENT ARE YOU ABLE TO CARRY OUT YOUR EVERYDAY PHYSICAL ACTIVITIES SUCH AS WALKING, CLIMBING STAIRS, CARRYING GROCERIES, OR MOVING A CHAIR [ON A SCALE OF 1 (NOT AT ALL) TO 5 (COMPLETELY)]?: 5
IN GENERAL, WOULD YOU SAY YOUR QUALITY OF LIFE IS...[ON A SCALE OF 1 (POOR) TO 5 (EXCELLENT)]: 3

## 2023-02-23 ENCOUNTER — CARE COORDINATION (OUTPATIENT)
Dept: CASE MANAGEMENT | Age: 73
End: 2023-02-23

## 2023-02-23 NOTE — CARE COORDINATION
[facility] Care Transitions Follow Up Call    Patient Current Location:  [place]    Care Transition Nurse contacted the patient by telephone to follow up.    Patient: Nikkie Banks  Patient : 1950   MRN: [id number]  Reason for Admission: Lung mass  Discharge Date: 23 RARS: Readmission Risk Score: 7.7      Needs to be reviewed by the provider   Additional needs identified to be addressed with provider: No  none       Method of communication with provider: none.    Addressed changes since last contact:  Has seen PCP and oncology since hospital discharge.    Discussed follow-up appointments.     Follow Up  Future Appointments   Date Time Provider Department Center   3/13/2023  8:10 AM [facility] MAMMO RM 1 [facility] WOMENS [facility] Women's   3/21/2023 11:15 AM MD FRANTZ [place]   3/21/2023 11:15 AM SCHEDULE, [facility] MED ONC MA [facility] MED ONC [facility]   8/15/2023  3:00 PM CHRIS [facility]        Care Transitions Subsequent and Final Call    Schedule Follow Up Appointment with PCP: Completed  Subsequent and Final Calls  Do you have any ongoing symptoms?: No  Have your medications changed?: No  Do you have any questions related to your medications?: No  Do you currently have any active services?: No  Do you have any needs or concerns that I can assist you with?: No  Identified Barriers: None  Care Transitions Interventions  Other Interventions:           Placed a call to the patient for ongoing follow up post hospital discharge.  She reported that she is doing well.  She said she has been to see her PCP since discharge and that went well.  She said she did not change any of her medications.  She said she has also seen oncology.  He has a plan for her for treatment.  She is going to be taking a chemo pill that will cost around $3500 a pill and they are trying to work with pharmaceutical companies to get this for her.  She said the spots that are on her liver are not really cancer from what  she has been told. She said she is eating well, no nausea or other GI issues. She is not having any pain at this time. She said she is getting around well, no weakness or other problems. She has all of her meds, taking them as ordered. She is aware of her other follow up visits. Informed/reminded of Care Transitions Coordination process, purpose, future calls, etc and is agreeable with appropriate follow up. Ensured to have CTN contact information to be used as needed. Encouraged to call for new/ongoing issues, questions, concerns, etc.  Encouraged to make contact with PCP as indicated for any further needs as well. Given the opportunity to ask questions and answered appropriately.   CTN to follow up as indicated in a few days for assessment of new/ongoing symptoms, management of self care needs in the home environment, teaching needs as indicated, etc.    Urvashi Belcher, RN   Care Transitions Nurse  [phone]

## 2023-03-01 ENCOUNTER — CARE COORDINATION (OUTPATIENT)
Dept: CASE MANAGEMENT | Age: 73
End: 2023-03-01

## 2023-03-01 NOTE — CARE COORDINATION
[facility] Care Transitions Follow Up Call    Patient Current Location:  [facility] Transition Nurse contacted the patient by telephone to follow up. Patient: Lelia Connors  Patient : 1950   MRN: [id number]    Discharge Date: 23 RARS: Readmission Risk Score: 7.7      Needs to be reviewed by the provider   Additional needs identified to be addressed with provider: No  none       Method of communication with provider: none. Addressed changes since last contact: Stable    Discussed follow-up appointments. Follow Up  Future Appointments   Date Time Provider Deepti Tapia   3/13/2023  8:10 AM [facility] MAMMO RM 1 [facility] WOMENS L Women's   3/21/2023 11:15 AM [facility]   3/21/2023 11:15 AM SCHEDULE, [facility] MED ONC MA [facility] MED ONC [facility]   8/15/2023  3:00 PM CHRIS Aquino P-[place]     Care Transitions Subsequent and Final Call    Schedule Follow Up Appointment with PCP: Completed  Subsequent and Final Calls  Do you have any ongoing symptoms?: No  Have your medications changed?: No  Do you have any questions related to your medications?: No  Do you currently have any active services?: No  Do you have any needs or concerns that I can assist you with?: No  Identified Barriers: None  Care Transitions Interventions  Other Interventions:           Placed a call to the patient for ongoing follow up. She reported that she is doing very well. She sounded very upbeat, cheerful. She denied any complaints. She said if she did not know any better, she would know even know that she has cancer. She said that she feels so good. She said she has not heard from the oncologist's office yet about her chemo, but she is keeping leisa that they will hear something about it soon. She is eating well, appetite is good. She is drinking well. Said she is up and about, tolerating activity well. She is sleeping well.   Discussed infection control measures, avoiding risk of exposure to infectious disease, communicable disease, etc, COVID 19, flu, etc.  Discussed good handwashing, etc.  Discussed good, nutritious diet, healthy eating, etc.  She voiced understanding of all of this. Has all of her meds, taking as ordered. Aware of follow up appointments. No other needs noted at this time. CTN will sign off. Patient to call prn any needs and follow up with all providers as indicated.      Forest Wagner RN

## 2023-03-02 ENCOUNTER — OFFICE VISIT (OUTPATIENT)
Dept: INTERNAL MEDICINE | Age: 73
End: 2023-03-02
Payer: MEDICARE

## 2023-03-02 VITALS
OXYGEN SATURATION: 97 % | WEIGHT: 126 LBS | SYSTOLIC BLOOD PRESSURE: 126 MMHG | HEART RATE: 103 BPM | TEMPERATURE: 97.7 F | BODY MASS INDEX: 23.81 KG/M2 | DIASTOLIC BLOOD PRESSURE: 70 MMHG

## 2023-03-02 DIAGNOSIS — C78.7 NSCLC METASTATIC TO LIVER (HCC): ICD-10-CM

## 2023-03-02 DIAGNOSIS — C34.92 NSCLC OF LEFT LUNG (HCC): ICD-10-CM

## 2023-03-02 DIAGNOSIS — C34.90 NSCLC METASTATIC TO LIVER (HCC): ICD-10-CM

## 2023-03-02 DIAGNOSIS — E03.9 ACQUIRED HYPOTHYROIDISM: ICD-10-CM

## 2023-03-02 DIAGNOSIS — J01.91 ACUTE RECURRENT SINUSITIS, UNSPECIFIED LOCATION: Primary | ICD-10-CM

## 2023-03-02 PROBLEM — R91.8 LUNG MASS: Status: RESOLVED | Noted: 2023-01-31 | Resolved: 2023-03-02

## 2023-03-02 PROBLEM — R22.2 NODULE OF CHEST WALL: Status: RESOLVED | Noted: 2021-05-10 | Resolved: 2023-03-02

## 2023-03-02 PROBLEM — N30.00 ACUTE CYSTITIS WITHOUT HEMATURIA: Status: RESOLVED | Noted: 2023-03-02 | Resolved: 2023-03-02

## 2023-03-02 PROBLEM — N30.00 ACUTE CYSTITIS WITHOUT HEMATURIA: Status: ACTIVE | Noted: 2023-03-02

## 2023-03-02 PROBLEM — L23.7 POISON IVY DERMATITIS: Status: RESOLVED | Noted: 2022-06-20 | Resolved: 2023-03-02

## 2023-03-02 PROBLEM — U07.1 COVID: Status: RESOLVED | Noted: 2022-02-02 | Resolved: 2023-03-02

## 2023-03-02 PROBLEM — J01.90 ACUTE SINUSITIS: Status: ACTIVE | Noted: 2023-03-02

## 2023-03-02 PROBLEM — K22.89 ESOPHAGEAL MASS: Status: RESOLVED | Noted: 2023-01-31 | Resolved: 2023-03-02

## 2023-03-02 PROCEDURE — 99214 OFFICE O/P EST MOD 30 MIN: CPT | Performed by: NURSE PRACTITIONER

## 2023-03-02 PROCEDURE — 1090F PRES/ABSN URINE INCON ASSESS: CPT | Performed by: NURSE PRACTITIONER

## 2023-03-02 PROCEDURE — G8420 CALC BMI NORM PARAMETERS: HCPCS | Performed by: NURSE PRACTITIONER

## 2023-03-02 PROCEDURE — G8427 DOCREV CUR MEDS BY ELIG CLIN: HCPCS | Performed by: NURSE PRACTITIONER

## 2023-03-02 PROCEDURE — G8399 PT W/DXA RESULTS DOCUMENT: HCPCS | Performed by: NURSE PRACTITIONER

## 2023-03-02 PROCEDURE — 1123F ACP DISCUSS/DSCN MKR DOCD: CPT | Performed by: NURSE PRACTITIONER

## 2023-03-02 PROCEDURE — G8484 FLU IMMUNIZE NO ADMIN: HCPCS | Performed by: NURSE PRACTITIONER

## 2023-03-02 PROCEDURE — 1036F TOBACCO NON-USER: CPT | Performed by: NURSE PRACTITIONER

## 2023-03-02 PROCEDURE — 3078F DIAST BP <80 MM HG: CPT | Performed by: NURSE PRACTITIONER

## 2023-03-02 PROCEDURE — 3074F SYST BP LT 130 MM HG: CPT | Performed by: NURSE PRACTITIONER

## 2023-03-02 PROCEDURE — 1111F DSCHRG MED/CURRENT MED MERGE: CPT | Performed by: NURSE PRACTITIONER

## 2023-03-02 PROCEDURE — 3017F COLORECTAL CA SCREEN DOC REV: CPT | Performed by: NURSE PRACTITIONER

## 2023-03-02 RX ORDER — CEFDINIR 300 MG/1
300 CAPSULE ORAL 2 TIMES DAILY
Qty: 14 CAPSULE | Refills: 0 | Status: SHIPPED | OUTPATIENT
Start: 2023-03-02 | End: 2023-03-09

## 2023-03-02 RX ORDER — LEVOTHYROXINE AND LIOTHYRONINE 38; 9 UG/1; UG/1
60 TABLET ORAL DAILY
Qty: 90 TABLET | Refills: 1 | Status: CANCELLED | OUTPATIENT
Start: 2023-03-02

## 2023-03-02 SDOH — ECONOMIC STABILITY: FOOD INSECURITY: WITHIN THE PAST 12 MONTHS, YOU WORRIED THAT YOUR FOOD WOULD RUN OUT BEFORE YOU GOT MONEY TO BUY MORE.: NEVER TRUE

## 2023-03-02 SDOH — ECONOMIC STABILITY: INCOME INSECURITY: HOW HARD IS IT FOR YOU TO PAY FOR THE VERY BASICS LIKE FOOD, HOUSING, MEDICAL CARE, AND HEATING?: NOT HARD AT ALL

## 2023-03-02 SDOH — ECONOMIC STABILITY: HOUSING INSECURITY
IN THE LAST 12 MONTHS, WAS THERE A TIME WHEN YOU DID NOT HAVE A STEADY PLACE TO SLEEP OR SLEPT IN A SHELTER (INCLUDING NOW)?: NO

## 2023-03-02 SDOH — ECONOMIC STABILITY: FOOD INSECURITY: WITHIN THE PAST 12 MONTHS, THE FOOD YOU BOUGHT JUST DIDN'T LAST AND YOU DIDN'T HAVE MONEY TO GET MORE.: NEVER TRUE

## 2023-03-02 ASSESSMENT — ENCOUNTER SYMPTOMS
ABDOMINAL PAIN: 0
EYE DISCHARGE: 0
CONSTIPATION: 0
STRIDOR: 0
COUGH: 0
ABDOMINAL DISTENTION: 0
SINUS PRESSURE: 1
VOMITING: 0
SORE THROAT: 0
EYE ITCHING: 0
COLOR CHANGE: 0
DIARRHEA: 0
BLOOD IN STOOL: 0
CHOKING: 0
SINUS PAIN: 1
TROUBLE SWALLOWING: 0
NAUSEA: 0
WHEEZING: 0
SHORTNESS OF BREATH: 0

## 2023-03-02 NOTE — ASSESSMENT & PLAN NOTE
As soon as you get your script take a one of each ;  Drink a large glass of water  with a little bit of food and the very next time you urinate your urine will be bright orange but the pain will be gone; You need to wear a panty liner the next few days as if you dribble at all that orange stain will not come out of your clothes; Should you develop a fever over 101 or you have nausea or vomiting, let me know; Let me know if you are not better in 48 hours; Carmine Cline ..

## 2023-03-02 NOTE — PATIENT INSTRUCTIONS
1.  Sinus congestion   Acute sinusitis  Medrol Dosepak  Cefdinir 300 twice  a day for 7 days  get 2 doses in today    Saline nasal spray 4 times a day both nares for 7 days  Steroid spray, rhinocort, nasocort, nasonex, flonase twice daily about 5 minutes after the saline   mucinex 1200 mg twice daily for 7 days with plenty of water  Delsym cough syrup up to 3 times daily as needed for cough   Ricola cough drops, honey lemon in pink bag;  has echanesia to help build your immunity     #2 hypothyroidism she needs a refill on her [place] Thyroid compounded by [employer] I have called that in  #3 lung cancer with that metastasis to the liver

## 2023-03-02 NOTE — ASSESSMENT & PLAN NOTE
Acute sinusitis    Cefdinir  300 mg twice daily for 7 days;  Get 2 doses in today   Saline nasal spray 4 times a day both nares for 7 days  Steroid spray, rhinocort, nasocort, nasonex, flonase twice daily about 5 minutes after the saline   mucinex 600 mg twice daily for 7 days with plenty of water  Delsym cough syrup as needed     Proair inhaler   1 inhalation 4 times a day until you are feeling better and then 24 hours after that. Following day decreased to 3 treatments a day for 3 days then 2 treatments a day for 3 days and 1 treatment a day for 3 days and then he can stop.

## 2023-03-02 NOTE — PROGRESS NOTES
[address] [facility]  [address] [address]  [address] [address]  Dept: [phone]  Dept Fax: [phone]: [phone]    Barak See (:  1950) is a 67 y.o. female,Established patient  with green, here for evaluation of the following chief complaint(s): Sinusitis      Barak See is a 67 y.o. female who presents today for her medical conditions/complaints as noted below. Barak See is c/surendra Sinusitis        HPI:     Chief Complaint   Patient presents with    Sinusitis       HPI   Sinus congestion;  no fever;  head congestion   NSCLC with liver mets new diagnosis last month; under care of Dr Omid Calderon  ; she is on palliative therapy with Tagrisso 80 mg daily;   she has fu with Dr Omid Calderon soon;  he gives her prognosis as poor in his notes. But she tells me that he told her he gets shrink the tumor down to a grape size in her chest and he did not think that they got good biopsies of her liver so she feels like she does not have anything in her liver because of a blood test that he did for her. I am not seeing that reflected in his note but I will look further  Hypothyroidism she needs a refill on her [place] Thyroid      Past Medical History:   Diagnosis Date    Acquired hypothyroidism 2017    Alopecia 2017    Breast implant capsular contracture     \"with cysts behind right breast\" per pt.      Esophageal mass 2023    Essential hypertension 2017    Familial hypercholesterolemia 2017    Lung mass 2023    Nodule of chest wall 5/10/2021    PONV (postoperative nausea and vomiting)     Prolonged emergence from general anesthesia     Stage 1 chronic kidney disease 2021    Tobacco abuse, in remission       Past Surgical History:   Procedure Laterality Date    BREAST CYST EXCISION      BREAST ENHANCEMENT SURGERY Bilateral 2020    REMOVAL BILATERAL CONTRACTURED IMPLANTS WITH BILATERAL PECTORAL BLOCK performed by Hilda Felix MD at [address] Bilateral     bunion correct osteotomy smith double-stem lesser MTP Impl    COLONOSCOPY  2017    HEMORRHOID SURGERY  2005    HYSTERECTOMY (CERVIX STATUS UNKNOWN)  1980    KNEE SURGERY Right 2007    RECTAL SURGERY      anal fissurectomy with sphincterotomy    MAICOL AND BSO (CERVIX REMOVED) Bilateral     both ovaries removed, had surgery as a result of endometriosis    TUBAL LIGATION      UPPER GASTROINTESTINAL ENDOSCOPY N/A 2023    Dr Melisa Alpers 3/2/2023 2023 2023 2023 2023 7090   SYSTOLIC [phone] - 166 -   DIASTOLIC 70 70 58 - 82 -   Site - Left Upper Arm - - - -   Position - Sitting - - - -   Pulse 103 89 68 - 64 70   Temp 97.7 98.3 - - 98.2 -   Resp - - - - 14 15   SpO2 97 96 98 - 100 97   Weight 126 lb 126 lb 9.6 oz 125 lb - - -   Height - 5' 1\" 5' 0\" - - -   Body mass index - 23.92 kg/m2 24.41 kg/m2 - - -   Pain Level - - - 9 - -   Some recent data might be hidden       Family History   Problem Relation Age of Onset    High Blood Pressure Mother     Diabetes Mother         Type 2    Obesity Mother     High Blood Pressure Father     Diabetes Sister         Type 2    Obesity Sister     Diabetes Sister     Obesity Sister     Diabetes Sister     Obesity Sister     Diabetes Brother         Type 2    Cancer Brother         Blood form of cancer-not leukemia     Obesity Brother     No Known Problems Son     No Known Problems Son        Social History     Tobacco Use    Smoking status: Former     Packs/day: 2.00     Years: 15.00     Pack years: 30.00     Types: Cigarettes     Start date:      Quit date:      Years since quittin.1    Smokeless tobacco: Never   Substance Use Topics    Alcohol use: Yes     Comment: rare social occasions      Current Outpatient Medications   Medication Sig Dispense Refill    Osimertinib Mesylate (TAGRISSO) 80 MG TABS Take 80 mg by mouth daily 30 tablet 0    vitamin D (ERGOCALCIFEROL) 1.25 MG (49330 UT) CAPS capsule TAKE 1 CAPSULE BY MOUTH TWICE A WEEK 24 capsule 0    albuterol sulfate HFA (PROVENTIL HFA) 108 (90 Base) MCG/ACT inhaler Inhale 2 puffs into the lungs every 6 hours as needed for Wheezing 18 g 3    thyroid (ARMOUR) 60 MG tablet Take 1 tablet by mouth daily Compound from straw[employer]   t-4-60/t3-12 90 tablet 1    Omega 3 1200 MG CAPS Take by mouth 2 times daily      lovastatin (MEVACOR) 40 MG tablet TAKE 1 TABLET EVERY NIGHT 90 tablet 3    losartan (COZAAR) 100 MG tablet TAKE 1 TABLET EVERY DAY 90 tablet 3    estradiol (ESTRACE) 0.5 MG tablet TAKE 1 TABLET EVERY DAY 90 tablet 3    fluticasone-salmeterol (ADVAIR) 500-50 MCG/DOSE diskus inhaler Inhale 1 puff into the lungs daily as needed       calcium carbonate-vitamin D 600-200 MG-UNIT TABS Take 1 tablet by mouth 2 times daily       No current facility-administered medications for this visit. Allergies   Allergen Reactions    Compazine [Prochlorperazine Maleate] Other (See Comments)     Eyelids flipped up and mouth stretched to the side.      Thorazine [Chlorpromazine] Other (See Comments)     Eyelids flipped up and mouth stretched to the side       Health Maintenance   Topic Date Due    Annual Wellness Visit (AWV)  12/29/2022    COVID-19 Vaccine (4 - Booster for Pfizer series) 01/01/2024 (Originally 11/29/2021)    DTaP/Tdap/Td vaccine (1 - Tdap) 02/08/2024 (Originally 3/24/1969)    Flu vaccine (1) 02/08/2024 (Originally 8/1/2022)    Shingles vaccine (1 of 2) 02/08/2024 (Originally 3/24/1969)    Depression Screen  02/08/2024    Pneumococcal 65+ years Vaccine  Completed    Hepatitis A vaccine  Aged Out    Hib vaccine  Aged Out    Meningococcal (ACWY) vaccine  Aged Out       No results found for: LABA1C  No results found for: PSA, PSADIA  TSH   Date Value Ref Range Status   02/06/2023 3.920 0.270 - 4.200 uIU/mL Final   ]  Lab Results   Component Value Date     02/06/2023    K 4.0 02/06/2023     02/06/2023    CO2 26 02/06/2023    BUN 25 (H) 02/06/2023    CREATININE 1.0 (H) 02/06/2023    GLUCOSE 101 02/06/2023    CALCIUM 9.6 02/06/2023    PROT 7.2 02/06/2023    LABALBU 3.9 02/06/2023    BILITOT 0.3 02/06/2023    ALKPHOS 117 (H) 02/06/2023    AST 28 02/06/2023    ALT 18 02/06/2023    LABGLOM 60 (A) 02/06/2023    GFRAA >59 06/28/2022     Lab Results   Component Value Date    CHOL 187 02/06/2023    CHOL 161 06/28/2022    CHOL 156 (L) 09/20/2021     Lab Results   Component Value Date    TRIG 235 (H) 02/06/2023    TRIG 182 (H) 06/28/2022    TRIG 187 (H) 12/20/2021     Lab Results   Component Value Date    HDL 54 (L) 02/06/2023    HDL 55 (L) 06/28/2022    HDL 53 (L) 09/20/2021     Lab Results   Component Value Date    LDLCALC 86 02/06/2023    LDLCALC 70 06/28/2022    LDLCALC 58 09/20/2021     Lab Results   Component Value Date/Time     02/06/2023 09:46 AM    K 4.0 02/06/2023 09:46 AM    K 4.7 02/02/2023 02:34 AM     02/06/2023 09:46 AM    CO2 26 02/06/2023 09:46 AM    BUN 25 02/06/2023 09:46 AM    CREATININE 1.0 02/06/2023 09:46 AM    GLUCOSE 101 02/06/2023 09:46 AM    CALCIUM 9.6 02/06/2023 09:46 AM      Lab Results   Component Value Date    WBC 12.23 (H) 02/20/2023    HGB 14.4 02/20/2023    HCT 44.2 02/20/2023    MCV 93.6 02/20/2023     02/20/2023    LYMPHOPCT 20.8 02/20/2023    RBC 4.72 02/20/2023    MCH 30.5 02/20/2023    MCHC 32.6 02/20/2023    RDW 12.2 02/20/2023     Lab Results   Component Value Date    VITD25 91.3 02/06/2023       Subjective:      Review of Systems   Constitutional:  Negative for fatigue, fever and unexpected weight change. HENT:  Positive for congestion, sinus pressure and sinus pain. Negative for ear discharge, ear pain, mouth sores, sore throat and trouble swallowing. Eyes:  Negative for discharge, itching and visual disturbance. Respiratory:  Negative for cough, choking, shortness of breath, wheezing and stridor.     Cardiovascular: Negative for chest pain, palpitations and leg swelling. Gastrointestinal:  Negative for abdominal distention, abdominal pain, blood in stool, constipation, diarrhea, nausea and vomiting. Endocrine: Negative for cold intolerance, polydipsia and polyuria. Genitourinary:  Negative for difficulty urinating, dysuria, frequency and urgency. Musculoskeletal:  Negative for arthralgias and gait problem. Skin:  Negative for color change and rash. Allergic/Immunologic: Negative for food allergies and immunocompromised state. Neurological:  Negative for dizziness, tremors, syncope, speech difficulty, weakness and headaches. Hematological:  Negative for adenopathy. Does not bruise/bleed easily. Psychiatric/Behavioral:  Negative for confusion and hallucinations. Objective:     Physical Exam  Constitutional:       General: She is not in acute distress. Appearance: She is well-developed. HENT:      Head: Normocephalic and atraumatic. Right Ear: Tympanic membrane normal.      Left Ear: Tympanic membrane normal.   Eyes:      General: No scleral icterus. Right eye: No discharge. Left eye: No discharge. Pupils: Pupils are equal, round, and reactive to light. Neck:      Thyroid: No thyromegaly. Vascular: No JVD. Cardiovascular:      Rate and Rhythm: Normal rate and regular rhythm. Heart sounds: Normal heart sounds. No murmur heard. Pulmonary:      Effort: Pulmonary effort is normal. No respiratory distress. Breath sounds: Normal breath sounds. No wheezing or rales. Abdominal:      General: Bowel sounds are normal. There is no distension. Palpations: Abdomen is soft. There is no mass. Tenderness: There is no abdominal tenderness. There is no guarding or rebound. Musculoskeletal:         General: No tenderness. Normal range of motion. Cervical back: Normal range of motion and neck supple. Skin:     General: Skin is warm and dry.       Findings: No erythema or rash. Neurological:      Mental Status: She is alert and oriented to person, place, and time. Cranial Nerves: No cranial nerve deficit. Coordination: Coordination normal.      Deep Tendon Reflexes: Reflexes are normal and symmetric. Reflexes normal.   Psychiatric:         Mood and Affect: Mood is not depressed. Behavior: Behavior normal.         Thought Content: Thought content normal.         Judgment: Judgment normal.     /70   Pulse (!) 103   Temp 97.7 °F (36.5 °C)   Wt 126 lb (57.2 kg)   SpO2 97%   BMI 23.81 kg/m²           Assessment:      Problem List       Acute sinusitis - Primary      Acute sinusitis    Cefdinir  300 mg twice daily for 7 days;  Get 2 doses in today   Saline nasal spray 4 times a day both nares for 7 days  Steroid spray, rhinocort, nasocort, nasonex, flonase twice daily about 5 minutes after the saline   mucinex 600 mg twice daily for 7 days with plenty of water  Delsym cough syrup as needed     Proair inhaler   1 inhalation 4 times a day until you are feeling better and then 24 hours after that. Following day decreased to 3 treatments a day for 3 days then 2 treatments a day for 3 days and 1 treatment a day for 3 days and then he can stop. NSCLC metastatic to liver (HCC)    Relevant Medications    Osimertinib Mesylate (TAGRISSO) 80 MG TABS    NSCLC of left lung ([address]     Followed by Dr. Cris Lane          Relevant Medications    Osimertinib Mesylate (TAGRISSO) 80 MG TABS    Acquired hypothyroidism (Chronic)     Refill of her [place] Thyroid called to [facility]          Relevant Medications    thyroid (ARMOUR) 60 MG tablet       Plan:        Patient given educational materials - see patient instructions. Discussed use, benefit, and side effects of prescribed medications. Allpatient questions answered. Pt voiced understanding. Reviewed health maintenance. Instructed to continue current medications, diet and exercise.   Patient agreed with treatment plan. Follow up as directed. MEDICATIONS:  No orders of the defined types were placed in this encounter. ORDERS:  No orders of the defined types were placed in this encounter. Follow-up:  No follow-ups on file. PATIENT INSTRUCTIONS:  Patient Instructions   1. Sinus congestion   Acute sinusitis  Medrol Dosepak  Cefdinir 300 twice  a day for 7 days  get 2 doses in today    Saline nasal spray 4 times a day both nares for 7 days  Steroid spray, rhinocort, nasocort, nasonex, flonase twice daily about 5 minutes after the saline   mucinex 1200 mg twice daily for 7 days with plenty of water  Delsym cough syrup up to 3 times daily as needed for cough   Ricola cough drops, honey lemon in pink bag;  has echanesia to help build your immunity     #2 hypothyroidism she needs a refill on her [place] Thyroid compounded by [facility] [facility] I have called that in  #3 lung cancer with that metastasis to the liver  Electronically signed by CHRIS Gillette on 3/2/2023 at 11:37 AM  22 minutes to gather informaiont;  review records and test and discus with patient and family     EMRDragon/transcription disclaimer:  Much of this encounter note is electronic transcription/translation of spoken language to printed texts. The electronic translation of spoken language may be erroneous, or at times,nonsensical words or phrases may be inadvertently transcribed.   Although I have reviewed the note for such errors, some may still exist.

## 2023-03-13 ENCOUNTER — HOSPITAL ENCOUNTER (OUTPATIENT)
Dept: WOMENS IMAGING | Age: 73
Discharge: HOME | End: 2023-03-13
Payer: MEDICARE

## 2023-03-13 DIAGNOSIS — Z12.31 ENCOUNTER FOR SCREENING MAMMOGRAM FOR MALIGNANT NEOPLASM OF BREAST: ICD-10-CM

## 2023-03-13 PROCEDURE — 77067 SCR MAMMO BI INCL CAD: CPT | Performed by: RADIOLOGY

## 2023-03-13 PROCEDURE — 77063 BREAST TOMOSYNTHESIS BI: CPT

## 2023-03-13 RX ORDER — OSIMERTINIB 80 1/1
80 TABLET, FILM COATED ORAL DAILY
Qty: 30 TABLET | Refills: 11 | Status: ACTIVE | OUTPATIENT
Start: 2023-03-13

## 2023-03-20 NOTE — PROGRESS NOTES
tablet 3    estradiol (ESTRACE) 0.5 MG tablet TAKE 1 TABLET EVERY DAY 90 tablet 3    fluticasone-salmeterol (ADVAIR) 500-50 MCG/DOSE diskus inhaler Inhale 1 puff into the lungs daily as needed       calcium carbonate-vitamin D 600-200 MG-UNIT TABS Take 1 tablet by mouth 2 times daily       No current facility-administered medications for this visit. REVIEW OF SYSTEMS:   CONSTITUTIONAL: no fever, no night sweats,  fatigue;  HEENT: no blurring of vision, no double vision, no hearing difficulty, no tinnitus, no ulceration, no dysplasia, no epistaxis;   LUNGS: no cough, no hemoptysis, no wheeze,  no shortness of breath;  CARDIOVASCULAR: no palpitation, no chest pain, no shortness of breath;  GI: no abdominal pain, no nausea, no vomiting, no diarrhea, no constipation;  CHAVA: no dysuria, no hematuria, no frequency or urgency, no nephrolithiasis;  MUSCULOSKELETAL: no joint pain, no swelling, no stiffness;  ENDOCRINE: no polyuria, no polydipsia, no cold or heat intolerance;  HEMATOLOGY: no easy bruising or bleeding, no history of clotting disorder;  DERMATOLOGY: skin rash on back, no eczema, no pruritus;  PSYCHIATRY: no depression, no anxiety, no panic attacks, no suicidal ideation, no homicidal ideation;  NEUROLOGY: no syncope, no seizures, no numbness or tingling of hands, no numbness or tingling of feet, no paresis;     Vitals signs:  /78   Pulse 74   Temp 97.3 °F (36.3 °C)   Wt 126 lb 14.4 oz (57.6 kg)   SpO2 99%   BMI 23.98 kg/m²    Pain scale:       PHYSICAL EXAM:  CONSTITUTIONAL: Alert, appropriate, no acute distress  EYES: Non icteric, EOM intact, pupils equal round   ENT: Mucus membranes moist, no oral pharyngeal lesions, external inspection of ears and nose are normal.  NECK: Supple, no masses. No palpable thyroid mass  CHEST/LUNGS: CTA bilaterally, normal respiratory effort   CARDIOVASCULAR: RRR, no murmurs. No lower extremity edema  ABDOMEN: soft non-tender, active bowel sounds, no HSM.   No

## 2023-03-21 ENCOUNTER — OFFICE VISIT (OUTPATIENT)
Dept: HEMATOLOGY | Age: 73
End: 2023-03-21
Payer: MEDICARE

## 2023-03-21 ENCOUNTER — HOSPITAL ENCOUNTER (OUTPATIENT)
Dept: INFUSION THERAPY | Age: 73
Discharge: HOME | End: 2023-03-21
Payer: MEDICARE

## 2023-03-21 VITALS
DIASTOLIC BLOOD PRESSURE: 78 MMHG | HEART RATE: 74 BPM | BODY MASS INDEX: 23.98 KG/M2 | TEMPERATURE: 97.3 F | OXYGEN SATURATION: 99 % | SYSTOLIC BLOOD PRESSURE: 126 MMHG | WEIGHT: 126.9 LBS

## 2023-03-21 DIAGNOSIS — C78.7 NSCLC METASTATIC TO LIVER (HCC): ICD-10-CM

## 2023-03-21 DIAGNOSIS — C34.90 NSCLC METASTATIC TO LIVER (HCC): ICD-10-CM

## 2023-03-21 DIAGNOSIS — L27.0 DRUG-INDUCED SKIN RASH: ICD-10-CM

## 2023-03-21 DIAGNOSIS — T45.1X5S ANTINEOPLASTIC DRUGS CAUSING ADVERSE EFFECT, SEQUELA: ICD-10-CM

## 2023-03-21 DIAGNOSIS — Z71.89 CARE PLAN DISCUSSED WITH PATIENT: ICD-10-CM

## 2023-03-21 DIAGNOSIS — C34.92 NSCLC OF LEFT LUNG (HCC): ICD-10-CM

## 2023-03-21 DIAGNOSIS — C78.7 LIVER METASTASIS: ICD-10-CM

## 2023-03-21 DIAGNOSIS — C34.92 NSCLC OF LEFT LUNG (HCC): Primary | ICD-10-CM

## 2023-03-21 LAB
ALBUMIN SERPL-MCNC: 4.1 G/DL (ref 3.5–5.2)
ALP SERPL-CCNC: 86 U/L (ref 35–104)
ALT SERPL-CCNC: 16 U/L (ref 9–52)
ANION GAP SERPL CALCULATED.3IONS-SCNC: 7 MMOL/L (ref 7–19)
AST SERPL-CCNC: 25 U/L (ref 14–36)
BASOPHILS # BLD: 0.06 K/UL (ref 0.01–0.08)
BASOPHILS NFR BLD: 0.7 % (ref 0.1–1.2)
BILIRUB SERPL-MCNC: <0.2 MG/DL (ref 0.2–1.3)
BUN SERPL-MCNC: 26 MG/DL (ref 7–17)
CALCIUM SERPL-MCNC: 10 MG/DL (ref 8.4–10.2)
CHLORIDE SERPL-SCNC: 104 MMOL/L (ref 98–111)
CO2 SERPL-SCNC: 30 MMOL/L (ref 22–29)
CREAT SERPL-MCNC: 1.2 MG/DL (ref 0.5–1)
EOSINOPHIL # BLD: 0.27 K/UL (ref 0.04–0.54)
EOSINOPHIL NFR BLD: 3.4 % (ref 0.7–7)
ERYTHROCYTE [DISTWIDTH] IN BLOOD BY AUTOMATED COUNT: 12.7 % (ref 11.7–14.4)
GLOBULIN: 2.5 G/DL
GLUCOSE SERPL-MCNC: 105 MG/DL (ref 74–106)
HCT VFR BLD AUTO: 42.3 % (ref 34.1–44.9)
HGB BLD-MCNC: 13 G/DL (ref 11.2–15.7)
LYMPHOCYTES # BLD: 2.52 K/UL (ref 1.18–3.74)
LYMPHOCYTES NFR BLD: 31.4 % (ref 19.3–53.1)
MCH RBC QN AUTO: 30.5 PG (ref 25.6–32.2)
MCHC RBC AUTO-ENTMCNC: 30.7 G/DL (ref 32.3–35.5)
MCV RBC AUTO: 99.3 FL (ref 79.4–94.8)
MONOCYTES # BLD: 0.53 K/UL (ref 0.24–0.82)
MONOCYTES NFR BLD: 6.6 % (ref 4.7–12.5)
NEUTROPHILS # BLD: 4.62 K/UL (ref 1.56–6.13)
NEUTS SEG NFR BLD: 57.5 % (ref 34–71.1)
PLATELET # BLD AUTO: 171 K/UL (ref 182–369)
PMV BLD AUTO: 10.3 FL (ref 7.4–10.4)
POTASSIUM SERPL-SCNC: 4.7 MMOL/L (ref 3.5–5.1)
PROT SERPL-MCNC: 6.6 G/DL (ref 6.3–8.2)
RBC # BLD AUTO: 4.26 M/UL (ref 3.93–5.22)
SODIUM SERPL-SCNC: 141 MMOL/L (ref 137–145)
WBC # BLD AUTO: 8.03 K/UL (ref 3.98–10.04)

## 2023-03-21 PROCEDURE — 80053 COMPREHEN METABOLIC PANEL: CPT

## 2023-03-21 PROCEDURE — 99214 OFFICE O/P EST MOD 30 MIN: CPT | Performed by: INTERNAL MEDICINE

## 2023-03-21 PROCEDURE — G8420 CALC BMI NORM PARAMETERS: HCPCS | Performed by: INTERNAL MEDICINE

## 2023-03-21 PROCEDURE — 36415 COLL VENOUS BLD VENIPUNCTURE: CPT

## 2023-03-21 PROCEDURE — 1090F PRES/ABSN URINE INCON ASSESS: CPT | Performed by: INTERNAL MEDICINE

## 2023-03-21 PROCEDURE — 3074F SYST BP LT 130 MM HG: CPT | Performed by: INTERNAL MEDICINE

## 2023-03-21 PROCEDURE — 99212 OFFICE O/P EST SF 10 MIN: CPT

## 2023-03-21 PROCEDURE — 3078F DIAST BP <80 MM HG: CPT | Performed by: INTERNAL MEDICINE

## 2023-03-21 PROCEDURE — 1036F TOBACCO NON-USER: CPT | Performed by: INTERNAL MEDICINE

## 2023-03-21 PROCEDURE — 3017F COLORECTAL CA SCREEN DOC REV: CPT | Performed by: INTERNAL MEDICINE

## 2023-03-21 PROCEDURE — G8427 DOCREV CUR MEDS BY ELIG CLIN: HCPCS | Performed by: INTERNAL MEDICINE

## 2023-03-21 PROCEDURE — 1123F ACP DISCUSS/DSCN MKR DOCD: CPT | Performed by: INTERNAL MEDICINE

## 2023-03-21 PROCEDURE — 85025 COMPLETE CBC W/AUTO DIFF WBC: CPT

## 2023-03-21 PROCEDURE — G8484 FLU IMMUNIZE NO ADMIN: HCPCS | Performed by: INTERNAL MEDICINE

## 2023-03-21 PROCEDURE — G8399 PT W/DXA RESULTS DOCUMENT: HCPCS | Performed by: INTERNAL MEDICINE

## 2023-03-31 ENCOUNTER — CLINICAL DOCUMENTATION (OUTPATIENT)
Dept: HEMATOLOGY | Age: 73
End: 2023-03-31

## 2023-03-31 NOTE — PROGRESS NOTES
Pt called stating she has found a new right breast lump. Discussed with Dr Echo Jacobsen who recommended a diagnostic mammogram. Signer reviewed chart and found pt had screening mammogram on 3/13/23 at [facility]. Discussed with Dr Echo Jacobsen who reviewed mammogram and recommended to see pt for breast exam to determine further treatment options at that time. Call to pt to inform her of above and to schedule appointment. Pt voiced understanding and accepted appt for Mon 4/3.

## 2023-03-31 NOTE — PROGRESS NOTES
tablet TAKE 1 TABLET EVERY DAY 90 tablet 3    fluticasone-salmeterol (ADVAIR) 500-50 MCG/DOSE diskus inhaler Inhale 1 puff into the lungs daily as needed      calcium carbonate-vitamin D 600-200 MG-UNIT TABS Take 1 tablet by mouth 2 times daily       No current facility-administered medications for this visit. REVIEW OF SYSTEMS:   CONSTITUTIONAL: no fever, no night sweats, fatigue;  HEENT: no blurring of vision, no double vision, no hearing difficulty, no tinnitus, no ulceration, no dysplasia, no epistaxis;   Breast: PALPABLE BREAST NODULE  LUNGS: no cough, no hemoptysis, no wheeze,  no shortness of breath;  CARDIOVASCULAR: no palpitation, no chest pain, no shortness of breath;  GI: no abdominal pain, no nausea, no vomiting, no diarrhea, no constipation;  CHAVA: no dysuria, no hematuria, no frequency or urgency, no nephrolithiasis;  MUSCULOSKELETAL: no joint pain, no swelling, no stiffness;  ENDOCRINE: no polyuria, no polydipsia, no cold or heat intolerance;  HEMATOLOGY: no easy bruising or bleeding, no history of clotting disorder;  DERMATOLOGY: no skin rash, no eczema, no pruritus;  PSYCHIATRY: no depression, no anxiety, no panic attacks, no suicidal ideation, no homicidal ideation;  NEUROLOGY: no syncope, no seizures, no numbness or tingling of hands, no numbness or tingling of feet, no paresis;     Vitals signs:  /60   Pulse 55   Temp 97.1 °F (36.2 °C)   Wt 125 lb 14.4 oz (57.1 kg)   SpO2 99%   BMI 23.79 kg/m²    Pain scale:       PHYSICAL EXAM:  CONSTITUTIONAL: Alert, appropriate, no acute distress  EYES: Non icteric, EOM intact, pupils equal round   ENT: Mucus membranes moist, no oral pharyngeal lesions, external inspection of ears and nose are normal.  NECK: Supple, no masses. No palpable thyroid mass  CHEST/LUNGS: CTA bilaterally, normal respiratory effort   BREAST: Chaperoned breast exam with Jennifer Yusuf RN  CARDIOVASCULAR: RRR, no murmurs.   No lower extremity edema  ABDOMEN: soft

## 2023-04-03 ENCOUNTER — HOSPITAL ENCOUNTER (OUTPATIENT)
Dept: INFUSION THERAPY | Age: 73
Discharge: HOME | End: 2023-04-03
Payer: MEDICARE

## 2023-04-03 ENCOUNTER — OFFICE VISIT (OUTPATIENT)
Dept: HEMATOLOGY | Age: 73
End: 2023-04-03
Payer: MEDICARE

## 2023-04-03 VITALS
WEIGHT: 125.9 LBS | TEMPERATURE: 97.1 F | DIASTOLIC BLOOD PRESSURE: 60 MMHG | OXYGEN SATURATION: 99 % | BODY MASS INDEX: 23.79 KG/M2 | HEART RATE: 55 BPM | SYSTOLIC BLOOD PRESSURE: 125 MMHG

## 2023-04-03 DIAGNOSIS — C34.92 NSCLC OF LEFT LUNG (HCC): ICD-10-CM

## 2023-04-03 DIAGNOSIS — Z71.89 CARE PLAN DISCUSSED WITH PATIENT: ICD-10-CM

## 2023-04-03 DIAGNOSIS — C78.7 NSCLC METASTATIC TO LIVER (HCC): ICD-10-CM

## 2023-04-03 DIAGNOSIS — N63.12 MASS OF UPPER INNER QUADRANT OF RIGHT BREAST: Primary | ICD-10-CM

## 2023-04-03 DIAGNOSIS — C78.7 MALIGNANT NEOPLASM METASTATIC TO LIVER (HCC): ICD-10-CM

## 2023-04-03 DIAGNOSIS — C34.90 NSCLC METASTATIC TO LIVER (HCC): ICD-10-CM

## 2023-04-03 DIAGNOSIS — L27.0 DRUG-INDUCED SKIN RASH: ICD-10-CM

## 2023-04-03 LAB
BASOPHILS # BLD: 0.07 K/UL (ref 0.01–0.08)
BASOPHILS NFR BLD: 0.9 % (ref 0.1–1.2)
EOSINOPHIL # BLD: 0.29 K/UL (ref 0.04–0.54)
EOSINOPHIL NFR BLD: 3.6 % (ref 0.7–7)
ERYTHROCYTE [DISTWIDTH] IN BLOOD BY AUTOMATED COUNT: 12.3 % (ref 11.7–14.4)
HCT VFR BLD AUTO: 44.2 % (ref 34.1–44.9)
HGB BLD-MCNC: 13.7 G/DL (ref 11.2–15.7)
LYMPHOCYTES # BLD: 2.78 K/UL (ref 1.18–3.74)
LYMPHOCYTES NFR BLD: 34.4 % (ref 19.3–53.1)
MCH RBC QN AUTO: 30.8 PG (ref 25.6–32.2)
MCHC RBC AUTO-ENTMCNC: 31 G/DL (ref 32.3–35.5)
MCV RBC AUTO: 99.3 FL (ref 79.4–94.8)
MONOCYTES # BLD: 0.46 K/UL (ref 0.24–0.82)
MONOCYTES NFR BLD: 5.7 % (ref 4.7–12.5)
NEUTROPHILS # BLD: 4.45 K/UL (ref 1.56–6.13)
NEUTS SEG NFR BLD: 55.2 % (ref 34–71.1)
PLATELET # BLD AUTO: 171 K/UL (ref 182–369)
PMV BLD AUTO: 10.4 FL (ref 7.4–10.4)
RBC # BLD AUTO: 4.45 M/UL (ref 3.93–5.22)
WBC # BLD AUTO: 8.07 K/UL (ref 3.98–10.04)

## 2023-04-03 PROCEDURE — 3017F COLORECTAL CA SCREEN DOC REV: CPT | Performed by: INTERNAL MEDICINE

## 2023-04-03 PROCEDURE — 99212 OFFICE O/P EST SF 10 MIN: CPT

## 2023-04-03 PROCEDURE — G8420 CALC BMI NORM PARAMETERS: HCPCS | Performed by: INTERNAL MEDICINE

## 2023-04-03 PROCEDURE — 85025 COMPLETE CBC W/AUTO DIFF WBC: CPT

## 2023-04-03 PROCEDURE — 1123F ACP DISCUSS/DSCN MKR DOCD: CPT | Performed by: INTERNAL MEDICINE

## 2023-04-03 PROCEDURE — 3074F SYST BP LT 130 MM HG: CPT | Performed by: INTERNAL MEDICINE

## 2023-04-03 PROCEDURE — 3078F DIAST BP <80 MM HG: CPT | Performed by: INTERNAL MEDICINE

## 2023-04-03 PROCEDURE — 1090F PRES/ABSN URINE INCON ASSESS: CPT | Performed by: INTERNAL MEDICINE

## 2023-04-03 PROCEDURE — 99213 OFFICE O/P EST LOW 20 MIN: CPT | Performed by: INTERNAL MEDICINE

## 2023-04-03 PROCEDURE — 36415 COLL VENOUS BLD VENIPUNCTURE: CPT

## 2023-04-03 PROCEDURE — G8427 DOCREV CUR MEDS BY ELIG CLIN: HCPCS | Performed by: INTERNAL MEDICINE

## 2023-04-03 PROCEDURE — 1036F TOBACCO NON-USER: CPT | Performed by: INTERNAL MEDICINE

## 2023-04-03 PROCEDURE — G8399 PT W/DXA RESULTS DOCUMENT: HCPCS | Performed by: INTERNAL MEDICINE

## 2023-04-03 NOTE — LETTER
April 3, 2023      Joaquin Middleton, [address] [facility]  [address]      Patient: Severo Doffing   MR Number: [id number]   YOB: 1950   Date of Visit: 4/3/2023       Dear Joaquin Middleton: Thank you for referring Bob Alexander to me for evaluation/treatment. Below are the relevant portions of my assessment and plan of care. If you have questions, please do not hesitate to call me. I look forward to following Nikkie along with you.     Sincerely,        Neo Angulo MD

## 2023-04-04 ENCOUNTER — TELEPHONE (OUTPATIENT)
Dept: SURGERY | Age: 73
End: 2023-04-04

## 2023-04-04 NOTE — TELEPHONE ENCOUNTER
Nikkie called to schedule a  NP appt from a referral for mass in breast. [facility] was unable to accommodate. Patient wants to be seen soon. Please be advised that the best time to call her to accommodate their needs is  as soon as possible . Thank you.

## 2023-04-06 ENCOUNTER — TELEPHONE (OUTPATIENT)
Dept: INTERNAL MEDICINE | Age: 73
End: 2023-04-06

## 2023-04-06 ENCOUNTER — NURSE ONLY (OUTPATIENT)
Dept: INTERNAL MEDICINE | Age: 73
End: 2023-04-06

## 2023-04-06 DIAGNOSIS — R05.9 COUGH, UNSPECIFIED TYPE: Primary | ICD-10-CM

## 2023-04-06 DIAGNOSIS — R05.1 ACUTE COUGH: Primary | ICD-10-CM

## 2023-04-06 RX ORDER — CEFDINIR 300 MG/1
300 CAPSULE ORAL 2 TIMES DAILY
Qty: 14 CAPSULE | Refills: 0 | Status: SHIPPED | OUTPATIENT
Start: 2023-04-06 | End: 2023-04-13

## 2023-04-06 RX ORDER — METHYLPREDNISOLONE ACETATE 80 MG/ML
80 INJECTION, SUSPENSION INTRA-ARTICULAR; INTRALESIONAL; INTRAMUSCULAR; SOFT TISSUE ONCE
Status: COMPLETED | OUTPATIENT
Start: 2023-04-06 | End: 2023-04-06

## 2023-04-06 RX ORDER — GUAIFENESIN AND CODEINE PHOSPHATE 100; 10 MG/5ML; MG/5ML
5 SOLUTION ORAL 4 TIMES DAILY PRN
Qty: 118 ML | Refills: 0 | Status: SHIPPED | OUTPATIENT
Start: 2023-04-06 | End: 2023-04-09

## 2023-04-06 RX ADMIN — METHYLPREDNISOLONE ACETATE 80 MG: 80 INJECTION, SUSPENSION INTRA-ARTICULAR; INTRALESIONAL; INTRAMUSCULAR; SOFT TISSUE at 15:06

## 2023-04-06 NOTE — TELEPHONE ENCOUNTER
Nikkie called requesting a refill of the below medication which has been pended for you:     Requested Prescriptions     Pending Prescriptions Disp Refills    cefdinir (OMNICEF) 300 MG capsule 14 capsule 0     Sig: Take 1 capsule by mouth 2 times daily for 7 days       Last Appointment Date: 3/2/2023  Next Appointment Date: 8/15/2023    Allergies   Allergen Reactions    Compazine [Prochlorperazine Maleate] Other (See Comments)     Eyelids flipped up and mouth stretched to the side.      Thorazine [Chlorpromazine] Other (See Comments)     Eyelids flipped up and mouth stretched to the side

## 2023-04-25 ENCOUNTER — TELEPHONE (OUTPATIENT)
Dept: INTERNAL MEDICINE | Age: 73
End: 2023-04-25

## 2023-05-01 RX ORDER — OMEPRAZOLE 20 MG/1
20 TABLET, DELAYED RELEASE ORAL DAILY
Qty: 30 TABLET | Refills: 3 | Status: SHIPPED | OUTPATIENT
Start: 2023-05-01

## 2023-05-01 RX ORDER — ERGOCALCIFEROL 1.25 MG/1
CAPSULE ORAL
Qty: 24 CAPSULE | Refills: 0 | Status: SHIPPED | OUTPATIENT
Start: 2023-05-01

## 2023-05-11 ENCOUNTER — TELEPHONE (OUTPATIENT)
Dept: INTERNAL MEDICINE | Age: 73
End: 2023-05-11

## 2023-05-12 RX ORDER — OMEPRAZOLE 20 MG/1
20 TABLET, DELAYED RELEASE ORAL DAILY
Qty: 90 TABLET | Refills: 1 | Status: SHIPPED | OUTPATIENT
Start: 2023-05-12

## 2023-05-15 ENCOUNTER — HOSPITAL ENCOUNTER (OUTPATIENT)
Dept: INFUSION THERAPY | Age: 73
Discharge: HOME | End: 2023-05-15
Payer: MEDICARE

## 2023-05-15 ENCOUNTER — OFFICE VISIT (OUTPATIENT)
Dept: HEMATOLOGY | Age: 73
End: 2023-05-15
Payer: MEDICARE

## 2023-05-15 VITALS
BODY MASS INDEX: 23.3 KG/M2 | DIASTOLIC BLOOD PRESSURE: 58 MMHG | OXYGEN SATURATION: 98 % | HEIGHT: 61 IN | WEIGHT: 123.4 LBS | SYSTOLIC BLOOD PRESSURE: 128 MMHG | HEART RATE: 73 BPM

## 2023-05-15 DIAGNOSIS — C78.7 NSCLC METASTATIC TO LIVER (HCC): ICD-10-CM

## 2023-05-15 DIAGNOSIS — Z71.89 CARE PLAN DISCUSSED WITH PATIENT: ICD-10-CM

## 2023-05-15 DIAGNOSIS — C34.92 NSCLC OF LEFT LUNG (HCC): Primary | ICD-10-CM

## 2023-05-15 DIAGNOSIS — C34.90 NSCLC METASTATIC TO LIVER (HCC): ICD-10-CM

## 2023-05-15 DIAGNOSIS — C78.7 MALIGNANT NEOPLASM METASTATIC TO LIVER (HCC): ICD-10-CM

## 2023-05-15 LAB
ALBUMIN SERPL-MCNC: 4.4 G/DL (ref 3.5–5.2)
ALP SERPL-CCNC: 51 U/L (ref 35–104)
ALT SERPL-CCNC: 23 U/L (ref 9–52)
ANION GAP SERPL CALCULATED.3IONS-SCNC: 8 MMOL/L (ref 7–19)
AST SERPL-CCNC: 32 U/L (ref 14–36)
BASOPHILS # BLD: 0.03 K/UL (ref 0.01–0.08)
BASOPHILS NFR BLD: 0.4 % (ref 0.1–1.2)
BILIRUB SERPL-MCNC: 0.3 MG/DL (ref 0.2–1.3)
BUN SERPL-MCNC: 31 MG/DL (ref 7–17)
CALCIUM SERPL-MCNC: 9.7 MG/DL (ref 8.4–10.2)
CHLORIDE SERPL-SCNC: 105 MMOL/L (ref 98–111)
CO2 SERPL-SCNC: 30 MMOL/L (ref 22–29)
CREAT SERPL-MCNC: 1.3 MG/DL (ref 0.5–1)
EOSINOPHIL # BLD: 0.12 K/UL (ref 0.04–0.54)
EOSINOPHIL NFR BLD: 1.7 % (ref 0.7–7)
ERYTHROCYTE [DISTWIDTH] IN BLOOD BY AUTOMATED COUNT: 12.3 % (ref 11.7–14.4)
GLOBULIN: 3.2 G/DL
GLUCOSE SERPL-MCNC: 92 MG/DL (ref 74–106)
HCT VFR BLD AUTO: 46.8 % (ref 34.1–44.9)
HGB BLD-MCNC: 14.8 G/DL (ref 11.2–15.7)
LYMPHOCYTES # BLD: 2.6 K/UL (ref 1.18–3.74)
LYMPHOCYTES NFR BLD: 37.5 % (ref 19.3–53.1)
MCH RBC QN AUTO: 30.2 PG (ref 25.6–32.2)
MCHC RBC AUTO-ENTMCNC: 31.6 G/DL (ref 32.3–35.5)
MCV RBC AUTO: 95.5 FL (ref 79.4–94.8)
MONOCYTES # BLD: 0.54 K/UL (ref 0.24–0.82)
MONOCYTES NFR BLD: 7.8 % (ref 4.7–12.5)
NEUTROPHILS # BLD: 3.63 K/UL (ref 1.56–6.13)
NEUTS SEG NFR BLD: 52.3 % (ref 34–71.1)
PLATELET # BLD AUTO: 152 K/UL (ref 182–369)
PMV BLD AUTO: 10.4 FL (ref 7.4–10.4)
POTASSIUM SERPL-SCNC: 5.4 MMOL/L (ref 3.5–5.1)
PROT SERPL-MCNC: 7.6 G/DL (ref 6.3–8.2)
RBC # BLD AUTO: 4.9 M/UL (ref 3.93–5.22)
SODIUM SERPL-SCNC: 143 MMOL/L (ref 137–145)
WBC # BLD AUTO: 6.94 K/UL (ref 3.98–10.04)

## 2023-05-15 PROCEDURE — 3017F COLORECTAL CA SCREEN DOC REV: CPT | Performed by: INTERNAL MEDICINE

## 2023-05-15 PROCEDURE — 80053 COMPREHEN METABOLIC PANEL: CPT

## 2023-05-15 PROCEDURE — 3074F SYST BP LT 130 MM HG: CPT | Performed by: INTERNAL MEDICINE

## 2023-05-15 PROCEDURE — 36415 COLL VENOUS BLD VENIPUNCTURE: CPT

## 2023-05-15 PROCEDURE — 1123F ACP DISCUSS/DSCN MKR DOCD: CPT | Performed by: INTERNAL MEDICINE

## 2023-05-15 PROCEDURE — 85025 COMPLETE CBC W/AUTO DIFF WBC: CPT

## 2023-05-15 PROCEDURE — 1036F TOBACCO NON-USER: CPT | Performed by: INTERNAL MEDICINE

## 2023-05-15 PROCEDURE — G8427 DOCREV CUR MEDS BY ELIG CLIN: HCPCS | Performed by: INTERNAL MEDICINE

## 2023-05-15 PROCEDURE — 36415 COLL VENOUS BLD VENIPUNCTURE: CPT | Performed by: INTERNAL MEDICINE

## 2023-05-15 PROCEDURE — G8399 PT W/DXA RESULTS DOCUMENT: HCPCS | Performed by: INTERNAL MEDICINE

## 2023-05-15 PROCEDURE — 3078F DIAST BP <80 MM HG: CPT | Performed by: INTERNAL MEDICINE

## 2023-05-15 PROCEDURE — G8420 CALC BMI NORM PARAMETERS: HCPCS | Performed by: INTERNAL MEDICINE

## 2023-05-15 PROCEDURE — 99212 OFFICE O/P EST SF 10 MIN: CPT

## 2023-05-15 PROCEDURE — 99214 OFFICE O/P EST MOD 30 MIN: CPT | Performed by: INTERNAL MEDICINE

## 2023-05-15 PROCEDURE — 1090F PRES/ABSN URINE INCON ASSESS: CPT | Performed by: INTERNAL MEDICINE

## 2023-05-22 RX ORDER — ESTRADIOL 0.5 MG/1
TABLET ORAL
Qty: 90 TABLET | Refills: 3 | Status: SHIPPED | OUTPATIENT
Start: 2023-05-22

## 2023-05-30 RX ORDER — LOVASTATIN 40 MG/1
TABLET ORAL
Qty: 90 TABLET | Refills: 3 | Status: SHIPPED | OUTPATIENT
Start: 2023-05-30

## 2023-05-30 RX ORDER — ERGOCALCIFEROL 1.25 MG/1
50000 CAPSULE ORAL
Qty: 24 CAPSULE | Refills: 0 | Status: SHIPPED | OUTPATIENT
Start: 2023-06-01

## 2023-05-30 RX ORDER — LOSARTAN POTASSIUM 100 MG/1
TABLET ORAL
Qty: 90 TABLET | Refills: 3 | Status: SHIPPED | OUTPATIENT
Start: 2023-05-30

## 2023-05-30 NOTE — TELEPHONE ENCOUNTER
Nikkie called requesting a refill of the below medication which has been pended for you:     Requested Prescriptions     Pending Prescriptions Disp Refills    vitamin D (ERGOCALCIFEROL) 1.25 MG (38903 UT) CAPS capsule 24 capsule 0     Sig: Take 1 capsule by mouth Twice a Week    losartan (COZAAR) 100 MG tablet 90 tablet 3     Sig: TAKE 1 TABLET EVERY DAY    lovastatin (MEVACOR) 40 MG tablet 90 tablet 3     Sig: TAKE 1 TABLET EVERY NIGHT       Last Appointment Date: 3/2/2023  Next Appointment Date: 8/15/2023    Allergies   Allergen Reactions    Compazine [Prochlorperazine Maleate] Other (See Comments)     Eyelids flipped up and mouth stretched to the side.      Thorazine [Chlorpromazine] Other (See Comments)     Eyelids flipped up and mouth stretched to the side

## 2023-06-02 ENCOUNTER — TELEPHONE (OUTPATIENT)
Dept: INTERNAL MEDICINE | Age: 73
End: 2023-06-02

## 2023-06-08 ENCOUNTER — HOSPITAL ENCOUNTER (OUTPATIENT)
Dept: CT IMAGING | Age: 73
Discharge: HOME | End: 2023-06-08
Payer: MEDICARE

## 2023-06-08 DIAGNOSIS — C34.92 NSCLC OF LEFT LUNG (HCC): ICD-10-CM

## 2023-06-08 DIAGNOSIS — C78.7 MALIGNANT NEOPLASM METASTATIC TO LIVER (HCC): ICD-10-CM

## 2023-06-08 PROCEDURE — 6360000004 HC RX CONTRAST MEDICATION: Performed by: INTERNAL MEDICINE

## 2023-06-08 PROCEDURE — 74177 CT ABD & PELVIS W/CONTRAST: CPT

## 2023-06-08 PROCEDURE — 71260 CT THORAX DX C+: CPT

## 2023-06-08 RX ADMIN — IOPAMIDOL 75 ML: 755 INJECTION, SOLUTION INTRAVENOUS at 15:56

## 2023-06-13 ENCOUNTER — HOSPITAL ENCOUNTER (OUTPATIENT)
Dept: INFUSION THERAPY | Age: 73
Discharge: HOME | End: 2023-06-13
Payer: MEDICARE

## 2023-06-13 DIAGNOSIS — C78.7 MALIGNANT NEOPLASM METASTATIC TO LIVER (HCC): ICD-10-CM

## 2023-06-13 LAB
ERYTHROCYTE [DISTWIDTH] IN BLOOD BY AUTOMATED COUNT: 12.5 % (ref 11.7–14.4)
HCT VFR BLD AUTO: 47.5 % (ref 34.1–44.9)
HGB BLD-MCNC: 14.7 G/DL (ref 11.2–15.7)
LYMPHOCYTES # BLD: 1.9 K/UL (ref 1.18–3.74)
LYMPHOCYTES NFR BLD: 30.4 % (ref 19.3–53.1)
MCH RBC QN AUTO: 29.6 PG (ref 25.6–32.2)
MCHC RBC AUTO-ENTMCNC: 30.9 G/DL (ref 32.3–35.5)
MCV RBC AUTO: 95.8 FL (ref 79.4–94.8)
MONOCYTES # BLD: 0.4 K/UL (ref 0.24–0.82)
MONOCYTES NFR BLD: 7 % (ref 4.7–12.5)
NEUTROPHILS # BLD: 4 K/UL (ref 1.56–6.13)
NEUTS SEG NFR BLD: 62.6 % (ref 34–71.1)
PLATELET # BLD AUTO: 165 K/UL (ref 182–369)
PMV BLD AUTO: 9.7 FL (ref 7.4–10.4)
RBC # BLD AUTO: 4.96 M/UL (ref 3.93–5.22)
WBC # BLD AUTO: 6.3 K/UL (ref 3.98–10.04)

## 2023-06-13 PROCEDURE — 99212 OFFICE O/P EST SF 10 MIN: CPT

## 2023-06-13 PROCEDURE — 36415 COLL VENOUS BLD VENIPUNCTURE: CPT

## 2023-06-13 PROCEDURE — 85025 COMPLETE CBC W/AUTO DIFF WBC: CPT

## 2023-07-03 ENCOUNTER — OFFICE VISIT (OUTPATIENT)
Dept: INTERNAL MEDICINE | Age: 73
End: 2023-07-03
Payer: MEDICARE

## 2023-07-03 ENCOUNTER — HOSPITAL ENCOUNTER (OUTPATIENT)
Dept: GENERAL RADIOLOGY | Age: 73
Discharge: HOME | End: 2023-07-03
Payer: MEDICARE

## 2023-07-03 VITALS
OXYGEN SATURATION: 99 % | DIASTOLIC BLOOD PRESSURE: 64 MMHG | HEIGHT: 60 IN | WEIGHT: 124 LBS | HEART RATE: 76 BPM | SYSTOLIC BLOOD PRESSURE: 128 MMHG | BODY MASS INDEX: 24.35 KG/M2 | TEMPERATURE: 99.6 F

## 2023-07-03 DIAGNOSIS — M25.511 ACUTE PAIN OF RIGHT SHOULDER: Primary | ICD-10-CM

## 2023-07-03 DIAGNOSIS — J20.8 ACUTE BRONCHITIS DUE TO OTHER SPECIFIED ORGANISMS: ICD-10-CM

## 2023-07-03 DIAGNOSIS — M25.511 ACUTE PAIN OF RIGHT SHOULDER: ICD-10-CM

## 2023-07-03 PROCEDURE — G8420 CALC BMI NORM PARAMETERS: HCPCS | Performed by: NURSE PRACTITIONER

## 2023-07-03 PROCEDURE — 99213 OFFICE O/P EST LOW 20 MIN: CPT | Performed by: NURSE PRACTITIONER

## 2023-07-03 PROCEDURE — 1036F TOBACCO NON-USER: CPT | Performed by: NURSE PRACTITIONER

## 2023-07-03 PROCEDURE — 3074F SYST BP LT 130 MM HG: CPT | Performed by: NURSE PRACTITIONER

## 2023-07-03 PROCEDURE — 73030 X-RAY EXAM OF SHOULDER: CPT

## 2023-07-03 PROCEDURE — 1090F PRES/ABSN URINE INCON ASSESS: CPT | Performed by: NURSE PRACTITIONER

## 2023-07-03 PROCEDURE — G8427 DOCREV CUR MEDS BY ELIG CLIN: HCPCS | Performed by: NURSE PRACTITIONER

## 2023-07-03 PROCEDURE — 3017F COLORECTAL CA SCREEN DOC REV: CPT | Performed by: NURSE PRACTITIONER

## 2023-07-03 PROCEDURE — G8399 PT W/DXA RESULTS DOCUMENT: HCPCS | Performed by: NURSE PRACTITIONER

## 2023-07-03 PROCEDURE — 3078F DIAST BP <80 MM HG: CPT | Performed by: NURSE PRACTITIONER

## 2023-07-03 PROCEDURE — 1123F ACP DISCUSS/DSCN MKR DOCD: CPT | Performed by: NURSE PRACTITIONER

## 2023-07-03 RX ORDER — CEFDINIR 300 MG/1
300 CAPSULE ORAL 2 TIMES DAILY
Qty: 14 CAPSULE | Refills: 0 | Status: SHIPPED | OUTPATIENT
Start: 2023-07-03 | End: 2023-07-10

## 2023-07-03 RX ORDER — METHYLPREDNISOLONE ACETATE 80 MG/ML
80 INJECTION, SUSPENSION INTRA-ARTICULAR; INTRALESIONAL; INTRAMUSCULAR; SOFT TISSUE ONCE
Status: COMPLETED | OUTPATIENT
Start: 2023-07-03 | End: 2023-07-03

## 2023-07-03 RX ADMIN — METHYLPREDNISOLONE ACETATE 80 MG: 80 INJECTION, SUSPENSION INTRA-ARTICULAR; INTRALESIONAL; INTRAMUSCULAR; SOFT TISSUE at 14:06

## 2023-07-03 ASSESSMENT — ENCOUNTER SYMPTOMS
COUGH: 1
VOMITING: 0
STRIDOR: 0
TROUBLE SWALLOWING: 0
SHORTNESS OF BREATH: 0
SORE THROAT: 0
CHOKING: 0
EYE ITCHING: 0
WHEEZING: 1
COLOR CHANGE: 0
NAUSEA: 0
CONSTIPATION: 0
ABDOMINAL PAIN: 0
BLOOD IN STOOL: 0
DIARRHEA: 0
ABDOMINAL DISTENTION: 0
EYE DISCHARGE: 0

## 2023-07-03 NOTE — PROGRESS NOTES
[facility]  [address] [facility],[address] [address]  [address]   Dept: [phone]  Dept Fax: [phone]: [phone]    Lauren Suarez (:  1950) is a 68 y.o. female,Established patient  with green , here for evaluation of the following chief complaint(s): Other (Wheezing and rt shoulder pain)      Lauren Suarez is a 68 y.o. female who presents today for her medical conditions/complaints as noted below. Lauren Suarez is c/surendra Other (Wheezing and rt shoulder pain)        HPI:     Chief Complaint   Patient presents with    Other     Wheezing and rt shoulder pain     @pt is alone   accompanied by   HPI    Cough congestion and wheezing    2. Right shoulder pain;  she was hit in the right shoulder posteriorly at the [place] the scapula is the pain ; her son and  were cutting down a small tree;  a hanging limb from another tree fell and hit her in the right shoulder posteriorly; Past Medical History:   Diagnosis Date    Acquired hypothyroidism 2017    Alopecia 2017    Breast implant capsular contracture     \"with cysts behind right breast\" per pt.      Esophageal mass 2023    Essential hypertension 2017    Familial hypercholesterolemia 2017    Lung mass 2023    Nodule of chest wall 5/10/2021    PONV (postoperative nausea and vomiting)     Prolonged emergence from general anesthesia     Stage 1 chronic kidney disease 2021    Tobacco abuse, in remission       Past Surgical History:   Procedure Laterality Date    BREAST CYST EXCISION      BREAST ENHANCEMENT SURGERY Bilateral 2020    REMOVAL BILATERAL CONTRACTURED IMPLANTS WITH BILATERAL PECTORAL BLOCK performed by Tabby Retana MD at [address] Bilateral     bunion correct osteotomy smith double-stem lesser MTP Impl    COLONOSCOPY  2017    HEMORRHOID SURGERY  2005    HYSTERECTOMY (CERVIX STATUS UNKNOWN)  1980

## 2023-07-03 NOTE — PATIENT INSTRUCTIONS
Acute bronchitis    Medrol 80 IM   Cefdinir 300 mg twice daily for 7 days;  get 2 doses in today   Proair inhaler;   2 puffs 4 tmies a day until cough is gone  then use use 24 more hours, then start weaning down so every 3 days  by one dose  3 times daily for 3 , 2 times day for 3 days, then daily for 3 days, then stop     2.   Right shoulder pain posterior around scapula;  xray today

## 2023-07-12 ENCOUNTER — TELEPHONE (OUTPATIENT)
Dept: INTERNAL MEDICINE | Age: 73
End: 2023-07-12

## 2023-07-12 DIAGNOSIS — J20.8 ACUTE BRONCHITIS DUE TO OTHER SPECIFIED ORGANISMS: ICD-10-CM

## 2023-07-12 RX ORDER — CEFDINIR 300 MG/1
300 CAPSULE ORAL 2 TIMES DAILY
Qty: 14 CAPSULE | Refills: 0 | Status: SHIPPED | OUTPATIENT
Start: 2023-07-12 | End: 2023-07-19

## 2023-07-12 NOTE — TELEPHONE ENCOUNTER
Nikkie called requesting a refill of the below medication which has been pended for you:     Requested Prescriptions     Pending Prescriptions Disp Refills    cefdinir (OMNICEF) 300 MG capsule 14 capsule 0     Sig: Take 1 capsule by mouth 2 times daily for 7 days       Last Appointment Date: 7/3/2023  Next Appointment Date: 8/15/2023    Allergies   Allergen Reactions    Compazine [Prochlorperazine Maleate] Other (See Comments)     Eyelids flipped up and mouth stretched to the side.      Thorazine [Chlorpromazine] Other (See Comments)     Eyelids flipped up and mouth stretched to the side

## 2023-07-24 NOTE — PROGRESS NOTES
Asymmetric an irregular mucosal thickening of the distal esophagus. Esophageal mucosal mass is suspected in the distal left aspect measuring 2.4 x 1.8 cm. Diffuse metastatic disease suspected to the liver. I was consulted for the above findings.   -1/31/2023-CT abdomen/pelvis with oral/IV contrast showed multiple liver lesions with the largest lesion measured 2.5 cm and 1.9 cm in the right hepatic lobe.  -2/1/23 Liver, fine-needle core biopsies with touch imprint smears: Metastatic   moderately differentiated lung adenocarcinoma.   -2/8/23 CARIS: [facility]  -2/9/23 Lucence: EGFR-positive I  -2/15/23 CT ABDOMEN PELVIS W IV CONTRAST Markedly thickened irregular appearance of the distal esophagus at the GE junction may represent neoplasm. Left lower lobe soft tissue density mass. Multiple hepatic low-attenuation masses suggestive of metastatic disease.   -2/15/23 NM BONE SCAN WHOLE BODY There is abnormal focal radiotracer accumulation corresponding to the RIGHT posterior rib cage. This corresponds to a pathologic fracture of the RIGHT posterior 6th rib with associated lytic lucency, seen on chest CT dated 01/30/2023 (see series 4, image 58 of prior study). 6/8/23 CT Chest W Contrast  The previously noted mass lesion in the left lower lobe of the lung appears to be decreasing in size. The lesion measures approximately 2.9 cm transverse, 1.9 cm AP. This is compared to previous measurements of 4.8 cm transverse,  approximately 3.4 cm. Interval decrease size of the left lower lobe mass lesion. Metastatic disease to the liver appear to be decreased in size when compared to previous study. 6/8/23 CT Abd/Pelvis W IV Contrast (oral) Decreased size of the left lower lobe lung mass and liver metastases. Stable irregular thickening of the distal esophagus, could be inflammation or neoplasm. No evidence of new or worsening disease in the abdomen/pelvis. 06/13/2023-I reviewed CT C/A/P, pathology.   NGS

## 2023-07-25 ENCOUNTER — HOSPITAL ENCOUNTER (OUTPATIENT)
Dept: INFUSION THERAPY | Age: 73
Discharge: HOME | End: 2023-07-25
Payer: MEDICARE

## 2023-07-25 ENCOUNTER — OFFICE VISIT (OUTPATIENT)
Dept: HEMATOLOGY | Age: 73
End: 2023-07-25
Payer: MEDICARE

## 2023-07-25 VITALS
OXYGEN SATURATION: 99 % | WEIGHT: 121.4 LBS | DIASTOLIC BLOOD PRESSURE: 64 MMHG | BODY MASS INDEX: 23.71 KG/M2 | HEART RATE: 76 BPM | TEMPERATURE: 97.5 F | SYSTOLIC BLOOD PRESSURE: 122 MMHG

## 2023-07-25 DIAGNOSIS — C34.92 NSCLC OF LEFT LUNG (HCC): Primary | ICD-10-CM

## 2023-07-25 DIAGNOSIS — C34.92 NSCLC OF LEFT LUNG (HCC): ICD-10-CM

## 2023-07-25 DIAGNOSIS — T45.1X5S ANTINEOPLASTIC DRUGS CAUSING ADVERSE EFFECT, SEQUELA: ICD-10-CM

## 2023-07-25 DIAGNOSIS — C34.90 NSCLC METASTATIC TO LIVER (HCC): ICD-10-CM

## 2023-07-25 DIAGNOSIS — N63.12 MASS OF UPPER INNER QUADRANT OF RIGHT BREAST: ICD-10-CM

## 2023-07-25 DIAGNOSIS — C78.7 MALIGNANT NEOPLASM METASTATIC TO LIVER (HCC): ICD-10-CM

## 2023-07-25 DIAGNOSIS — C78.7 NSCLC METASTATIC TO LIVER (HCC): ICD-10-CM

## 2023-07-25 DIAGNOSIS — Z71.89 CARE PLAN DISCUSSED WITH PATIENT: ICD-10-CM

## 2023-07-25 DIAGNOSIS — C34.90 EGFR-RELATED LUNG CANCER (HCC): ICD-10-CM

## 2023-07-25 LAB
ALBUMIN SERPL-MCNC: 4.6 G/DL (ref 3.5–5.2)
ALP SERPL-CCNC: 65 U/L (ref 35–104)
ALT SERPL-CCNC: 19 U/L (ref 9–52)
ANION GAP SERPL CALCULATED.3IONS-SCNC: 13 MMOL/L (ref 7–19)
AST SERPL-CCNC: 31 U/L (ref 14–36)
BILIRUB SERPL-MCNC: 0.4 MG/DL (ref 0.2–1.3)
BUN SERPL-MCNC: 21 MG/DL (ref 7–17)
CALCIUM SERPL-MCNC: 9.6 MG/DL (ref 8.4–10.2)
CHLORIDE SERPL-SCNC: 107 MMOL/L (ref 98–111)
CO2 SERPL-SCNC: 25 MMOL/L (ref 22–29)
CREAT SERPL-MCNC: 1.1 MG/DL (ref 0.5–1)
ERYTHROCYTE [DISTWIDTH] IN BLOOD BY AUTOMATED COUNT: 12.8 % (ref 11.7–14.4)
GLOBULIN: 3.2 G/DL
GLUCOSE SERPL-MCNC: 87 MG/DL (ref 74–106)
HCT VFR BLD AUTO: 41 % (ref 34.1–44.9)
HGB BLD-MCNC: 13.5 G/DL (ref 11.2–15.7)
LYMPHOCYTES # BLD: 2.31 K/UL (ref 1.18–3.74)
LYMPHOCYTES NFR BLD: 31.4 % (ref 19.3–53.1)
MCH RBC QN AUTO: 30.4 PG (ref 25.6–32.2)
MCHC RBC AUTO-ENTMCNC: 32.9 G/DL (ref 32.3–35.5)
MCV RBC AUTO: 92.3 FL (ref 79.4–94.8)
MONOCYTES # BLD: 0.47 K/UL (ref 0.24–0.82)
MONOCYTES NFR BLD: 6.4 % (ref 4.7–12.5)
NEUTROPHILS # BLD: 4.27 K/UL (ref 1.56–6.13)
NEUTS SEG NFR BLD: 58 % (ref 34–71.1)
PLATELET # BLD AUTO: 148 K/UL (ref 182–369)
PMV BLD AUTO: 10.5 FL (ref 7.4–10.4)
POTASSIUM SERPL-SCNC: 4.5 MMOL/L (ref 3.5–5.1)
PROT SERPL-MCNC: 7.8 G/DL (ref 6.3–8.2)
RBC # BLD AUTO: 4.44 M/UL (ref 3.93–5.22)
SODIUM SERPL-SCNC: 145 MMOL/L (ref 137–145)
WBC # BLD AUTO: 7.36 K/UL (ref 3.98–10.04)

## 2023-07-25 PROCEDURE — 85025 COMPLETE CBC W/AUTO DIFF WBC: CPT

## 2023-07-25 PROCEDURE — G8420 CALC BMI NORM PARAMETERS: HCPCS | Performed by: INTERNAL MEDICINE

## 2023-07-25 PROCEDURE — 1090F PRES/ABSN URINE INCON ASSESS: CPT | Performed by: INTERNAL MEDICINE

## 2023-07-25 PROCEDURE — 3074F SYST BP LT 130 MM HG: CPT | Performed by: INTERNAL MEDICINE

## 2023-07-25 PROCEDURE — G8427 DOCREV CUR MEDS BY ELIG CLIN: HCPCS | Performed by: INTERNAL MEDICINE

## 2023-07-25 PROCEDURE — 99212 OFFICE O/P EST SF 10 MIN: CPT

## 2023-07-25 PROCEDURE — 99214 OFFICE O/P EST MOD 30 MIN: CPT | Performed by: INTERNAL MEDICINE

## 2023-07-25 PROCEDURE — 36415 COLL VENOUS BLD VENIPUNCTURE: CPT

## 2023-07-25 PROCEDURE — 1036F TOBACCO NON-USER: CPT | Performed by: INTERNAL MEDICINE

## 2023-07-25 PROCEDURE — 3078F DIAST BP <80 MM HG: CPT | Performed by: INTERNAL MEDICINE

## 2023-07-25 PROCEDURE — 80053 COMPREHEN METABOLIC PANEL: CPT

## 2023-07-25 PROCEDURE — G8399 PT W/DXA RESULTS DOCUMENT: HCPCS | Performed by: INTERNAL MEDICINE

## 2023-07-25 PROCEDURE — 1123F ACP DISCUSS/DSCN MKR DOCD: CPT | Performed by: INTERNAL MEDICINE

## 2023-07-25 PROCEDURE — 3017F COLORECTAL CA SCREEN DOC REV: CPT | Performed by: INTERNAL MEDICINE

## 2023-08-11 DIAGNOSIS — E03.9 ACQUIRED HYPOTHYROIDISM: Primary | Chronic | ICD-10-CM

## 2023-08-11 DIAGNOSIS — E03.9 ACQUIRED HYPOTHYROIDISM: Chronic | ICD-10-CM

## 2023-08-11 DIAGNOSIS — E55.9 VITAMIN D DEFICIENCY: ICD-10-CM

## 2023-08-11 DIAGNOSIS — E78.2 MIXED HYPERLIPIDEMIA: ICD-10-CM

## 2023-08-11 LAB
25(OH)D3 SERPL-MCNC: 98.8 NG/ML
ALBUMIN SERPL-MCNC: 4.4 G/DL (ref 3.5–5.2)
ALP SERPL-CCNC: 64 U/L (ref 35–104)
ALT SERPL-CCNC: 11 U/L (ref 5–33)
ANION GAP SERPL CALCULATED.3IONS-SCNC: 17 MMOL/L (ref 7–19)
AST SERPL-CCNC: 20 U/L (ref 5–32)
BILIRUB SERPL-MCNC: 0.4 MG/DL (ref 0.2–1.2)
BUN SERPL-MCNC: 18 MG/DL (ref 8–23)
CALCIUM SERPL-MCNC: 9.4 MG/DL (ref 8.8–10.2)
CHLORIDE SERPL-SCNC: 103 MMOL/L (ref 98–111)
CHOLEST SERPL-MCNC: 166 MG/DL (ref 160–199)
CO2 SERPL-SCNC: 22 MMOL/L (ref 22–29)
CREAT SERPL-MCNC: 1.1 MG/DL (ref 0.5–0.9)
ERYTHROCYTE [DISTWIDTH] IN BLOOD BY AUTOMATED COUNT: 12.6 % (ref 11.5–14.5)
GLUCOSE SERPL-MCNC: 90 MG/DL (ref 74–109)
HCT VFR BLD AUTO: 41.8 % (ref 37–47)
HDLC SERPL-MCNC: 57 MG/DL (ref 65–121)
HGB BLD-MCNC: 13.1 G/DL (ref 12–16)
LDLC SERPL CALC-MCNC: 70 MG/DL
MCH RBC QN AUTO: 30.2 PG (ref 27–31)
MCHC RBC AUTO-ENTMCNC: 31.3 G/DL (ref 33–37)
MCV RBC AUTO: 96.3 FL (ref 81–99)
PLATELET # BLD AUTO: 177 K/UL (ref 130–400)
PMV BLD AUTO: 10.1 FL (ref 9.4–12.3)
POTASSIUM SERPL-SCNC: 4.5 MMOL/L (ref 3.5–5)
PROT SERPL-MCNC: 7.2 G/DL (ref 6.6–8.7)
RBC # BLD AUTO: 4.34 M/UL (ref 4.2–5.4)
SODIUM SERPL-SCNC: 142 MMOL/L (ref 136–145)
TRIGL SERPL-MCNC: 196 MG/DL (ref 0–149)
TSH SERPL DL<=0.005 MIU/L-ACNC: 1.4 UIU/ML (ref 0.27–4.2)
WBC # BLD AUTO: 6.8 K/UL (ref 4.8–10.8)

## 2023-08-15 ENCOUNTER — OFFICE VISIT (OUTPATIENT)
Dept: INTERNAL MEDICINE | Age: 73
End: 2023-08-15
Payer: MEDICARE

## 2023-08-15 VITALS
OXYGEN SATURATION: 97 % | HEART RATE: 69 BPM | WEIGHT: 120.4 LBS | BODY MASS INDEX: 23.64 KG/M2 | SYSTOLIC BLOOD PRESSURE: 142 MMHG | HEIGHT: 60 IN | DIASTOLIC BLOOD PRESSURE: 68 MMHG

## 2023-08-15 DIAGNOSIS — Z78.0 POSTMENOPAUSAL: Primary | ICD-10-CM

## 2023-08-15 DIAGNOSIS — I10 ESSENTIAL HYPERTENSION: Chronic | ICD-10-CM

## 2023-08-15 DIAGNOSIS — K21.9 GASTROESOPHAGEAL REFLUX DISEASE WITHOUT ESOPHAGITIS: ICD-10-CM

## 2023-08-15 DIAGNOSIS — E55.9 VITAMIN D DEFICIENCY: ICD-10-CM

## 2023-08-15 DIAGNOSIS — E78.2 MIXED HYPERLIPIDEMIA: ICD-10-CM

## 2023-08-15 DIAGNOSIS — E03.9 ACQUIRED HYPOTHYROIDISM: ICD-10-CM

## 2023-08-15 DIAGNOSIS — C34.92 NSCLC OF LEFT LUNG (HCC): ICD-10-CM

## 2023-08-15 DIAGNOSIS — Z00.00 MEDICARE ANNUAL WELLNESS VISIT, SUBSEQUENT: ICD-10-CM

## 2023-08-15 PROBLEM — J20.8 ACUTE BRONCHITIS DUE TO OTHER SPECIFIED ORGANISMS: Status: RESOLVED | Noted: 2022-12-06 | Resolved: 2023-08-15

## 2023-08-15 PROBLEM — J01.90 ACUTE SINUSITIS: Status: RESOLVED | Noted: 2023-03-02 | Resolved: 2023-08-15

## 2023-08-15 PROBLEM — M25.511 ACUTE PAIN OF RIGHT SHOULDER: Status: RESOLVED | Noted: 2023-07-03 | Resolved: 2023-08-15

## 2023-08-15 PROCEDURE — G8427 DOCREV CUR MEDS BY ELIG CLIN: HCPCS | Performed by: NURSE PRACTITIONER

## 2023-08-15 PROCEDURE — 3077F SYST BP >= 140 MM HG: CPT | Performed by: NURSE PRACTITIONER

## 2023-08-15 PROCEDURE — G8399 PT W/DXA RESULTS DOCUMENT: HCPCS | Performed by: NURSE PRACTITIONER

## 2023-08-15 PROCEDURE — 3017F COLORECTAL CA SCREEN DOC REV: CPT | Performed by: NURSE PRACTITIONER

## 2023-08-15 PROCEDURE — 1090F PRES/ABSN URINE INCON ASSESS: CPT | Performed by: NURSE PRACTITIONER

## 2023-08-15 PROCEDURE — 3078F DIAST BP <80 MM HG: CPT | Performed by: NURSE PRACTITIONER

## 2023-08-15 PROCEDURE — G8420 CALC BMI NORM PARAMETERS: HCPCS | Performed by: NURSE PRACTITIONER

## 2023-08-15 PROCEDURE — 99214 OFFICE O/P EST MOD 30 MIN: CPT | Performed by: NURSE PRACTITIONER

## 2023-08-15 PROCEDURE — 1036F TOBACCO NON-USER: CPT | Performed by: NURSE PRACTITIONER

## 2023-08-15 PROCEDURE — 1123F ACP DISCUSS/DSCN MKR DOCD: CPT | Performed by: NURSE PRACTITIONER

## 2023-08-15 PROCEDURE — G0439 PPPS, SUBSEQ VISIT: HCPCS | Performed by: NURSE PRACTITIONER

## 2023-08-15 RX ORDER — THYROID 60 MG/1
60 TABLET ORAL DAILY
Qty: 90 TABLET | Refills: 1 | Status: SHIPPED | OUTPATIENT
Start: 2023-08-15

## 2023-08-15 RX ORDER — ALBUTEROL SULFATE 90 UG/1
2 AEROSOL, METERED RESPIRATORY (INHALATION) EVERY 6 HOURS PRN
Qty: 18 G | Refills: 3 | Status: SHIPPED | OUTPATIENT
Start: 2023-08-15

## 2023-08-15 RX ORDER — ERGOCALCIFEROL 1.25 MG/1
50000 CAPSULE ORAL
Qty: 24 CAPSULE | Refills: 0 | Status: SHIPPED | OUTPATIENT
Start: 2023-08-17

## 2023-08-15 RX ORDER — LOVASTATIN 40 MG/1
TABLET ORAL
Qty: 90 TABLET | Refills: 3 | Status: SHIPPED | OUTPATIENT
Start: 2023-08-15

## 2023-08-15 RX ORDER — OMEPRAZOLE 20 MG/1
20 TABLET, DELAYED RELEASE ORAL DAILY
Qty: 90 TABLET | Refills: 1 | Status: SHIPPED | OUTPATIENT
Start: 2023-08-15

## 2023-08-15 RX ORDER — ESTRADIOL 0.5 MG/1
0.5 TABLET ORAL DAILY
Qty: 90 TABLET | Refills: 3 | Status: SHIPPED | OUTPATIENT
Start: 2023-08-15

## 2023-08-15 RX ORDER — LOSARTAN POTASSIUM 100 MG/1
TABLET ORAL
Qty: 90 TABLET | Refills: 3 | Status: SHIPPED | OUTPATIENT
Start: 2023-08-15

## 2023-08-15 ASSESSMENT — ENCOUNTER SYMPTOMS
VOMITING: 0
SHORTNESS OF BREATH: 0
STRIDOR: 0
COUGH: 0
CHOKING: 0
SORE THROAT: 0
TROUBLE SWALLOWING: 0
EYE DISCHARGE: 0
NAUSEA: 0
BLOOD IN STOOL: 0
WHEEZING: 0
CONSTIPATION: 0
COLOR CHANGE: 0
ABDOMINAL PAIN: 0
ABDOMINAL DISTENTION: 0
EYE ITCHING: 0
DIARRHEA: 0

## 2023-08-15 ASSESSMENT — LIFESTYLE VARIABLES
HOW MANY STANDARD DRINKS CONTAINING ALCOHOL DO YOU HAVE ON A TYPICAL DAY: PATIENT DOES NOT DRINK
HOW OFTEN DO YOU HAVE A DRINK CONTAINING ALCOHOL: NEVER

## 2023-08-15 ASSESSMENT — PATIENT HEALTH QUESTIONNAIRE - PHQ9
SUM OF ALL RESPONSES TO PHQ QUESTIONS 1-9: 0
1. LITTLE INTEREST OR PLEASURE IN DOING THINGS: 0
2. FEELING DOWN, DEPRESSED OR HOPELESS: 0
SUM OF ALL RESPONSES TO PHQ QUESTIONS 1-9: 0
SUM OF ALL RESPONSES TO PHQ9 QUESTIONS 1 & 2: 0

## 2023-08-15 NOTE — PROGRESS NOTES
Take 1 tablet by mouth daily Yes CHRIS Anglin   lovastatin (MEVACOR) 40 MG tablet TAKE 1 TABLET EVERY NIGHT Yes CHRIS Anglin   losartan (COZAAR) 100 MG tablet TAKE 1 TABLET EVERY DAY Yes CHRIS Anglin   estradiol (ESTRACE) 0.5 MG tablet Take 1 tablet by mouth daily Yes CHRIS Anglin   albuterol sulfate HFA (PROVENTIL HFA) 108 (90 Base) MCG/ACT inhaler Inhale 2 puffs into the lungs every 6 hours as needed for Wheezing Yes CHRIS Anglin   ciclopirox (PENLAC) 8 % solution Apply topically nightly.  Yes CHRIS Anglin   Omega 3 1200 MG CAPS Take by mouth 2 times daily Yes Historical Provider, MD   calcium carbonate-vitamin D 600-200 MG-UNIT TABS Take 1 tablet by mouth 2 times daily Yes Historical Provider, MD   Osimertinib Mesylate (TAGRISSO) 80 MG TABS Take 80 mg by mouth daily  Que Roland MD   fluticasone-salmeterol (ADVAIR) 500-50 MCG/DOSE diskus inhaler Inhale 1 puff into the lungs daily as needed  Patient not taking: Reported on 8/15/2023  Historical Provider, MD Garcia (Including outside providers/suppliers regularly involved in providing care):   Patient Care Team:  CHRIS Anglin as PCP - General (Nurse Practitioner Acute Care)  CHRIS Anglin as PCP - Empaneled Provider     Reviewed and updated this visit:  Tobacco  Allergies  Meds  Med Hx  Surg Hx  Soc Hx  Fam Hx

## 2023-08-15 NOTE — PATIENT INSTRUCTIONS
Hypertension The current medical regimen is effective;  continue present plan and medications. Lung cancer; stable with Dr Jonathan Roque  Hypothyroidmsi The current medical regimen is effective;  continue present plan and medications. Hyperlipidemia; The current medical regimen is effective;  continue present plan and medications. Vit d   decrease this dose to twice a month   Gerd stable with prilosec  daily         Learning About Vision Tests  What are vision tests? The four most common vision tests are visual acuity tests, refraction, visual field tests, and color vision tests. Visual acuity (sharpness) tests  These tests are used: To see if you need glasses or contact lenses. To monitor an eye problem. To check an eye injury. Visual acuity tests are done as part of routine exams. You may also have this test when you get your 's license or apply for some types of jobs. Visual field tests  These tests are used: To check for vision loss in any area of your range of vision. To screen for certain eye diseases. To look for nerve damage after a stroke, head injury, or other problem that could reduce blood flow to the brain. Refraction and color tests  A refraction test is done to find the right prescription for glasses and contact lenses. A color vision test is done to check for color blindness. Color vision is often tested as part of a routine exam. You may also have this test when you apply for a job where recognizing different colors is important, such as , electronics, or the [employer]. How are vision tests done? Visual acuity test   You cover one eye at a time. You read aloud from a wall chart across the room. You read aloud from a small card that you hold in your hand. Refraction   You look into a special device. The device puts lenses of different strengths in front of each eye to see how strong your glasses or contact lenses need to be.   Visual field tests   Your doctor

## 2023-08-28 DIAGNOSIS — C34.92 NSCLC OF LEFT LUNG (HCC): ICD-10-CM

## 2023-08-28 RX ORDER — DOXYCYCLINE HYCLATE 100 MG
100 TABLET ORAL 2 TIMES DAILY
Qty: 20 TABLET | Refills: 0 | Status: SHIPPED | OUTPATIENT
Start: 2023-08-28 | End: 2023-08-28 | Stop reason: SDUPTHER

## 2023-08-28 RX ORDER — DOXYCYCLINE HYCLATE 100 MG
100 TABLET ORAL 2 TIMES DAILY
Qty: 20 TABLET | Refills: 0 | Status: SHIPPED | OUTPATIENT
Start: 2023-08-28 | End: 2023-08-29 | Stop reason: SDUPTHER

## 2023-08-28 NOTE — TELEPHONE ENCOUNTER
Nikkie called requesting a refill of the below medication which has been pended for you:     Requested Prescriptions     Pending Prescriptions Disp Refills    doxycycline hyclate (VIBRA-TABS) 100 MG tablet 20 tablet 0     Sig: Take 1 tablet by mouth 2 times daily for 10 days       Last Appointment Date: 8/15/2023  Next Appointment Date: 2/15/2024    Allergies   Allergen Reactions    Compazine [Prochlorperazine Maleate] Other (See Comments)     Eyelids flipped up and mouth stretched to the side.      Thorazine [Chlorpromazine] Other (See Comments)     Eyelids flipped up and mouth stretched to the side

## 2023-08-29 DIAGNOSIS — C34.92 NSCLC OF LEFT LUNG (HCC): ICD-10-CM

## 2023-08-29 DIAGNOSIS — L27.0 DRUG-INDUCED SKIN RASH: Primary | ICD-10-CM

## 2023-08-29 RX ORDER — DOXYCYCLINE HYCLATE 100 MG
100 TABLET ORAL 2 TIMES DAILY
Qty: 20 TABLET | Refills: 0 | Status: SHIPPED | OUTPATIENT
Start: 2023-08-29 | End: 2023-09-08

## 2023-08-31 ENCOUNTER — TELEPHONE (OUTPATIENT)
Dept: HEMATOLOGY | Age: 73
End: 2023-08-31

## 2023-09-05 ENCOUNTER — OFFICE VISIT (OUTPATIENT)
Dept: HEMATOLOGY | Age: 73
End: 2023-09-05
Payer: MEDICARE

## 2023-09-05 ENCOUNTER — HOSPITAL ENCOUNTER (OUTPATIENT)
Dept: INFUSION THERAPY | Age: 73
Discharge: HOME | End: 2023-09-05
Payer: MEDICARE

## 2023-09-05 VITALS
TEMPERATURE: 97.5 F | HEIGHT: 60 IN | WEIGHT: 118.5 LBS | HEART RATE: 64 BPM | BODY MASS INDEX: 23.26 KG/M2 | DIASTOLIC BLOOD PRESSURE: 60 MMHG | OXYGEN SATURATION: 99 % | SYSTOLIC BLOOD PRESSURE: 134 MMHG

## 2023-09-05 DIAGNOSIS — C34.92 NSCLC OF LEFT LUNG (HCC): ICD-10-CM

## 2023-09-05 DIAGNOSIS — Z51.5 PALLIATIVE CARE PATIENT: ICD-10-CM

## 2023-09-05 DIAGNOSIS — Z71.89 CARE PLAN DISCUSSED WITH PATIENT: ICD-10-CM

## 2023-09-05 DIAGNOSIS — T45.1X5S ANTINEOPLASTIC DRUGS CAUSING ADVERSE EFFECT, SEQUELA: ICD-10-CM

## 2023-09-05 DIAGNOSIS — C78.7 MALIGNANT NEOPLASM METASTATIC TO LIVER (HCC): ICD-10-CM

## 2023-09-05 DIAGNOSIS — C34.92 NSCLC OF LEFT LUNG (HCC): Primary | ICD-10-CM

## 2023-09-05 LAB
ALBUMIN SERPL-MCNC: 4.4 G/DL (ref 3.5–5.2)
ALP SERPL-CCNC: 61 U/L (ref 35–104)
ALT SERPL-CCNC: 17 U/L (ref 9–52)
ANION GAP SERPL CALCULATED.3IONS-SCNC: 14 MMOL/L (ref 7–19)
AST SERPL-CCNC: 30 U/L (ref 14–36)
BASOPHILS # BLD: 0.02 K/UL (ref 0.01–0.08)
BASOPHILS NFR BLD: 0.3 % (ref 0.1–1.2)
BILIRUB SERPL-MCNC: 0.4 MG/DL (ref 0.2–1.3)
BUN SERPL-MCNC: 21 MG/DL (ref 7–17)
CALCIUM SERPL-MCNC: 9.7 MG/DL (ref 8.4–10.2)
CHLORIDE SERPL-SCNC: 102 MMOL/L (ref 98–111)
CO2 SERPL-SCNC: 26 MMOL/L (ref 22–29)
CREAT SERPL-MCNC: 1.1 MG/DL (ref 0.5–1)
EOSINOPHIL # BLD: 0.18 K/UL (ref 0.04–0.54)
EOSINOPHIL NFR BLD: 2.5 % (ref 0.7–7)
ERYTHROCYTE [DISTWIDTH] IN BLOOD BY AUTOMATED COUNT: 12.6 % (ref 11.7–14.4)
GLOBULIN: 3 G/DL
GLUCOSE SERPL-MCNC: 87 MG/DL (ref 74–106)
HCT VFR BLD AUTO: 40.3 % (ref 34.1–44.9)
HGB BLD-MCNC: 13.4 G/DL (ref 11.2–15.7)
LYMPHOCYTES # BLD: 2.14 K/UL (ref 1.18–3.74)
LYMPHOCYTES NFR BLD: 29.3 % (ref 19.3–53.1)
MCH RBC QN AUTO: 30 PG (ref 25.6–32.2)
MCHC RBC AUTO-ENTMCNC: 33.3 G/DL (ref 32.3–35.5)
MCV RBC AUTO: 90.4 FL (ref 79.4–94.8)
MONOCYTES # BLD: 0.55 K/UL (ref 0.24–0.82)
MONOCYTES NFR BLD: 7.5 % (ref 4.7–12.5)
NEUTROPHILS # BLD: 4.39 K/UL (ref 1.56–6.13)
NEUTS SEG NFR BLD: 60 % (ref 34–71.1)
PLATELET # BLD AUTO: 191 K/UL (ref 182–369)
PMV BLD AUTO: 10 FL (ref 7.4–10.4)
POTASSIUM SERPL-SCNC: 4.7 MMOL/L (ref 3.5–5.1)
PROT SERPL-MCNC: 7.4 G/DL (ref 6.3–8.2)
RBC # BLD AUTO: 4.46 M/UL (ref 3.93–5.22)
SODIUM SERPL-SCNC: 142 MMOL/L (ref 137–145)
WBC # BLD AUTO: 7.31 K/UL (ref 3.98–10.04)

## 2023-09-05 PROCEDURE — 99212 OFFICE O/P EST SF 10 MIN: CPT

## 2023-09-05 PROCEDURE — 3078F DIAST BP <80 MM HG: CPT | Performed by: INTERNAL MEDICINE

## 2023-09-05 PROCEDURE — 3075F SYST BP GE 130 - 139MM HG: CPT | Performed by: INTERNAL MEDICINE

## 2023-09-05 PROCEDURE — 36415 COLL VENOUS BLD VENIPUNCTURE: CPT

## 2023-09-05 PROCEDURE — 1123F ACP DISCUSS/DSCN MKR DOCD: CPT | Performed by: INTERNAL MEDICINE

## 2023-09-05 PROCEDURE — G8399 PT W/DXA RESULTS DOCUMENT: HCPCS | Performed by: INTERNAL MEDICINE

## 2023-09-05 PROCEDURE — 80053 COMPREHEN METABOLIC PANEL: CPT

## 2023-09-05 PROCEDURE — 85025 COMPLETE CBC W/AUTO DIFF WBC: CPT

## 2023-09-05 PROCEDURE — G8420 CALC BMI NORM PARAMETERS: HCPCS | Performed by: INTERNAL MEDICINE

## 2023-09-05 PROCEDURE — 1036F TOBACCO NON-USER: CPT | Performed by: INTERNAL MEDICINE

## 2023-09-05 PROCEDURE — 3017F COLORECTAL CA SCREEN DOC REV: CPT | Performed by: INTERNAL MEDICINE

## 2023-09-05 PROCEDURE — G8427 DOCREV CUR MEDS BY ELIG CLIN: HCPCS | Performed by: INTERNAL MEDICINE

## 2023-09-05 PROCEDURE — 99214 OFFICE O/P EST MOD 30 MIN: CPT | Performed by: INTERNAL MEDICINE

## 2023-09-05 PROCEDURE — 1090F PRES/ABSN URINE INCON ASSESS: CPT | Performed by: INTERNAL MEDICINE

## 2023-09-10 DIAGNOSIS — E55.9 VITAMIN D DEFICIENCY: ICD-10-CM

## 2023-09-11 RX ORDER — ERGOCALCIFEROL 1.25 MG/1
50000 CAPSULE ORAL
Qty: 24 CAPSULE | Refills: 1 | Status: SHIPPED | OUTPATIENT
Start: 2023-09-11

## 2023-09-11 NOTE — TELEPHONE ENCOUNTER
Nikkie called requesting a refill of the below medication which has been pended for you:     Requested Prescriptions     Pending Prescriptions Disp Refills    vitamin D (ERGOCALCIFEROL) 1.25 MG (87746 UT) CAPS capsule [Pharmacy Med Name: VITAMIN D 1.25 MG (18512 UT) Capsule] 24 capsule 1     Sig: TAKE 1 CAPSULE BY MOUTH TWICE A WEEK       Last Appointment Date: 8/15/2023  Next Appointment Date: 2/15/2024    Allergies   Allergen Reactions    Compazine [Prochlorperazine Maleate] Other (See Comments)     Eyelids flipped up and mouth stretched to the side.      Thorazine [Chlorpromazine] Other (See Comments)     Eyelids flipped up and mouth stretched to the side

## 2023-09-18 DIAGNOSIS — E03.9 ACQUIRED HYPOTHYROIDISM: ICD-10-CM

## 2023-09-18 RX ORDER — THYROID 60 MG/1
60 TABLET ORAL DAILY
Qty: 90 TABLET | Refills: 1 | Status: SHIPPED | OUTPATIENT
Start: 2023-09-18

## 2023-09-18 NOTE — TELEPHONE ENCOUNTER
Nikkie called requesting a refill of the below medication which has been pended for you:     Requested Prescriptions     Pending Prescriptions Disp Refills    thyroid (ARMOUR) 60 MG tablet 90 tablet 1     Sig: Take 1 tablet by mouth daily Compound from [facility]   [id number]-[id number]/t3-12       Last Appointment Date: 8/15/2023  Next Appointment Date: 2/15/2024    Allergies   Allergen Reactions    Compazine [Prochlorperazine Maleate] Other (See Comments)     Eyelids flipped up and mouth stretched to the side.      Thorazine [Chlorpromazine] Other (See Comments)     Eyelids flipped up and mouth stretched to the side

## 2023-09-19 DIAGNOSIS — C34.92 NSCLC OF LEFT LUNG (HCC): ICD-10-CM

## 2023-09-19 NOTE — TELEPHONE ENCOUNTER
Nikkie called requesting a refill of the below medication which has been pended for you:     Requested Prescriptions     Pending Prescriptions Disp Refills    doxycycline hyclate (VIBRA-TABS) 100 MG tablet [Pharmacy Med Name: DOXYCYCLINE HYCLATE 100 MG Tablet] 20 tablet 0     Sig: TAKE 1 TABLET TWICE DAILY FOR 10 DAYS       Last Appointment Date: 8/15/2023  Next Appointment Date: 2/15/2024    Allergies   Allergen Reactions    Compazine [Prochlorperazine Maleate] Other (See Comments)     Eyelids flipped up and mouth stretched to the side.      Thorazine [Chlorpromazine] Other (See Comments)     Eyelids flipped up and mouth stretched to the side

## 2023-09-20 RX ORDER — DOXYCYCLINE HYCLATE 100 MG
TABLET ORAL
Qty: 20 TABLET | Refills: 0 | Status: SHIPPED | OUTPATIENT
Start: 2023-09-20

## 2023-09-27 ENCOUNTER — HOSPITAL ENCOUNTER (OUTPATIENT)
Dept: CT IMAGING | Age: 73
Discharge: HOME | End: 2023-09-27
Payer: MEDICARE

## 2023-09-27 DIAGNOSIS — C34.92 NSCLC OF LEFT LUNG (HCC): ICD-10-CM

## 2023-09-27 PROCEDURE — 6360000004 HC RX CONTRAST MEDICATION: Performed by: INTERNAL MEDICINE

## 2023-09-27 PROCEDURE — 74177 CT ABD & PELVIS W/CONTRAST: CPT

## 2023-09-27 PROCEDURE — 71260 CT THORAX DX C+: CPT

## 2023-09-27 RX ADMIN — IOPAMIDOL 75 ML: 755 INJECTION, SOLUTION INTRAVENOUS at 13:56

## 2023-09-29 ENCOUNTER — TELEPHONE (OUTPATIENT)
Dept: HEMATOLOGY | Age: 73
End: 2023-09-29

## 2023-09-29 DIAGNOSIS — C34.92 NSCLC OF LEFT LUNG (HCC): Primary | ICD-10-CM

## 2023-10-04 ENCOUNTER — HOSPITAL ENCOUNTER (OUTPATIENT)
Dept: NUCLEAR MEDICINE | Age: 73
Discharge: HOME | End: 2023-10-06
Attending: INTERNAL MEDICINE
Payer: MEDICARE

## 2023-10-04 DIAGNOSIS — C34.92 NSCLC OF LEFT LUNG (HCC): ICD-10-CM

## 2023-10-04 LAB
GLUCOSE BLD-MCNC: 76 MG/DL (ref 70–99)
PERFORMED ON: NORMAL

## 2023-10-04 PROCEDURE — A9552 F18 FDG: HCPCS | Performed by: INTERNAL MEDICINE

## 2023-10-04 PROCEDURE — 3430000000 HC RX DIAGNOSTIC RADIOPHARMACEUTICAL: Performed by: INTERNAL MEDICINE

## 2023-10-04 PROCEDURE — 78815 PET IMAGE W/CT SKULL-THIGH: CPT

## 2023-10-04 PROCEDURE — 82962 GLUCOSE BLOOD TEST: CPT

## 2023-10-04 RX ORDER — FLUDEOXYGLUCOSE F 18 200 MCI/ML
10 INJECTION, SOLUTION INTRAVENOUS
Status: COMPLETED | OUTPATIENT
Start: 2023-10-04 | End: 2023-10-04

## 2023-10-04 RX ADMIN — FLUDEOXYGLUCOSE F 18 10 MILLICURIE: 200 INJECTION, SOLUTION INTRAVENOUS at 14:47

## 2023-10-04 NOTE — PROGRESS NOTES
MEDICAL ONCOLOGY PROGRESS NOTE                                                          Susi Myers   1950  10/6/2023     Chief Complaint   Patient presents with    Follow-up     NSCLC of left lung ([address])       INTERVAL HISTORY/HISTORY OF PRESENT ILLNESS:  Reason for MD visit-toxicity assessment/disease management and compliance. Patient is a 68years old female who has been started on Sheran Maxon for treatment of her metastatic EGFR mutated lung cancer with liver metastasis. She had repeat CT scans and PET scan since last visit. She has been tolerating treatment well. She is compliant with treatment. She denies any respiratory complaints. Diagnosis  LLL lesion, Jan 2023  Liver metastasis  NSCLC-adenocarcinoma, Feb 2023  Moderately differentiated   Stage IV (liver mets), [id number] (liver)  Caris: QNS  NGS- Lucence: EGFR exon 19-positive     Treatment summary  2/20/23 Initiated Osimertinib 80 mg p.o. daily     Cancer history:   Deuce Gonzalez was first seen by me on 1/31/2023. I was consulted for abnormal CT scan findings of a lung mass, mediastinal adenopathy and a lower esophageal mass. She presented to the ER department on 1/30/2023 at [facility] with complaints of back pain. She has a history of hypertension, hypothyroidism, obesity, hyperlipidemia and chronic kidney disease. The pain started about 2-3 days before she comes to the hospital.  Denies any shortness of breath. Denies any fever or chills. Imaging studies were performed emergency. 1/30/2023-CTA chest showed no evidence of pulmonary embolism. Lungs: Enhancing solid lesion posteriorly within the left lower lobe. Measures 3.2 x 4.3 x 3.7 cm. Subcarinal lymph node measures 1.3 x 1.2 cm. Asymmetric an irregular mucosal thickening of the distal esophagus. Esophageal mucosal mass is suspected in the distal left aspect measuring 2.4 x 1.8 cm. Diffuse metastatic disease suspected to the liver.  I was consulted for the above

## 2023-10-06 ENCOUNTER — HOSPITAL ENCOUNTER (OUTPATIENT)
Dept: INFUSION THERAPY | Age: 73
Discharge: HOME | End: 2023-10-06
Payer: MEDICARE

## 2023-10-06 ENCOUNTER — OFFICE VISIT (OUTPATIENT)
Dept: HEMATOLOGY | Age: 73
End: 2023-10-06
Payer: MEDICARE

## 2023-10-06 VITALS
BODY MASS INDEX: 23.36 KG/M2 | DIASTOLIC BLOOD PRESSURE: 62 MMHG | SYSTOLIC BLOOD PRESSURE: 130 MMHG | WEIGHT: 119 LBS | OXYGEN SATURATION: 98 % | HEART RATE: 70 BPM | HEIGHT: 60 IN

## 2023-10-06 DIAGNOSIS — L27.0 RASH, DRUG: ICD-10-CM

## 2023-10-06 DIAGNOSIS — C34.92 NSCLC OF LEFT LUNG (HCC): Primary | ICD-10-CM

## 2023-10-06 DIAGNOSIS — T45.1X5S ANTINEOPLASTIC DRUGS CAUSING ADVERSE EFFECT, SEQUELA: ICD-10-CM

## 2023-10-06 DIAGNOSIS — C34.92 NSCLC OF LEFT LUNG (HCC): ICD-10-CM

## 2023-10-06 DIAGNOSIS — C34.91 ADENOCARCINOMA OF RIGHT LUNG METASTATIC TO LIVER (HCC): ICD-10-CM

## 2023-10-06 DIAGNOSIS — C78.7 MALIGNANT NEOPLASM METASTATIC TO LIVER (HCC): ICD-10-CM

## 2023-10-06 DIAGNOSIS — C34.90 EGFR-RELATED LUNG CANCER (HCC): Primary | ICD-10-CM

## 2023-10-06 DIAGNOSIS — C78.7 ADENOCARCINOMA OF RIGHT LUNG METASTATIC TO LIVER (HCC): ICD-10-CM

## 2023-10-06 LAB
ALBUMIN SERPL-MCNC: 4.4 G/DL (ref 3.5–5.2)
ALP SERPL-CCNC: 69 U/L (ref 35–104)
ALT SERPL-CCNC: 12 U/L (ref 5–33)
ANION GAP SERPL CALCULATED.3IONS-SCNC: 11 MMOL/L (ref 7–19)
AST SERPL-CCNC: 23 U/L (ref 5–32)
BASOPHILS # BLD: 0.02 K/UL (ref 0.01–0.08)
BASOPHILS NFR BLD: 0.3 % (ref 0.1–1.2)
BILIRUB SERPL-MCNC: <0.2 MG/DL (ref 0.2–1.2)
BUN SERPL-MCNC: 23 MG/DL (ref 8–23)
CALCIUM SERPL-MCNC: 9.4 MG/DL (ref 8.8–10.2)
CHLORIDE SERPL-SCNC: 102 MMOL/L (ref 98–111)
CO2 SERPL-SCNC: 26 MMOL/L (ref 22–29)
CREAT SERPL-MCNC: 1.1 MG/DL (ref 0.5–0.9)
EOSINOPHIL # BLD: 0.17 K/UL (ref 0.04–0.54)
EOSINOPHIL NFR BLD: 2.7 % (ref 0.7–7)
ERYTHROCYTE [DISTWIDTH] IN BLOOD BY AUTOMATED COUNT: 12.8 % (ref 11.7–14.4)
GLUCOSE SERPL-MCNC: 82 MG/DL (ref 74–109)
HCT VFR BLD AUTO: 39.4 % (ref 34.1–44.9)
HGB BLD-MCNC: 13.2 G/DL (ref 11.2–15.7)
LYMPHOCYTES # BLD: 1.75 K/UL (ref 1.18–3.74)
LYMPHOCYTES NFR BLD: 28.2 % (ref 19.3–53.1)
MCH RBC QN AUTO: 30.5 PG (ref 25.6–32.2)
MCHC RBC AUTO-ENTMCNC: 33.5 G/DL (ref 32.3–35.5)
MCV RBC AUTO: 91 FL (ref 79.4–94.8)
MONOCYTES # BLD: 0.6 K/UL (ref 0.24–0.82)
MONOCYTES NFR BLD: 9.7 % (ref 4.7–12.5)
NEUTROPHILS # BLD: 3.65 K/UL (ref 1.56–6.13)
NEUTS SEG NFR BLD: 58.9 % (ref 34–71.1)
PLATELET # BLD AUTO: 163 K/UL (ref 182–369)
PMV BLD AUTO: 9.6 FL (ref 7.4–10.4)
POTASSIUM SERPL-SCNC: 4.1 MMOL/L (ref 3.5–5)
PROT SERPL-MCNC: 7 G/DL (ref 6.6–8.7)
RBC # BLD AUTO: 4.33 M/UL (ref 3.93–5.22)
SODIUM SERPL-SCNC: 139 MMOL/L (ref 136–145)
WBC # BLD AUTO: 6.2 K/UL (ref 3.98–10.04)

## 2023-10-06 PROCEDURE — G8420 CALC BMI NORM PARAMETERS: HCPCS | Performed by: INTERNAL MEDICINE

## 2023-10-06 PROCEDURE — 36415 COLL VENOUS BLD VENIPUNCTURE: CPT

## 2023-10-06 PROCEDURE — 1090F PRES/ABSN URINE INCON ASSESS: CPT | Performed by: INTERNAL MEDICINE

## 2023-10-06 PROCEDURE — G8399 PT W/DXA RESULTS DOCUMENT: HCPCS | Performed by: INTERNAL MEDICINE

## 2023-10-06 PROCEDURE — 85025 COMPLETE CBC W/AUTO DIFF WBC: CPT

## 2023-10-06 PROCEDURE — G8427 DOCREV CUR MEDS BY ELIG CLIN: HCPCS | Performed by: INTERNAL MEDICINE

## 2023-10-06 PROCEDURE — G8484 FLU IMMUNIZE NO ADMIN: HCPCS | Performed by: INTERNAL MEDICINE

## 2023-10-06 PROCEDURE — 99212 OFFICE O/P EST SF 10 MIN: CPT

## 2023-10-06 PROCEDURE — 1036F TOBACCO NON-USER: CPT | Performed by: INTERNAL MEDICINE

## 2023-10-06 PROCEDURE — 3017F COLORECTAL CA SCREEN DOC REV: CPT | Performed by: INTERNAL MEDICINE

## 2023-10-06 PROCEDURE — 99214 OFFICE O/P EST MOD 30 MIN: CPT | Performed by: INTERNAL MEDICINE

## 2023-10-06 PROCEDURE — 3078F DIAST BP <80 MM HG: CPT | Performed by: INTERNAL MEDICINE

## 2023-10-06 PROCEDURE — 1123F ACP DISCUSS/DSCN MKR DOCD: CPT | Performed by: INTERNAL MEDICINE

## 2023-10-06 PROCEDURE — 3075F SYST BP GE 130 - 139MM HG: CPT | Performed by: INTERNAL MEDICINE

## 2023-10-10 ENCOUNTER — HOSPITAL ENCOUNTER (OUTPATIENT)
Dept: RADIATION ONCOLOGY | Facility: HOSPITAL | Age: 73
Setting detail: RADIATION/ONCOLOGY SERIES
End: 2023-10-10
Payer: MEDICARE

## 2023-10-10 ENCOUNTER — TELEPHONE (OUTPATIENT)
Dept: RADIATION ONCOLOGY | Facility: HOSPITAL | Age: 73
End: 2023-10-10

## 2023-10-11 PROBLEM — C34.92 NSCLC OF LEFT LUNG: Status: ACTIVE | Noted: 2023-02-08

## 2023-10-11 NOTE — PROGRESS NOTES
[facility]Eric Mckeon MD      CLINTON Velasquez  ____________________________________________________________               [facility]  Department of Radiation Oncology  [address] [place] [id number]  Office:  [phone]  Fax: [phone]    DATE: 10/12/2023  PATIENT: Mimi Mariee 1950  Medical record #: [id number]                                                      REASON FOR CONSULTATION:   Chief Complaint   Patient presents with    Lung Cancer     Mimi Mariee is a 73 y.o. female that has been referred to our clinic to be evaluated for consideration of radiotherapy to the lung. Reports fatigue and SOB. Denies appetite change, unexpected weight change, cough, hemoptysis, nausea/vomiting, diarrhea, light-headedness, dizziness, extremity weakness, and headaches. He follows .            HISTORY OF PRESENT ILLNESS    01/30/2023 - Presents to ER with complaints of pain under her right shoulder blade that started out as an ache. It progressed to a more intense severe pain. She was admitted for further evaluation and treatment.    01/30/2023 - CTA pulmonary with contrast:  No acute pulmonary embolism.   Enhancing solid lesion posteriorly within the left lower lobe. Measures 3.2 x 4.3 x 3.7 cm and is most consistent with lung neoplasm. Metastatic subcarinal lymph node is suspected.   Asymmetric mucosal thickening of the distal esophagus with masslike abnormality in the left lateral aspect measuring 2.4 x 1.8 cm. An esophageal mass is suspected. Metastatic disease here is not excluded.   Diffuse metastatic disease of the liver.   No lytic or blastic lesion.     01/30/2023 - Inpatient consults with Blayne Luz MD:  Pulmonary Assessment/plan:  Lung mass left lower lobe with mediastinal adenopathy and possible bone and evidence of metastatic disease to the liver. Discussed biopsy with Dr. Alexis from radiology. CT-guided biopsy of  the liver lesion is safer than lung biopsy and would offer diagnosis and staging. We will proceed with liver biopsy.  Back pain likely secondary to metastatic disease to the bone.  Esophageal mass suggested on the CT of the chest however no evidence of mass was seen on upper endoscopy.  Anemia per hematology.  DVT and GI prophylaxis.    01/31/2023 - Inpatient with :  LLL lung mass with mediastinal adenopathy-likely lung malignancy. Very remote history of smoking. She quit 4 years ago.  Liver lesions-concern for metastatic disease  Paraesophageal mass versus adenopathy. This could represent periesophageal adenopathy.  Poor prognosis-likely malignancy with stage IV disease with possible liver metastasis. Inpatient consultation to palliative care  Neutrophil leukocytosis-mild. Continue to monitor  PLAN:  CT-guided biopsy to obtain tissue for diagnosis  Patient consultation to palliative care  Please avoid to give the patient any NSAIDs as this will delay possible biopsies.  CT abdomen/pelvis  Discussed with pulmonary,     01/31/2023 - CT Abdomen/Pelvis with contrast:  Markedly thickened irregular appearance of the distal esophagus at the GE junction may represent neoplasm.   Left lower lobe soft tissue density mass.   Multiple hepatic low-attenuation masses suggestive of metastatic disease.     02/01/2023 - Liver, fine-needle core biopsies with touch imprint smears:   Metastatic moderately differentiated lung adenocarcinoma.    02/02/2023 - Discharged from hospital with follow up appointments scheduled.     02/15/2023 - Bone Scan:  There is abnormal focal radiotracer accumulation corresponding to the RIGHT posterior rib cage.This corresponds to a pathologic fracture of the RIGHT posterior 6th rib with associated lytic lucency, seen on chest CT dated  01/30/2023 (see series 4, image 58 of prior study).     06/08/2023 - CT Chest with Contrast:  The previously noted mass lesion in the left lower lobe  of the lung appears to be decreasing in size. The lesion measures approximately 2.9 cm transverse, 1.9 cm AP. This is compared to previous measurements of 4.8 cm transverse, approximately 3.4 cm.   Interval decrease size of the left lower lobe mass lesion.  Metastatic disease to the liver appear to be decreased in size when compared to previous study.    06/08/2023 - CT Abdomen/Pelvis with Contrast:  Decreased size of the left lower lobe lung mass and liver metastases.  Stable irregular thickening of the distal esophagus, could be inflammation or neoplasm.   No evidence of new or worsening disease in the abdomen/pelvis. The findings measure approximately 11 mm and appear to be decreasing in size when compared to previous study.    09/05/2023 - Appointment with :  NSCLC-adenocarcinoma, moderately differentiated, Stage IV, Feb 2023 EGFR exon 19 mutated  Very remote history of smoking. She quit 40 years ago.  -1/30/2023-CTA chest showed no evidence of pulmonary embolism. Lungs: Enhancing solid lesion posteriorly within the left lower lobe. Measures 3.2 x 4.3 x 3.7 cm. Subcarinal lymph node measures 1.3 x 1.2 cm. Asymmetric an irregular mucosal thickening of the distal esophagus. Esophageal mucosal mass is suspected in the distal left aspect measuring 2.4 x 1.8 cm. Diffuse metastatic disease suspected to the liver. I was consulted for the above findings.  -1/31/2023-CT abdomen/pelvis with oral/IV contrast showed multiple liver lesions with the largest lesion measured 2.5 cm and 1.9 cm in the right hepatic lobe.  -2/1/23 Liver, fine-needle core biopsies with touch imprint smears: Metastatic  moderately differentiated lung adenocarcinoma.  -2/8/23 CARIS: UNABLE TO BE COMPLETED TO THE [facility]  -2/9/23 Lucence: EGFR-positive I  -2/15/23 CT ABDOMEN PELVIS W IV CONTRAST Markedly thickened irregular appearance of the distal esophagus at the GE junction may represent neoplasm. Left lower lobe soft tissue density mass.  Multiple hepatic low-attenuation masses suggestive of metastatic disease.  -2/15/23 NM BONE SCAN WHOLE BODY There is abnormal focal radiotracer accumulation corresponding to the RIGHT posterior rib cage. This corresponds to a pathologic fracture of the RIGHT posterior 6th rib with associated lytic lucency, seen on chest CT dated 01/30/2023 (see series 4, image 58 of prior study).  6/8/23 CT Chest W Contrast The previously noted mass lesion in the left lower lobe of the lung appears to be decreasing in size. The lesion measures approximately 2.9 cm transverse, 1.9 cm AP. This is compared to previous measurements of 4.8 cm transverse, approximately 3.4 cm. Interval decrease size of the left lower lobe mass lesion. Metastatic disease to the liver appear to be decreased in size when compared to previous study.  6/8/23 CT Abd/Pelvis W IV Contrast (oral) Decreased size of the left lower lobe lung mass and liver metastases .Stable irregular thickening of the distal esophagus, could be inflammation or neoplasm. No evidence of new or worsening disease in the abdomen/pelvis.  06/13/2023-I reviewed CT C/A/P, pathology. NGS liquid biopsy showed evidence of EGFR mutation exon 19. CT scans showed interval response to treatment. I have recommended frontline palliative treatment with osimertinib 80 mg p.o. daily.  Plan:  -Tagrisso 80 mg p.o. daily. Prescribed on 02/20/23  -Repeat CT C/A/P in 3 weeks  Liver lesions-metastatic disease lung canceer  -1/31/2023-CT abdomen/pelvis with oral/IV contrast showed multiple liver lesions with the largest lesion measured 2.5 cm and 1.9 cm in the right hepatic lobe.  -2/1/23 Liver, fine-needle core biopsies with touch imprint smears: Metastatic  moderately differentiated lung adenocarcinoma.  -2/15/23 CT ABDOMEN PELVIS W IV CONTRAST Markedly thickened irregular appearance of the distal esophagus at the GE junction may represent neoplasm. Left lower lobe soft tissue density mass. Multiple hepatic  low-attenuation masses suggestive of metastatic disease.  Paraesophageal mass versus adenopathy. This could represent periesophageal adenopathy. Upper endoscopy was normal.  -2/15/23 CT ABDOMEN PELVIS W IV CONTRAST Markedly thickened irregular appearance of the distal esophagus at the GE junction may represent neoplasm. Left lower lobe soft tissue density mass. Multiple hepatic low-attenuation masses suggestive of metastatic disease.  Lytic lesion-pathologic fracture right posterior 6th rib  -2/15/23 NM BONE SCAN WHOLE BODY There is abnormal focal radiotracer accumulation corresponding to the RIGHT posterior rib cage.This corresponds to a pathologic fracture of the RIGHT posterior 6th rib with associated lytic lucency, seen on chest CT dated 01/30/2023 (see series 4, image 58 of prior study).  PLAN:  RTC with MD 6 weeks  CBC, CMP today  Continue Tagrisso 80 mg daily  Repeat CT chest Abd/Pelvis in 3 weeks to assess response  Follow Up:  Return in about 4 weeks (around 10/3/2023) for CBC, CMP, Appointment with Dr. Lopez/after scans.  Sched CT C/A/P in 3 weeks    09/28/2023 - CT Chest with Contrast:   No mediastinal hilar adenopathy.   Small hiatal hernia.   Enlarging 3.1 x 4.4 cm mass/neoplasm left lower lobe previously measuring 1.9 x 2.3 cm.   No new pulmonary nodule.   No pleural effusion.    09/28/2023 - CT Abdomen/Pelvis without Contrast:   The liver again demonstrates a few scattered small low attenuation lesions which appear less noticeable since prior study. It is uncertain whether this represents disease improvement or differences in CT technique. Largest currently visualized is 1 cm.   Moderate atherosclerotic disease.   Again noted is apparent thickening of the lower esophagus which at least in part may be secondary to a small sliding hiatal hernia. This can be correlated with fluoroscopic upper gastrointestinal series.   General bowel gas pattern and appearance is within normal limits.    10/04/2023 -  PET Scan:  There is uniform metabolic activity associated with a 3.1 x 4.4 cm left lower lobe mass, with an SUV max of 13. This is malignant. Outside of this mass, no additional uptake is observed to suggest local or distant metastatic spread.    10/06/2023 - Appointment with :  NSCLC-adenocarcinoma, moderately differentiated, Stage IV, Feb 2023 EGFR exon 19 mutated  Very remote history of smoking. She quit 40 years ago.  -1/30/2023-CTA chest showed no evidence of pulmonary embolism. Lungs: Enhancing solid lesion posteriorly within the left lower lobe. Measures 3.2 x 4.3 x 3.7 cm. Subcarinal lymph node measures 1.3 x 1.2 cm. Asymmetric an irregular mucosal thickening of the distal esophagus. Esophageal mucosal mass is suspected in the distal left aspect measuring 2.4 x 1.8 cm. Diffuse metastatic disease suspected to the liver. I was consulted for the above findings.  -1/31/2023-CT abdomen/pelvis with oral/IV contrast showed multiple liver lesions with the largest lesion measured 2.5 cm and 1.9 cm in the right hepatic lobe.  -2/1/23 Liver, fine-needle core biopsies with touch imprint smears: Metastatic moderately differentiated lung adenocarcinoma.  -2/8/23 CARIS: UNABLE TO BE COMPLETED TO THE [facility]  -2/9/23 [place]: EGFR-positive I  -2/15/23 CT ABDOMEN PELVIS W IV CONTRAST Markedly thickened irregular appearance of the distal esophagus at the GE junction may represent neoplasm. Left lower lobe soft tissue density mass. Multiple hepatic low-attenuation masses suggestive of metastatic disease.  -2/15/23 NM BONE SCAN WHOLE BODY There is abnormal focal radiotracer accumulation corresponding to the RIGHT posterior rib cage. This corresponds to a pathologic fracture of the RIGHT posterior 6th rib with associated lytic lucency, seen on chest CT dated 01/30/2023 (see series 4, image 58 of prior study).  6/8/23 CT Chest W Contrast The previously noted mass lesion in the left lower lobe of the lung appears to be  decreasing in size. The lesion measures approximately 2.9 cm transverse, 1.9 cm AP. This is compared to previous measurements of 4.8 cm transverse, approximately 3.4 cm. Interval decrease size of the left lower lobe mass lesion .Metastatic disease to the liver appear to be decreased in size when compared to previous study.  6/8/23 CT Abd/Pelvis W IV Contrast (oral) Decreased size of the left lower lobe lung mass and liver metastases. Stable irregular thickening of the distal esophagus, could be inflammation or neoplasm. No evidence of new or worsening disease in the abdomen/pelvis.  06/13/2023-I reviewed CT C/A/P, pathology. NGS liquid biopsy showed evidence of EGFR mutation exon 19. CT scans showed interval response to treatment. I have recommended frontline palliative treatment with osimertinib 80 mg p.o. daily.  9/28/23 CT Chest W Contrast: No mediastinal hilar adenopathy. Small hiatal hernia. Enlarging 3.1 x 4.4 cm mass/neoplasm left lower lobe previously measuring 1.9 x 2.3 cm. No new pulmonary nodule. No pleural effusion.  9/28/23 CT Abd/Pelvis W IV Contrast: The liver again demonstrates a few scattered small low attenuation lesions which appear less noticeable since prior study. It is uncertain whether this represents disease improvement or differences in CT technique. Largest currently visualized is 1 cm. Moderate atherosclerotic disease. Again noted is apparent thickening of the lower esophagus which at least in part may be secondary to a small sliding hiatal hernia. This can be correlated with fluoroscopic upper gastrointestinal series. General bowel gas pattern and appearance is within normal limits.  10/4/23 PET CT Skull: There is uniform metabolic activity associated with a 3.1 x 4.4 cm left lower lobe mass, with an SUV max of 13. This is malignant. Outside of this mass, no additional uptake is observed to suggest local or distant metastatic spread.  10/06/23-I reviewed imaging studies CT C/A/P.  Interval improvement of liver metastatic lesions. Interval worsening of left lower lobe lung lesion. This is consistent with oligometastatic progression in the lung only. She had a PET scan that showed no other sites of disease activity other than the left lower lobe mass. Therefore, I recommend the patient to be seen by Dr. Justin Mckeon for consideration of definitive RT to the site of progression. She is to continue her Tagrisso.  Essentially, the patient has EGFR mutated lung cancer. She presented with disease target in the left lower lobe and liver metastasis. She had a wonderful response. Most recent CT chest showed interval progression of left lower lobe. PET scan showed no evidence of disease in the liver at this time. She continues to have a significant response outside of the lung with no evidence of abnormal FDG uptake in the liver.  At this point, due to localized progression in the left lower lobe only I would continue aggressive and refer her to radiation oncology for consideration of local therapy. Should she have further progression in the future we will refer her to [place] for consideration of clinical trials.  Plan:  -Tagrisso 80 mg p.o. daily. Prescribed on 02/20/23  -Return to radiation oncology, Dr. Justin Mckeon for consideration of local therapy for her site of disease progression in the left lower lobe.  Liver lesions-metastatic disease lung canceer  -1/31/2023-CT abdomen/pelvis with oral/IV contrast showed multiple liver lesions with the largest lesion measured 2.5 cm and 1.9 cm in the right hepatic lobe.  -2/1/23 Liver, fine-needle core biopsies with touch imprint smears: Metastatic  moderately differentiated lung adenocarcinoma.  -2/15/23 CT ABDOMEN PELVIS W IV CO surely NTRAST Markedly thickened irregular appearance of the distal esophagus at the GE junction may represent neoplasm. Left lower lobe soft tissue density mass. Multiple hepatic low-attenuation masses suggestive of metastatic  disease.  9/28/23 CT Abd/Pelvis W IV Contrast: The liver again demonstrates a few scattered small low attenuation lesions which appear less noticeable since prior study. It is uncertain whether this represents disease improvement or differences in CT technique. Largest currently visualized is 1 cm. Moderate atherosclerotic disease. Again noted is apparent thickening of the lower esophagus which at least in part may be secondary to a small sliding hiatal hernia. This can be correlated with fluoroscopic upper gastrointestinal series. General bowel gas pattern and appearance is within normal limits.  10/4/23 PET CT Skull: There is uniform metabolic activity associated with a 3.1 x 4.4 cm left lower lobe mass, with an SUV max of 13. This is malignant. Outside of this mass, no additional uptake is observed to suggest local or distant metastatic spread.  Lytic lesion-pathologic fracture right posterior 6th rib  -2/15/23 NM BONE SCAN WHOLE BODY There is abnormal focal radiotracer accumulation corresponding to the RIGHT posterior rib cage.This corresponds to a pathologic fracture of the RIGHT posterior 6th rib with associated lytic lucency, seen on chest CT dated 01/30/2023 (see series 4, image 58 of prior study).  PLAN:  RTC with MD 4 weeks  CBC, CMP today  Continue Tagrisso 80 mg daily  Refer to Dr Justin Mckeon/[facility] for evaluation/treatment of left lower lobe lung mass  Repeat CT chest, abdomen, pelvis in 4 months  Follow Up:  Return in about 4 weeks (around 11/3/2023) for CBC, Appointment with Dr. Lopez.  Refer to Dr Justin Mckeon/[facility] Radiation [facility]    History obtained from  PATIENT, FAMILY, and CHART    PAST MEDICAL HISTORY  Past Medical History:   Diagnosis Date    Lung cancer       PAST SURGICAL HISTORY  No past surgical history on file.     FAMILY HISTORY  family history is not on file.    SOCIAL HISTORY        ALLERGIES  Chlorpromazine and Prochlorperazine maleate     MEDICATIONS  No current  "outpatient medications on file.     No current facility-administered medications for this visit.     The following portions of the patient's history were reviewed and updated as appropriate: allergies, current medications, past family history, past medical history, past social history, past surgical history and problem list.    REVIEW OF SYSTEMS  Review of Systems   Constitutional:  Positive for fatigue. Negative for appetite change and unexpected weight change.   Respiratory:  Positive for shortness of breath. Negative for cough and hemoptysis.    Cardiovascular: Negative.    Gastrointestinal: Negative.    Genitourinary: Negative.     Musculoskeletal: Negative.    Skin: Negative.    Neurological: Negative.  Negative for dizziness and extremity weakness.   Hematological: Negative.  Negative for adenopathy.   Psychiatric/Behavioral: Negative.     All other systems reviewed and are negative.    I have reviewed and confirmed the accuracy of the ROS as documented by the MA/LINUS/RN Justin Mckeon III, MD    PHYSICAL EXAM  VITAL SIGNS:   Vitals:    10/12/23 1111   BP: 147/67   Pulse: 89   SpO2: 98%  Comment: room air   Weight: 53.1 kg (117 lb)   Height: 152.4 cm (60\")   PainSc: 0-No pain     Physical Exam  Vitals reviewed.   Constitutional:       Appearance: Normal appearance.   HENT:      Head: Normocephalic.   Cardiovascular:      Rate and Rhythm: Normal rate and regular rhythm.      Pulses: Normal pulses.      Heart sounds: Normal heart sounds.   Pulmonary:      Effort: Pulmonary effort is normal.      Breath sounds: Normal breath sounds.   Abdominal:      General: Bowel sounds are normal.   Musculoskeletal:         General: Normal range of motion.      Cervical back: Normal range of motion and neck supple.   Skin:     General: Skin is warm.      Capillary Refill: Capillary refill takes less than 2 seconds.   Neurological:      General: No focal deficit present.      Mental Status: She is alert and oriented to person, " place, and time.   Psychiatric:         Mood and Affect: Mood normal.         Behavior: Behavior normal.         Performance Status: ECOG (0) Fully active, able to carry on all predisease performance without restriction    Clinical Quality Measures  -Pain Documented by Standardized Tool, FPS Mimi Mariee reports a pain score of 0.  Given her pain assessment as noted, treatment options were discussed and the following options were decided upon as a follow-up plan to address the patient's pain:  No pain,no plan given .     -Advanced Care Planning Advance Care Planning   ACP discussion was held with the patient during this visit. Patient does not have an advance directive, information provided.     -Body Mass Index Screening and Follow-Up Plan BMI is within normal parameters. No other follow-up for BMI required.    -Tobacco Use: Screening and Cessation Intervention Social History    Tobacco Use      Smoking status: Not on file      Smokeless tobacco: Not on file     ASSESSMENT AND PLAN  1. NSCLC of left lung      No orders of the defined types were placed in this encounter.    RECOMMENDATIONS: Mimi Mariee was diagnosed with Stage IV adenocarcinoma of left lung.     The indications and rationale of lung stereotactic/external beam radiation therapy according to the NCCN Guidelines has been discussed today. I have extensively reviewed the risks, benefits and alternatives of therapy with this diagnosis. The risks of radiation therapy includes but is not limited to radiation induced pulmonary fibrosis, progression of disease in spite of therapy with either local or systemic failure. I have seen, examined and reviewed this patient's medication list, appropriate labs and imaging studies as well as other physician notes. We discussed the goals and plans of care with the patient and family and answered all questions.     Following this discussion and in consideration of the diagnostic data/evaluation of the  patient, I recommended a course of stereotactic radiosurgery to the left lower lobe. Will simulate treatment fields today, 3D CT with MIPS to begin the planning process, final course pending.  If definitive SBRT is not possible we will consider definitive EBRT over 33 treatment fractions.    Continue ongoing management per primary care physician and other specialists. Thank you for allowing me to assist in this patients care.     Patient Instructions   1) Will do markings today to see if 4-5 pinpoint treatments are possible.   2) if pinpoint treatments aren't possible, will plan on 33 daily treatments.     Time Spent: I spent 58 minutes caring for Mimi on this date of service. This time includes time spent by me in the following activities: preparing for the visit, reviewing tests, obtaining and/or reviewing a separately obtained history, performing a medically appropriate examination and/or evaluation, counseling and educating the patient/family/caregiver, ordering medications, tests, or procedures, referring and communicating with other health care professionals, documenting information in the medical record, independently interpreting results and communicating that information with the patient/family/caregiver, and care coordination.   Justin Mckeon III, MD  10/12/2023

## 2023-10-12 ENCOUNTER — HOSPITAL ENCOUNTER (OUTPATIENT)
Dept: RADIATION ONCOLOGY | Facility: HOSPITAL | Age: 73
Setting detail: RADIATION/ONCOLOGY SERIES
Discharge: HOME | End: 2023-10-12
Payer: MEDICARE

## 2023-10-12 ENCOUNTER — CONSULT (OUTPATIENT)
Dept: RADIATION ONCOLOGY | Facility: HOSPITAL | Age: 73
End: 2023-10-12
Payer: MEDICARE

## 2023-10-12 VITALS
HEIGHT: 60 IN | OXYGEN SATURATION: 98 % | SYSTOLIC BLOOD PRESSURE: 147 MMHG | DIASTOLIC BLOOD PRESSURE: 67 MMHG | HEART RATE: 89 BPM | BODY MASS INDEX: 22.97 KG/M2 | WEIGHT: 117 LBS

## 2023-10-12 DIAGNOSIS — C34.92 NSCLC OF LEFT LUNG: Primary | ICD-10-CM

## 2023-10-12 PROCEDURE — 77334 RADIATION TREATMENT AID(S): CPT | Performed by: RADIOLOGY

## 2023-10-12 PROCEDURE — 77470 SPECIAL RADIATION TREATMENT: CPT | Performed by: RADIOLOGY

## 2023-10-12 PROCEDURE — G0463 HOSPITAL OUTPT CLINIC VISIT: HCPCS | Performed by: RADIOLOGY

## 2023-10-12 NOTE — PATIENT INSTRUCTIONS
1) Will do markings today to see if 4-5 pinpoint treatments are possible.   2) if pinpoint treatments aren't possible, will plan on 33 daily treatments.

## 2023-10-18 DIAGNOSIS — E03.9 ACQUIRED HYPOTHYROIDISM: ICD-10-CM

## 2023-10-18 RX ORDER — THYROID 60 MG/1
60 TABLET ORAL DAILY
Qty: 90 TABLET | Refills: 1 | Status: SHIPPED | OUTPATIENT
Start: 2023-10-18

## 2023-10-18 RX ORDER — OMEPRAZOLE 20 MG/1
CAPSULE, DELAYED RELEASE ORAL
COMMUNITY
Start: 2023-09-14

## 2023-10-20 ENCOUNTER — HOSPITAL ENCOUNTER (OUTPATIENT)
Dept: ULTRASOUND IMAGING | Age: 73
Discharge: HOME | End: 2023-10-20
Payer: MEDICARE

## 2023-10-20 DIAGNOSIS — Z78.0 POSTMENOPAUSAL: ICD-10-CM

## 2023-10-20 PROCEDURE — 77338 DESIGN MLC DEVICE FOR IMRT: CPT | Performed by: RADIOLOGY

## 2023-10-20 PROCEDURE — 77080 DXA BONE DENSITY AXIAL: CPT

## 2023-10-20 PROCEDURE — 77301 RADIOTHERAPY DOSE PLAN IMRT: CPT | Performed by: RADIOLOGY

## 2023-10-20 PROCEDURE — 77300 RADIATION THERAPY DOSE PLAN: CPT | Performed by: RADIOLOGY

## 2023-10-20 PROCEDURE — 77293 RESPIRATOR MOTION MGMT SIMUL: CPT | Performed by: RADIOLOGY

## 2023-10-31 ENCOUNTER — HOSPITAL ENCOUNTER (OUTPATIENT)
Dept: RADIATION ONCOLOGY | Facility: HOSPITAL | Age: 73
Discharge: HOME | End: 2023-10-31

## 2023-10-31 LAB
RAD ONC ARIA COURSE ID: NORMAL
RAD ONC ARIA COURSE LAST TREATMENT DATE: NORMAL
RAD ONC ARIA COURSE START DATE: NORMAL
RAD ONC ARIA COURSE TREATMENT ELAPSED DAYS: 0
RAD ONC ARIA FIRST TREATMENT DATE: NORMAL
RAD ONC ARIA PLAN FRACTIONS TREATED TO DATE: 1
RAD ONC ARIA PLAN ID: NORMAL
RAD ONC ARIA PLAN NAME: NORMAL
RAD ONC ARIA PLAN PRESCRIBED DOSE PER FRACTION: 12.5 GY
RAD ONC ARIA PLAN PRIMARY REFERENCE POINT: NORMAL
RAD ONC ARIA PLAN TOTAL FRACTIONS PRESCRIBED: 4
RAD ONC ARIA PLAN TOTAL PRESCRIBED DOSE: 5000 CGY
RAD ONC ARIA REFERENCE POINT DOSAGE GIVEN TO DATE: 12.5 GY
RAD ONC ARIA REFERENCE POINT ID: NORMAL
RAD ONC ARIA REFERENCE POINT SESSION DOSAGE GIVEN: 12.5 GY

## 2023-10-31 PROCEDURE — 77373 STRTCTC BDY RAD THER TX DLVR: CPT | Performed by: RADIOLOGY

## 2023-11-01 ENCOUNTER — HOSPITAL ENCOUNTER (OUTPATIENT)
Dept: RADIATION ONCOLOGY | Facility: HOSPITAL | Age: 73
Setting detail: RADIATION/ONCOLOGY SERIES
End: 2023-11-01
Payer: MEDICARE

## 2023-11-02 ENCOUNTER — TELEPHONE (OUTPATIENT)
Dept: HEMATOLOGY | Age: 73
End: 2023-11-02

## 2023-11-02 ENCOUNTER — HOSPITAL ENCOUNTER (OUTPATIENT)
Dept: RADIATION ONCOLOGY | Facility: HOSPITAL | Age: 73
Setting detail: RADIATION/ONCOLOGY SERIES
Discharge: HOME | End: 2023-11-02
Payer: MEDICARE

## 2023-11-02 LAB
RAD ONC ARIA COURSE ID: NORMAL
RAD ONC ARIA COURSE LAST TREATMENT DATE: NORMAL
RAD ONC ARIA COURSE START DATE: NORMAL
RAD ONC ARIA COURSE TREATMENT ELAPSED DAYS: 2
RAD ONC ARIA FIRST TREATMENT DATE: NORMAL
RAD ONC ARIA PLAN FRACTIONS TREATED TO DATE: 2
RAD ONC ARIA PLAN ID: NORMAL
RAD ONC ARIA PLAN NAME: NORMAL
RAD ONC ARIA PLAN PRESCRIBED DOSE PER FRACTION: 12.5 GY
RAD ONC ARIA PLAN PRIMARY REFERENCE POINT: NORMAL
RAD ONC ARIA PLAN TOTAL FRACTIONS PRESCRIBED: 4
RAD ONC ARIA PLAN TOTAL PRESCRIBED DOSE: 5000 CGY
RAD ONC ARIA REFERENCE POINT DOSAGE GIVEN TO DATE: 25 GY
RAD ONC ARIA REFERENCE POINT ID: NORMAL
RAD ONC ARIA REFERENCE POINT SESSION DOSAGE GIVEN: 12.5 GY

## 2023-11-02 PROCEDURE — 77373 STRTCTC BDY RAD THER TX DLVR: CPT | Performed by: RADIOLOGY

## 2023-11-02 NOTE — TELEPHONE ENCOUNTER
Called patient to remind them of their appointment on 11/03/23. Detailed voicemail was left with appointment date and time.

## 2023-11-03 ENCOUNTER — OFFICE VISIT (OUTPATIENT)
Dept: HEMATOLOGY | Age: 73
End: 2023-11-03
Payer: MEDICARE

## 2023-11-03 ENCOUNTER — HOSPITAL ENCOUNTER (OUTPATIENT)
Dept: INFUSION THERAPY | Age: 73
Discharge: HOME | End: 2023-11-03
Payer: MEDICARE

## 2023-11-03 VITALS
HEIGHT: 60 IN | SYSTOLIC BLOOD PRESSURE: 118 MMHG | BODY MASS INDEX: 22.46 KG/M2 | DIASTOLIC BLOOD PRESSURE: 68 MMHG | WEIGHT: 114.4 LBS | TEMPERATURE: 96.6 F | OXYGEN SATURATION: 98 % | HEART RATE: 66 BPM

## 2023-11-03 DIAGNOSIS — T45.1X5S ANTINEOPLASTIC DRUGS CAUSING ADVERSE EFFECT, SEQUELA: ICD-10-CM

## 2023-11-03 DIAGNOSIS — Z71.89 CARE PLAN DISCUSSED WITH PATIENT: ICD-10-CM

## 2023-11-03 DIAGNOSIS — C34.90 EGFR-RELATED LUNG CANCER (HCC): ICD-10-CM

## 2023-11-03 DIAGNOSIS — C78.7 NSCLC METASTATIC TO LIVER (HCC): ICD-10-CM

## 2023-11-03 DIAGNOSIS — L27.0 RASH, DRUG: ICD-10-CM

## 2023-11-03 DIAGNOSIS — C34.90 NSCLC METASTATIC TO LIVER (HCC): ICD-10-CM

## 2023-11-03 DIAGNOSIS — C34.92 NSCLC OF LEFT LUNG (HCC): Primary | ICD-10-CM

## 2023-11-03 DIAGNOSIS — K12.1 MOUTH ULCER: ICD-10-CM

## 2023-11-03 DIAGNOSIS — C78.7 MALIGNANT NEOPLASM METASTATIC TO LIVER (HCC): ICD-10-CM

## 2023-11-03 LAB
BASOPHILS # BLD: 0.01 K/UL (ref 0.01–0.08)
BASOPHILS NFR BLD: 0.2 % (ref 0.1–1.2)
EOSINOPHIL # BLD: 0.2 K/UL (ref 0.04–0.54)
EOSINOPHIL NFR BLD: 3.7 % (ref 0.7–7)
ERYTHROCYTE [DISTWIDTH] IN BLOOD BY AUTOMATED COUNT: 12.4 % (ref 11.7–14.4)
HCT VFR BLD AUTO: 40.1 % (ref 34.1–44.9)
HGB BLD-MCNC: 13.1 G/DL (ref 11.2–15.7)
LYMPHOCYTES # BLD: 0.82 K/UL (ref 1.18–3.74)
LYMPHOCYTES NFR BLD: 15 % (ref 19.3–53.1)
MCH RBC QN AUTO: 29.5 PG (ref 25.6–32.2)
MCHC RBC AUTO-ENTMCNC: 32.7 G/DL (ref 32.3–35.5)
MCV RBC AUTO: 90.3 FL (ref 79.4–94.8)
MONOCYTES # BLD: 0.45 K/UL (ref 0.24–0.82)
MONOCYTES NFR BLD: 8.2 % (ref 4.7–12.5)
NEUTROPHILS # BLD: 3.96 K/UL (ref 1.56–6.13)
NEUTS SEG NFR BLD: 72.5 % (ref 34–71.1)
PLATELET # BLD AUTO: 179 K/UL (ref 182–369)
PMV BLD AUTO: 9.8 FL (ref 7.4–10.4)
RBC # BLD AUTO: 4.44 M/UL (ref 3.93–5.22)
WBC # BLD AUTO: 5.46 K/UL (ref 3.98–10.04)

## 2023-11-03 PROCEDURE — 3078F DIAST BP <80 MM HG: CPT | Performed by: INTERNAL MEDICINE

## 2023-11-03 PROCEDURE — 3074F SYST BP LT 130 MM HG: CPT | Performed by: INTERNAL MEDICINE

## 2023-11-03 PROCEDURE — 3017F COLORECTAL CA SCREEN DOC REV: CPT | Performed by: INTERNAL MEDICINE

## 2023-11-03 PROCEDURE — 1123F ACP DISCUSS/DSCN MKR DOCD: CPT | Performed by: INTERNAL MEDICINE

## 2023-11-03 PROCEDURE — G8484 FLU IMMUNIZE NO ADMIN: HCPCS | Performed by: INTERNAL MEDICINE

## 2023-11-03 PROCEDURE — G8399 PT W/DXA RESULTS DOCUMENT: HCPCS | Performed by: INTERNAL MEDICINE

## 2023-11-03 PROCEDURE — G8427 DOCREV CUR MEDS BY ELIG CLIN: HCPCS | Performed by: INTERNAL MEDICINE

## 2023-11-03 PROCEDURE — 1090F PRES/ABSN URINE INCON ASSESS: CPT | Performed by: INTERNAL MEDICINE

## 2023-11-03 PROCEDURE — 85025 COMPLETE CBC W/AUTO DIFF WBC: CPT

## 2023-11-03 PROCEDURE — 99211 OFF/OP EST MAY X REQ PHY/QHP: CPT

## 2023-11-03 PROCEDURE — 36415 COLL VENOUS BLD VENIPUNCTURE: CPT

## 2023-11-03 PROCEDURE — 99214 OFFICE O/P EST MOD 30 MIN: CPT | Performed by: INTERNAL MEDICINE

## 2023-11-03 PROCEDURE — 1036F TOBACCO NON-USER: CPT | Performed by: INTERNAL MEDICINE

## 2023-11-03 PROCEDURE — G8420 CALC BMI NORM PARAMETERS: HCPCS | Performed by: INTERNAL MEDICINE

## 2023-11-03 RX ORDER — DOXYCYCLINE HYCLATE 100 MG
TABLET ORAL
Qty: 20 TABLET | Refills: 3 | Status: SHIPPED | OUTPATIENT
Start: 2023-11-03

## 2023-11-06 ENCOUNTER — HOSPITAL ENCOUNTER (OUTPATIENT)
Dept: RADIATION ONCOLOGY | Facility: HOSPITAL | Age: 73
Setting detail: RADIATION/ONCOLOGY SERIES
Discharge: HOME | End: 2023-11-06
Payer: MEDICARE

## 2023-11-06 LAB
RAD ONC ARIA COURSE ID: NORMAL
RAD ONC ARIA COURSE LAST TREATMENT DATE: NORMAL
RAD ONC ARIA COURSE START DATE: NORMAL
RAD ONC ARIA COURSE TREATMENT ELAPSED DAYS: 6
RAD ONC ARIA FIRST TREATMENT DATE: NORMAL
RAD ONC ARIA PLAN FRACTIONS TREATED TO DATE: 3
RAD ONC ARIA PLAN ID: NORMAL
RAD ONC ARIA PLAN NAME: NORMAL
RAD ONC ARIA PLAN PRESCRIBED DOSE PER FRACTION: 12.5 GY
RAD ONC ARIA PLAN PRIMARY REFERENCE POINT: NORMAL
RAD ONC ARIA PLAN TOTAL FRACTIONS PRESCRIBED: 4
RAD ONC ARIA PLAN TOTAL PRESCRIBED DOSE: 5000 CGY
RAD ONC ARIA REFERENCE POINT DOSAGE GIVEN TO DATE: 37.5 GY
RAD ONC ARIA REFERENCE POINT ID: NORMAL
RAD ONC ARIA REFERENCE POINT SESSION DOSAGE GIVEN: 12.5 GY

## 2023-11-06 PROCEDURE — 77373 STRTCTC BDY RAD THER TX DLVR: CPT | Performed by: RADIOLOGY

## 2023-11-08 ENCOUNTER — HOSPITAL ENCOUNTER (OUTPATIENT)
Dept: RADIATION ONCOLOGY | Facility: HOSPITAL | Age: 73
Setting detail: RADIATION/ONCOLOGY SERIES
Discharge: HOME | End: 2023-11-08
Payer: MEDICARE

## 2023-11-08 DIAGNOSIS — C34.92 NSCLC OF LEFT LUNG: Primary | ICD-10-CM

## 2023-11-08 DIAGNOSIS — Z92.3 HISTORY OF RADIATION THERAPY: ICD-10-CM

## 2023-11-08 LAB
RAD ONC ARIA COURSE ID: NORMAL
RAD ONC ARIA COURSE LAST TREATMENT DATE: NORMAL
RAD ONC ARIA COURSE START DATE: NORMAL
RAD ONC ARIA COURSE TREATMENT ELAPSED DAYS: 8
RAD ONC ARIA FIRST TREATMENT DATE: NORMAL
RAD ONC ARIA PLAN FRACTIONS TREATED TO DATE: 4
RAD ONC ARIA PLAN ID: NORMAL
RAD ONC ARIA PLAN NAME: NORMAL
RAD ONC ARIA PLAN PRESCRIBED DOSE PER FRACTION: 12.5 GY
RAD ONC ARIA PLAN PRIMARY REFERENCE POINT: NORMAL
RAD ONC ARIA PLAN TOTAL FRACTIONS PRESCRIBED: 4
RAD ONC ARIA PLAN TOTAL PRESCRIBED DOSE: 5000 CGY
RAD ONC ARIA REFERENCE POINT DOSAGE GIVEN TO DATE: 50 GY
RAD ONC ARIA REFERENCE POINT ID: NORMAL
RAD ONC ARIA REFERENCE POINT SESSION DOSAGE GIVEN: 12.5 GY

## 2023-11-08 PROCEDURE — 77373 STRTCTC BDY RAD THER TX DLVR: CPT | Performed by: RADIOLOGY

## 2023-11-08 PROCEDURE — 77336 RADIATION PHYSICS CONSULT: CPT | Performed by: RADIOLOGY

## 2023-11-13 ENCOUNTER — OFFICE VISIT (OUTPATIENT)
Dept: INTERNAL MEDICINE | Age: 73
End: 2023-11-13
Payer: MEDICARE

## 2023-11-13 VITALS
OXYGEN SATURATION: 100 % | HEIGHT: 60 IN | BODY MASS INDEX: 22.38 KG/M2 | SYSTOLIC BLOOD PRESSURE: 128 MMHG | HEART RATE: 75 BPM | DIASTOLIC BLOOD PRESSURE: 74 MMHG | WEIGHT: 114 LBS

## 2023-11-13 DIAGNOSIS — R53.83 OTHER FATIGUE: Primary | ICD-10-CM

## 2023-11-13 DIAGNOSIS — K12.1 MOUTH ULCERS: ICD-10-CM

## 2023-11-13 DIAGNOSIS — R53.83 OTHER FATIGUE: ICD-10-CM

## 2023-11-13 LAB
ALBUMIN SERPL-MCNC: 4.5 G/DL (ref 3.5–5.2)
ALP SERPL-CCNC: 81 U/L (ref 35–104)
ALT SERPL-CCNC: 10 U/L (ref 5–33)
ANION GAP SERPL CALCULATED.3IONS-SCNC: 12 MMOL/L (ref 7–19)
APPEARANCE FLUID: CLEAR
AST SERPL-CCNC: 19 U/L (ref 5–32)
BASOPHILS # BLD: 0 K/UL (ref 0–0.2)
BASOPHILS NFR BLD: 0.7 % (ref 0–1)
BILIRUB SERPL-MCNC: 0.3 MG/DL (ref 0.2–1.2)
BILIRUBIN, POC: NORMAL
BLOOD URINE, POC: NORMAL
BUN SERPL-MCNC: 25 MG/DL (ref 8–23)
CALCIUM SERPL-MCNC: 10.2 MG/DL (ref 8.8–10.2)
CHLORIDE SERPL-SCNC: 102 MMOL/L (ref 98–111)
CLARITY, POC: CLEAR
CO2 SERPL-SCNC: 25 MMOL/L (ref 22–29)
COLOR, POC: YELLOW
CREAT SERPL-MCNC: 1 MG/DL (ref 0.5–0.9)
EOSINOPHIL # BLD: 0.2 K/UL (ref 0–0.6)
EOSINOPHIL NFR BLD: 3.3 % (ref 0–5)
ERYTHROCYTE [DISTWIDTH] IN BLOOD BY AUTOMATED COUNT: 12.8 % (ref 11.5–14.5)
GLUCOSE SERPL-MCNC: 95 MG/DL (ref 74–109)
GLUCOSE URINE, POC: NORMAL
HCT VFR BLD AUTO: 43.1 % (ref 37–47)
HGB BLD-MCNC: 13.6 G/DL (ref 12–16)
IMM GRANULOCYTES # BLD: 0 K/UL
INFLUENZA A ANTIGEN, POC: NEGATIVE
INFLUENZA B ANTIGEN, POC: NEGATIVE
KETONES, POC: NORMAL
LEUKOCYTE EST, POC: NORMAL
LOT EXPIRE DATE: NORMAL
LOT KIT NUMBER: NORMAL
LYMPHOCYTES # BLD: 0.8 K/UL (ref 1.1–4.5)
LYMPHOCYTES NFR BLD: 12.6 % (ref 20–40)
MCH RBC QN AUTO: 29.9 PG (ref 27–31)
MCHC RBC AUTO-ENTMCNC: 31.6 G/DL (ref 33–37)
MCV RBC AUTO: 94.7 FL (ref 81–99)
MONOCYTES # BLD: 0.6 K/UL (ref 0–0.9)
MONOCYTES NFR BLD: 9.2 % (ref 0–10)
NEUTROPHILS # BLD: 4.4 K/UL (ref 1.5–7.5)
NEUTS SEG NFR BLD: 73.9 % (ref 50–65)
NITRITE, POC: NORMAL
PH, POC: 5.5
PLATELET # BLD AUTO: 190 K/UL (ref 130–400)
PMV BLD AUTO: 10.4 FL (ref 9.4–12.3)
POTASSIUM SERPL-SCNC: 4.3 MMOL/L (ref 3.5–5)
PROT SERPL-MCNC: 7.6 G/DL (ref 6.6–8.7)
PROTEIN, POC: NORMAL
RBC # BLD AUTO: 4.55 M/UL (ref 4.2–5.4)
SARS-COV-2, POC: NORMAL
SODIUM SERPL-SCNC: 139 MMOL/L (ref 136–145)
SPECIFIC GRAVITY, POC: 1.02
TSH SERPL DL<=0.005 MIU/L-ACNC: 1.76 UIU/ML (ref 0.27–4.2)
UROBILINOGEN, POC: 0.2
VALID INTERNAL CONTROL: NORMAL
VENDOR AND KIT NAME POC: NORMAL
WBC # BLD AUTO: 6 K/UL (ref 4.8–10.8)

## 2023-11-13 PROCEDURE — 3017F COLORECTAL CA SCREEN DOC REV: CPT | Performed by: NURSE PRACTITIONER

## 2023-11-13 PROCEDURE — G8420 CALC BMI NORM PARAMETERS: HCPCS | Performed by: NURSE PRACTITIONER

## 2023-11-13 PROCEDURE — 3074F SYST BP LT 130 MM HG: CPT | Performed by: NURSE PRACTITIONER

## 2023-11-13 PROCEDURE — G8399 PT W/DXA RESULTS DOCUMENT: HCPCS | Performed by: NURSE PRACTITIONER

## 2023-11-13 PROCEDURE — 1123F ACP DISCUSS/DSCN MKR DOCD: CPT | Performed by: NURSE PRACTITIONER

## 2023-11-13 PROCEDURE — 81002 URINALYSIS NONAUTO W/O SCOPE: CPT | Performed by: NURSE PRACTITIONER

## 2023-11-13 PROCEDURE — G8484 FLU IMMUNIZE NO ADMIN: HCPCS | Performed by: NURSE PRACTITIONER

## 2023-11-13 PROCEDURE — 3078F DIAST BP <80 MM HG: CPT | Performed by: NURSE PRACTITIONER

## 2023-11-13 PROCEDURE — 1036F TOBACCO NON-USER: CPT | Performed by: NURSE PRACTITIONER

## 2023-11-13 PROCEDURE — G8427 DOCREV CUR MEDS BY ELIG CLIN: HCPCS | Performed by: NURSE PRACTITIONER

## 2023-11-13 PROCEDURE — 1090F PRES/ABSN URINE INCON ASSESS: CPT | Performed by: NURSE PRACTITIONER

## 2023-11-13 PROCEDURE — 99213 OFFICE O/P EST LOW 20 MIN: CPT | Performed by: NURSE PRACTITIONER

## 2023-11-13 NOTE — PROGRESS NOTES
[address] [facility] INTERNAL MEDICINE  [address] [facility],[address]  [address] [address]  Dept: [phone]  Dept Fax: [phone]: [phone]    Anahi Villalobos (:  1950) is a 68 y.o. female,Established patient  with green , here for evaluation of the following chief complaint(s): Office Visit for Anticoagulation Management      Anahi Villalobos is a 68 y.o. female who presents today for her medical conditions/complaints as noted below. Anahi Villalobos is c/surendra Office Visit for Anticoagulation Management        HPI:     Chief Complaint   Patient presents with    Office Visit for Anticoagulation Management     @pt is alone   accompanied by   HPI    Fatigue finished radiation treatment for her lung carcinoma. oral ulcers on the tongue is really keeping her from being wanted to eat      Past Medical History:   Diagnosis Date    Acquired hypothyroidism 2017    Alopecia 2017    Breast implant capsular contracture     \"with cysts behind right breast\" per pt.      Esophageal mass 2023    Essential hypertension 2017    Familial hypercholesterolemia 2017    Lung cancer ([address])     Lung mass 2023    Nodule of chest wall 05/10/2021    PONV (postoperative nausea and vomiting)     Prolonged emergence from general anesthesia     Stage 1 chronic kidney disease 2021    Tobacco abuse, in remission       Past Surgical History:   Procedure Laterality Date    BREAST CYST EXCISION      BREAST ENHANCEMENT SURGERY Bilateral 2020    REMOVAL BILATERAL CONTRACTURED IMPLANTS WITH BILATERAL PECTORAL BLOCK performed by Radha Carlos MD at [address] Bilateral     bunion correct osteotomy smith double-stem lesser MTP Impl    COLONOSCOPY  2017    HEMORRHOID SURGERY  2005    HYSTERECTOMY (CERVIX STATUS UNKNOWN)  1980    KNEE SURGERY Right 2007    RECTAL SURGERY      anal fissurectomy with sphincterotomy    MAICOL AND

## 2023-11-13 NOTE — PATIENT INSTRUCTIONS
1.Fatigue we will get labs today  2.   Oral ulcers send you to an silver nitrate sticks to use at home over the next few days if you need it

## 2023-12-14 ENCOUNTER — TELEPHONE (OUTPATIENT)
Dept: HEMATOLOGY | Age: 73
End: 2023-12-14

## 2023-12-14 DIAGNOSIS — C34.92 NSCLC OF LEFT LUNG (HCC): Primary | ICD-10-CM

## 2023-12-14 DIAGNOSIS — C34.90 NSCLC METASTATIC TO LIVER (HCC): ICD-10-CM

## 2023-12-14 DIAGNOSIS — C34.90 EGFR-RELATED LUNG CANCER (HCC): ICD-10-CM

## 2023-12-14 DIAGNOSIS — C78.7 NSCLC METASTATIC TO LIVER (HCC): ICD-10-CM

## 2023-12-14 NOTE — PROGRESS NOTES
MEDICAL ONCOLOGY PROGRESS NOTE                                                          Maki Evans   1950  12/15/2023     Chief Complaint   Patient presents with    Follow-up     NSCLC of left lung            INTERVAL HISTORY/HISTORY OF PRESENT ILLNESS:  Reason for MD visit-toxicity assessment/disease management and compliance. Patient is a 68years old female who has been started on Dennie Dragon for treatment of her metastatic EGFR mutated lung cancer with liver metastasis. Treatment was started in February 2022. More recently, she had repeat CT scans and PET scan that showed interval progression in a single site in the left lower lobe. Therefore, I referred her to Dr. Johanna Babin for consideration of palliative SBRT to the site of progression. She was recommended to continue osimertinib. She continues to tolerate treatment relatively well except for complaints of mild drug-induced rash which is controlled with doxycycline. She has been tolerating treatment well. She is compliant with treatment. She denies any respiratory complaints. She is currently undergoing radiation treatment. Diagnosis  LLL lesion, Jan 2023  Liver metastasis  NSCLC-adenocarcinoma, Feb 2023  Moderately differentiated   Stage IV (liver mets), rM1C3H5 (liver)  Caris: QNS  NGS- Lucence: EGFR exon 19-positive     Treatment summary  2/20/23 Initiated Osimertinib 80 mg p.o. daily  10/31/23-11/8/23 Completed radiation left lung 5000 cGy over 4 treatments     Cancer history:   Roxanna Burciaga was first seen by me on 1/31/2023. I was consulted for abnormal CT scan findings of a lung mass, mediastinal adenopathy and a lower esophageal mass. She presented to the ER department on 1/30/2023 at [facility] with complaints of back pain. She has a history of hypertension, hypothyroidism, obesity, hyperlipidemia and chronic kidney disease.   The pain started about 2-3 days before she comes to the hospital.  Denies any shortness

## 2023-12-15 ENCOUNTER — HOSPITAL ENCOUNTER (OUTPATIENT)
Dept: INFUSION THERAPY | Age: 73
Discharge: HOME | End: 2023-12-15
Payer: MEDICARE

## 2023-12-15 ENCOUNTER — OFFICE VISIT (OUTPATIENT)
Dept: HEMATOLOGY | Age: 73
End: 2023-12-15
Payer: MEDICARE

## 2023-12-15 VITALS
HEIGHT: 60 IN | TEMPERATURE: 98.5 F | HEART RATE: 90 BPM | SYSTOLIC BLOOD PRESSURE: 118 MMHG | BODY MASS INDEX: 23.16 KG/M2 | WEIGHT: 118 LBS | OXYGEN SATURATION: 99 % | DIASTOLIC BLOOD PRESSURE: 76 MMHG

## 2023-12-15 DIAGNOSIS — Z51.5 PALLIATIVE CARE PATIENT: ICD-10-CM

## 2023-12-15 DIAGNOSIS — C34.90 EGFR-RELATED LUNG CANCER (HCC): ICD-10-CM

## 2023-12-15 DIAGNOSIS — C34.92 NSCLC OF LEFT LUNG (HCC): Primary | ICD-10-CM

## 2023-12-15 DIAGNOSIS — C78.7 NSCLC METASTATIC TO LIVER (HCC): ICD-10-CM

## 2023-12-15 DIAGNOSIS — C34.90 NSCLC METASTATIC TO LIVER (HCC): ICD-10-CM

## 2023-12-15 DIAGNOSIS — C34.92 NSCLC OF LEFT LUNG (HCC): ICD-10-CM

## 2023-12-15 DIAGNOSIS — G89.3 CANCER RELATED PAIN: ICD-10-CM

## 2023-12-15 LAB
ALBUMIN SERPL-MCNC: 4.5 G/DL (ref 3.5–5.2)
ALP SERPL-CCNC: 70 U/L (ref 35–104)
ALT SERPL-CCNC: 18 U/L (ref 9–52)
ANION GAP SERPL CALCULATED.3IONS-SCNC: 9 MMOL/L (ref 7–19)
AST SERPL-CCNC: 31 U/L (ref 14–36)
BASOPHILS # BLD: 0.02 K/UL (ref 0.01–0.08)
BASOPHILS NFR BLD: 0.4 % (ref 0.1–1.2)
BILIRUB SERPL-MCNC: 0.3 MG/DL (ref 0.2–1.3)
BUN SERPL-MCNC: 19 MG/DL (ref 7–17)
CALCIUM SERPL-MCNC: 9.2 MG/DL (ref 8.4–10.2)
CHLORIDE SERPL-SCNC: 105 MMOL/L (ref 98–111)
CO2 SERPL-SCNC: 28 MMOL/L (ref 22–29)
CREAT SERPL-MCNC: 1.1 MG/DL (ref 0.5–1)
EOSINOPHIL # BLD: 0.14 K/UL (ref 0.04–0.54)
EOSINOPHIL NFR BLD: 3.1 % (ref 0.7–7)
ERYTHROCYTE [DISTWIDTH] IN BLOOD BY AUTOMATED COUNT: 13.7 % (ref 11.7–14.4)
GLOBULIN: 2.7 G/DL
GLUCOSE SERPL-MCNC: 104 MG/DL (ref 74–106)
HCT VFR BLD AUTO: 38.8 % (ref 34.1–44.9)
HGB BLD-MCNC: 12.7 G/DL (ref 11.2–15.7)
LYMPHOCYTES # BLD: 0.68 K/UL (ref 1.18–3.74)
LYMPHOCYTES NFR BLD: 15 % (ref 19.3–53.1)
MCH RBC QN AUTO: 30.1 PG (ref 25.6–32.2)
MCHC RBC AUTO-ENTMCNC: 32.7 G/DL (ref 32.3–35.5)
MCV RBC AUTO: 91.9 FL (ref 79.4–94.8)
MONOCYTES # BLD: 0.4 K/UL (ref 0.24–0.82)
MONOCYTES NFR BLD: 8.8 % (ref 4.7–12.5)
NEUTROPHILS # BLD: 3.26 K/UL (ref 1.56–6.13)
NEUTS SEG NFR BLD: 72.3 % (ref 34–71.1)
PLATELET # BLD AUTO: 145 K/UL (ref 182–369)
PMV BLD AUTO: 9.4 FL (ref 7.4–10.4)
POTASSIUM SERPL-SCNC: 3.9 MMOL/L (ref 3.5–5.1)
PROT SERPL-MCNC: 7.2 G/DL (ref 6.3–8.2)
RBC # BLD AUTO: 4.22 M/UL (ref 3.93–5.22)
SODIUM SERPL-SCNC: 142 MMOL/L (ref 137–145)
WBC # BLD AUTO: 4.52 K/UL (ref 3.98–10.04)

## 2023-12-15 PROCEDURE — G8484 FLU IMMUNIZE NO ADMIN: HCPCS | Performed by: INTERNAL MEDICINE

## 2023-12-15 PROCEDURE — 1123F ACP DISCUSS/DSCN MKR DOCD: CPT | Performed by: INTERNAL MEDICINE

## 2023-12-15 PROCEDURE — 3074F SYST BP LT 130 MM HG: CPT | Performed by: INTERNAL MEDICINE

## 2023-12-15 PROCEDURE — 1090F PRES/ABSN URINE INCON ASSESS: CPT | Performed by: INTERNAL MEDICINE

## 2023-12-15 PROCEDURE — 80053 COMPREHEN METABOLIC PANEL: CPT

## 2023-12-15 PROCEDURE — G8420 CALC BMI NORM PARAMETERS: HCPCS | Performed by: INTERNAL MEDICINE

## 2023-12-15 PROCEDURE — 1036F TOBACCO NON-USER: CPT | Performed by: INTERNAL MEDICINE

## 2023-12-15 PROCEDURE — 85025 COMPLETE CBC W/AUTO DIFF WBC: CPT

## 2023-12-15 PROCEDURE — 3017F COLORECTAL CA SCREEN DOC REV: CPT | Performed by: INTERNAL MEDICINE

## 2023-12-15 PROCEDURE — 36415 COLL VENOUS BLD VENIPUNCTURE: CPT

## 2023-12-15 PROCEDURE — 99214 OFFICE O/P EST MOD 30 MIN: CPT | Performed by: INTERNAL MEDICINE

## 2023-12-15 PROCEDURE — G8399 PT W/DXA RESULTS DOCUMENT: HCPCS | Performed by: INTERNAL MEDICINE

## 2023-12-15 PROCEDURE — G8427 DOCREV CUR MEDS BY ELIG CLIN: HCPCS | Performed by: INTERNAL MEDICINE

## 2023-12-15 PROCEDURE — 3078F DIAST BP <80 MM HG: CPT | Performed by: INTERNAL MEDICINE

## 2023-12-15 PROCEDURE — 99212 OFFICE O/P EST SF 10 MIN: CPT

## 2024-01-09 ENCOUNTER — HOSPITAL ENCOUNTER (OUTPATIENT)
Dept: CT IMAGING | Age: 74
Discharge: HOME | End: 2024-01-09
Payer: MEDICARE

## 2024-01-09 DIAGNOSIS — C34.92 NSCLC OF LEFT LUNG (HCC): ICD-10-CM

## 2024-01-09 PROCEDURE — 74177 CT ABD & PELVIS W/CONTRAST: CPT

## 2024-01-09 PROCEDURE — 6360000004 HC RX CONTRAST MEDICATION: Performed by: INTERNAL MEDICINE

## 2024-01-09 PROCEDURE — 71260 CT THORAX DX C+: CPT

## 2024-01-09 RX ADMIN — IOPAMIDOL 75 ML: 755 INJECTION, SOLUTION INTRAVENOUS at 12:04

## 2024-01-16 ENCOUNTER — TELEPHONE (OUTPATIENT)
Dept: HEMATOLOGY | Age: 74
End: 2024-01-16

## 2024-01-16 NOTE — TELEPHONE ENCOUNTER
Called patient and reminded patient of their appointment on 1/19/2024and patient confirmed they would be here.

## 2024-01-17 DIAGNOSIS — C34.92 NSCLC OF LEFT LUNG (HCC): Primary | ICD-10-CM

## 2024-01-17 NOTE — PROGRESS NOTES
Pericardium: The main pulmonary artery is normal caliber.  There is no central pulmonary embolism.  The thoracic aorta is not dilated.  Calcification in the aorta consistent with atherosclerosis.  Coronary artery calcification.  The heart chambers are not enlarged.  There is no pericardial effusion or thickening.Bones and Soft Tissues: There is no fracture, lytic lesion, or blastic lesion.  Chest wall soft tissues are unremarkable.Upper Abdomen: See same day report CT scan abdomen and pelvis.  Small hiatus hernia.  Multiple new liver masses. Multiple new bilateral lung nodules consistent with neoplasm.  Previously visualized mass in the left lower lobe is slightly smaller. Small left pleural effusion.  Mild left basilar atelectasis.  New right inferior paratracheal lymphadenopathy measuring 1.6 cm. Atherosclerosis and coronary artery calcification. Small hiatus hernia.  Multiple liver masses.  See same day report CT scan abdomen and pelvis.   1/9/24 CT Abd/Pelvis W IV Contrast (oral) There are several new or progressed heterogeneous hypodense mass lesions in the liver, the largest in the right hepatic lobe measuring 3.1 cm. Additional similar appearing versus lesions in the liver measure 2.1 cm, 1.9 cm, 1.7 cm, 1.6 cm, and 1.1 cm. These are seen in both the right and left hepatic lobes. Additional smaller suspicious lesions are noted.  Previously seen 11 mm low density lesion in the right hepatic lobe itself has slightly smaller.  Progression of disease in the liver. Grossly stable thickening of the wall of the distal esophagus versus hiatal hernia. Pulmonary parenchymal metastatic disease at the bilateral lung bases.  Interval decrease in size of treated mass lesion posterior left lung base.  New small left pleural effusion.  1/19/2024-I reviewed results CT C/A/P.  Evidence of disease progression in the liver with new lesions as well as new lung nodules.  This is consistent with progressive disease.  Recommended  intact

## 2024-01-18 ENCOUNTER — TELEPHONE (OUTPATIENT)
Facility: HOSPITAL | Age: 74
End: 2024-01-18

## 2024-01-19 ENCOUNTER — OFFICE VISIT (OUTPATIENT)
Dept: HEMATOLOGY | Age: 74
End: 2024-01-19
Payer: MEDICARE

## 2024-01-19 ENCOUNTER — HOSPITAL ENCOUNTER (OUTPATIENT)
Dept: INFUSION THERAPY | Age: 74
Discharge: HOME | End: 2024-01-19
Payer: MEDICARE

## 2024-01-19 VITALS
HEART RATE: 68 BPM | TEMPERATURE: 97.6 F | WEIGHT: 116.9 LBS | SYSTOLIC BLOOD PRESSURE: 120 MMHG | DIASTOLIC BLOOD PRESSURE: 60 MMHG | HEIGHT: 60 IN | OXYGEN SATURATION: 94 % | BODY MASS INDEX: 22.95 KG/M2

## 2024-01-19 DIAGNOSIS — C34.90 NSCLC METASTATIC TO LIVER (HCC): ICD-10-CM

## 2024-01-19 DIAGNOSIS — Z71.89 COORDINATION OF COMPLEX CARE: ICD-10-CM

## 2024-01-19 DIAGNOSIS — C34.92 NSCLC OF LEFT LUNG (HCC): ICD-10-CM

## 2024-01-19 DIAGNOSIS — C78.7 NSCLC METASTATIC TO LIVER (HCC): ICD-10-CM

## 2024-01-19 DIAGNOSIS — Z71.89 CARE PLAN DISCUSSED WITH PATIENT: ICD-10-CM

## 2024-01-19 DIAGNOSIS — Z01.812 BLOOD TESTS PRIOR TO TREATMENT OR PROCEDURE: ICD-10-CM

## 2024-01-19 DIAGNOSIS — C78.7 NSCLC METASTATIC TO LIVER (HCC): Primary | ICD-10-CM

## 2024-01-19 DIAGNOSIS — C34.90 NSCLC METASTATIC TO LIVER (HCC): Primary | ICD-10-CM

## 2024-01-19 DIAGNOSIS — T45.1X5S ANTINEOPLASTIC DRUGS CAUSING ADVERSE EFFECT, SEQUELA: ICD-10-CM

## 2024-01-19 DIAGNOSIS — C34.92 NSCLC OF LEFT LUNG (HCC): Primary | ICD-10-CM

## 2024-01-19 DIAGNOSIS — N28.9 RENAL IMPAIRMENT: ICD-10-CM

## 2024-01-19 LAB
ALBUMIN SERPL-MCNC: 4.8 G/DL (ref 3.5–5.2)
ALP SERPL-CCNC: 108 U/L (ref 35–104)
ALT SERPL-CCNC: 21 U/L (ref 9–52)
ANION GAP SERPL CALCULATED.3IONS-SCNC: 7 MMOL/L (ref 7–19)
AST SERPL-CCNC: 47 U/L (ref 14–36)
BASOPHILS # BLD: 0.02 K/UL (ref 0.01–0.08)
BASOPHILS NFR BLD: 0.3 % (ref 0.1–1.2)
BILIRUB SERPL-MCNC: 0.4 MG/DL (ref 0.2–1.3)
BUN SERPL-MCNC: 25 MG/DL (ref 7–17)
CALCIUM SERPL-MCNC: 9.9 MG/DL (ref 8.4–10.2)
CHLORIDE SERPL-SCNC: 108 MMOL/L (ref 98–111)
CO2 SERPL-SCNC: 28 MMOL/L (ref 22–29)
CREAT SERPL-MCNC: 1.2 MG/DL (ref 0.5–1)
EOSINOPHIL # BLD: 0.32 K/UL (ref 0.04–0.54)
EOSINOPHIL NFR BLD: 4.9 % (ref 0.7–7)
ERYTHROCYTE [DISTWIDTH] IN BLOOD BY AUTOMATED COUNT: 13.7 % (ref 11.7–14.4)
GLOBULIN: 2.6 G/DL
GLUCOSE SERPL-MCNC: 107 MG/DL (ref 74–106)
HCT VFR BLD AUTO: 40.3 % (ref 34.1–44.9)
HGB BLD-MCNC: 13.2 G/DL (ref 11.2–15.7)
INR PPP: 1.06 (ref 0.88–1.18)
LYMPHOCYTES # BLD: 0.81 K/UL (ref 1.18–3.74)
LYMPHOCYTES NFR BLD: 12.5 % (ref 19.3–53.1)
MCH RBC QN AUTO: 29.8 PG (ref 25.6–32.2)
MCHC RBC AUTO-ENTMCNC: 32.8 G/DL (ref 32.3–35.5)
MCV RBC AUTO: 91 FL (ref 79.4–94.8)
MONOCYTES # BLD: 0.57 K/UL (ref 0.24–0.82)
MONOCYTES NFR BLD: 8.8 % (ref 4.7–12.5)
NEUTROPHILS # BLD: 4.74 K/UL (ref 1.56–6.13)
NEUTS SEG NFR BLD: 73 % (ref 34–71.1)
PLATELET # BLD AUTO: 175 K/UL (ref 182–369)
PMV BLD AUTO: 9.9 FL (ref 7.4–10.4)
POTASSIUM SERPL-SCNC: 4.6 MMOL/L (ref 3.5–5.1)
PROT SERPL-MCNC: 7.4 G/DL (ref 6.3–8.2)
PROTHROMBIN TIME: 13.5 SEC (ref 12–14.6)
RBC # BLD AUTO: 4.43 M/UL (ref 3.93–5.22)
SODIUM SERPL-SCNC: 143 MMOL/L (ref 137–145)
WBC # BLD AUTO: 6.49 K/UL (ref 3.98–10.04)

## 2024-01-19 PROCEDURE — G8484 FLU IMMUNIZE NO ADMIN: HCPCS | Performed by: INTERNAL MEDICINE

## 2024-01-19 PROCEDURE — 1090F PRES/ABSN URINE INCON ASSESS: CPT | Performed by: INTERNAL MEDICINE

## 2024-01-19 PROCEDURE — 99212 OFFICE O/P EST SF 10 MIN: CPT

## 2024-01-19 PROCEDURE — 36415 COLL VENOUS BLD VENIPUNCTURE: CPT

## 2024-01-19 PROCEDURE — 1036F TOBACCO NON-USER: CPT | Performed by: INTERNAL MEDICINE

## 2024-01-19 PROCEDURE — G8420 CALC BMI NORM PARAMETERS: HCPCS | Performed by: INTERNAL MEDICINE

## 2024-01-19 PROCEDURE — 3074F SYST BP LT 130 MM HG: CPT | Performed by: INTERNAL MEDICINE

## 2024-01-19 PROCEDURE — 3078F DIAST BP <80 MM HG: CPT | Performed by: INTERNAL MEDICINE

## 2024-01-19 PROCEDURE — G8399 PT W/DXA RESULTS DOCUMENT: HCPCS | Performed by: INTERNAL MEDICINE

## 2024-01-19 PROCEDURE — 1123F ACP DISCUSS/DSCN MKR DOCD: CPT | Performed by: INTERNAL MEDICINE

## 2024-01-19 PROCEDURE — 99215 OFFICE O/P EST HI 40 MIN: CPT | Performed by: INTERNAL MEDICINE

## 2024-01-19 PROCEDURE — 3017F COLORECTAL CA SCREEN DOC REV: CPT | Performed by: INTERNAL MEDICINE

## 2024-01-19 PROCEDURE — G8427 DOCREV CUR MEDS BY ELIG CLIN: HCPCS | Performed by: INTERNAL MEDICINE

## 2024-01-19 PROCEDURE — 80053 COMPREHEN METABOLIC PANEL: CPT

## 2024-01-19 PROCEDURE — 85025 COMPLETE CBC W/AUTO DIFF WBC: CPT

## 2024-01-19 NOTE — PROGRESS NOTES
New Treatment plan built for patient at this time. Patient education performed, treatment consent signed. Electronically signed by Lorena Varma RN on 1/19/2024 at 4:47 PM

## 2024-01-25 ENCOUNTER — TELEPHONE (OUTPATIENT)
Dept: HEMATOLOGY | Age: 74
End: 2024-01-25

## 2024-01-25 ENCOUNTER — HOSPITAL ENCOUNTER (OUTPATIENT)
Dept: CT IMAGING | Age: 74
Discharge: HOME | End: 2024-01-25
Attending: INTERNAL MEDICINE
Payer: MEDICARE

## 2024-01-25 VITALS
RESPIRATION RATE: 16 BRPM | SYSTOLIC BLOOD PRESSURE: 110 MMHG | HEART RATE: 70 BPM | TEMPERATURE: 97 F | WEIGHT: 116 LBS | DIASTOLIC BLOOD PRESSURE: 62 MMHG | OXYGEN SATURATION: 97 % | BODY MASS INDEX: 22.65 KG/M2

## 2024-01-25 DIAGNOSIS — C78.7 NSCLC METASTATIC TO LIVER (HCC): ICD-10-CM

## 2024-01-25 DIAGNOSIS — C34.90 NSCLC METASTATIC TO LIVER (HCC): ICD-10-CM

## 2024-01-25 DIAGNOSIS — C78.7 NSCLC METASTATIC TO LIVER (HCC): Primary | ICD-10-CM

## 2024-01-25 DIAGNOSIS — C34.90 NSCLC METASTATIC TO LIVER (HCC): Primary | ICD-10-CM

## 2024-01-25 PROCEDURE — 2580000003 HC RX 258: Performed by: RADIOLOGY

## 2024-01-25 PROCEDURE — 88341 IMHCHEM/IMCYTCHM EA ADD ANTB: CPT

## 2024-01-25 PROCEDURE — 88333 PATH CONSLTJ SURG CYTO XM 1: CPT

## 2024-01-25 PROCEDURE — 6360000002 HC RX W HCPCS: Performed by: RADIOLOGY

## 2024-01-25 PROCEDURE — 77012 CT SCAN FOR NEEDLE BIOPSY: CPT

## 2024-01-25 PROCEDURE — 6370000000 HC RX 637 (ALT 250 FOR IP): Performed by: RADIOLOGY

## 2024-01-25 PROCEDURE — 88342 IMHCHEM/IMCYTCHM 1ST ANTB: CPT

## 2024-01-25 PROCEDURE — 88307 TISSUE EXAM BY PATHOLOGIST: CPT

## 2024-01-25 RX ORDER — SODIUM CHLORIDE, SODIUM LACTATE, POTASSIUM CHLORIDE, CALCIUM CHLORIDE 600; 310; 30; 20 MG/100ML; MG/100ML; MG/100ML; MG/100ML
INJECTION, SOLUTION INTRAVENOUS ONCE
Status: COMPLETED | OUTPATIENT
Start: 2024-01-25 | End: 2024-01-25

## 2024-01-25 RX ORDER — MIDAZOLAM HYDROCHLORIDE 2 MG/2ML
INJECTION, SOLUTION INTRAMUSCULAR; INTRAVENOUS PRN
Status: COMPLETED | OUTPATIENT
Start: 2024-01-25 | End: 2024-01-25

## 2024-01-25 RX ORDER — OXYCODONE HYDROCHLORIDE AND ACETAMINOPHEN 5; 325 MG/1; MG/1
1 TABLET ORAL ONCE
Status: COMPLETED | OUTPATIENT
Start: 2024-01-25 | End: 2024-01-25

## 2024-01-25 RX ORDER — FENTANYL CITRATE 50 UG/ML
INJECTION, SOLUTION INTRAMUSCULAR; INTRAVENOUS PRN
Status: COMPLETED | OUTPATIENT
Start: 2024-01-25 | End: 2024-01-25

## 2024-01-25 RX ORDER — SODIUM CHLORIDE 0.9 % (FLUSH) 0.9 %
5-40 SYRINGE (ML) INJECTION PRN
Status: DISCONTINUED | OUTPATIENT
Start: 2024-01-25 | End: 2024-01-26 | Stop reason: HOSPADM

## 2024-01-25 RX ADMIN — OXYCODONE AND ACETAMINOPHEN 1 TABLET: 5; 325 TABLET ORAL at 09:15

## 2024-01-25 RX ADMIN — FENTANYL CITRATE 50 MCG: 50 INJECTION, SOLUTION INTRAMUSCULAR; INTRAVENOUS at 08:46

## 2024-01-25 RX ADMIN — SODIUM CHLORIDE, SODIUM LACTATE, POTASSIUM CHLORIDE, AND CALCIUM CHLORIDE: 600; 310; 30; 20 INJECTION, SOLUTION INTRAVENOUS at 10:15

## 2024-01-25 RX ADMIN — MIDAZOLAM HYDROCHLORIDE 1 MG: 1 INJECTION, SOLUTION INTRAMUSCULAR; INTRAVENOUS at 08:47

## 2024-01-25 ASSESSMENT — PAIN DESCRIPTION - DESCRIPTORS
DESCRIPTORS: DISCOMFORT
DESCRIPTORS: DISCOMFORT

## 2024-01-25 ASSESSMENT — PAIN DESCRIPTION - LOCATION
LOCATION: FLANK
LOCATION: FLANK

## 2024-01-25 ASSESSMENT — PAIN SCALES - GENERAL
PAINLEVEL_OUTOF10: 5
PAINLEVEL_OUTOF10: 5

## 2024-01-25 ASSESSMENT — PAIN DESCRIPTION - ORIENTATION
ORIENTATION: RIGHT
ORIENTATION: RIGHT

## 2024-01-25 ASSESSMENT — PAIN - FUNCTIONAL ASSESSMENT: PAIN_FUNCTIONAL_ASSESSMENT: NONE - DENIES PAIN

## 2024-01-25 NOTE — TELEPHONE ENCOUNTER
I called and spoke with patient. I called to let her know that there are no current clinical trials at [place] at this time. Patient is interested in trying at [facility] or [facility] () if MD is aware of any clinical trials in one of those two places. I spoke with MD, a referral for Dr. Naun Rossi at [facility] for possible clinical trials. I called and discussed referral order with Nikkie. Patient agrees with referral. We will proceed with chemotherapy when we receive approval. Electronically signed by Lorena Varma RN on 1/25/2024 at 3:54 PM

## 2024-01-25 NOTE — OR NURSING
Mrs Banks tolerated the procedure well. Vitals remained stable in route back to [facility] room 2.  Percocet given for pain per radiologist order.  Patient positioned on right side with rolled towel under site for pressure.  Puncture site clean and dry with no bleeding or bruising or hematoma observed.  Patient on continuous monitor per bedside.  Patient family updated on patient condition and her spouse is at her bedside.  Pathology was in dept and observed samples and transported them to lab

## 2024-01-25 NOTE — OR NURSING
Patient in room talking with Dr Stock the radiologist explaining procedure and risks and process.  Patient agreed to proceed.

## 2024-01-25 NOTE — PROGRESS NOTES
Patient continued to recover well.  Puncture site clean and dry and patient has no complaints of pain.  Discharge instructions reviewed with the patient and her spouse and both verbalized understanding of instruction and all questions answered.  Vitals stable.  Iv removed and patient assisted with dressing.  She voided prior to discharge and had no complaints of pain when she walked.  She ws escorted to car with spouse assisted to vehicle without complications.

## 2024-01-25 NOTE — TELEPHONE ENCOUNTER
Attempted to call patient about clinical trials. Left patient a voicemail that there are no clinical trails available at this time in [place] and if the patient is interested in going somewhere else to give me a call. I will attempt to call the patient back again later this date. Electronically signed by Lorena Varma RN on 1/25/2024 at 8:12 AM

## 2024-01-25 NOTE — DISCHARGE INSTRUCTIONS
[facility]  PATIENT EDUCATION  Liver Biopsy Discharge Instructions  Care Needed at Home:  · Resume regular diet  · Rest in bed or on the couch for the next 24 hours  · No heavy lifting, bending, twisting or straining for 24 hours  · You may remove bandage in the morning if no bleeding.  · You may shower the next day.  · Be sure to wash your hands before touching the wound or dressing.  · You may need to limit your activity for 3 to 5 days depending on your normal daily routine.  · Avoid sports or activities that involve rough contact with your belly.  What Problems Could Happen?  · Pain  · Bleeding  · Hematoma (collection of blood under the skin)  · Infection  · Collapsed lung  When Should I Call the Doctor?  · Signs of infection: fever of 100.4 or higher, chills or non-healing wound.  · Signs of a wound infection: swelling, redness, warmth around the wound; too much pain when  touched; yellowish, greenish or bloody discharge; foul smell coming from the cut site; cute site  opens up.  · You are not feeling better in 2 to 3 days or you are feeling worse.  · Signs of a hematoma: if skin around site turns dark red or purple, abdomen gets firm to the  touch, or if develop severe pain.  · Signs of bleeding: blood on Band-Aid, if this occurs, roll up face towel and put at site, lie on that  side until bleeding stops or have someone put direct pressure on it until the bleeding stops. If  you cannot get the bleeding to stop then call the doctor.  _____________________________________________________________________________________  Signature of Patient or Responsible Person Date Time  _____________________________________________________________________________________  Signature of Instructing Nurse Date Time

## 2024-01-25 NOTE — PROGRESS NOTES
Date of the Proposed Procedure:   1/25/2024     Proposed Procedure:  Liver Biopsy    Radiologist:  Jonathan Stock MD    Indications:  Abnormal radiology scan    American Society of Anesthesiologists (ASA) Classification:  ASA 3 - Patient with moderate systemic disease with functional limitations    Plan of Sedation:  Moderate Sedation    Mallampati Classification: II (soft palate, uvula, fauces visible)    Prior to procedure:  I have reviewed the recent H&P from the referring physician, or other provider, related to ordered procedure. No interval changes were found upon examination/review since the original History and Physical / Consult Note.    I have performed a pre-procedure assessment of this patient on 1/25/2024, in the CT procedure room, in the presence of a  Registered Nurse and CT Tech. I discussed with the patient,  in detail,  the risks, benefits, and alternatives to this procedure.  Patient/Guardian is aware there are risks associated with moderate sedation which includes transient drop in blood pressure and need for assisted ventilation.EBL: less than 10 ml    Plan for discharge:  Discharge patient after post procedure ordered recovery time.  Post niyah score at pre-procedure baseline, score of at least 8 prior to discharge.      Jonathan Stock MD  1/25/20248:31 AM

## 2024-01-25 NOTE — PROGRESS NOTES
Mrs Banks is here today for a ct guided liver biopsy ordered by Dr Valdivia for new findings of liver nodules.  Procedure verified and patient escorted to room 2 in [facility]. She has been oriented to room and call light.  Procedure explained and discharge instructions reviewed and both patient and her spouse verbalized understanding of instruction.  IV started and labs were resulted from 1/19/2024.  H/P dated 1/19/2024 was reviewed by myself and radiologist and faxed to pathology on 1/22/2024.  Assessment completed.  Vitals stable and medications and allergies reviewed and verified.  Ecoh in ct was notified of patient status and gave instruction to come for procedure at this time.  Family waiting in [facility].

## 2024-01-25 NOTE — PROGRESS NOTES
Vitals have increased and Dr Stock came to unit to access patient.  No new orders.  Patient denies pain or discomfort.  Vitals now 104/42 heart rate 78.

## 2024-01-25 NOTE — PROGRESS NOTES
Patient resting and blood pressure has decreased, called radiologist and orders received for LR bolus.  Patient easily awakens when I go to bedside and denies pain.  She is warm and dry with puncture site clean and dry with no bleeding or bruising or hematoma present.  Spouse at bedside

## 2024-01-26 DIAGNOSIS — R11.0 CHEMOTHERAPY-INDUCED NAUSEA: Primary | ICD-10-CM

## 2024-01-26 DIAGNOSIS — C34.90 SMALL CELL LUNG CANCER (HCC): ICD-10-CM

## 2024-01-26 DIAGNOSIS — T45.1X5A CHEMOTHERAPY-INDUCED NAUSEA: Primary | ICD-10-CM

## 2024-01-26 RX ORDER — ONDANSETRON 4 MG/1
4 TABLET, FILM COATED ORAL EVERY 6 HOURS PRN
Qty: 120 TABLET | Refills: 5 | Status: SHIPPED | OUTPATIENT
Start: 2024-01-26

## 2024-01-26 NOTE — PROGRESS NOTES
Patients pathology results received. Patient has had transformation from NSCLC to Small Cell Lung Cancer. New treatment plan built at this time. Patient notified by  of new diagnosis and new treatment plan. New treatment plan built at this time. Please obtain treatment consent on Cycle #1. Electronically signed by Lorena Varma RN on 1/26/2024 at 5:29 PM    Prescription for Zofran 4 mg Q 6 hours PRN sent to [facility] at this time. Patient notified of prescription. Patient has an allergy to Promethazine.

## 2024-01-30 ENCOUNTER — TELEPHONE (OUTPATIENT)
Dept: INTERNAL MEDICINE | Age: 74
End: 2024-01-30

## 2024-01-30 DIAGNOSIS — G47.00 ACUTE INSOMNIA: Primary | ICD-10-CM

## 2024-01-30 RX ORDER — TEMAZEPAM 15 MG/1
15 CAPSULE ORAL NIGHTLY PRN
Qty: 30 CAPSULE | Refills: 1 | Status: SHIPPED | OUTPATIENT
Start: 2024-01-30 | End: 2024-03-30

## 2024-01-30 NOTE — TELEPHONE ENCOUNTER
Pt is having trouble settling down at night to go to sleep. Pt has to go to chemo and radiation and can not come in. Pt is wanting low dose medication that can help her settle down and go to sleep.

## 2024-02-08 ENCOUNTER — HOSPITAL ENCOUNTER (OUTPATIENT)
Dept: RADIATION ONCOLOGY | Facility: HOSPITAL | Age: 74
Setting detail: RADIATION/ONCOLOGY SERIES
End: 2024-02-08
Payer: MEDICARE

## 2024-02-08 PROBLEM — Z92.3 HISTORY OF RADIATION THERAPY: Status: ACTIVE | Noted: 2024-02-08

## 2024-02-09 ENCOUNTER — OFFICE VISIT (OUTPATIENT)
Dept: RADIATION ONCOLOGY | Facility: HOSPITAL | Age: 74
End: 2024-02-09
Payer: MEDICARE

## 2024-02-09 ENCOUNTER — CLINICAL DOCUMENTATION (OUTPATIENT)
Dept: HEMATOLOGY | Age: 74
End: 2024-02-09

## 2024-02-09 VITALS
DIASTOLIC BLOOD PRESSURE: 71 MMHG | WEIGHT: 113.7 LBS | SYSTOLIC BLOOD PRESSURE: 141 MMHG | HEIGHT: 60 IN | OXYGEN SATURATION: 96 % | HEART RATE: 113 BPM | BODY MASS INDEX: 22.32 KG/M2

## 2024-02-09 DIAGNOSIS — C34.92 NSCLC OF LEFT LUNG: Primary | ICD-10-CM

## 2024-02-09 DIAGNOSIS — Z87.891 FORMER SMOKER: ICD-10-CM

## 2024-02-09 DIAGNOSIS — Z92.3 HISTORY OF RADIATION THERAPY: ICD-10-CM

## 2024-02-09 PROCEDURE — G0463 HOSPITAL OUTPT CLINIC VISIT: HCPCS | Performed by: RADIOLOGY

## 2024-02-09 RX ORDER — LOSARTAN POTASSIUM 100 MG/1
100 TABLET ORAL DAILY
COMMUNITY
Start: 2023-08-15

## 2024-02-09 RX ORDER — FLUTICASONE PROPIONATE AND SALMETEROL 500; 50 UG/1; UG/1
1 POWDER RESPIRATORY (INHALATION) 2 TIMES DAILY
COMMUNITY

## 2024-02-09 RX ORDER — ERGOCALCIFEROL 1.25 MG/1
1 CAPSULE ORAL 2 TIMES WEEKLY
COMMUNITY
Start: 2023-09-11

## 2024-02-09 RX ORDER — CALCIUM CARBONATE/VITAMIN D3 600 MG-10
1 TABLET ORAL 2 TIMES DAILY
COMMUNITY

## 2024-02-09 RX ORDER — ALBUTEROL SULFATE 90 UG/1
2 AEROSOL, METERED RESPIRATORY (INHALATION) EVERY 6 HOURS PRN
COMMUNITY
Start: 2023-08-15

## 2024-02-09 RX ORDER — ONDANSETRON 4 MG/1
4 TABLET, FILM COATED ORAL EVERY 6 HOURS PRN
COMMUNITY
Start: 2024-01-26

## 2024-02-09 RX ORDER — THYROID 60 MG/1
60 TABLET ORAL DAILY
COMMUNITY
Start: 2023-10-18

## 2024-02-09 RX ORDER — TEMAZEPAM 15 MG/1
15 CAPSULE ORAL NIGHTLY PRN
COMMUNITY
Start: 2024-01-30

## 2024-02-09 RX ORDER — LOVASTATIN 40 MG/1
1 TABLET ORAL NIGHTLY
COMMUNITY
Start: 2023-08-15

## 2024-02-09 RX ORDER — OMEPRAZOLE 20 MG/1
20 CAPSULE, DELAYED RELEASE ORAL DAILY
COMMUNITY
Start: 2023-09-14

## 2024-02-09 RX ORDER — DOXYCYCLINE HYCLATE 100 MG
100 TABLET ORAL 2 TIMES DAILY PRN
COMMUNITY
Start: 2023-11-03

## 2024-02-09 NOTE — PROGRESS NOTES
Received 2/1/224 [employer] insurance DENIAL for [employer] testing. Signer emailed denial to Kelsey Fontaine/Evaristo morrissey who stated:     \"If the patient's insurance denies the claim, we will appeal the denial. If after all of our appeals have been exhausted, and the insurance upholds the denial, the claim will be written off and the patient will owe $0 OOP.  The patient is only responsible for any remaining balance after the claim has been approved by the insurance. This includes, any copays, coinsurance, deductibles or any out of network charges.\"

## 2024-02-19 NOTE — PROGRESS NOTES
No Known Problems Son       Current Outpatient Medications   Medication Sig Dispense Refill    temazepam (RESTORIL) 15 MG capsule Take 1 capsule by mouth nightly as needed for Sleep for up to 60 days. Max Daily Amount: 15 mg 30 capsule 1    ondansetron (ZOFRAN) 4 MG tablet Take 1 tablet by mouth every 6 hours as needed for Nausea or Vomiting 120 tablet 5    doxycycline hyclate (VIBRA-TABS) 100 MG tablet TAKE 1 TABLET TWICE DAILY FOR 10 DAYS (Patient not taking: Reported on 1/19/2024) 20 tablet 3    omeprazole (PRILOSEC) 20 MG delayed release capsule  (Patient not taking: Reported on 12/15/2023)      thyroid (ARMOUR) 60 MG tablet Take 1 tablet by mouth daily Compound from [facility] t-4-60/t3-12 90 tablet 1    vitamin D (ERGOCALCIFEROL) 1.25 MG (90692 UT) CAPS capsule TAKE 1 CAPSULE BY MOUTH TWICE A WEEK 24 capsule 1    omeprazole (PRILOSEC OTC) 20 MG tablet Take 1 tablet by mouth daily 90 tablet 1    lovastatin (MEVACOR) 40 MG tablet TAKE 1 TABLET EVERY NIGHT 90 tablet 3    losartan (COZAAR) 100 MG tablet TAKE 1 TABLET EVERY DAY 90 tablet 3    estradiol (ESTRACE) 0.5 MG tablet Take 1 tablet by mouth daily (Patient not taking: Reported on 1/19/2024) 90 tablet 3    albuterol sulfate HFA (PROVENTIL HFA) 108 (90 Base) MCG/ACT inhaler Inhale 2 puffs into the lungs every 6 hours as needed for Wheezing 18 g 3    ciclopirox (PENLAC) 8 % solution Apply topically nightly. (Patient not taking: Reported on 1/19/2024) 6 mL 1    Omega 3 1200 MG CAPS Take by mouth 2 times daily      fluticasone-salmeterol (ADVAIR) 500-50 MCG/DOSE diskus inhaler Inhale 1 puff into the lungs daily as needed      calcium carbonate-vitamin D 600-200 MG-UNIT TABS Take 1 tablet by mouth 2 times daily       No current facility-administered medications for this visit.     Facility-Administered Medications Ordered in Other Visits   Medication Dose Route Frequency Provider Last Rate Last Admin    0.9 % sodium chloride infusion  5-250 mL/hr

## 2024-02-21 ENCOUNTER — OFFICE VISIT (OUTPATIENT)
Dept: HEMATOLOGY | Age: 74
End: 2024-02-21

## 2024-02-21 ENCOUNTER — HOSPITAL ENCOUNTER (OUTPATIENT)
Dept: INFUSION THERAPY | Age: 74
Discharge: HOME | End: 2024-02-21
Payer: MEDICARE

## 2024-02-21 VITALS
HEART RATE: 74 BPM | HEIGHT: 60 IN | SYSTOLIC BLOOD PRESSURE: 148 MMHG | DIASTOLIC BLOOD PRESSURE: 60 MMHG | WEIGHT: 114.3 LBS | OXYGEN SATURATION: 96 % | TEMPERATURE: 98.8 F | RESPIRATION RATE: 16 BRPM | BODY MASS INDEX: 22.44 KG/M2

## 2024-02-21 DIAGNOSIS — C34.92 NSCLC OF LEFT LUNG (HCC): Primary | ICD-10-CM

## 2024-02-21 DIAGNOSIS — R74.01 ELEVATED AST (SGOT): ICD-10-CM

## 2024-02-21 DIAGNOSIS — C78.7 NON-SMALL CELL LUNG CANCER METASTATIC TO LIVER (HCC): ICD-10-CM

## 2024-02-21 DIAGNOSIS — C34.90 SMALL CELL LUNG CANCER (HCC): ICD-10-CM

## 2024-02-21 DIAGNOSIS — Z51.11 CHEMOTHERAPY MANAGEMENT, ENCOUNTER FOR: Primary | ICD-10-CM

## 2024-02-21 DIAGNOSIS — C34.90 SMALL CELL LUNG CANCER (HCC): Primary | ICD-10-CM

## 2024-02-21 DIAGNOSIS — R53.83 FATIGUE, UNSPECIFIED TYPE: ICD-10-CM

## 2024-02-21 DIAGNOSIS — E83.52 HYPERCALCEMIA: ICD-10-CM

## 2024-02-21 DIAGNOSIS — C34.90 EGFR-RELATED LUNG CANCER (HCC): ICD-10-CM

## 2024-02-21 DIAGNOSIS — C34.90 NON-SMALL CELL LUNG CANCER METASTATIC TO LIVER (HCC): ICD-10-CM

## 2024-02-21 LAB
ALBUMIN SERPL-MCNC: 4.6 G/DL (ref 3.5–5.2)
ALP SERPL-CCNC: 464 U/L (ref 35–104)
ALT SERPL-CCNC: 42 U/L (ref 9–52)
ANION GAP SERPL CALCULATED.3IONS-SCNC: 10 MMOL/L (ref 7–19)
AST SERPL-CCNC: 122 U/L (ref 14–36)
BASOPHILS # BLD: 0.04 K/UL (ref 0.01–0.08)
BASOPHILS NFR BLD: 0.4 % (ref 0.1–1.2)
BILIRUB SERPL-MCNC: 0.5 MG/DL (ref 0.2–1.3)
BUN SERPL-MCNC: 22 MG/DL (ref 7–17)
CALCIUM SERPL-MCNC: 11 MG/DL (ref 8.4–10.2)
CHLORIDE SERPL-SCNC: 102 MMOL/L (ref 98–111)
CO2 SERPL-SCNC: 27 MMOL/L (ref 22–29)
CORTIS SERPL-MCNC: 19.7 UG/DL
CREAT SERPL-MCNC: 0.7 MG/DL (ref 0.5–1)
EOSINOPHIL # BLD: 0.45 K/UL (ref 0.04–0.54)
EOSINOPHIL NFR BLD: 4.6 % (ref 0.7–7)
ERYTHROCYTE [DISTWIDTH] IN BLOOD BY AUTOMATED COUNT: 13.5 % (ref 11.7–14.4)
GLOBULIN: 2.8 G/DL
GLUCOSE SERPL-MCNC: 134 MG/DL (ref 74–106)
HCT VFR BLD AUTO: 40.9 % (ref 34.1–44.9)
HGB BLD-MCNC: 13.2 G/DL (ref 11.2–15.7)
LYMPHOCYTES # BLD: 0.75 K/UL (ref 1.18–3.74)
LYMPHOCYTES NFR BLD: 7.7 % (ref 19.3–53.1)
MCH RBC QN AUTO: 29.9 PG (ref 25.6–32.2)
MCHC RBC AUTO-ENTMCNC: 32.3 G/DL (ref 32.3–35.5)
MCV RBC AUTO: 92.7 FL (ref 79.4–94.8)
MONOCYTES # BLD: 0.79 K/UL (ref 0.24–0.82)
MONOCYTES NFR BLD: 8.2 % (ref 4.7–12.5)
NEUTROPHILS # BLD: 7.62 K/UL (ref 1.56–6.13)
NEUTS SEG NFR BLD: 78.8 % (ref 34–71.1)
PLATELET # BLD AUTO: 326 K/UL (ref 182–369)
PMV BLD AUTO: 9.4 FL (ref 7.4–10.4)
POTASSIUM SERPL-SCNC: 4.2 MMOL/L (ref 3.5–5.1)
PROT SERPL-MCNC: 7.4 G/DL (ref 6.3–8.2)
RBC # BLD AUTO: 4.41 M/UL (ref 3.93–5.22)
SODIUM SERPL-SCNC: 139 MMOL/L (ref 137–145)
T4 FREE SERPL-MCNC: 0.96 NG/DL (ref 0.93–1.7)
TSH SERPL DL<=0.005 MIU/L-ACNC: 3.14 UIU/ML (ref 0.27–4.2)
WBC # BLD AUTO: 9.68 K/UL (ref 3.98–10.04)

## 2024-02-21 PROCEDURE — 2580000003 HC RX 258: Performed by: INTERNAL MEDICINE

## 2024-02-21 PROCEDURE — 36415 COLL VENOUS BLD VENIPUNCTURE: CPT

## 2024-02-21 PROCEDURE — 96417 CHEMO IV INFUS EACH ADDL SEQ: CPT

## 2024-02-21 PROCEDURE — 96367 TX/PROPH/DG ADDL SEQ IV INF: CPT

## 2024-02-21 PROCEDURE — 96375 TX/PRO/DX INJ NEW DRUG ADDON: CPT

## 2024-02-21 PROCEDURE — 80053 COMPREHEN METABOLIC PANEL: CPT

## 2024-02-21 PROCEDURE — 96413 CHEMO IV INFUSION 1 HR: CPT

## 2024-02-21 PROCEDURE — 6360000002 HC RX W HCPCS: Performed by: INTERNAL MEDICINE

## 2024-02-21 PROCEDURE — 85025 COMPLETE CBC W/AUTO DIFF WBC: CPT

## 2024-02-21 RX ORDER — SODIUM CHLORIDE 9 MG/ML
5-250 INJECTION, SOLUTION INTRAVENOUS PRN
Status: CANCELLED | OUTPATIENT
Start: 2024-02-22

## 2024-02-21 RX ORDER — DIPHENHYDRAMINE HYDROCHLORIDE 50 MG/ML
50 INJECTION INTRAMUSCULAR; INTRAVENOUS
Status: CANCELLED | OUTPATIENT
Start: 2024-02-23

## 2024-02-21 RX ORDER — FAMOTIDINE 10 MG/ML
20 INJECTION, SOLUTION INTRAVENOUS
Status: CANCELLED | OUTPATIENT
Start: 2024-02-22

## 2024-02-21 RX ORDER — SODIUM CHLORIDE 9 MG/ML
5-250 INJECTION, SOLUTION INTRAVENOUS PRN
Status: CANCELLED | OUTPATIENT
Start: 2024-02-21

## 2024-02-21 RX ORDER — ACETAMINOPHEN 325 MG/1
650 TABLET ORAL
Status: CANCELLED | OUTPATIENT
Start: 2024-02-23

## 2024-02-21 RX ORDER — EPINEPHRINE 1 MG/ML
0.3 INJECTION, SOLUTION, CONCENTRATE INTRAVENOUS PRN
Status: CANCELLED | OUTPATIENT
Start: 2024-02-23

## 2024-02-21 RX ORDER — SODIUM CHLORIDE 9 MG/ML
5-250 INJECTION, SOLUTION INTRAVENOUS PRN
Status: CANCELLED | OUTPATIENT
Start: 2024-02-23

## 2024-02-21 RX ORDER — SODIUM CHLORIDE 9 MG/ML
5-250 INJECTION, SOLUTION INTRAVENOUS PRN
Status: DISCONTINUED | OUTPATIENT
Start: 2024-02-21 | End: 2024-02-22 | Stop reason: HOSPADM

## 2024-02-21 RX ORDER — MEPERIDINE HYDROCHLORIDE 50 MG/ML
12.5 INJECTION INTRAMUSCULAR; INTRAVENOUS; SUBCUTANEOUS PRN
Status: CANCELLED | OUTPATIENT
Start: 2024-02-21

## 2024-02-21 RX ORDER — DIPHENHYDRAMINE HYDROCHLORIDE 50 MG/ML
50 INJECTION INTRAMUSCULAR; INTRAVENOUS
Status: CANCELLED | OUTPATIENT
Start: 2024-02-22

## 2024-02-21 RX ORDER — SODIUM CHLORIDE 0.9 % (FLUSH) 0.9 %
5-40 SYRINGE (ML) INJECTION PRN
Status: CANCELLED | OUTPATIENT
Start: 2024-02-21

## 2024-02-21 RX ORDER — EPINEPHRINE 1 MG/ML
0.3 INJECTION, SOLUTION, CONCENTRATE INTRAVENOUS PRN
Status: CANCELLED | OUTPATIENT
Start: 2024-02-22

## 2024-02-21 RX ORDER — PALONOSETRON 0.05 MG/ML
0.25 INJECTION, SOLUTION INTRAVENOUS ONCE
Status: COMPLETED | OUTPATIENT
Start: 2024-02-21 | End: 2024-02-21

## 2024-02-21 RX ORDER — HEPARIN SODIUM (PORCINE) LOCK FLUSH IV SOLN 100 UNIT/ML 100 UNIT/ML
500 SOLUTION INTRAVENOUS PRN
Status: CANCELLED | OUTPATIENT
Start: 2024-02-22

## 2024-02-21 RX ORDER — DEXAMETHASONE SODIUM PHOSPHATE 10 MG/ML
10 INJECTION, SOLUTION INTRAMUSCULAR; INTRAVENOUS ONCE
Status: COMPLETED | OUTPATIENT
Start: 2024-02-21 | End: 2024-02-21

## 2024-02-21 RX ORDER — HEPARIN SODIUM (PORCINE) LOCK FLUSH IV SOLN 100 UNIT/ML 100 UNIT/ML
500 SOLUTION INTRAVENOUS PRN
Status: CANCELLED | OUTPATIENT
Start: 2024-02-23

## 2024-02-21 RX ORDER — ONDANSETRON 2 MG/ML
8 INJECTION INTRAMUSCULAR; INTRAVENOUS
Status: CANCELLED | OUTPATIENT
Start: 2024-02-21

## 2024-02-21 RX ORDER — ALBUTEROL SULFATE 90 UG/1
4 AEROSOL, METERED RESPIRATORY (INHALATION) PRN
Status: CANCELLED | OUTPATIENT
Start: 2024-02-21

## 2024-02-21 RX ORDER — ALBUTEROL SULFATE 90 UG/1
4 AEROSOL, METERED RESPIRATORY (INHALATION) PRN
Status: CANCELLED | OUTPATIENT
Start: 2024-02-22

## 2024-02-21 RX ORDER — SODIUM CHLORIDE 9 MG/ML
INJECTION, SOLUTION INTRAVENOUS CONTINUOUS
Status: CANCELLED | OUTPATIENT
Start: 2024-02-23

## 2024-02-21 RX ORDER — HEPARIN SODIUM (PORCINE) LOCK FLUSH IV SOLN 100 UNIT/ML 100 UNIT/ML
500 SOLUTION INTRAVENOUS PRN
Status: CANCELLED | OUTPATIENT
Start: 2024-02-21

## 2024-02-21 RX ORDER — ONDANSETRON 2 MG/ML
8 INJECTION INTRAMUSCULAR; INTRAVENOUS
Status: CANCELLED | OUTPATIENT
Start: 2024-02-23

## 2024-02-21 RX ORDER — MEPERIDINE HYDROCHLORIDE 50 MG/ML
12.5 INJECTION INTRAMUSCULAR; INTRAVENOUS; SUBCUTANEOUS PRN
Status: CANCELLED | OUTPATIENT
Start: 2024-02-23

## 2024-02-21 RX ORDER — ACETAMINOPHEN 325 MG/1
650 TABLET ORAL
Status: CANCELLED | OUTPATIENT
Start: 2024-02-21

## 2024-02-21 RX ORDER — EPINEPHRINE 1 MG/ML
0.3 INJECTION, SOLUTION, CONCENTRATE INTRAVENOUS PRN
Status: CANCELLED | OUTPATIENT
Start: 2024-02-21

## 2024-02-21 RX ORDER — ALBUTEROL SULFATE 90 UG/1
4 AEROSOL, METERED RESPIRATORY (INHALATION) PRN
Status: CANCELLED | OUTPATIENT
Start: 2024-02-23

## 2024-02-21 RX ORDER — FAMOTIDINE 10 MG/ML
20 INJECTION, SOLUTION INTRAVENOUS
Status: CANCELLED | OUTPATIENT
Start: 2024-02-21

## 2024-02-21 RX ORDER — DIPHENHYDRAMINE HYDROCHLORIDE 50 MG/ML
50 INJECTION INTRAMUSCULAR; INTRAVENOUS
Status: CANCELLED | OUTPATIENT
Start: 2024-02-21

## 2024-02-21 RX ORDER — SODIUM CHLORIDE 9 MG/ML
INJECTION, SOLUTION INTRAVENOUS CONTINUOUS
Status: CANCELLED | OUTPATIENT
Start: 2024-02-21

## 2024-02-21 RX ORDER — SODIUM CHLORIDE 9 MG/ML
INJECTION, SOLUTION INTRAVENOUS CONTINUOUS
Status: CANCELLED | OUTPATIENT
Start: 2024-02-22

## 2024-02-21 RX ORDER — ONDANSETRON 2 MG/ML
8 INJECTION INTRAMUSCULAR; INTRAVENOUS
Status: CANCELLED | OUTPATIENT
Start: 2024-02-22

## 2024-02-21 RX ORDER — MEPERIDINE HYDROCHLORIDE 50 MG/ML
12.5 INJECTION INTRAMUSCULAR; INTRAVENOUS; SUBCUTANEOUS PRN
Status: CANCELLED | OUTPATIENT
Start: 2024-02-22

## 2024-02-21 RX ORDER — SODIUM CHLORIDE 0.9 % (FLUSH) 0.9 %
5-40 SYRINGE (ML) INJECTION PRN
Status: CANCELLED | OUTPATIENT
Start: 2024-02-23

## 2024-02-21 RX ORDER — SODIUM CHLORIDE 0.9 % (FLUSH) 0.9 %
5-40 SYRINGE (ML) INJECTION PRN
Status: CANCELLED | OUTPATIENT
Start: 2024-02-22

## 2024-02-21 RX ORDER — FAMOTIDINE 10 MG/ML
20 INJECTION, SOLUTION INTRAVENOUS
Status: CANCELLED | OUTPATIENT
Start: 2024-02-23

## 2024-02-21 RX ORDER — ACETAMINOPHEN 325 MG/1
650 TABLET ORAL
Status: CANCELLED | OUTPATIENT
Start: 2024-02-22

## 2024-02-21 RX ADMIN — PALONOSETRON 0.25 MG: 0.05 INJECTION, SOLUTION INTRAVENOUS at 11:55

## 2024-02-21 RX ADMIN — SODIUM CHLORIDE 25 ML/HR: 9 INJECTION, SOLUTION INTRAVENOUS at 11:53

## 2024-02-21 RX ADMIN — SODIUM CHLORIDE 150 MG: 900 INJECTION, SOLUTION INTRAVENOUS at 11:55

## 2024-02-21 RX ADMIN — ATEZOLIZUMAB 1200 MG: 1200 INJECTION, SOLUTION INTRAVENOUS at 12:20

## 2024-02-21 RX ADMIN — CARBOPLATIN 420 MG: 450 INJECTION, SOLUTION INTRAVENOUS at 13:22

## 2024-02-21 RX ADMIN — ETOPOSIDE 150 MG: 20 INJECTION INTRAVENOUS at 13:55

## 2024-02-21 RX ADMIN — DEXAMETHASONE SODIUM PHOSPHATE 10 MG: 10 INJECTION, SOLUTION INTRAMUSCULAR; INTRAVENOUS at 11:55

## 2024-02-21 NOTE — PROGRESS NOTES
Lab Results   Component Value Date    WBC 9.68 02/21/2024    HGB 13.2 02/21/2024    HCT 40.9 02/21/2024    MCV 92.7 02/21/2024     02/21/2024     Lab Results   Component Value Date    NEUTROABS 7.62 (H) 02/21/2024     Lab Results   Component Value Date     02/21/2024    K 4.2 02/21/2024     02/21/2024    CO2 27 02/21/2024    BUN 22 (H) 02/21/2024    CREATININE 0.7 02/21/2024    GLUCOSE 134 (H) 02/21/2024    CALCIUM 11.0 (H) 02/21/2024    PROT 7.4 02/21/2024    LABALBU 4.6 02/21/2024    BILITOT 0.5 02/21/2024    ALKPHOS 464 (H) 02/21/2024     (H) 02/21/2024    ALT 42 02/21/2024    LABGLOM >60 02/21/2024    GFRAA >59 06/28/2022    GLOB 2.8 02/21/2024

## 2024-02-22 ENCOUNTER — HOSPITAL ENCOUNTER (OUTPATIENT)
Dept: INFUSION THERAPY | Age: 74
Discharge: HOME | End: 2024-02-22
Payer: MEDICARE

## 2024-02-22 ENCOUNTER — TELEPHONE (OUTPATIENT)
Dept: HEMATOLOGY | Age: 74
End: 2024-02-22

## 2024-02-22 ENCOUNTER — OFFICE VISIT (OUTPATIENT)
Dept: HEMATOLOGY | Age: 74
End: 2024-02-22

## 2024-02-22 VITALS
OXYGEN SATURATION: 99 % | HEART RATE: 93 BPM | SYSTOLIC BLOOD PRESSURE: 145 MMHG | TEMPERATURE: 97.7 F | BODY MASS INDEX: 23.01 KG/M2 | RESPIRATION RATE: 16 BRPM | HEIGHT: 60 IN | DIASTOLIC BLOOD PRESSURE: 77 MMHG | WEIGHT: 117.2 LBS

## 2024-02-22 VITALS — BODY MASS INDEX: 22.89 KG/M2 | HEIGHT: 60 IN

## 2024-02-22 DIAGNOSIS — C34.90 SMALL CELL LUNG CANCER (HCC): Primary | ICD-10-CM

## 2024-02-22 DIAGNOSIS — C34.90 SMALL CELL LUNG CANCER (HCC): ICD-10-CM

## 2024-02-22 DIAGNOSIS — E83.52 HYPERCALCEMIA: Primary | ICD-10-CM

## 2024-02-22 DIAGNOSIS — C34.90 NON-SMALL CELL LUNG CANCER METASTATIC TO LIVER (HCC): Primary | ICD-10-CM

## 2024-02-22 DIAGNOSIS — C34.92 NSCLC OF LEFT LUNG (HCC): Primary | ICD-10-CM

## 2024-02-22 DIAGNOSIS — C78.7 NON-SMALL CELL LUNG CANCER METASTATIC TO LIVER (HCC): Primary | ICD-10-CM

## 2024-02-22 DIAGNOSIS — R79.89 ELEVATED LFTS: ICD-10-CM

## 2024-02-22 PROCEDURE — 2580000003 HC RX 258: Performed by: INTERNAL MEDICINE

## 2024-02-22 PROCEDURE — 6360000002 HC RX W HCPCS: Performed by: INTERNAL MEDICINE

## 2024-02-22 PROCEDURE — 96367 TX/PROPH/DG ADDL SEQ IV INF: CPT

## 2024-02-22 PROCEDURE — 96413 CHEMO IV INFUSION 1 HR: CPT

## 2024-02-22 RX ADMIN — ETOPOSIDE 150 MG: 20 INJECTION INTRAVENOUS at 11:40

## 2024-02-22 RX ADMIN — DEXAMETHASONE SODIUM PHOSPHATE 16 MG: 10 INJECTION, SOLUTION INTRAMUSCULAR; INTRAVENOUS at 11:12

## 2024-02-22 NOTE — TELEPHONE ENCOUNTER
I have attempted to call patient regarding results of CMP. Per 's request he would like repeat lab to be completed next week. I have explained to patient that lab order has been put in our system and she could proceed to the 4th floor to have it completed. I have requested for patient to return phone call if she has any questions.

## 2024-02-22 NOTE — PROGRESS NOTES
encouraged. However, emphasized that this level may be adjusted. Pt provided with lists of calcium content of foods. We discussed her current intake and methods to decrease dietary calcium intake.   Continue not to take calcium or vitamin D supplement for now and discuss with physician.     Nutrition Monitoring and Evaluation:   Behavioral-Environmental Outcomes: None Identified  Food/Nutrient Intake Outcomes: Food and Nutrient Intake, Vitamin/Mineral Intake  Physical Signs/Symptoms Outcomes: Biochemical Data    RD will follow up PRN to assess nutrition progression. Contact information given for pt to reach out if needed.   Rossi García, MS, RD, LD, [facility]  Contact: [phone]

## 2024-02-23 ENCOUNTER — HOSPITAL ENCOUNTER (OUTPATIENT)
Dept: INFUSION THERAPY | Age: 74
Discharge: HOME | End: 2024-02-23
Payer: MEDICARE

## 2024-02-23 VITALS
DIASTOLIC BLOOD PRESSURE: 68 MMHG | WEIGHT: 116.9 LBS | OXYGEN SATURATION: 99 % | TEMPERATURE: 98 F | HEART RATE: 99 BPM | BODY MASS INDEX: 22.95 KG/M2 | SYSTOLIC BLOOD PRESSURE: 144 MMHG | RESPIRATION RATE: 16 BRPM | HEIGHT: 60 IN

## 2024-02-23 DIAGNOSIS — C34.90 SMALL CELL LUNG CANCER (HCC): Primary | ICD-10-CM

## 2024-02-23 PROCEDURE — 6360000002 HC RX W HCPCS: Performed by: INTERNAL MEDICINE

## 2024-02-23 PROCEDURE — 96367 TX/PROPH/DG ADDL SEQ IV INF: CPT

## 2024-02-23 PROCEDURE — 2580000003 HC RX 258: Performed by: INTERNAL MEDICINE

## 2024-02-23 PROCEDURE — 96413 CHEMO IV INFUSION 1 HR: CPT

## 2024-02-23 RX ORDER — SODIUM CHLORIDE 9 MG/ML
5-250 INJECTION, SOLUTION INTRAVENOUS PRN
Status: DISCONTINUED | OUTPATIENT
Start: 2024-02-23 | End: 2024-02-24 | Stop reason: HOSPADM

## 2024-02-23 RX ADMIN — ETOPOSIDE 150 MG: 20 INJECTION INTRAVENOUS at 12:08

## 2024-02-23 RX ADMIN — DEXAMETHASONE SODIUM PHOSPHATE 16 MG: 10 INJECTION, SOLUTION INTRAMUSCULAR; INTRAVENOUS at 11:38

## 2024-02-26 DIAGNOSIS — C34.90 NSCLC METASTATIC TO LIVER (HCC): Primary | ICD-10-CM

## 2024-02-26 DIAGNOSIS — C78.7 NSCLC METASTATIC TO LIVER (HCC): Primary | ICD-10-CM

## 2024-03-04 ENCOUNTER — HOSPITAL ENCOUNTER (INPATIENT)
Age: 74
LOS: 6 days | Discharge: HOME | DRG: 871 | End: 2024-03-10
Attending: EMERGENCY MEDICINE | Admitting: HOSPITALIST
Payer: MEDICARE

## 2024-03-04 ENCOUNTER — TELEPHONE (OUTPATIENT)
Dept: INFUSION THERAPY | Age: 74
End: 2024-03-04

## 2024-03-04 ENCOUNTER — APPOINTMENT (OUTPATIENT)
Dept: GENERAL RADIOLOGY | Age: 74
DRG: 871 | End: 2024-03-04
Payer: MEDICARE

## 2024-03-04 DIAGNOSIS — C34.90 STAGE 4 MALIGNANT NEOPLASM OF LUNG, UNSPECIFIED LATERALITY (HCC): ICD-10-CM

## 2024-03-04 DIAGNOSIS — I48.91 ATRIAL FIBRILLATION WITH RVR (HCC): ICD-10-CM

## 2024-03-04 DIAGNOSIS — G89.3 CANCER RELATED PAIN: ICD-10-CM

## 2024-03-04 DIAGNOSIS — D70.1 CHEMOTHERAPY-INDUCED NEUTROPENIA (HCC): ICD-10-CM

## 2024-03-04 DIAGNOSIS — N17.9 AKI (ACUTE KIDNEY INJURY) (HCC): Primary | ICD-10-CM

## 2024-03-04 DIAGNOSIS — R11.2 NAUSEA VOMITING AND DIARRHEA: ICD-10-CM

## 2024-03-04 DIAGNOSIS — C34.92 NSCLC OF LEFT LUNG (HCC): ICD-10-CM

## 2024-03-04 DIAGNOSIS — R19.7 NAUSEA VOMITING AND DIARRHEA: ICD-10-CM

## 2024-03-04 DIAGNOSIS — D61.818 PANCYTOPENIA (HCC): ICD-10-CM

## 2024-03-04 DIAGNOSIS — I95.89 HYPOTENSION DUE TO HYPOVOLEMIA: ICD-10-CM

## 2024-03-04 DIAGNOSIS — E86.1 HYPOTENSION DUE TO HYPOVOLEMIA: ICD-10-CM

## 2024-03-04 DIAGNOSIS — T45.1X5A CHEMOTHERAPY-INDUCED NEUTROPENIA (HCC): ICD-10-CM

## 2024-03-04 DIAGNOSIS — C34.90 SMALL CELL LUNG CANCER (HCC): ICD-10-CM

## 2024-03-04 PROBLEM — E87.20 LACTIC ACIDOSIS: Status: ACTIVE | Noted: 2024-03-04

## 2024-03-04 PROBLEM — R65.21 SEPTIC SHOCK (HCC): Status: ACTIVE | Noted: 2024-03-04

## 2024-03-04 PROBLEM — E87.1 HYPONATREMIA: Status: ACTIVE | Noted: 2024-03-04

## 2024-03-04 PROBLEM — E16.2 HYPOGLYCEMIA: Status: ACTIVE | Noted: 2024-03-04

## 2024-03-04 PROBLEM — A41.9 SEPTIC SHOCK (HCC): Status: ACTIVE | Noted: 2024-03-04

## 2024-03-04 LAB
ALBUMIN SERPL-MCNC: 3.3 G/DL (ref 3.5–5.2)
ALP SERPL-CCNC: 139 U/L (ref 35–104)
ALT SERPL-CCNC: 21 U/L (ref 5–33)
ANION GAP SERPL CALCULATED.3IONS-SCNC: 17 MMOL/L (ref 7–19)
AST SERPL-CCNC: 21 U/L (ref 5–32)
B PARAP IS1001 DNA NPH QL NAA+NON-PROBE: NOT DETECTED
B PERT.PT PRMT NPH QL NAA+NON-PROBE: NOT DETECTED
BACTERIA #/AREA URNS HPF: ABNORMAL /HPF
BILIRUB SERPL-MCNC: 0.5 MG/DL (ref 0.2–1.2)
BILIRUB UR QL STRIP: NEGATIVE
BUN SERPL-MCNC: 48 MG/DL (ref 8–23)
BURR CELLS BLD QL SMEAR: ABNORMAL
C PNEUM DNA NPH QL NAA+NON-PROBE: NOT DETECTED
CALCIUM SERPL-MCNC: 8.6 MG/DL (ref 8.8–10.2)
CHLORIDE SERPL-SCNC: 93 MMOL/L (ref 98–111)
CLARITY UR: ABNORMAL
CO2 SERPL-SCNC: 17 MMOL/L (ref 22–29)
COLOR UR: ABNORMAL
CREAT SERPL-MCNC: 2.5 MG/DL (ref 0.5–0.9)
DACRYOCYTES BLD QL SMEAR: ABNORMAL
ERYTHROCYTE [DISTWIDTH] IN BLOOD BY AUTOMATED COUNT: 13.4 % (ref 11.5–14.5)
FLUAV RNA NPH QL NAA+NON-PROBE: NOT DETECTED
FLUBV RNA NPH QL NAA+NON-PROBE: NOT DETECTED
GLUCOSE BLD-MCNC: 31 MG/DL (ref 70–99)
GLUCOSE BLD-MCNC: 44 MG/DL (ref 70–99)
GLUCOSE BLD-MCNC: 51 MG/DL (ref 70–99)
GLUCOSE BLD-MCNC: 77 MG/DL (ref 70–99)
GLUCOSE BLD-MCNC: 86 MG/DL (ref 70–99)
GLUCOSE BLD-MCNC: 87 MG/DL (ref 70–99)
GLUCOSE SERPL-MCNC: 55 MG/DL (ref 74–109)
GLUCOSE UR STRIP.AUTO-MCNC: NEGATIVE MG/DL
HADV DNA NPH QL NAA+NON-PROBE: NOT DETECTED
HCOV 229E RNA NPH QL NAA+NON-PROBE: NOT DETECTED
HCOV HKU1 RNA NPH QL NAA+NON-PROBE: NOT DETECTED
HCOV NL63 RNA NPH QL NAA+NON-PROBE: NOT DETECTED
HCOV OC43 RNA NPH QL NAA+NON-PROBE: NOT DETECTED
HCT VFR BLD AUTO: 34.5 % (ref 37–47)
HGB BLD-MCNC: 11.6 G/DL (ref 12–16)
HGB UR STRIP.AUTO-MCNC: NEGATIVE MG/L
HMPV RNA NPH QL NAA+NON-PROBE: NOT DETECTED
HPIV1 RNA NPH QL NAA+NON-PROBE: NOT DETECTED
HPIV2 RNA NPH QL NAA+NON-PROBE: NOT DETECTED
HPIV3 RNA NPH QL NAA+NON-PROBE: NOT DETECTED
HPIV4 RNA NPH QL NAA+NON-PROBE: NOT DETECTED
IMM GRANULOCYTES # BLD: 0 K/UL
KETONES UR STRIP.AUTO-MCNC: ABNORMAL MG/DL
LACTATE BLDV-SCNC: 2.3 MG/DL (ref 0.5–1.9)
LACTATE BLDV-SCNC: 2.6 MG/DL (ref 0.5–1.9)
LEUKOCYTE ESTERASE UR QL STRIP.AUTO: ABNORMAL
LIPASE SERPL-CCNC: 9 U/L (ref 13–60)
M PNEUMO DNA NPH QL NAA+NON-PROBE: NOT DETECTED
MCH RBC QN AUTO: 29.7 PG (ref 27–31)
MCHC RBC AUTO-ENTMCNC: 33.6 G/DL (ref 33–37)
MCV RBC AUTO: 88.2 FL (ref 81–99)
NITRITE UR QL STRIP.AUTO: NEGATIVE
OVALOCYTES BLD QL SMEAR: ABNORMAL
PERFORMED ON: ABNORMAL
PERFORMED ON: NORMAL
PH UR STRIP.AUTO: 5 [PH] (ref 5–8)
PLATELET # BLD AUTO: 24 K/UL (ref 130–400)
PLATELET SLIDE REVIEW: ABNORMAL
POTASSIUM SERPL-SCNC: 3.8 MMOL/L (ref 3.5–5)
PROT SERPL-MCNC: 6.4 G/DL (ref 6.6–8.7)
PROT UR STRIP.AUTO-MCNC: 30 MG/DL
RBC # BLD AUTO: 3.91 M/UL (ref 4.2–5.4)
RBC #/AREA URNS HPF: ABNORMAL /HPF (ref 0–2)
RSV RNA NPH QL NAA+NON-PROBE: NOT DETECTED
RV+EV RNA NPH QL NAA+NON-PROBE: NOT DETECTED
SARS-COV-2 RNA NPH QL NAA+NON-PROBE: NOT DETECTED
SODIUM SERPL-SCNC: 127 MMOL/L (ref 136–145)
SP GR UR STRIP.AUTO: 1.02 (ref 1–1.03)
SQUAMOUS #/AREA URNS HPF: ABNORMAL /HPF
UROBILINOGEN UR STRIP.AUTO-MCNC: 1 E.U./DL
WBC # BLD AUTO: 0.3 K/UL (ref 4.8–10.8)
WBC #/AREA URNS HPF: ABNORMAL /HPF (ref 0–5)

## 2024-03-04 PROCEDURE — 0202U NFCT DS 22 TRGT SARS-COV-2: CPT

## 2024-03-04 PROCEDURE — 87086 URINE CULTURE/COLONY COUNT: CPT

## 2024-03-04 PROCEDURE — 2580000003 HC RX 258

## 2024-03-04 PROCEDURE — 80053 COMPREHEN METABOLIC PANEL: CPT

## 2024-03-04 PROCEDURE — 2580000003 HC RX 258: Performed by: HOSPITALIST

## 2024-03-04 PROCEDURE — 1200000000 HC SEMI PRIVATE

## 2024-03-04 PROCEDURE — 83690 ASSAY OF LIPASE: CPT

## 2024-03-04 PROCEDURE — 96375 TX/PRO/DX INJ NEW DRUG ADDON: CPT

## 2024-03-04 PROCEDURE — 2580000003 HC RX 258: Performed by: EMERGENCY MEDICINE

## 2024-03-04 PROCEDURE — 82962 GLUCOSE BLOOD TEST: CPT

## 2024-03-04 PROCEDURE — 36415 COLL VENOUS BLD VENIPUNCTURE: CPT

## 2024-03-04 PROCEDURE — 6360000002 HC RX W HCPCS: Performed by: HOSPITALIST

## 2024-03-04 PROCEDURE — 87040 BLOOD CULTURE FOR BACTERIA: CPT

## 2024-03-04 PROCEDURE — 96361 HYDRATE IV INFUSION ADD-ON: CPT

## 2024-03-04 PROCEDURE — 6360000002 HC RX W HCPCS: Performed by: EMERGENCY MEDICINE

## 2024-03-04 PROCEDURE — 87150 DNA/RNA AMPLIFIED PROBE: CPT

## 2024-03-04 PROCEDURE — 85025 COMPLETE CBC W/AUTO DIFF WBC: CPT

## 2024-03-04 PROCEDURE — 81001 URINALYSIS AUTO W/SCOPE: CPT

## 2024-03-04 PROCEDURE — 87186 SC STD MICRODIL/AGAR DIL: CPT

## 2024-03-04 PROCEDURE — 51702 INSERT TEMP BLADDER CATH: CPT

## 2024-03-04 PROCEDURE — 96374 THER/PROPH/DIAG INJ IV PUSH: CPT

## 2024-03-04 PROCEDURE — 71045 X-RAY EXAM CHEST 1 VIEW: CPT

## 2024-03-04 PROCEDURE — 83605 ASSAY OF LACTIC ACID: CPT

## 2024-03-04 PROCEDURE — 99285 EMERGENCY DEPT VISIT HI MDM: CPT

## 2024-03-04 PROCEDURE — 96365 THER/PROPH/DIAG IV INF INIT: CPT

## 2024-03-04 RX ORDER — DEXTROSE MONOHYDRATE 25 G/50ML
25 INJECTION, SOLUTION INTRAVENOUS PRN
Status: DISCONTINUED | OUTPATIENT
Start: 2024-03-04 | End: 2024-03-10 | Stop reason: HOSPADM

## 2024-03-04 RX ORDER — FENTANYL CITRATE 50 UG/ML
25 INJECTION, SOLUTION INTRAMUSCULAR; INTRAVENOUS ONCE
Status: COMPLETED | OUTPATIENT
Start: 2024-03-04 | End: 2024-03-04

## 2024-03-04 RX ORDER — SODIUM CHLORIDE, SODIUM LACTATE, POTASSIUM CHLORIDE, AND CALCIUM CHLORIDE .6; .31; .03; .02 G/100ML; G/100ML; G/100ML; G/100ML
1000 INJECTION, SOLUTION INTRAVENOUS ONCE
Status: COMPLETED | OUTPATIENT
Start: 2024-03-04 | End: 2024-03-04

## 2024-03-04 RX ORDER — DEXTROSE MONOHYDRATE 100 MG/ML
INJECTION, SOLUTION INTRAVENOUS CONTINUOUS
Status: DISCONTINUED | OUTPATIENT
Start: 2024-03-04 | End: 2024-03-06

## 2024-03-04 RX ORDER — DEXTROSE MONOHYDRATE 100 MG/ML
INJECTION, SOLUTION INTRAVENOUS
Status: COMPLETED
Start: 2024-03-04 | End: 2024-03-04

## 2024-03-04 RX ORDER — ONDANSETRON 2 MG/ML
4 INJECTION INTRAMUSCULAR; INTRAVENOUS ONCE
Status: COMPLETED | OUTPATIENT
Start: 2024-03-04 | End: 2024-03-04

## 2024-03-04 RX ADMIN — FENTANYL CITRATE 25 MCG: 50 INJECTION INTRAMUSCULAR; INTRAVENOUS at 20:59

## 2024-03-04 RX ADMIN — SODIUM CHLORIDE, POTASSIUM CHLORIDE, SODIUM LACTATE AND CALCIUM CHLORIDE 1000 ML: 600; 310; 30; 20 INJECTION, SOLUTION INTRAVENOUS at 21:05

## 2024-03-04 RX ADMIN — CEFEPIME 2000 MG: 2 INJECTION, POWDER, FOR SOLUTION INTRAVENOUS at 20:34

## 2024-03-04 RX ADMIN — SODIUM CHLORIDE, POTASSIUM CHLORIDE, SODIUM LACTATE AND CALCIUM CHLORIDE 1000 ML: 600; 310; 30; 20 INJECTION, SOLUTION INTRAVENOUS at 19:23

## 2024-03-04 RX ADMIN — VANCOMYCIN HYDROCHLORIDE 1250 MG: 10 INJECTION, POWDER, LYOPHILIZED, FOR SOLUTION INTRAVENOUS at 21:49

## 2024-03-04 RX ADMIN — DEXTROSE MONOHYDRATE: 100 INJECTION, SOLUTION INTRAVENOUS at 21:06

## 2024-03-04 RX ADMIN — ONDANSETRON 4 MG: 2 INJECTION INTRAMUSCULAR; INTRAVENOUS at 19:22

## 2024-03-04 NOTE — TELEPHONE ENCOUNTER
Nikkie called and states that she has a temperature of 101.1, N/V and extreme fatigue and weakness.  I have instructed her to go to  ER.  Dr. Valdivia informed.  She is about a week and a half post treatment.

## 2024-03-05 ENCOUNTER — APPOINTMENT (OUTPATIENT)
Dept: ULTRASOUND IMAGING | Age: 74
DRG: 871 | End: 2024-03-05
Payer: MEDICARE

## 2024-03-05 PROBLEM — E44.0 MODERATE MALNUTRITION (HCC): Chronic | Status: ACTIVE | Noted: 2024-03-05

## 2024-03-05 LAB
A BAUMANNII DNA BLD POS QL NAA+NON-PROBE: NOT DETECTED
ALBUMIN SERPL-MCNC: 3 G/DL (ref 3.5–5.2)
ALP SERPL-CCNC: 132 U/L (ref 35–104)
ALT SERPL-CCNC: 20 U/L (ref 5–33)
ANION GAP SERPL CALCULATED.3IONS-SCNC: 25 MMOL/L (ref 7–19)
AST SERPL-CCNC: 22 U/L (ref 5–32)
BILIRUB SERPL-MCNC: 0.5 MG/DL (ref 0.2–1.2)
BLACTX-M ISLT/SPM QL: NOT DETECTED
BLAIMP ISLT/SPM QL: NOT DETECTED
BLAKPC ISLT/SPM QL: NOT DETECTED
BLAVIM ISLT/SPM QL: NOT DETECTED
BUN SERPL-MCNC: 50 MG/DL (ref 8–23)
C ALBICANS DNA BLD POS QL NAA+NON-PROBE: NOT DETECTED
C AURIS DNA BLD POS QL NAA+PROBE: NOT DETECTED
C GLABRATA DNA BLD POS QL NAA+NON-PROBE: NOT DETECTED
C KRUSEI DNA BLD POS QL NAA+NON-PROBE: NOT DETECTED
C PARAP DNA BLD POS QL NAA+NON-PROBE: NOT DETECTED
C TROPICLS DNA BLD POS QL NAA+NON-PROBE: NOT DETECTED
CALCIUM SERPL-MCNC: 8.3 MG/DL (ref 8.8–10.2)
CARBAPENEM RESISTANCE NDM GENE BY PCR: NOT DETECTED
CHLORIDE SERPL-SCNC: 92 MMOL/L (ref 98–111)
CO2 SERPL-SCNC: 13 MMOL/L (ref 22–29)
CREAT SERPL-MCNC: 2.4 MG/DL (ref 0.5–0.9)
CREAT UR-MCNC: 117.5 MG/DL (ref 28–217)
CRYPTOCOCCUS NEOFORMANS/GATTII BY PCR: NOT DETECTED
D DIMER PPP FEU-MCNC: 3.17 UG/ML FEU (ref 0–0.48)
E CLOAC COMP DNA BLD POS NAA+NON-PROBE: NOT DETECTED
E COLI DNA BLD POS QL NAA+NON-PROBE: NOT DETECTED
E FAECALIS DNA BLD POS QL NAA+PROBE: NOT DETECTED
E FAECIUM DNA BLD POS QL NAA+PROBE: NOT DETECTED
ENTEROBACT DNA BLD POS QL NAA+NON-PROBE: NOT DETECTED
ENTEROCOC DNA BLD POS QL NAA+NON-PROBE: NOT DETECTED
EOSINOPHIL URNS QL MICRO: NORMAL
ERYTHROCYTE [DISTWIDTH] IN BLOOD BY AUTOMATED COUNT: 14.1 % (ref 11.5–14.5)
GLUCOSE BLD-MCNC: 103 MG/DL (ref 70–99)
GLUCOSE BLD-MCNC: 164 MG/DL (ref 70–99)
GLUCOSE BLD-MCNC: 186 MG/DL (ref 70–99)
GLUCOSE BLD-MCNC: 203 MG/DL (ref 70–99)
GLUCOSE BLD-MCNC: 236 MG/DL (ref 70–99)
GLUCOSE BLD-MCNC: 264 MG/DL (ref 70–99)
GLUCOSE BLD-MCNC: 315 MG/DL (ref 70–99)
GLUCOSE SERPL-MCNC: 56 MG/DL (ref 74–109)
GN BLD CULTURE PNL BLD POS NAA+PROBE: NOT DETECTED
GP B STREP DNA BLD POS QL NAA+NON-PROBE: NOT DETECTED
HCT VFR BLD AUTO: 31.3 % (ref 37–47)
HGB BLD-MCNC: 9.9 G/DL (ref 12–16)
IMM GRANULOCYTES # BLD: 0 K/UL
K OXYTOCA DNA BLD POS QL NAA+NON-PROBE: NOT DETECTED
K PNEUMON DNA SPEC QL NAA+PROBE: NOT DETECTED
K. AEROGENES DNA SPEC QL NAA+PROBE: NOT DETECTED
L MONOCYTOG DNA BLD POS QL NAA+NON-PROBE: NOT DETECTED
LACTATE BLDV-SCNC: 2.3 MMOL/L (ref 0.5–1.9)
LACTATE BLDV-SCNC: 2.9 MMOL/L (ref 0.5–1.9)
LACTATE BLDV-SCNC: 3.7 MMOL/L (ref 0.5–1.9)
LEGIONELLA AG UR QL: NORMAL
MCH RBC QN AUTO: 29.4 PG (ref 27–31)
MCHC RBC AUTO-ENTMCNC: 31.6 G/DL (ref 33–37)
MCV RBC AUTO: 92.9 FL (ref 81–99)
N MEN DNA BLD POS QL NAA+NON-PROBE: NOT DETECTED
OSMOLALITY URINE: 327 MOSM/KG (ref 250–1200)
P AERUGINOSA DNA BLD POS NAA+NON-PROBE: DETECTED
PERFORMED ON: ABNORMAL
PLATELET # BLD AUTO: 20 K/UL (ref 130–400)
POTASSIUM SERPL-SCNC: 3.8 MMOL/L (ref 3.5–5)
PROT SERPL-MCNC: 6.2 G/DL (ref 6.6–8.7)
PROTEUS SP DNA BLD POS QL NAA+NON-PROBE: NOT DETECTED
RBC # BLD AUTO: 3.37 M/UL (ref 4.2–5.4)
S AUREUS DNA BLD POS QL NAA+NON-PROBE: NOT DETECTED
S AUREUS+CONS DNA BLD POS NAA+NON-PROBE: NOT DETECTED
S EPIDERMIDIS DNA BLD POS QL NAA+PROBE: NOT DETECTED
S LUGDUNENSIS DNA BLD POS QL NAA+PROBE: NOT DETECTED
S MALTOPH DNA BLD POS QL NAA+PROBE: NOT DETECTED
S MARCESCENS DNA BLD POS NAA+NON-PROBE: NOT DETECTED
S PNEUM AG SPEC QL: NORMAL
S PNEUM DNA BLD POS QL NAA+NON-PROBE: NOT DETECTED
S PYO DNA BLD POS QL NAA+NON-PROBE: NOT DETECTED
SALMONELLA DNA BLD POS QL NAA+PROBE: NOT DETECTED
SODIUM SERPL-SCNC: 130 MMOL/L (ref 136–145)
SODIUM UR-SCNC: <20 MMOL/L
STREPTOCOCCUS DNA BLD POS NAA+NON-PROBE: NOT DETECTED
WBC # BLD AUTO: 0.2 K/UL (ref 4.8–10.8)

## 2024-03-05 PROCEDURE — 1200000000 HC SEMI PRIVATE

## 2024-03-05 PROCEDURE — 83935 ASSAY OF URINE OSMOLALITY: CPT

## 2024-03-05 PROCEDURE — 87449 NOS EACH ORGANISM AG IA: CPT

## 2024-03-05 PROCEDURE — 6370000000 HC RX 637 (ALT 250 FOR IP): Performed by: INTERNAL MEDICINE

## 2024-03-05 PROCEDURE — 94150 VITAL CAPACITY TEST: CPT

## 2024-03-05 PROCEDURE — 99223 1ST HOSP IP/OBS HIGH 75: CPT

## 2024-03-05 PROCEDURE — 2700000000 HC OXYGEN THERAPY PER DAY

## 2024-03-05 PROCEDURE — 84300 ASSAY OF URINE SODIUM: CPT

## 2024-03-05 PROCEDURE — 80053 COMPREHEN METABOLIC PANEL: CPT

## 2024-03-05 PROCEDURE — 36415 COLL VENOUS BLD VENIPUNCTURE: CPT

## 2024-03-05 PROCEDURE — 6360000002 HC RX W HCPCS: Performed by: INTERNAL MEDICINE

## 2024-03-05 PROCEDURE — 87641 MR-STAPH DNA AMP PROBE: CPT

## 2024-03-05 PROCEDURE — 6370000000 HC RX 637 (ALT 250 FOR IP): Performed by: HOSPITALIST

## 2024-03-05 PROCEDURE — 2580000003 HC RX 258: Performed by: INTERNAL MEDICINE

## 2024-03-05 PROCEDURE — 2000000000 HC ICU R&B

## 2024-03-05 PROCEDURE — 76770 US EXAM ABDO BACK WALL COMP: CPT

## 2024-03-05 PROCEDURE — 82962 GLUCOSE BLOOD TEST: CPT

## 2024-03-05 PROCEDURE — 99223 1ST HOSP IP/OBS HIGH 75: CPT | Performed by: INTERNAL MEDICINE

## 2024-03-05 PROCEDURE — 83605 ASSAY OF LACTIC ACID: CPT

## 2024-03-05 PROCEDURE — 2580000003 HC RX 258: Performed by: HOSPITALIST

## 2024-03-05 PROCEDURE — 85379 FIBRIN DEGRADATION QUANT: CPT

## 2024-03-05 PROCEDURE — 94760 N-INVAS EAR/PLS OXIMETRY 1: CPT

## 2024-03-05 PROCEDURE — 85025 COMPLETE CBC W/AUTO DIFF WBC: CPT

## 2024-03-05 PROCEDURE — 94640 AIRWAY INHALATION TREATMENT: CPT

## 2024-03-05 PROCEDURE — 6360000002 HC RX W HCPCS: Performed by: HOSPITALIST

## 2024-03-05 PROCEDURE — 87205 SMEAR GRAM STAIN: CPT

## 2024-03-05 PROCEDURE — 2500000003 HC RX 250 WO HCPCS: Performed by: HOSPITALIST

## 2024-03-05 PROCEDURE — 82570 ASSAY OF URINE CREATININE: CPT

## 2024-03-05 RX ORDER — CALCIUM CARBONATE 500 MG/1
500 TABLET, CHEWABLE ORAL 3 TIMES DAILY PRN
Status: DISCONTINUED | OUTPATIENT
Start: 2024-03-05 | End: 2024-03-10 | Stop reason: HOSPADM

## 2024-03-05 RX ORDER — LEVOFLOXACIN 5 MG/ML
750 INJECTION, SOLUTION INTRAVENOUS ONCE
Status: COMPLETED | OUTPATIENT
Start: 2024-03-05 | End: 2024-03-05

## 2024-03-05 RX ORDER — PROMETHAZINE HYDROCHLORIDE 12.5 MG/1
25 TABLET ORAL EVERY 6 HOURS PRN
Status: DISCONTINUED | OUTPATIENT
Start: 2024-03-05 | End: 2024-03-10 | Stop reason: HOSPADM

## 2024-03-05 RX ORDER — SODIUM CHLORIDE 0.9 % (FLUSH) 0.9 %
5-40 SYRINGE (ML) INJECTION EVERY 12 HOURS SCHEDULED
Status: DISCONTINUED | OUTPATIENT
Start: 2024-03-05 | End: 2024-03-06

## 2024-03-05 RX ORDER — ACETAMINOPHEN 325 MG/1
650 TABLET ORAL EVERY 4 HOURS PRN
Status: DISCONTINUED | OUTPATIENT
Start: 2024-03-05 | End: 2024-03-10 | Stop reason: HOSPADM

## 2024-03-05 RX ORDER — ONDANSETRON 4 MG/1
4 TABLET, ORALLY DISINTEGRATING ORAL EVERY 6 HOURS PRN
Status: DISCONTINUED | OUTPATIENT
Start: 2024-03-05 | End: 2024-03-10 | Stop reason: HOSPADM

## 2024-03-05 RX ORDER — SODIUM CHLORIDE 9 MG/ML
INJECTION, SOLUTION INTRAVENOUS PRN
Status: DISCONTINUED | OUTPATIENT
Start: 2024-03-05 | End: 2024-03-10 | Stop reason: HOSPADM

## 2024-03-05 RX ORDER — LEVOFLOXACIN 5 MG/ML
250 INJECTION, SOLUTION INTRAVENOUS EVERY 24 HOURS
Status: DISCONTINUED | OUTPATIENT
Start: 2024-03-05 | End: 2024-03-05 | Stop reason: DRUGHIGH

## 2024-03-05 RX ORDER — ATORVASTATIN CALCIUM 10 MG/1
10 TABLET, FILM COATED ORAL NIGHTLY
Status: DISCONTINUED | OUTPATIENT
Start: 2024-03-05 | End: 2024-03-10 | Stop reason: HOSPADM

## 2024-03-05 RX ORDER — SODIUM CHLORIDE, SODIUM LACTATE, POTASSIUM CHLORIDE, AND CALCIUM CHLORIDE .6; .31; .03; .02 G/100ML; G/100ML; G/100ML; G/100ML
1000 INJECTION, SOLUTION INTRAVENOUS ONCE
Status: COMPLETED | OUTPATIENT
Start: 2024-03-05 | End: 2024-03-05

## 2024-03-05 RX ORDER — PANTOPRAZOLE SODIUM 20 MG/1
20 TABLET, DELAYED RELEASE ORAL
Status: DISCONTINUED | OUTPATIENT
Start: 2024-03-05 | End: 2024-03-10 | Stop reason: HOSPADM

## 2024-03-05 RX ORDER — ACETYLCYSTEINE 200 MG/ML
600 SOLUTION ORAL; RESPIRATORY (INHALATION) 2 TIMES DAILY PRN
Status: DISCONTINUED | OUTPATIENT
Start: 2024-03-05 | End: 2024-03-10 | Stop reason: HOSPADM

## 2024-03-05 RX ORDER — DEXAMETHASONE SODIUM PHOSPHATE 10 MG/ML
6 INJECTION, SOLUTION INTRAMUSCULAR; INTRAVENOUS ONCE
Status: COMPLETED | OUTPATIENT
Start: 2024-03-05 | End: 2024-03-05

## 2024-03-05 RX ORDER — METOCLOPRAMIDE 10 MG/1
10 TABLET ORAL 3 TIMES DAILY PRN
Status: DISCONTINUED | OUTPATIENT
Start: 2024-03-05 | End: 2024-03-07

## 2024-03-05 RX ORDER — DEXTROSE MONOHYDRATE 100 MG/ML
INJECTION, SOLUTION INTRAVENOUS CONTINUOUS PRN
Status: DISCONTINUED | OUTPATIENT
Start: 2024-03-05 | End: 2024-03-10 | Stop reason: HOSPADM

## 2024-03-05 RX ORDER — ALBUTEROL SULFATE 2.5 MG/3ML
2.5 SOLUTION RESPIRATORY (INHALATION) EVERY 4 HOURS PRN
Status: DISCONTINUED | OUTPATIENT
Start: 2024-03-05 | End: 2024-03-10 | Stop reason: HOSPADM

## 2024-03-05 RX ORDER — POLYETHYLENE GLYCOL 3350 17 G/17G
17 POWDER, FOR SOLUTION ORAL DAILY PRN
Status: DISCONTINUED | OUTPATIENT
Start: 2024-03-05 | End: 2024-03-10 | Stop reason: HOSPADM

## 2024-03-05 RX ORDER — SODIUM CHLORIDE 0.9 % (FLUSH) 0.9 %
5-40 SYRINGE (ML) INJECTION PRN
Status: DISCONTINUED | OUTPATIENT
Start: 2024-03-05 | End: 2024-03-10 | Stop reason: HOSPADM

## 2024-03-05 RX ORDER — MECOBALAMIN 5000 MCG
5 TABLET,DISINTEGRATING ORAL NIGHTLY PRN
Status: DISCONTINUED | OUTPATIENT
Start: 2024-03-05 | End: 2024-03-10 | Stop reason: HOSPADM

## 2024-03-05 RX ORDER — CALCIUM CARBONATE/VITAMIN D3 600 MG-10
1200 TABLET ORAL
Status: DISCONTINUED | OUTPATIENT
Start: 2024-03-05 | End: 2024-03-05 | Stop reason: CLARIF

## 2024-03-05 RX ORDER — THYROID 60 MG/1
60 TABLET ORAL
Status: DISCONTINUED | OUTPATIENT
Start: 2024-03-05 | End: 2024-03-10 | Stop reason: HOSPADM

## 2024-03-05 RX ORDER — ONDANSETRON 2 MG/ML
4 INJECTION INTRAMUSCULAR; INTRAVENOUS EVERY 6 HOURS PRN
Status: DISCONTINUED | OUTPATIENT
Start: 2024-03-05 | End: 2024-03-10 | Stop reason: HOSPADM

## 2024-03-05 RX ORDER — LEVOFLOXACIN 5 MG/ML
500 INJECTION, SOLUTION INTRAVENOUS
Status: DISCONTINUED | OUTPATIENT
Start: 2024-03-07 | End: 2024-03-06 | Stop reason: DRUGHIGH

## 2024-03-05 RX ORDER — ARFORMOTEROL TARTRATE 15 UG/2ML
15 SOLUTION RESPIRATORY (INHALATION)
Status: DISCONTINUED | OUTPATIENT
Start: 2024-03-05 | End: 2024-03-10 | Stop reason: HOSPADM

## 2024-03-05 RX ORDER — BUDESONIDE 0.5 MG/2ML
0.5 INHALANT ORAL EVERY 12 HOURS
Status: DISCONTINUED | OUTPATIENT
Start: 2024-03-05 | End: 2024-03-10 | Stop reason: HOSPADM

## 2024-03-05 RX ADMIN — SODIUM CHLORIDE, POTASSIUM CHLORIDE, SODIUM LACTATE AND CALCIUM CHLORIDE 1000 ML: 600; 310; 30; 20 INJECTION, SOLUTION INTRAVENOUS at 18:56

## 2024-03-05 RX ADMIN — SODIUM CHLORIDE, PRESERVATIVE FREE 10 ML: 5 INJECTION INTRAVENOUS at 21:16

## 2024-03-05 RX ADMIN — FILGRASTIM-AAFI 480 MCG: 480 INJECTION, SOLUTION SUBCUTANEOUS at 09:54

## 2024-03-05 RX ADMIN — SODIUM BICARBONATE: 84 INJECTION, SOLUTION INTRAVENOUS at 11:52

## 2024-03-05 RX ADMIN — PROMETHAZINE HYDROCHLORIDE 25 MG: 12.5 TABLET ORAL at 14:39

## 2024-03-05 RX ADMIN — ARFORMOTEROL TARTRATE 15 MCG: 15 SOLUTION RESPIRATORY (INHALATION) at 06:48

## 2024-03-05 RX ADMIN — ACETAMINOPHEN 650 MG: 325 TABLET ORAL at 01:07

## 2024-03-05 RX ADMIN — METOCLOPRAMIDE 10 MG: 10 TABLET ORAL at 14:39

## 2024-03-05 RX ADMIN — ARFORMOTEROL TARTRATE 15 MCG: 15 SOLUTION RESPIRATORY (INHALATION) at 18:57

## 2024-03-05 RX ADMIN — SODIUM BICARBONATE: 84 INJECTION, SOLUTION INTRAVENOUS at 06:53

## 2024-03-05 RX ADMIN — BUDESONIDE 500 MCG: 0.5 INHALANT ORAL at 06:48

## 2024-03-05 RX ADMIN — SODIUM BICARBONATE: 84 INJECTION, SOLUTION INTRAVENOUS at 06:27

## 2024-03-05 RX ADMIN — ATORVASTATIN CALCIUM 10 MG: 10 TABLET, FILM COATED ORAL at 21:16

## 2024-03-05 RX ADMIN — SODIUM BICARBONATE: 84 INJECTION, SOLUTION INTRAVENOUS at 22:35

## 2024-03-05 RX ADMIN — CEFEPIME 1000 MG: 1 INJECTION, POWDER, FOR SOLUTION INTRAMUSCULAR; INTRAVENOUS at 20:40

## 2024-03-05 RX ADMIN — LEVOTHYROXINE, LIOTHYRONINE 60 MG: 38; 9 TABLET ORAL at 07:01

## 2024-03-05 RX ADMIN — PANTOPRAZOLE SODIUM 20 MG: 20 TABLET, DELAYED RELEASE ORAL at 07:01

## 2024-03-05 RX ADMIN — SODIUM BICARBONATE: 84 INJECTION, SOLUTION INTRAVENOUS at 01:02

## 2024-03-05 RX ADMIN — ACETAMINOPHEN 650 MG: 325 TABLET ORAL at 07:00

## 2024-03-05 RX ADMIN — ONDANSETRON 4 MG: 2 INJECTION INTRAMUSCULAR; INTRAVENOUS at 12:06

## 2024-03-05 RX ADMIN — BUDESONIDE 500 MCG: 0.5 INHALANT ORAL at 18:58

## 2024-03-05 RX ADMIN — LEVOFLOXACIN 750 MG: 5 INJECTION, SOLUTION INTRAVENOUS at 17:22

## 2024-03-05 RX ADMIN — DEXAMETHASONE SODIUM PHOSPHATE 6 MG: 10 INJECTION INTRAMUSCULAR; INTRAVENOUS at 15:28

## 2024-03-05 RX ADMIN — CEFEPIME 1000 MG: 1 INJECTION, POWDER, FOR SOLUTION INTRAMUSCULAR; INTRAVENOUS at 09:52

## 2024-03-05 RX ADMIN — ONDANSETRON 4 MG: 2 INJECTION INTRAMUSCULAR; INTRAVENOUS at 01:03

## 2024-03-05 ASSESSMENT — PAIN DESCRIPTION - DESCRIPTORS
DESCRIPTORS: ACHING

## 2024-03-05 ASSESSMENT — PAIN DESCRIPTION - LOCATION
LOCATION: BACK

## 2024-03-05 ASSESSMENT — PAIN DESCRIPTION - ORIENTATION
ORIENTATION: MID

## 2024-03-05 ASSESSMENT — PAIN - FUNCTIONAL ASSESSMENT
PAIN_FUNCTIONAL_ASSESSMENT: ACTIVITIES ARE NOT PREVENTED
PAIN_FUNCTIONAL_ASSESSMENT: ACTIVITIES ARE NOT PREVENTED

## 2024-03-05 ASSESSMENT — PAIN SCALES - GENERAL
PAINLEVEL_OUTOF10: 8
PAINLEVEL_OUTOF10: 0
PAINLEVEL_OUTOF10: 0

## 2024-03-05 NOTE — ED NOTES
ED TO INPATIENT SBAR HANDOFF    Patient Name: Nikkie Banks   : 1950  73 y.o.   Family/Caregiver Present: Yes  Code Status Order: Prior    C-SSRS: Risk of Suicide: No Risk  Sitter No  Restraints:         Situation  Chief Complaint:   Chief Complaint   Patient presents with    Fatigue     X3 days, pt has stage 4 lung CA.  Last treatment x2 weeks pta     Patient Diagnosis: Septic shock (HCC) [A41.9, R65.21]     Brief Description of Patient's Condition: PT arrived to ED with c/o fatigue, hypotension, generalized weakness. She's been dx with AURELIANO, neutropenia, and dehydration. She also had an episode of hypoglycemia (dropped to 33), we've started D10 at 100ml/hr and last accucheck was 87. She has been tachy and hypotensive despite 2 L LR and has had minimal urine output (<120 ml). Lung sounds CTA, pt is on room air. When trying to change position in bed her HR increased from 120s-130s to 160s-170 with minimal exertion. She's A&O x 4 and very pleasant.   Mental Status: oriented and alert  Arrived from: home    Imaging:   XR CHEST PORTABLE   Final Result       Small left pleural effusion and adjacent atelectasis.  Pneumonia cannot be excluded.       Bilateral lung nodules redemonstrated.                   ______________________________________    Electronically signed by: YOSEF WHITAKER M.D.   Date:     2024   Time:    21:55         COVID-19 Results:   Internal Administration   First Dose COVID-19, PFIZER PURPLE top, DILUTE for use, (age 12 y+), 30mcg/0.3mL  2021   Second Dose COVID-19, PFIZER PURPLE top, DILUTE for use, (age 12 y+), 30mcg/0.3mL   2021       Last COVID Lab SARS-CoV-2 (no units)   Date Value   2020 Not Detected     SARS-CoV-2, PCR (no units)   Date Value   2024 Not Detected     SARS-CoV-2, NAAT (no units)   Date Value   2023 Not Detected     SARS-COV-2, POC (no units)   Date Value   2023 Not-Detected     POC Glucose (mg/dl)   Date Value   2024 87

## 2024-03-05 NOTE — ACP (ADVANCE CARE PLANNING)
Advance Care Planning     Advance Care Planning (ACP) Physician/NP/PA (Provider) Conversation      Date of ACP Conversation: 3/5/2024    Conversation Conducted with: Pt with capacity at bedside    Health Care Decision Maker:  Current Designated Health Care Decision Maker:      Primary Decision Maker: [place] (HCS) - Spouse - [phone]    Secondary Decision Maker: Nba [place] - Child - [phone]      Care Preferences:  Hospitalization:  \"If your health worsens and it becomes clear that your chance of recovery is unlikely, what would your preference be regarding hospitalization?\"  YES    Ventilation:  \"If you were in your present state of health and suddenly became very ill and were unable to breathe on your own, what would your preference be about the use of a ventilator (breathing machine) if it was available to you?\"    NO    Resuscitation:  \"CPR works best to restart the heart when there is a sudden event, like a heart attack, in someone who is otherwise healthy. Unfortunately, CPR does not typically restart the heart for people who have serious health conditions or who are very sick.\"    \"In the event your heart stopped as a result of an underlying serious health condition, would you want attempts to be made to restart your heart (answer \"yes\" for attempt to resuscitate) or would you prefer a natural death (answer \"no\" for do not attempt to resuscitate)?\"   NO    Conversation Outcomes / Follow-Up Plan:   Reviewed ACP documents on file with pts at bedside this morning. She confirms her  remains primary HCS decision maker. Discussed code status and meaning of \"full code\" vs \"DNR\". Pt confirms she wishes to continue all current medical treatments but that she is to be a DNR in the event of cardiac or respiratory arrest.     Length of Voluntary ACP Conversation in minutes:  12 minutes during consult visit    CHRIS Shi - CNP

## 2024-03-05 NOTE — ED PROVIDER NOTES
Atrial fibrillation with RVR (HCC)          DISPOSITION/PLAN   DISPOSITION Admitted 03/04/2024 09:38:20 PM      (Please note that portions of this note were completed with a voice recognition program.  Efforts were made to edit thedictations but occasionally words are mis-transcribed.)    Johnathan Culp MD (electronically signed)  Attending Emergency Physician            Johnathan Culp MD  03/04/24 [id number]       Johnathan Culp MD  03/04/24 [id number]

## 2024-03-05 NOTE — H&P
[facility] Hospitalist Group History and Physical    Patient Information:  Patient: Nikkie Banks  MRN: [id number]   Acct: [id number]  YOB: 1950  Admit Date: 3/4/2024      Date of Service: 3/4/2024     Primary Care Physician: Lovely Ayala APRN  Advance Directive: Full Code  Health Care Proxy: Mr. Soy Eldridge, her , [phone]         SUBJECTIVE:    Chief Complaint   Patient presents with    Fatigue     X3 days, pt has stage 4 lung CA.  Last treatment x2 weeks pta     HPI:  Mrs. Nikkie Banks is an unfortunate 73 year old lady who suffers from NSCLC stage 4 from the left breast for which she is on chemo with Osirmetinib as an OP. She has presented to the [facility] for Nausea and Vomiting with Diarrhea along with fatigue. She was found to be profoundly hypotensive and tachycardic and was bolused aggressively, she was also found to be hypoglycemic on screening and despite multiple interventions has not had significant improvement as regards blood sugar as of yet. She was also found on labs to have a severe AURELIANO, her Cr is normally at 0.7 but is up to 2.5 today. She has been started on Cefepime and Vancomycin. Her UA was negative although UTI was initially suspected as per the appearance of her UA sample by her ED team. We are suspecting lung as the source but have not as of yet been able to definitively diagnose her source. She has been started on Vancomycin and this will be continued with the Cefepime as per usual recommendations from Hematology/Oncology for our neutropenic patients with unclear etiology of infection. In addition to urine and blood Cxx a PCR Panel was snet but this has not yet resulted.    Review of Systems:   Review of Systems   Constitutional:  Positive for chills, fatigue and fever. Negative for diaphoresis.   HENT:  Negative for sneezing.    Respiratory:  Negative for cough, chest tightness, shortness of breath and wheezing.

## 2024-03-06 LAB
ALBUMIN SERPL-MCNC: 1.9 G/DL (ref 3.5–5.2)
ALP SERPL-CCNC: 83 U/L (ref 35–104)
ALT SERPL-CCNC: 16 U/L (ref 5–33)
ANION GAP SERPL CALCULATED.3IONS-SCNC: 15 MMOL/L (ref 7–19)
AST SERPL-CCNC: 14 U/L (ref 5–32)
BACTERIA UR CULT: NORMAL
BASOPHILS # BLD: 0 K/UL (ref 0–0.2)
BASOPHILS NFR BLD: 0 % (ref 0–1)
BILIRUB SERPL-MCNC: 0.4 MG/DL (ref 0.2–1.2)
BUN SERPL-MCNC: 36 MG/DL (ref 8–23)
BURR CELLS BLD QL SMEAR: ABNORMAL
CALCIUM SERPL-MCNC: 6.9 MG/DL (ref 8.8–10.2)
CHLORIDE SERPL-SCNC: 83 MMOL/L (ref 98–111)
CO2 SERPL-SCNC: 28 MMOL/L (ref 22–29)
CREAT SERPL-MCNC: 1.1 MG/DL (ref 0.5–0.9)
EOSINOPHIL # BLD: 0 K/UL (ref 0–0.6)
EOSINOPHIL NFR BLD: 0 % (ref 0–5)
ERYTHROCYTE [DISTWIDTH] IN BLOOD BY AUTOMATED COUNT: 13.1 % (ref 11.5–14.5)
GLUCOSE BLD-MCNC: 126 MG/DL (ref 70–99)
GLUCOSE BLD-MCNC: 151 MG/DL (ref 70–99)
GLUCOSE SERPL-MCNC: 164 MG/DL (ref 74–109)
HCT VFR BLD AUTO: 28.5 % (ref 37–47)
HGB BLD-MCNC: 9.6 G/DL (ref 12–16)
HYPOCHROMIA BLD QL SMEAR: ABNORMAL
IMM GRANULOCYTES # BLD: 0 K/UL
LYMPHOCYTES # BLD: 0.2 K/UL (ref 1.1–4.5)
LYMPHOCYTES NFR BLD: 10 % (ref 20–40)
MAGNESIUM SERPL-MCNC: 1.3 MG/DL (ref 1.6–2.4)
MCH RBC QN AUTO: 29 PG (ref 27–31)
MCHC RBC AUTO-ENTMCNC: 33.7 G/DL (ref 33–37)
MCV RBC AUTO: 86.1 FL (ref 81–99)
MONOCYTES # BLD: 0.7 K/UL (ref 0–0.9)
MONOCYTES NFR BLD: 31 % (ref 0–10)
MRSA DNA SPEC QL NAA+PROBE: NOT DETECTED
MYELOCYTES NFR BLD MANUAL: 2 %
NEUTROPHILS # BLD: 1.3 K/UL (ref 1.5–7.5)
NEUTS BAND NFR BLD MANUAL: 10 % (ref 0–5)
NEUTS SEG NFR BLD: 47 % (ref 50–65)
OVALOCYTES BLD QL SMEAR: ABNORMAL
PERFORMED ON: ABNORMAL
PERFORMED ON: ABNORMAL
PLATELET # BLD AUTO: 15 K/UL (ref 130–400)
PLATELET SLIDE REVIEW: ABNORMAL
POTASSIUM SERPL-SCNC: 3.2 MMOL/L (ref 3.5–5)
PROT SERPL-MCNC: 4.5 G/DL (ref 6.6–8.7)
RBC # BLD AUTO: 3.31 M/UL (ref 4.2–5.4)
SODIUM SERPL-SCNC: 126 MMOL/L (ref 136–145)
VANCOMYCIN TROUGH SERPL-MCNC: 8.4 UG/ML (ref 10–20)
WBC # BLD AUTO: 2.2 K/UL (ref 4.8–10.8)

## 2024-03-06 PROCEDURE — 94640 AIRWAY INHALATION TREATMENT: CPT

## 2024-03-06 PROCEDURE — 6370000000 HC RX 637 (ALT 250 FOR IP): Performed by: HOSPITALIST

## 2024-03-06 PROCEDURE — 83735 ASSAY OF MAGNESIUM: CPT

## 2024-03-06 PROCEDURE — 85025 COMPLETE CBC W/AUTO DIFF WBC: CPT

## 2024-03-06 PROCEDURE — 97162 PT EVAL MOD COMPLEX 30 MIN: CPT

## 2024-03-06 PROCEDURE — 2580000003 HC RX 258: Performed by: HOSPITALIST

## 2024-03-06 PROCEDURE — 6360000002 HC RX W HCPCS: Performed by: HOSPITALIST

## 2024-03-06 PROCEDURE — 6360000002 HC RX W HCPCS: Performed by: INTERNAL MEDICINE

## 2024-03-06 PROCEDURE — 36410 VNPNXR 3YR/> PHY/QHP DX/THER: CPT

## 2024-03-06 PROCEDURE — 2500000003 HC RX 250 WO HCPCS: Performed by: INTERNAL MEDICINE

## 2024-03-06 PROCEDURE — 80053 COMPREHEN METABOLIC PANEL: CPT

## 2024-03-06 PROCEDURE — 2580000003 HC RX 258: Performed by: INTERNAL MEDICINE

## 2024-03-06 PROCEDURE — 2709999900 HC NON-CHARGEABLE SUPPLY

## 2024-03-06 PROCEDURE — 05HY33Z INSERTION OF INFUSION DEVICE INTO UPPER VEIN, PERCUTANEOUS APPROACH: ICD-10-PCS | Performed by: INTERNAL MEDICINE

## 2024-03-06 PROCEDURE — 97116 GAIT TRAINING THERAPY: CPT

## 2024-03-06 PROCEDURE — 99232 SBSQ HOSP IP/OBS MODERATE 35: CPT | Performed by: INTERNAL MEDICINE

## 2024-03-06 PROCEDURE — 97530 THERAPEUTIC ACTIVITIES: CPT

## 2024-03-06 PROCEDURE — 82962 GLUCOSE BLOOD TEST: CPT

## 2024-03-06 PROCEDURE — 80202 ASSAY OF VANCOMYCIN: CPT

## 2024-03-06 PROCEDURE — 1200000000 HC SEMI PRIVATE

## 2024-03-06 PROCEDURE — 94760 N-INVAS EAR/PLS OXIMETRY 1: CPT

## 2024-03-06 PROCEDURE — 6370000000 HC RX 637 (ALT 250 FOR IP): Performed by: INTERNAL MEDICINE

## 2024-03-06 PROCEDURE — 36415 COLL VENOUS BLD VENIPUNCTURE: CPT

## 2024-03-06 RX ORDER — LEVOFLOXACIN 5 MG/ML
750 INJECTION, SOLUTION INTRAVENOUS
Status: DISCONTINUED | OUTPATIENT
Start: 2024-03-07 | End: 2024-03-09

## 2024-03-06 RX ORDER — MAGNESIUM SULFATE IN WATER 40 MG/ML
2000 INJECTION, SOLUTION INTRAVENOUS PRN
Status: DISCONTINUED | OUTPATIENT
Start: 2024-03-06 | End: 2024-03-10 | Stop reason: HOSPADM

## 2024-03-06 RX ORDER — POTASSIUM CHLORIDE 20 MEQ/1
40 TABLET, EXTENDED RELEASE ORAL PRN
Status: DISCONTINUED | OUTPATIENT
Start: 2024-03-06 | End: 2024-03-10 | Stop reason: HOSPADM

## 2024-03-06 RX ORDER — POTASSIUM CHLORIDE 7.45 MG/ML
10 INJECTION INTRAVENOUS PRN
Status: DISCONTINUED | OUTPATIENT
Start: 2024-03-06 | End: 2024-03-10 | Stop reason: HOSPADM

## 2024-03-06 RX ORDER — SODIUM CHLORIDE, SODIUM LACTATE, POTASSIUM CHLORIDE, CALCIUM CHLORIDE 600; 310; 30; 20 MG/100ML; MG/100ML; MG/100ML; MG/100ML
INJECTION, SOLUTION INTRAVENOUS CONTINUOUS
Status: DISCONTINUED | OUTPATIENT
Start: 2024-03-06 | End: 2024-03-07

## 2024-03-06 RX ORDER — CALCIUM GLUCONATE 20 MG/ML
1000 INJECTION, SOLUTION INTRAVENOUS ONCE
Status: COMPLETED | OUTPATIENT
Start: 2024-03-06 | End: 2024-03-06

## 2024-03-06 RX ADMIN — CALCIUM GLUCONATE 1000 MG: 20 INJECTION, SOLUTION INTRAVENOUS at 07:03

## 2024-03-06 RX ADMIN — CEFEPIME 2000 MG: 2 INJECTION, POWDER, FOR SOLUTION INTRAVENOUS at 21:10

## 2024-03-06 RX ADMIN — FILGRASTIM-AAFI 480 MCG: 480 INJECTION, SOLUTION SUBCUTANEOUS at 08:40

## 2024-03-06 RX ADMIN — Medication 1 TABLET: at 08:41

## 2024-03-06 RX ADMIN — BUDESONIDE 500 MCG: 0.5 INHALANT ORAL at 06:23

## 2024-03-06 RX ADMIN — MAGNESIUM SULFATE HEPTAHYDRATE 2000 MG: 40 INJECTION, SOLUTION INTRAVENOUS at 21:16

## 2024-03-06 RX ADMIN — LEVOTHYROXINE, LIOTHYRONINE 60 MG: 38; 9 TABLET ORAL at 06:16

## 2024-03-06 RX ADMIN — VANCOMYCIN HYDROCHLORIDE 750 MG: 750 INJECTION, POWDER, LYOPHILIZED, FOR SOLUTION INTRAVENOUS at 05:38

## 2024-03-06 RX ADMIN — PANTOPRAZOLE SODIUM 20 MG: 20 TABLET, DELAYED RELEASE ORAL at 06:16

## 2024-03-06 RX ADMIN — ATORVASTATIN CALCIUM 10 MG: 10 TABLET, FILM COATED ORAL at 21:01

## 2024-03-06 RX ADMIN — SODIUM CHLORIDE, SODIUM LACTATE, POTASSIUM CHLORIDE, AND CALCIUM CHLORIDE: 600; 310; 30; 20 INJECTION, SOLUTION INTRAVENOUS at 20:57

## 2024-03-06 RX ADMIN — ARFORMOTEROL TARTRATE 15 MCG: 15 SOLUTION RESPIRATORY (INHALATION) at 19:53

## 2024-03-06 RX ADMIN — Medication 5000 UNITS: at 08:41

## 2024-03-06 RX ADMIN — Medication 1 TABLET: at 21:01

## 2024-03-06 RX ADMIN — CEFEPIME 1000 MG: 1 INJECTION, POWDER, FOR SOLUTION INTRAMUSCULAR; INTRAVENOUS at 08:40

## 2024-03-06 RX ADMIN — SODIUM CHLORIDE, SODIUM LACTATE, POTASSIUM CHLORIDE, AND CALCIUM CHLORIDE: 600; 310; 30; 20 INJECTION, SOLUTION INTRAVENOUS at 12:00

## 2024-03-06 RX ADMIN — BUDESONIDE 500 MCG: 0.5 INHALANT ORAL at 19:54

## 2024-03-06 RX ADMIN — MAGNESIUM SULFATE HEPTAHYDRATE 2000 MG: 40 INJECTION, SOLUTION INTRAVENOUS at 16:24

## 2024-03-06 RX ADMIN — ARFORMOTEROL TARTRATE 15 MCG: 15 SOLUTION RESPIRATORY (INHALATION) at 06:23

## 2024-03-06 RX ADMIN — SODIUM CHLORIDE, PRESERVATIVE FREE 10 ML: 5 INJECTION INTRAVENOUS at 08:43

## 2024-03-06 RX ADMIN — POTASSIUM CHLORIDE 40 MEQ: 1500 TABLET, EXTENDED RELEASE ORAL at 16:23

## 2024-03-06 NOTE — PROCEDURES
[facility] Vascular Access Team:  PowerGlide Midline/Extended Dwell IV Catheter  Insertion Procedure Note      Patient: Nikkie Banks  YOB: 1950   MRN: [id number]  Room: [address] [place] [address]     Attending Physician - Gideon Eddy MD  Ordering Physician - Gideon Eddy MD    Diagnosis:   Chemotherapy-induced neutropenia (HCC) [D70.1, T45.1X5A]  AURELIANO (acute kidney injury) (HCC) [N17.9]  Atrial fibrillation with RVR (HCC) [I48.91]  Nausea vomiting and diarrhea [R11.2, R19.7]  Septic shock (HCC) [A41.9, R65.21]  Stage 4 malignant neoplasm of lung, unspecified laterality (HCC) [C34.90]  Hypotension due to hypovolemia [I95.89, E86.1]       Procedure(s):   Insertion of a 20 gauge 10 cm Single Lumen PowerGlide Catheter    Date of Procedure: 3/6/2024     Performed by: Nelsy Stern RN    Complications: None      Findings:   1. Successful insertion of PowerGlide Catheter.  2. PowerGlide is ready to be used. Please change tubing prior to using the new line. Make sure to label, date and use alcohol caps on new tubing and alcohol caps on unused ports.   3. Patient may be changed to a unit draw for lab work.        Detailed Description of Procedure:   The Right Basilic vein was visualized using the ultrasound and deemed a suitable vessel. The area was prepped with Chlorhexidine and draped in sterile fashion using partial barrier draping. The vein was accessed using US guidance. The 20 gauge 10 cm Single Lumen PowerGlide catheter was advanced into the vein using ANTT. Approximately 0 cm exposed. All lumens had brisk blood return and was flushed with 20 cc of NS. The catheter was secured using a securement device. A 3M CHG Tegaderm was placed over the securement device and insertion site. Dressing was dated and initialed with external measurement marked. Patient did tolerate procedure well.    Electronically signed by Nelsy Stern RN on 3/6/2024 at 10:59 AM

## 2024-03-07 LAB
ANION GAP SERPL CALCULATED.3IONS-SCNC: 9 MMOL/L (ref 7–19)
BACTERIA BLD CULT: ABNORMAL
BASOPHILS # BLD: 0 K/UL (ref 0–0.2)
BASOPHILS NFR BLD: 0 % (ref 0–1)
BUN SERPL-MCNC: 42 MG/DL (ref 8–23)
CALCIUM SERPL-MCNC: 7.7 MG/DL (ref 8.8–10.2)
CHLORIDE SERPL-SCNC: 87 MMOL/L (ref 98–111)
CO2 SERPL-SCNC: 32 MMOL/L (ref 22–29)
CREAT SERPL-MCNC: 1.1 MG/DL (ref 0.5–0.9)
DACRYOCYTES BLD QL SMEAR: ABNORMAL
EOSINOPHIL # BLD: 0 K/UL (ref 0–0.6)
EOSINOPHIL NFR BLD: 0 % (ref 0–5)
ERYTHROCYTE [DISTWIDTH] IN BLOOD BY AUTOMATED COUNT: 12.9 % (ref 11.5–14.5)
GLUCOSE BLD-MCNC: 136 MG/DL (ref 70–99)
GLUCOSE BLD-MCNC: 190 MG/DL (ref 70–99)
GLUCOSE BLD-MCNC: 216 MG/DL (ref 70–99)
GLUCOSE BLD-MCNC: 216 MG/DL (ref 70–99)
GLUCOSE SERPL-MCNC: 139 MG/DL (ref 74–109)
HCT VFR BLD AUTO: 26.9 % (ref 37–47)
HGB BLD-MCNC: 9.4 G/DL (ref 12–16)
HYPOCHROMIA BLD QL SMEAR: ABNORMAL
IMM GRANULOCYTES # BLD: 1.7 K/UL
LYMPHOCYTES # BLD: 0.7 K/UL (ref 1.1–4.5)
LYMPHOCYTES NFR BLD: 3 % (ref 20–40)
MAGNESIUM SERPL-MCNC: 3.1 MG/DL (ref 1.6–2.4)
MCH RBC QN AUTO: 30.1 PG (ref 27–31)
MCHC RBC AUTO-ENTMCNC: 34.9 G/DL (ref 33–37)
MCV RBC AUTO: 86.2 FL (ref 81–99)
MONOCYTES # BLD: 2.1 K/UL (ref 0–0.9)
MONOCYTES NFR BLD: 13 % (ref 0–10)
NEUTROPHILS # BLD: 13.6 K/UL (ref 1.5–7.5)
NEUTS BAND NFR BLD MANUAL: 5 % (ref 0–5)
NEUTS SEG NFR BLD: 78 % (ref 50–65)
ORGANISM: ABNORMAL
OVALOCYTES BLD QL SMEAR: ABNORMAL
PERFORMED ON: ABNORMAL
PLATELET # BLD AUTO: 16 K/UL (ref 130–400)
PLATELET SLIDE REVIEW: ABNORMAL
POLYCHROMASIA BLD QL SMEAR: ABNORMAL
POTASSIUM SERPL-SCNC: 3.8 MMOL/L (ref 3.5–5)
RBC # BLD AUTO: 3.12 M/UL (ref 4.2–5.4)
SODIUM SERPL-SCNC: 128 MMOL/L (ref 136–145)
VARIANT LYMPHS NFR BLD: 1 % (ref 0–8)
WBC # BLD AUTO: 16.4 K/UL (ref 4.8–10.8)

## 2024-03-07 PROCEDURE — 6360000002 HC RX W HCPCS: Performed by: HOSPITALIST

## 2024-03-07 PROCEDURE — 85025 COMPLETE CBC W/AUTO DIFF WBC: CPT

## 2024-03-07 PROCEDURE — 6370000000 HC RX 637 (ALT 250 FOR IP): Performed by: HOSPITALIST

## 2024-03-07 PROCEDURE — 2580000003 HC RX 258: Performed by: HOSPITALIST

## 2024-03-07 PROCEDURE — 94640 AIRWAY INHALATION TREATMENT: CPT

## 2024-03-07 PROCEDURE — 99232 SBSQ HOSP IP/OBS MODERATE 35: CPT

## 2024-03-07 PROCEDURE — 2580000003 HC RX 258: Performed by: INTERNAL MEDICINE

## 2024-03-07 PROCEDURE — 94760 N-INVAS EAR/PLS OXIMETRY 1: CPT

## 2024-03-07 PROCEDURE — 1200000000 HC SEMI PRIVATE

## 2024-03-07 PROCEDURE — 6370000000 HC RX 637 (ALT 250 FOR IP): Performed by: INTERNAL MEDICINE

## 2024-03-07 PROCEDURE — 80048 BASIC METABOLIC PNL TOTAL CA: CPT

## 2024-03-07 PROCEDURE — 99232 SBSQ HOSP IP/OBS MODERATE 35: CPT | Performed by: INTERNAL MEDICINE

## 2024-03-07 PROCEDURE — 97116 GAIT TRAINING THERAPY: CPT

## 2024-03-07 PROCEDURE — 83735 ASSAY OF MAGNESIUM: CPT

## 2024-03-07 PROCEDURE — 82962 GLUCOSE BLOOD TEST: CPT

## 2024-03-07 PROCEDURE — 36415 COLL VENOUS BLD VENIPUNCTURE: CPT

## 2024-03-07 PROCEDURE — 6360000002 HC RX W HCPCS: Performed by: INTERNAL MEDICINE

## 2024-03-07 RX ORDER — METOCLOPRAMIDE 10 MG/1
5 TABLET ORAL 3 TIMES DAILY PRN
Status: DISCONTINUED | OUTPATIENT
Start: 2024-03-07 | End: 2024-03-10 | Stop reason: HOSPADM

## 2024-03-07 RX ORDER — LACTOBACILLUS RHAMNOSUS GG 10B CELL
1 CAPSULE ORAL
Status: DISCONTINUED | OUTPATIENT
Start: 2024-03-08 | End: 2024-03-10 | Stop reason: HOSPADM

## 2024-03-07 RX ORDER — TEMAZEPAM 15 MG/1
15 CAPSULE ORAL NIGHTLY PRN
Status: DISCONTINUED | OUTPATIENT
Start: 2024-03-07 | End: 2024-03-10 | Stop reason: HOSPADM

## 2024-03-07 RX ADMIN — SODIUM CHLORIDE, PRESERVATIVE FREE 10 ML: 5 INJECTION INTRAVENOUS at 07:46

## 2024-03-07 RX ADMIN — ARFORMOTEROL TARTRATE 15 MCG: 15 SOLUTION RESPIRATORY (INHALATION) at 18:24

## 2024-03-07 RX ADMIN — BUDESONIDE 500 MCG: 0.5 INHALANT ORAL at 06:38

## 2024-03-07 RX ADMIN — ATORVASTATIN CALCIUM 10 MG: 10 TABLET, FILM COATED ORAL at 20:16

## 2024-03-07 RX ADMIN — BUDESONIDE 500 MCG: 0.5 INHALANT ORAL at 18:24

## 2024-03-07 RX ADMIN — PANTOPRAZOLE SODIUM 20 MG: 20 TABLET, DELAYED RELEASE ORAL at 07:46

## 2024-03-07 RX ADMIN — VANCOMYCIN HYDROCHLORIDE 750 MG: 750 INJECTION, POWDER, LYOPHILIZED, FOR SOLUTION INTRAVENOUS at 05:11

## 2024-03-07 RX ADMIN — CEFEPIME 2000 MG: 2 INJECTION, POWDER, FOR SOLUTION INTRAVENOUS at 09:33

## 2024-03-07 RX ADMIN — Medication 1 TABLET: at 20:16

## 2024-03-07 RX ADMIN — Medication 1 TABLET: at 07:46

## 2024-03-07 RX ADMIN — SODIUM CHLORIDE, SODIUM LACTATE, POTASSIUM CHLORIDE, AND CALCIUM CHLORIDE: 600; 310; 30; 20 INJECTION, SOLUTION INTRAVENOUS at 07:57

## 2024-03-07 RX ADMIN — LEVOFLOXACIN 750 MG: 5 INJECTION, SOLUTION INTRAVENOUS at 17:45

## 2024-03-07 RX ADMIN — LEVOTHYROXINE, LIOTHYRONINE 60 MG: 38; 9 TABLET ORAL at 07:46

## 2024-03-07 RX ADMIN — ARFORMOTEROL TARTRATE 15 MCG: 15 SOLUTION RESPIRATORY (INHALATION) at 06:38

## 2024-03-07 RX ADMIN — Medication 5000 UNITS: at 07:46

## 2024-03-07 RX ADMIN — TEMAZEPAM 15 MG: 15 CAPSULE ORAL at 20:19

## 2024-03-07 NOTE — CARE COORDINATION
Case Management Assessment  Initial Evaluation    Date/Time of Evaluation: 3/7/2024 11:45 AM  Assessment Completed by: Mikki Senior RN    If patient is discharged prior to next notation, then this note serves as note for discharge by case management.    Patient Name: Nikkie Banks                   YOB: 1950  Diagnosis: Chemotherapy-induced neutropenia (HCC) [D70.1, T45.1X5A]  AURELIANO (acute kidney injury) (HCC) [N17.9]  Atrial fibrillation with RVR (HCC) [I48.91]  Nausea vomiting and diarrhea [R11.2, R19.7]  Septic shock (HCC) [A41.9, R65.21]  Stage 4 malignant neoplasm of lung, unspecified laterality (HCC) [C34.90]  Hypotension due to hypovolemia [I95.89, E86.1]                   Date / Time: 3/4/2024  6:21 PM    Patient Admission Status: Inpatient   Readmission Risk (Low < 19, Mod (19-27), High > 27): Readmission Risk Score: 19.5    Current PCP: Lovely Ayala APRN  PCP verified by CM? (P) Yes    Chart Reviewed: Yes      History Provided by: (P) Patient, Spouse  Patient Orientation: (P) Alert and Oriented    Patient Cognition: (P) Alert    Hospitalization in the last 30 days (Readmission):  No    If yes, Readmission Assessment in CM Navigator will be completed.    Advance Directives:      Code Status: DNR   Patient's Primary Decision Maker is: (P) Legal Next of Kin    Primary Decision Maker: Soy Eldridge (HCS) - Spouse - [phone]    Secondary Decision Maker: Nba Mendieta - Child - [phone]    Discharge Planning:    Patient lives with: (P) Spouse/Significant Other Type of Home: (P) House  Primary Care Giver: (P) Spouse  Patient Support Systems include: (P) Spouse/Significant Other   Current Financial resources: (P) Medicare  Current community resources: (P) Lodging  Current services prior to admission: None            Current DME:              Type of Home Care services:  (P) PT, Nursing Services (if pt accepts HC)    ADLS  Prior functional level: (P) Independent in

## 2024-03-08 ENCOUNTER — TELEPHONE (OUTPATIENT)
Dept: VASCULAR SURGERY | Age: 74
End: 2024-03-08

## 2024-03-08 DIAGNOSIS — Z01.818 PRE-OP TESTING: Primary | ICD-10-CM

## 2024-03-08 LAB
ANION GAP SERPL CALCULATED.3IONS-SCNC: 10 MMOL/L (ref 7–19)
BASOPHILS # BLD: 0 K/UL (ref 0–0.2)
BASOPHILS NFR BLD: 0 % (ref 0–1)
BUN SERPL-MCNC: 42 MG/DL (ref 8–23)
BURR CELLS BLD QL SMEAR: ABNORMAL
CALCIUM SERPL-MCNC: 7.5 MG/DL (ref 8.8–10.2)
CHLORIDE SERPL-SCNC: 93 MMOL/L (ref 98–111)
CO2 SERPL-SCNC: 32 MMOL/L (ref 22–29)
CREAT SERPL-MCNC: 1 MG/DL (ref 0.5–0.9)
DACRYOCYTES BLD QL SMEAR: ABNORMAL
EOSINOPHIL # BLD: 0 K/UL (ref 0–0.6)
EOSINOPHIL NFR BLD: 0 % (ref 0–5)
ERYTHROCYTE [DISTWIDTH] IN BLOOD BY AUTOMATED COUNT: 13.3 % (ref 11.5–14.5)
GLUCOSE BLD-MCNC: 111 MG/DL (ref 70–99)
GLUCOSE BLD-MCNC: 112 MG/DL (ref 70–99)
GLUCOSE BLD-MCNC: 115 MG/DL (ref 70–99)
GLUCOSE BLD-MCNC: 199 MG/DL (ref 70–99)
GLUCOSE SERPL-MCNC: 145 MG/DL (ref 74–109)
HCT VFR BLD AUTO: 26.4 % (ref 37–47)
HGB BLD-MCNC: 9.1 G/DL (ref 12–16)
HYPOCHROMIA BLD QL SMEAR: ABNORMAL
IMM GRANULOCYTES # BLD: 4.5 K/UL
LYMPHOCYTES # BLD: 1.9 K/UL (ref 1.1–4.5)
LYMPHOCYTES NFR BLD: 5 % (ref 20–40)
MAGNESIUM SERPL-MCNC: 2.5 MG/DL (ref 1.6–2.4)
MCH RBC QN AUTO: 29.6 PG (ref 27–31)
MCHC RBC AUTO-ENTMCNC: 34.5 G/DL (ref 33–37)
MCV RBC AUTO: 86 FL (ref 81–99)
METAMYELOCYTES NFR BLD MANUAL: 3 %
MONOCYTES # BLD: 5.2 K/UL (ref 0–0.9)
MONOCYTES NFR BLD: 14 % (ref 0–10)
MYELOCYTES NFR BLD MANUAL: 3 %
NEUTROPHILS # BLD: 30.1 K/UL (ref 1.5–7.5)
NEUTS BAND NFR BLD MANUAL: 10 % (ref 0–5)
NEUTS SEG NFR BLD: 65 % (ref 50–65)
OVALOCYTES BLD QL SMEAR: ABNORMAL
PERFORMED ON: ABNORMAL
PLATELET # BLD AUTO: 17 K/UL (ref 130–400)
PLATELET SLIDE REVIEW: ABNORMAL
POIKILOCYTOSIS BLD QL SMEAR: ABNORMAL
POTASSIUM SERPL-SCNC: 3.9 MMOL/L (ref 3.5–5)
RBC # BLD AUTO: 3.07 M/UL (ref 4.2–5.4)
SODIUM SERPL-SCNC: 135 MMOL/L (ref 136–145)
WBC # BLD AUTO: 37.2 K/UL (ref 4.8–10.8)

## 2024-03-08 PROCEDURE — 94760 N-INVAS EAR/PLS OXIMETRY 1: CPT

## 2024-03-08 PROCEDURE — 97110 THERAPEUTIC EXERCISES: CPT

## 2024-03-08 PROCEDURE — 99232 SBSQ HOSP IP/OBS MODERATE 35: CPT | Performed by: INTERNAL MEDICINE

## 2024-03-08 PROCEDURE — 82962 GLUCOSE BLOOD TEST: CPT

## 2024-03-08 PROCEDURE — 6360000002 HC RX W HCPCS: Performed by: HOSPITALIST

## 2024-03-08 PROCEDURE — 99232 SBSQ HOSP IP/OBS MODERATE 35: CPT

## 2024-03-08 PROCEDURE — 36415 COLL VENOUS BLD VENIPUNCTURE: CPT

## 2024-03-08 PROCEDURE — 83735 ASSAY OF MAGNESIUM: CPT

## 2024-03-08 PROCEDURE — 97116 GAIT TRAINING THERAPY: CPT

## 2024-03-08 PROCEDURE — 85025 COMPLETE CBC W/AUTO DIFF WBC: CPT

## 2024-03-08 PROCEDURE — 6370000000 HC RX 637 (ALT 250 FOR IP): Performed by: INTERNAL MEDICINE

## 2024-03-08 PROCEDURE — 6370000000 HC RX 637 (ALT 250 FOR IP): Performed by: HOSPITALIST

## 2024-03-08 PROCEDURE — 94640 AIRWAY INHALATION TREATMENT: CPT

## 2024-03-08 PROCEDURE — 2580000003 HC RX 258: Performed by: HOSPITALIST

## 2024-03-08 PROCEDURE — 1200000000 HC SEMI PRIVATE

## 2024-03-08 PROCEDURE — 80048 BASIC METABOLIC PNL TOTAL CA: CPT

## 2024-03-08 PROCEDURE — 99222 1ST HOSP IP/OBS MODERATE 55: CPT | Performed by: PHYSICIAN ASSISTANT

## 2024-03-08 RX ORDER — LOSARTAN POTASSIUM 100 MG/1
100 TABLET ORAL DAILY
Status: DISCONTINUED | OUTPATIENT
Start: 2024-03-08 | End: 2024-03-10 | Stop reason: HOSPADM

## 2024-03-08 RX ADMIN — Medication 5000 UNITS: at 09:09

## 2024-03-08 RX ADMIN — Medication 1 CAPSULE: at 09:09

## 2024-03-08 RX ADMIN — ARFORMOTEROL TARTRATE 15 MCG: 15 SOLUTION RESPIRATORY (INHALATION) at 07:09

## 2024-03-08 RX ADMIN — ATORVASTATIN CALCIUM 10 MG: 10 TABLET, FILM COATED ORAL at 20:00

## 2024-03-08 RX ADMIN — LEVOTHYROXINE, LIOTHYRONINE 60 MG: 38; 9 TABLET ORAL at 06:31

## 2024-03-08 RX ADMIN — PANTOPRAZOLE SODIUM 20 MG: 20 TABLET, DELAYED RELEASE ORAL at 06:31

## 2024-03-08 RX ADMIN — ARFORMOTEROL TARTRATE 15 MCG: 15 SOLUTION RESPIRATORY (INHALATION) at 18:51

## 2024-03-08 RX ADMIN — BUDESONIDE 500 MCG: 0.5 INHALANT ORAL at 18:51

## 2024-03-08 RX ADMIN — LOSARTAN POTASSIUM 100 MG: 100 TABLET, FILM COATED ORAL at 11:01

## 2024-03-08 RX ADMIN — TEMAZEPAM 15 MG: 15 CAPSULE ORAL at 20:08

## 2024-03-08 RX ADMIN — Medication 1 TABLET: at 09:09

## 2024-03-08 RX ADMIN — Medication 5 MG: at 02:23

## 2024-03-08 RX ADMIN — Medication 1 TABLET: at 20:00

## 2024-03-08 RX ADMIN — SODIUM CHLORIDE, PRESERVATIVE FREE 10 ML: 5 INJECTION INTRAVENOUS at 19:59

## 2024-03-08 RX ADMIN — BUDESONIDE 500 MCG: 0.5 INHALANT ORAL at 07:09

## 2024-03-08 NOTE — TELEPHONE ENCOUNTER
Spoke to the patient to let her know the following.  I also had the 4th floor nurses print the letter and give her a copy.            Nkikie KYRA Banks    Surgery Directions    Report to the outpatient registration at [facility] on Monday,        03/18/2024.  The surgery department will call you the day before to let you        know what time to arrive @ the hospital.   Nothing to eat or drink after midnight the night before the procedure.  Please take all medications as normally scheduled to take unless listed below with a sip of water.  Do not take Losartan or insulin the morning of the surgery.   If you have sleep apnea and require C-PAP, please bring this with you to the hospital.  Bring a list of all of your allergies and medications with you to the hospital.  Please let our nurse know if you have had an allergy to iodine, shellfish, or x-ray dye.  Let the nurse know if you take any of the following:  Over the counter herbal supplements  Diclofenec, indomethacin, ketoprofen, Caridopa/levadopa, naproxen, sulindac, piroxicam, glucosamine, Chondrotin, cocchine, or methotrexate.  Plan to stay at the hospital for 4 - 6 hours before being released  by the physician. You will need someone to drive you home after the procedure.  Please register at the outpatient area of the [facility] on 03/14/2024 @ 10:00 AM for pre-op testing.  You will not need to be fasting prior to this appointment.  You will need to use mupirocin ointment in your nose 5 days prior to surgery.  This will be called to your local pharmacy on file.   11. Other Directions: For any questions or concerns please contact our office @        [phone] and ask to speak with Leesa.   12.  You will need a  to take you home.      Unless instructed otherwise by your physician, cleanse incision/puncture site twice daily with soap and water.  Apply dry gauze. Do not get in tub.  Okay to shower.  Do not apply any salve, cream,

## 2024-03-08 NOTE — CONSULTS
[facility] Vascular Surgery    CONSULT    Patient:  Nikkie Banks  YOB: 1950  Date of Service: 3/8/2024  MRN: [id number]   Acct: [id number]   Primary Care Physician: Lovely Ayala APRN    Reason for consult: need for port placement    Requesting Physician: Dr. Valdivia    History Obtained From: patient and chart review    HISTORY OF PRESENT ILLNESS:  Mrs. Nikkie Banks is a 73 y.o. female who has a diagnosis of non-small cell lung cancer, adenocarcinoma EGFR mutated. She is currently receiving palliative chemotherapy and spiked a fever. She was then instructed to come to ED. She was found to have severe pancytopenia with WBC 0.3, hemoglobin 11.6, platelet count 24,000. Chemistry remarkable for elevated lactic acid, severe hyponatremia, acute kidney injury with creatinine 2.5, hyperglycemia. She was admitted to the hospital. During this stay there has been issues maintaining adequate vascular access, therefore vascular surgery consulted for port placement.      Past Medical History:       Diagnosis Date    Acquired hypothyroidism 08/20/2017    Alopecia 08/21/2017    Breast implant capsular contracture     \"with cysts behind right breast\" per pt.     Esophageal mass 01/31/2023    Essential hypertension 08/20/2017    Familial hypercholesterolemia 08/20/2017    Lung cancer (HCC)     Lung mass 01/31/2023    Nodule of chest wall 05/10/2021    Palliative care patient 03/05/2024    PONV (postoperative nausea and vomiting)     Prolonged emergence from general anesthesia     Stage 1 chronic kidney disease 06/29/2021    Tobacco abuse, in remission        Past Surgical History:        Procedure Laterality Date    BREAST CYST EXCISION  2022    BREAST ENHANCEMENT SURGERY Bilateral 06/11/2020    REMOVAL BILATERAL CONTRACTURED IMPLANTS WITH BILATERAL PECTORAL BLOCK performed by Huey Cox MD at [facility] OR    BUNIONECTOMY Bilateral 2008    bunion correct osteotomy smith double-stem lesser MTP Impl    
Palliative Care Consult Note    3/5/2024 10:05 AM  Subjective:  Admit Date: 3/4/2024  PCP: Lovely Ayala APRN    Date of Service: 3/5/2024    Reason for Consultation:  Goals of Care, Code Status, Family Support     History Obtained From: EMR/Patient and their Family    History Of Present Illness:   The patient is a 73 y.o. female with HTN, HLD, hypothyroid, CKD, metastatic lung cancer diagnosis and other comorbidities who presented to [facility] ED 3/4/2024 with multiple complaints including fatigue, nausea and vomiting, diarrhea, decreased oral intake for at least 3 days. Patient has known recent diagnosis of stage IV non-small cell lung cancer, adenocarcinoma EGFR mutated with liver metastasis. Follows locally with oncology with Dr. Valdivia.  She was initially diagnosed January 2023 and was initiated on Osimertinib at that time. She completed  LLL SBRT with Dr. Ramirez 11/8/2023.  Unfortunately concern for progression of disease in lung and liver and repeat liver biopsy obtained 1/25/2024 consistent with small cell transformation.  She did receive second opinion from [facility] and is currently receiving palliative chemotherapy with carboplatin and Alimta, last received 2/23/2024.  On chart review it appears she called oncology clinic on day of admission reporting a fever and she was instructed to come to the ED for further evaluation.  Workup revealed severe pancytopenia with WBC 0.3, hemoglobin 11.6, platelet count 24,000. Chemistry remarkable for elevated lactic acid, severe hyponatremia, acute kidney injury with creatinine 2.5, hyperglycemia.  CXR with small left pleural effusion and adjacent atelectasis, pneumonia cannot be excluded.  She was admitted to ICU under hospitalist services with oncology evaluation and initiated on broad-spectrum antibiotics and IVF resuscitation.  Pancultures obtained.  Palliative care is consulted for goals of care discussions and symptom management.    Past Medical History:      
attenuation lesions which appear less noticeable since prior study. It is uncertain whether this represents disease improvement or differences in CT technique.  Largest currently visualized is 1 cm. Moderate atherosclerotic disease. Again noted is apparent thickening of the lower esophagus which at least in part may be secondary to a small sliding hiatal hernia. This can be correlated with fluoroscopic upper gastrointestinal series. General bowel gas pattern and appearance is within normal limits.  10/4/23 PET CT Skull: There is uniform metabolic activity associated with a 3.1 x 4.4 cm left lower lobe mass, with an SUV max of 13. This is malignant.  Outside of this mass, no additional uptake is observed to suggest local or distant metastatic spread.  10/06/23-I reviewed imaging studies CT C/A/P.  Interval improvement of liver metastatic lesions.  Interval worsening of left lower lobe lung lesion.  This is consistent with oligometastatic progression in the lung only.  She had a PET scan that showed no other sites of disease activity other than the left lower lobe mass.  Therefore, I recommend the patient to be seen by Dr. George Ramirez for consideration of definitive RT to the site of progression.  She is to continue her Tagrisso.  10/12/23 Initial consult with Dr. George Ramirez ([facility]); Will do markings today to see if 4-5 pinpoint treatments are possible. If pinpoint treatments aren't possible, will plan on 33 daily treatments.    10/20/23 Bone Density Lumbar Spine T-Score 1.1, Left Hip T-Score 0.4  10/31/23 Initiated radiation  left lung at 5000 cGy over 4 treatments  10/31/23-11/8/23 Completed radiation left lung 5000 cGy over 4 treatments  1/9/24 CT Chest W Contrast Mass in the left lower lobe posteriorly measures 2.8 x 4.0 cm; prior was 3.1 x 4.4 cm. Multiple new bilateral pulmonary nodules with the 1.1 cm nodule in the right upper lobe anteriorly on image 38, 1.5-cm nodule in the right middle lobe anteriorly on

## 2024-03-09 LAB
ANION GAP SERPL CALCULATED.3IONS-SCNC: 9 MMOL/L (ref 7–19)
BACTERIA BLD CULT ORG #2: NORMAL
BASOPHILS # BLD: 0 K/UL (ref 0–0.2)
BASOPHILS NFR BLD: 0 % (ref 0–1)
BUN SERPL-MCNC: 35 MG/DL (ref 8–23)
CALCIUM SERPL-MCNC: 7.9 MG/DL (ref 8.8–10.2)
CHLORIDE SERPL-SCNC: 99 MMOL/L (ref 98–111)
CO2 SERPL-SCNC: 30 MMOL/L (ref 22–29)
CREAT SERPL-MCNC: 0.9 MG/DL (ref 0.5–0.9)
DACRYOCYTES BLD QL SMEAR: ABNORMAL
EOSINOPHIL # BLD: 0.41 K/UL (ref 0–0.6)
EOSINOPHIL NFR BLD: 1 % (ref 0–5)
ERYTHROCYTE [DISTWIDTH] IN BLOOD BY AUTOMATED COUNT: 14 % (ref 11.5–14.5)
GLUCOSE BLD-MCNC: 121 MG/DL (ref 70–99)
GLUCOSE BLD-MCNC: 131 MG/DL (ref 70–99)
GLUCOSE BLD-MCNC: 70 MG/DL (ref 70–99)
GLUCOSE BLD-MCNC: 98 MG/DL (ref 70–99)
GLUCOSE SERPL-MCNC: 92 MG/DL (ref 74–109)
HCT VFR BLD AUTO: 29.8 % (ref 37–47)
HGB BLD-MCNC: 9.9 G/DL (ref 12–16)
IMM GRANULOCYTES # BLD: 4.6 K/UL
LYMPHOCYTES # BLD: 2.5 K/UL (ref 1.1–4.5)
LYMPHOCYTES NFR BLD: 4 % (ref 20–40)
MAGNESIUM SERPL-MCNC: 1.9 MG/DL (ref 1.6–2.4)
MCH RBC QN AUTO: 29.5 PG (ref 27–31)
MCHC RBC AUTO-ENTMCNC: 33.2 G/DL (ref 33–37)
MCV RBC AUTO: 88.7 FL (ref 81–99)
METAMYELOCYTES NFR BLD MANUAL: 1 %
MONOCYTES # BLD: 6.1 K/UL (ref 0–0.9)
MONOCYTES NFR BLD: 15 % (ref 0–10)
NEUTROPHILS # BLD: 31.9 K/UL (ref 1.5–7.5)
NEUTS BAND NFR BLD MANUAL: 4 % (ref 0–5)
NEUTS SEG NFR BLD: 73 % (ref 50–65)
PERFORMED ON: ABNORMAL
PERFORMED ON: ABNORMAL
PERFORMED ON: NORMAL
PERFORMED ON: NORMAL
PLATELET # BLD AUTO: 32 K/UL (ref 130–400)
PLATELET SLIDE REVIEW: ABNORMAL
PMV BLD AUTO: 11.7 FL (ref 9.4–12.3)
POLYCHROMASIA BLD QL SMEAR: ABNORMAL
POTASSIUM SERPL-SCNC: 4.6 MMOL/L (ref 3.5–5)
RBC # BLD AUTO: 3.36 M/UL (ref 4.2–5.4)
SMUDGE CELLS BLD QL SMEAR: ABNORMAL
SODIUM SERPL-SCNC: 138 MMOL/L (ref 136–145)
VARIANT LYMPHS NFR BLD: 2 % (ref 0–8)
WBC # BLD AUTO: 40.9 K/UL (ref 4.8–10.8)

## 2024-03-09 PROCEDURE — 99232 SBSQ HOSP IP/OBS MODERATE 35: CPT | Performed by: INTERNAL MEDICINE

## 2024-03-09 PROCEDURE — 85025 COMPLETE CBC W/AUTO DIFF WBC: CPT

## 2024-03-09 PROCEDURE — 36415 COLL VENOUS BLD VENIPUNCTURE: CPT

## 2024-03-09 PROCEDURE — 2580000003 HC RX 258: Performed by: HOSPITALIST

## 2024-03-09 PROCEDURE — 6370000000 HC RX 637 (ALT 250 FOR IP): Performed by: HOSPITALIST

## 2024-03-09 PROCEDURE — 80048 BASIC METABOLIC PNL TOTAL CA: CPT

## 2024-03-09 PROCEDURE — 94760 N-INVAS EAR/PLS OXIMETRY 1: CPT

## 2024-03-09 PROCEDURE — 6370000000 HC RX 637 (ALT 250 FOR IP): Performed by: INTERNAL MEDICINE

## 2024-03-09 PROCEDURE — 83735 ASSAY OF MAGNESIUM: CPT

## 2024-03-09 PROCEDURE — 1200000000 HC SEMI PRIVATE

## 2024-03-09 PROCEDURE — 97116 GAIT TRAINING THERAPY: CPT

## 2024-03-09 PROCEDURE — 94640 AIRWAY INHALATION TREATMENT: CPT

## 2024-03-09 PROCEDURE — 82962 GLUCOSE BLOOD TEST: CPT

## 2024-03-09 PROCEDURE — 6360000002 HC RX W HCPCS: Performed by: HOSPITALIST

## 2024-03-09 RX ORDER — LEVOFLOXACIN 750 MG/1
750 TABLET, FILM COATED ORAL
Status: DISCONTINUED | OUTPATIENT
Start: 2024-03-09 | End: 2024-03-10 | Stop reason: HOSPADM

## 2024-03-09 RX ADMIN — Medication 5000 UNITS: at 09:05

## 2024-03-09 RX ADMIN — TEMAZEPAM 15 MG: 15 CAPSULE ORAL at 19:16

## 2024-03-09 RX ADMIN — Medication 1 TABLET: at 19:16

## 2024-03-09 RX ADMIN — LEVOTHYROXINE, LIOTHYRONINE 60 MG: 38; 9 TABLET ORAL at 05:34

## 2024-03-09 RX ADMIN — Medication 1 CAPSULE: at 09:04

## 2024-03-09 RX ADMIN — ATORVASTATIN CALCIUM 10 MG: 10 TABLET, FILM COATED ORAL at 19:16

## 2024-03-09 RX ADMIN — BUDESONIDE 500 MCG: 0.5 INHALANT ORAL at 19:24

## 2024-03-09 RX ADMIN — SODIUM CHLORIDE, PRESERVATIVE FREE 10 ML: 5 INJECTION INTRAVENOUS at 19:16

## 2024-03-09 RX ADMIN — LOSARTAN POTASSIUM 100 MG: 100 TABLET, FILM COATED ORAL at 09:05

## 2024-03-09 RX ADMIN — ARFORMOTEROL TARTRATE 15 MCG: 15 SOLUTION RESPIRATORY (INHALATION) at 19:24

## 2024-03-09 RX ADMIN — LEVOFLOXACIN 750 MG: 750 TABLET, FILM COATED ORAL at 11:41

## 2024-03-09 RX ADMIN — ARFORMOTEROL TARTRATE 15 MCG: 15 SOLUTION RESPIRATORY (INHALATION) at 07:43

## 2024-03-09 RX ADMIN — BUDESONIDE 500 MCG: 0.5 INHALANT ORAL at 07:43

## 2024-03-09 RX ADMIN — Medication 1 TABLET: at 09:05

## 2024-03-09 RX ADMIN — PANTOPRAZOLE SODIUM 20 MG: 20 TABLET, DELAYED RELEASE ORAL at 05:34

## 2024-03-10 VITALS
DIASTOLIC BLOOD PRESSURE: 78 MMHG | SYSTOLIC BLOOD PRESSURE: 155 MMHG | TEMPERATURE: 97.3 F | HEART RATE: 84 BPM | RESPIRATION RATE: 16 BRPM | HEIGHT: 60 IN | WEIGHT: 120.2 LBS | BODY MASS INDEX: 23.6 KG/M2 | OXYGEN SATURATION: 97 %

## 2024-03-10 LAB
ANION GAP SERPL CALCULATED.3IONS-SCNC: 9 MMOL/L (ref 7–19)
BASOPHILS # BLD: 0 K/UL (ref 0–0.2)
BASOPHILS NFR BLD: 0 % (ref 0–1)
BUN SERPL-MCNC: 25 MG/DL (ref 8–23)
CALCIUM SERPL-MCNC: 8.5 MG/DL (ref 8.8–10.2)
CHLORIDE SERPL-SCNC: 99 MMOL/L (ref 98–111)
CO2 SERPL-SCNC: 26 MMOL/L (ref 22–29)
CREAT SERPL-MCNC: 0.9 MG/DL (ref 0.5–0.9)
DACRYOCYTES BLD QL SMEAR: ABNORMAL
EOSINOPHIL # BLD: 0.35 K/UL (ref 0–0.6)
EOSINOPHIL NFR BLD: 1 % (ref 0–5)
ERYTHROCYTE [DISTWIDTH] IN BLOOD BY AUTOMATED COUNT: 14 % (ref 11.5–14.5)
GLUCOSE SERPL-MCNC: 112 MG/DL (ref 74–109)
HCT VFR BLD AUTO: 30.8 % (ref 37–47)
HGB BLD-MCNC: 10.2 G/DL (ref 12–16)
IMM GRANULOCYTES # BLD: 7 K/UL
LYMPHOCYTES # BLD: 2.8 K/UL (ref 1.1–4.5)
LYMPHOCYTES NFR BLD: 4 % (ref 20–40)
MAGNESIUM SERPL-MCNC: 1.5 MG/DL (ref 1.6–2.4)
MCH RBC QN AUTO: 29.2 PG (ref 27–31)
MCHC RBC AUTO-ENTMCNC: 33.1 G/DL (ref 33–37)
MCV RBC AUTO: 88.3 FL (ref 81–99)
METAMYELOCYTES NFR BLD MANUAL: 1 %
MONOCYTES # BLD: 6 K/UL (ref 0–0.9)
MONOCYTES NFR BLD: 17 % (ref 0–10)
NEUTROPHILS # BLD: 26 K/UL (ref 1.5–7.5)
NEUTS BAND NFR BLD MANUAL: 5 % (ref 0–5)
NEUTS SEG NFR BLD: 68 % (ref 50–65)
PLATELET # BLD AUTO: 55 K/UL (ref 130–400)
PLATELET SLIDE REVIEW: ABNORMAL
PMV BLD AUTO: 13 FL (ref 9.4–12.3)
POLYCHROMASIA BLD QL SMEAR: ABNORMAL
POTASSIUM SERPL-SCNC: 4.7 MMOL/L (ref 3.5–5)
RBC # BLD AUTO: 3.49 M/UL (ref 4.2–5.4)
SMUDGE CELLS BLD QL SMEAR: ABNORMAL
SODIUM SERPL-SCNC: 134 MMOL/L (ref 136–145)
STOMATOCYTES BLD QL SMEAR: ABNORMAL
VARIANT LYMPHS NFR BLD: 4 % (ref 0–8)
WBC # BLD AUTO: 35.1 K/UL (ref 4.8–10.8)

## 2024-03-10 PROCEDURE — 85025 COMPLETE CBC W/AUTO DIFF WBC: CPT

## 2024-03-10 PROCEDURE — 6360000002 HC RX W HCPCS: Performed by: HOSPITALIST

## 2024-03-10 PROCEDURE — 99232 SBSQ HOSP IP/OBS MODERATE 35: CPT | Performed by: INTERNAL MEDICINE

## 2024-03-10 PROCEDURE — 94760 N-INVAS EAR/PLS OXIMETRY 1: CPT

## 2024-03-10 PROCEDURE — 94640 AIRWAY INHALATION TREATMENT: CPT

## 2024-03-10 PROCEDURE — 36415 COLL VENOUS BLD VENIPUNCTURE: CPT

## 2024-03-10 PROCEDURE — 83735 ASSAY OF MAGNESIUM: CPT

## 2024-03-10 PROCEDURE — 6370000000 HC RX 637 (ALT 250 FOR IP): Performed by: INTERNAL MEDICINE

## 2024-03-10 PROCEDURE — 6370000000 HC RX 637 (ALT 250 FOR IP): Performed by: HOSPITALIST

## 2024-03-10 PROCEDURE — 80048 BASIC METABOLIC PNL TOTAL CA: CPT

## 2024-03-10 RX ORDER — CALCIUM CARBONATE 500 MG/1
500 TABLET, CHEWABLE ORAL 3 TIMES DAILY PRN
Qty: 90 TABLET | Refills: 0 | Status: SHIPPED | OUTPATIENT
Start: 2024-03-10 | End: 2024-04-09

## 2024-03-10 RX ORDER — LANOLIN ALCOHOL/MO/W.PET/CERES
400 CREAM (GRAM) TOPICAL ONCE
Status: COMPLETED | OUTPATIENT
Start: 2024-03-10 | End: 2024-03-10

## 2024-03-10 RX ORDER — LACTOBACILLUS RHAMNOSUS GG 10B CELL
1 CAPSULE ORAL
Qty: 10 CAPSULE | Refills: 0 | Status: SHIPPED | OUTPATIENT
Start: 2024-03-11

## 2024-03-10 RX ORDER — LEVOFLOXACIN 750 MG/1
750 TABLET, FILM COATED ORAL
Qty: 4 TABLET | Refills: 0 | Status: SHIPPED | OUTPATIENT
Start: 2024-03-11 | End: 2024-03-18

## 2024-03-10 RX ADMIN — Medication 1 TABLET: at 08:24

## 2024-03-10 RX ADMIN — LOSARTAN POTASSIUM 100 MG: 100 TABLET, FILM COATED ORAL at 08:24

## 2024-03-10 RX ADMIN — Medication 5000 UNITS: at 08:24

## 2024-03-10 RX ADMIN — BUDESONIDE 500 MCG: 0.5 INHALANT ORAL at 06:33

## 2024-03-10 RX ADMIN — PANTOPRAZOLE SODIUM 20 MG: 20 TABLET, DELAYED RELEASE ORAL at 05:14

## 2024-03-10 RX ADMIN — Medication 1 CAPSULE: at 08:24

## 2024-03-10 RX ADMIN — LEVOTHYROXINE, LIOTHYRONINE 60 MG: 38; 9 TABLET ORAL at 05:14

## 2024-03-10 RX ADMIN — Medication 400 MG: at 14:01

## 2024-03-10 RX ADMIN — ARFORMOTEROL TARTRATE 15 MCG: 15 SOLUTION RESPIRATORY (INHALATION) at 06:33

## 2024-03-10 NOTE — PLAN OF CARE
Problem: Safety - Adult  Goal: Free from fall injury  3/10/2024 1334 by Yohana Anguiano RN  Outcome: HH/HSPC Resolved Met  3/10/2024 0046 by Celine Aparicio RN  Outcome: Progressing  Flowsheets (Taken 3/9/2024 1313 by Sammi Edouard LPN)  Free From Fall Injury: Instruct family/caregiver on patient safety     Problem: Discharge Planning  Goal: Discharge to home or other facility with appropriate resources  3/10/2024 1334 by Yohana Anguiano RN  Outcome: HH/HSPC Resolved Met  3/10/2024 0046 by Celine Aparicio RN  Outcome: Progressing     Problem: Skin/Tissue Integrity  Goal: Absence of new skin breakdown  Description: 1.  Monitor for areas of redness and/or skin breakdown  2.  Assess vascular access sites hourly  3.  Every 4-6 hours minimum:  Change oxygen saturation probe site  4.  Every 4-6 hours:  If on nasal continuous positive airway pressure, respiratory therapy assess nares and determine need for appliance change or resting period.  3/10/2024 1334 by Yohana Anguiano RN  Outcome: HH/HSPC Resolved Met  3/10/2024 0046 by Celine Aparicio RN  Outcome: Progressing     Problem: ABCDS Injury Assessment  Goal: Absence of physical injury  3/10/2024 1334 by Yohana Anguiano RN  Outcome: HH/HSPC Resolved Met  3/10/2024 0046 by Celine Aparicio RN  Outcome: Progressing  Flowsheets (Taken 3/9/2024 1313 by Sammi Edouard LPN)  Absence of Physical Injury: Implement safety measures based on patient assessment     Problem: Pain  Goal: Verbalizes/displays adequate comfort level or baseline comfort level  3/10/2024 1334 by Yohana Anguiano RN  Outcome: HH/HSPC Resolved Met  3/10/2024 0046 by Celine Aparicio RN  Outcome: Progressing     Problem: Nutrition Deficit:  Goal: Optimize nutritional status  3/10/2024 1334 by Yohana Anguiano RN  Outcome: HH/HSPC Resolved Met  3/10/2024 0046 by Celine Aparicio RN  Outcome: Progressing     
  Problem: Safety - Adult  Goal: Free from fall injury  3/7/2024 1141 by Santa Ibarra RN  Outcome: Progressing  3/6/2024 2332 by Tierra Mi RN  Outcome: Progressing     Problem: Discharge Planning  Goal: Discharge to home or other facility with appropriate resources  3/7/2024 1141 by Santa Ibarra RN  Outcome: Progressing  3/6/2024 2332 by Tierra Mi RN  Outcome: Progressing     Problem: Skin/Tissue Integrity  Goal: Absence of new skin breakdown  Description: 1.  Monitor for areas of redness and/or skin breakdown  2.  Assess vascular access sites hourly  3.  Every 4-6 hours minimum:  Change oxygen saturation probe site  4.  Every 4-6 hours:  If on nasal continuous positive airway pressure, respiratory therapy assess nares and determine need for appliance change or resting period.  3/7/2024 1141 by Santa Ibarra RN  Outcome: Progressing  3/6/2024 2332 by Tierra iM RN  Outcome: Progressing     Problem: ABCDS Injury Assessment  Goal: Absence of physical injury  3/7/2024 1141 by Santa Ibarra RN  Outcome: Progressing  3/6/2024 2332 by Tierra Mi RN  Outcome: Progressing     Problem: Pain  Goal: Verbalizes/displays adequate comfort level or baseline comfort level  3/7/2024 1141 by Santa Ibarra RN  Outcome: Progressing  3/6/2024 2332 by Tierra Mi RN  Outcome: Progressing     Problem: Nutrition Deficit:  Goal: Optimize nutritional status  3/7/2024 1141 by Santa Ibarra RN  Outcome: Progressing  3/6/2024 2332 by Tierra Mi RN  Outcome: Progressing     
  Problem: Safety - Adult  Goal: Free from fall injury  Outcome: Progressing     Problem: Discharge Planning  Goal: Discharge to home or other facility with appropriate resources  Outcome: Progressing     Problem: Skin/Tissue Integrity  Goal: Absence of new skin breakdown  Description: 1.  Monitor for areas of redness and/or skin breakdown  2.  Assess vascular access sites hourly  3.  Every 4-6 hours minimum:  Change oxygen saturation probe site  4.  Every 4-6 hours:  If on nasal continuous positive airway pressure, respiratory therapy assess nares and determine need for appliance change or resting period.  Outcome: Progressing     Problem: ABCDS Injury Assessment  Goal: Absence of physical injury  Outcome: Progressing     Problem: Pain  Goal: Verbalizes/displays adequate comfort level or baseline comfort level  Outcome: Progressing     Problem: Nutrition Deficit:  Goal: Optimize nutritional status  Outcome: Progressing     
  Problem: Safety - Adult  Goal: Free from fall injury  Outcome: Progressing  Flowsheets (Taken 3/9/2024 1313 by Sammi Edouard LPN)  Free From Fall Injury: Instruct family/caregiver on patient safety     Problem: Discharge Planning  Goal: Discharge to home or other facility with appropriate resources  Outcome: Progressing     Problem: Skin/Tissue Integrity  Goal: Absence of new skin breakdown  Description: 1.  Monitor for areas of redness and/or skin breakdown  2.  Assess vascular access sites hourly  3.  Every 4-6 hours minimum:  Change oxygen saturation probe site  4.  Every 4-6 hours:  If on nasal continuous positive airway pressure, respiratory therapy assess nares and determine need for appliance change or resting period.  Outcome: Progressing     Problem: ABCDS Injury Assessment  Goal: Absence of physical injury  Outcome: Progressing  Flowsheets (Taken 3/9/2024 1313 by Sammi Edouard LPN)  Absence of Physical Injury: Implement safety measures based on patient assessment     Problem: Pain  Goal: Verbalizes/displays adequate comfort level or baseline comfort level  Outcome: Progressing     Problem: Nutrition Deficit:  Goal: Optimize nutritional status  Outcome: Progressing     
SUBJECTIVE:    Mrs. Nikkie Banks is an unfortunate 73 year old lady who suffers from NSCLC stage 4 from the left breast for which she is on chemo with Osirmetinib as an OP. She has presented to the [facility] for Nausea and Vomiting with Diarrhea along with fatigue. She was found to be profoundly hypotensive and tachycardic and was bolused aggressively, she was also found to be hypoglycemic on screening and despite multiple interventions has not had significant improvement as regards blood sugar as of yet. She was also found on labs to have a severe AURELIANO, her Cr is normally at 0.7 but is up to 2.5 today. She has been started on Cefepime and Vancomycin. Her UA was negative although UTI was initially suspected as per the appearance of her UA sample by her ED team. We are suspecting lung as the source but have not as of yet been able to definitively diagnose her source. She has been started on Vancomycin and this will be continued with the Cefepime as per usual recommendations from Hematology/Oncology for our neutropenic patients with unclear etiology of infection. In addition to urine and blood Cxx a PCR Panel was snet but this has not yet resulted.      OBJECTIVE:    BP (!) 80/49   Pulse (!) 133   Temp 97.7 °F (36.5 °C)   Resp 21   Ht 1.524 m (5')   Wt 52.6 kg (116 lb)   SpO2 93%   BMI 22.65 kg/m²       ASSESSMENTS & PLANS:    Septic Shock in Neutropenic Patient on Active Chemotherapy for NSCLC Stage 4 of Left Lung:  Admit to ICU  Consult to Hematology/Oncology  PK to dose Vancomycin  PK to dose Cefepime  CBC with Diff daily  Urine Strep Antigen and Urine Legionella Antigen - if legionella present will need an Aminopenicillin added  LA repeat timed for 4h s/p prior in ED  ProCal added on    AURELIANO (Cr baseline 0.7 now at 2.5) withOUT CKD (baseline GFR \">60\") withOUT hyperkalemia (Potassium 3.8 PoA):  Metabolic Acidosis: bicarb 17 PoA  Hyponatremia: 127 PoA  Severe Dehydration: BUN 48 with Cr 
Progressing  Flowsheets (Taken 3/6/2024 0800)  Verbalizes/displays adequate comfort level or baseline comfort level:   Encourage patient to monitor pain and request assistance   Assess pain using appropriate pain scale   Administer analgesics based on type and severity of pain and evaluate response   Implement non-pharmacological measures as appropriate and evaluate response     Problem: Nutrition Deficit:  Goal: Optimize nutritional status  3/6/2024 2332 by Tierra Mi RN  Outcome: Progressing  3/6/2024 1045 by Chantel Salgado, RN  Outcome: Progressing  3/6/2024 1044 by Chantel Salgado, RN  Outcome: Progressing

## 2024-03-10 NOTE — PROGRESS NOTES
Pharmacy Renal Adjustment    Nikkie Banks is a 73 y.o. female. Pharmacy has renally adjusted medications per protocol.    Recent Labs     03/04/24  1834 03/05/24  1039   BUN 48* 50*       Recent Labs     03/04/24  1834 03/05/24  1039   CREATININE 2.5* 2.4*       Estimated Creatinine Clearance: 15 mL/min (A) (based on SCr of 2.4 mg/dL (H)).    Height:   Ht Readings from Last 1 Encounters:   03/05/24 1.524 m (5')     Weight:  Wt Readings from Last 1 Encounters:   03/05/24 49.8 kg (109 lb 11.2 oz)       Plan: Adjust the following medications based on renal function:           Change Levaquin to 750 mg x1 followed by 500 mg every 48 hours bloodstream pseudomonas    Electronically signed by Constantino Ocampo RPH on 3/5/2024 at 4:00 PM      
   Pharmacy Renal Adjustment    Nikkie Banks is a 73 y.o. female. Pharmacy has renally adjusted medications per protocol.    Recent Labs     03/06/24  0204 03/07/24  0357   BUN 36* 42*       Recent Labs     03/06/24  0204 03/07/24  0357   CREATININE 1.1* 1.1*       Estimated Creatinine Clearance: 33 mL/min (A) (based on SCr of 1.1 mg/dL (H)).    Height:   Ht Readings from Last 1 Encounters:   03/05/24 1.524 m (5')     Weight:  Wt Readings from Last 1 Encounters:   03/05/24 49.8 kg (109 lb 11.2 oz)         Plan: Adjust the following medications based on renal function:  Change metoclopramide to 5mg by mouth three times daily as needed.    LUIS ANTONIO HODGSON, PHARM D, 3/7/2024, 11:21 AM            
  Palliative Care Progress Note  3/6/2024 12:52 PM    Patient:  Nikkie Banks  YOB: 1950  Primary Care Physician: Lovely Ayala APRN  Advance Directive: DNR  Admit Date: 3/4/2024       Hospital Day: 2  Portions of this note have been copied forward, however, changed to reflect the most current clinical status of this patient.    CHIEF COMPLAINT/REASON FOR CONSULTATION Goals of care, family support, Code status, symptom management     SUBJECTIVE:  Ms. Banks is sitting up in recliner chair and feeling well today.  Denies pain at this time.  Nausea and appetite greatly improved.  No distress during my exam.    HPI: The patient is a 73 y.o. female with HTN, HLD, hypothyroid, CKD, metastatic lung cancer diagnosis and other comorbidities who presented to [facility] ED 3/4/2024 with multiple complaints including fatigue, nausea and vomiting, diarrhea, decreased oral intake for at least 3 days. Patient has known recent diagnosis of stage IV non-small cell lung cancer, adenocarcinoma EGFR mutated with liver metastasis. Follows locally with oncology with Dr. Valdivia.  She was initially diagnosed January 2023 and was initiated on Osimertinib at that time. She completed  LLL SBRT with Dr. Ramirez 11/8/2023.  Unfortunately concern for progression of disease in lung and liver and repeat liver biopsy obtained 1/25/2024 consistent with small cell transformation.  She did receive second opinion from [facility] and is currently receiving palliative chemotherapy with carboplatin and Alimta, last received 2/23/2024.  On chart review it appears she called oncology clinic on day of admission reporting a fever and she was instructed to come to the ED for further evaluation.  Workup revealed severe pancytopenia with WBC 0.3, hemoglobin 11.6, platelet count 24,000. Chemistry remarkable for elevated lactic acid, severe hyponatremia, acute kidney injury with creatinine 2.5, hyperglycemia.  CXR with small left pleural effusion 
  Pharmacy Adjustment per [facility] protocol    Nikkie Banks is a 73 y.o. female. Pharmacy has adjusted medications per [facility] protocol.    Recent Labs     03/04/24  1834   BUN 48*       Recent Labs     03/04/24  1834   CREATININE 2.5*       Estimated Creatinine Clearance: 14 mL/min (A) (based on SCr of 2.5 mg/dL (H)).    Height:   Ht Readings from Last 1 Encounters:   03/04/24 1.524 m (5')     Weight:  Wt Readings from Last 1 Encounters:   03/04/24 52.6 kg (116 lb)         Plan: Adjust the following medications based on [facility] protocol:           Cefepime to 1000mg IV over 4 hr extended infusion every 12 hours for sepsis X 7 days.    Electronically signed by Talia Ryan RP on 3/4/2024 at 9:30 PM   
  Pharmacy Adjustment per [facility] protocol    Nikkie Banks is a 73 y.o. female. Pharmacy has adjusted medications per [facility] protocol.    Recent Labs     03/05/24  1039 03/06/24  0204   BUN 50* 36*       Recent Labs     03/05/24  1039 03/06/24  0204   CREATININE 2.4* 1.1*       Estimated Creatinine Clearance: 33 mL/min (A) (based on SCr of 1.1 mg/dL (H)).    Height:   Ht Readings from Last 1 Encounters:   03/05/24 1.524 m (5')     Weight:  Wt Readings from Last 1 Encounters:   03/05/24 49.8 kg (109 lb 11.2 oz)         Plan: Adjust the following medications based on [facility] protocol:           Levofloxacin 500 mg IV every 48 hours adjusted to Levofloxacin 750 mg IV every 48 hours.    Electronically signed by Agnieszka Rasheed [facility] on 3/6/2024 at 12:02 PM    
  Pharmacy Adjustment per [facility] protocol    Nikkie Banks is a 73 y.o. female. Pharmacy has adjusted medications per [facility] protocol.    Recent Labs     03/05/24  1039 03/06/24  0204   BUN 50* 36*       Recent Labs     03/05/24  1039 03/06/24  0204   CREATININE 2.4* 1.1*       Estimated Creatinine Clearance: 33 mL/min (A) (based on SCr of 1.1 mg/dL (H)).    Height:   Ht Readings from Last 1 Encounters:   03/05/24 1.524 m (5')     Weight:  Wt Readings from Last 1 Encounters:   03/05/24 49.8 kg (109 lb 11.2 oz)         Plan: Adjust the following medications based on [facility] protocol:           Cefepime 1 gm IV every 12 hours extended infusion adjusted to Cefepime 2 gm IV every 12 hours extended infusion.    Electronically signed by Agnieszka Rasheed [facility] on 3/6/2024 at 11:55 AM    
Comprehensive Nutrition Assessment    Type and Reason for Visit:  Reassess    Nutrition Recommendations/Plan:   D/C Ensure Plus ONS.  Add pineapple Gelatein ONS with dinner.  Food preferences updated.     Malnutrition Assessment:  Malnutrition Status:  Moderate malnutrition (03/05/24 1239)    Context:  Chronic Illness     Findings of the 6 clinical characteristics of malnutrition:  Energy Intake:  75% or less estimated energy requirements for 1 month or longer  Weight Loss:  5% over 1 month       Nutrition Assessment:    Pt moved from ICU yesterday. Visited pt during lunch. Pt reports her appetite is good but that she is not tolerating the Ensure ONS. Observed % of meal consumed and 0% Ensure. Documented intake shows 0% yesterday. Will d/c Ensure. Pt agreeable to try pineapple Gelatein ONS with dinner. Food preferences updated; pt likes OJ and grape juice. Pertinent labs: Na 128, BUN 42, creat 1.1, GFR 53, Mg 3.1, lactic acid 3.7, glu , accucheks . Will update orders and continue to monitor.    Current Nutrition Intake & Therapies:    Average Meal Intake: 0%, %  Average Supplements Intake: Refusing to take  ADULT DIET; Full Liquid  ADULT ORAL NUTRITION SUPPLEMENT; Dinner; Fortified Gelatin Oral Supplement    Anthropometric Measures:  Height: 152.4 cm (5')  Ideal Body Weight (IBW): 100 lbs (45 kg)    Current Body Weight: 49.8 kg (109 lb 12.6 oz),   IBW.    Current BMI (kg/m2): 21.4  BMI Categories: Underweight (BMI less than 22) age over 65    Estimated Daily Nutrient Needs:  Energy Requirements Based On: Kcal/kg  Weight Used for Energy Requirements: Current  Energy (kcal/day): 2071-9681  Weight Used for Protein Requirements: Current  Protein (g/day): 75  Method Used for Fluid Requirements: 1 ml/kcal  Fluid (ml/day): 0019-4435    Nutrition Diagnosis:   Moderate malnutrition, In context of chronic illness related to inadequate protein-energy intake as evidenced by Criteria as identified in 
[facility]   Pharmacy Pharmacokinetic Monitoring Service - Vancomycin    Consulting Provider: Dr Cole   Indication: Sepsis  Target Concentration: Goal AUC/SAHRA 400-600 mg*hr/L  Day of Therapy: 3  Additional Antimicrobials: Maxipime and Levaquin    Pertinent Laboratory Values:   Wt Readings from Last 1 Encounters:   03/05/24 49.8 kg (109 lb 11.2 oz)     Temp Readings from Last 1 Encounters:   03/06/24 97.5 °F (36.4 °C) (Temporal)     Estimated Creatinine Clearance: 33 mL/min (A) (based on SCr of 1.1 mg/dL (H)).  Recent Labs     03/05/24  1039 03/06/24  0204   CREATININE 2.4* 1.1*   BUN 50* 36*   WBC 0.2* 2.2*     Procalcitonin: N/A    Pertinent Cultures:  Culture Date Source Results   03/04/24 Blood Pseudo aeruginosa   MRSA Nasal Swab: was ordered by provider, awaiting results.    Recent vancomycin administrations                     vancomycin (VANCOCIN) 1,250 mg in sodium chloride 0.9 % 250 mL IVPB (mg) 1,250 mg New Bag 03/04/24 1789                    Assessment:  Date/Time Current Dose Concentration Timing of Concentration (h) AUC   03/06/24 Pulse dosing 8.4 Random level    Note: Serum concentrations collected for AUC dosing may appear elevated if collected in close proximity to the dose administered, this is not necessarily an indication of toxicity    Plan:  Current dosing regimen is sub-therapeutic  Start Vancomycin 750mg Q24hr due to improvement in renal function  Repeat vancomycin concentration ordered for 03/08 @ 0400   Pharmacy will continue to monitor patient and adjust therapy as indicated    Thank you for the consult,  Tyler Ramirez [facility]  3/6/2024 4:07 AM   
[facility]   Pharmacy Pharmacokinetic Monitoring Service - Vancomycin     Nikkie Banks is a 73 y.o. female starting on vancomycin therapy for Sepsis. Pharmacy consulted by Dr Cole for monitoring and adjustment.    Target Concentration: Goal AUC/SAHRA 400-600 mg*hr/L    Additional Antimicrobials: Maxipime    Pertinent Laboratory Values:   Wt Readings from Last 1 Encounters:   03/04/24 52.6 kg (116 lb)     Temp Readings from Last 1 Encounters:   03/04/24 97.7 °F (36.5 °C)     Estimated Creatinine Clearance: 14 mL/min (A) (based on SCr of 2.5 mg/dL (H)).  Recent Labs     03/04/24  1834   CREATININE 2.5*   BUN 48*   WBC 0.3*     Procalcitonin: N/A    Pertinent Cultures: No current cultures at this time  Culture Date Source Results        MRSA Nasal Swab: N/A. Non-respiratory infection.    Plan:  Concentration-guided dosing due to renal impairment/insufficiency  Start vancomycin 1250m gx1 dose  Renal labs as indicated   Vancomycin concentration ordered for 03/06 @ 0200   Pharmacy will continue to monitor patient and adjust therapy as indicated    Thank you for the consult,  Tyler Ramirez [facility]  3/4/2024 11:06 PM   
[facility]   Pharmacy Pharmacokinetic Monitoring Service - Vancomycin    Consulting Provider: Haider Cole MD    Indication: Sepsis of Unknown Etiology  Target Concentration:  Goal trough of 15-20 mg/L switch to -600 once renal function stable  Day of Therapy: 2  Additional Antimicrobials: Cefepime    Pertinent Laboratory Values:   Wt Readings from Last 1 Encounters:   03/05/24 49.8 kg (109 lb 11.2 oz)     Temp Readings from Last 1 Encounters:   03/05/24 98.1 °F (36.7 °C) (Temporal)     Estimated Creatinine Clearance: 14 mL/min (A) (based on SCr of 2.5 mg/dL (H)).  Recent Labs     03/04/24  1834   CREATININE 2.5*   BUN 48*   WBC 0.3*     Procalcitonin: None ordered at this time    Pertinent Cultures:  Culture Date Source Results   03/04/24 Blood x 2 Sent   03/04/24 Respiratory Panel Nothing detected   03/04/24 Urine Sent   MRSA Nasal Swab: not ordered. Order placed by pharmacy.    Recent vancomycin administrations                     vancomycin (VANCOCIN) 1,250 mg in sodium chloride 0.9 % 250 mL IVPB (mg) 1,250 mg New Bag 03/04/24 9972                  Plan:  Continue current dosing regimen   Pharmacy will continue to monitor patient and adjust therapy as indicated  Repeat vancomycin concentration ordered for 03/06/24 @ 0200     Thank you for the consult,  Agnieszka Rasheed, [facility]  3/5/2024 10:20 AM    
Nikkie Banks received from ICU to room # 436 .  Mental Status: Patient is oriented, alert, coherent, logical, thought processes intact, and able to concentrate and follow conversation.   Vitals:    03/06/24 1627   BP: (!) 143/81   Pulse: (!) 108   Resp: 22   Temp:    SpO2:      Placed on cardiac monitor: Yes. Box # [id number] .  Belongings:  personal affects and belongings  with patient at bedside .  Family at bedside Yes.  Oriented Patient and Family to room.  Call light within reach. Yes.  Transfer was: Well tolerated by patient..    Electronically signed by Bruna Brumfield RN on 3/6/2024 at 5:40 PM      
PHARMACY NOTE  Nikkiejairo Banks was ordered Fish Oil. Per the [facility] Formulary Committee, this medication is non-formulary and not stocked by pharmacy.  It has been discontinued. The medication can be reordered at discharge.     Electronically signed by Tyler Ramirez RPH on 3/5/2024 at 12:40 AM    
Physical Therapy     03/07/24 1100   Restrictions/Precautions   Restrictions/Precautions Isolation   Position Activity Restriction   Other position/activity restrictions neutropenic   General   Diagnosis septic shock, AURELIANO, hypotension due to hypovolemia, N,V, D, chemo induced neutropenia, stage 4 malignant neoplasm lung, afib RVR   Subjective   Subjective pt agreeable to tx   Subjective   Pain no pain; slight fatigue   Bed mobility   Supine to Sit Minimal assistance   Bed Mobility Comments with HOB elevated and use of R rail   Transfers   Sit to Stand Contact guard assistance   Stand to Sit Contact guard assistance   Bed to Chair Contact guard assistance   Ambulation   Surface Level tile   Device Rolling Walker   Assistance Contact guard assistance   Quality of Gait steady   Gait Deviations Slow Aneta;Decreased step length;Decreased step height   Distance 60'   Comments able to manage RW during L/R turns with no assistance needed.   Short Term Goals   Time Frame for Short Term Goals 2 wks   Short Term Goal 1 supine to sit indep   Short Term Goal 2 sit to stand indep   Short Term Goal 3 amb. 200' with or without RW SBA   Short Term Goal 4 bed to chair SBA   Activity Tolerance   Activity Tolerance Patient tolerated treatment well   Assessment   Assessment pt able to overall improve bed mobility, transfers, and AMB with no sign of distress. positioned in chair for comfort with BUE elevated and suported due to edema.   PT Plan of Care   Thursday X   Safety Devices   Type of Devices Gait belt;Call light within reach;Left in chair     Electronically signed by Hector Meraz PTA on 3/7/2024 at 11:05 AM     
Physical Therapy     03/08/24 0800   Restrictions/Precautions   Restrictions/Precautions Isolation   Position Activity Restriction   Other position/activity restrictions neutropenic   General   Diagnosis septic shock, AURELIANO, hypotension due to hypovolemia, N,V, D, chemo induced neutropenia, stage 4 malignant neoplasm lung, afib RVR   Subjective   Subjective pt agreeable to tx   Vitals   O2 Device None (Room air)   Subjective   Pain no pain; slight fatigue   Bed mobility   Bed Mobility Comments pt already up to chair   Transfers   Sit to Stand Stand by assistance   Stand to Sit Stand by assistance   Ambulation   Surface Level tile   Device No Device   Assistance Contact guard assistance   Quality of Gait mostly steady with an occasional offbeat step but no true LOB or sign of distress   Gait Deviations Slow Aneta;Decreased step length;Decreased step height   Distance 200'   Comments multiple L/R turns and distractions with no LOB   PT Exercises   Exercise Treatment BLE ex in sitting at recliner x 20 each   Short Term Goals   Time Frame for Short Term Goals 2 wks   Short Term Goal 1 supine to sit indep   Short Term Goal 2 sit to stand indep   Short Term Goal 3 amb. 200' with or without RW SBA   Short Term Goal 4 bed to chair SBA   Activity Tolerance   Activity Tolerance Patient tolerated treatment well   Assessment   Assessment pt able to progress from use of RW to no device with no LOB and increased distance up to 200'. encouraged pt to use AD at home/public as needed when balance is feeling off or finds self reaching out to steady on margarito by objects. left in chair with all needs in reach and instructions to use call light for all transfers.   PT Plan of Care   Friday X   Safety Devices   Type of Devices Gait belt;Call light within reach;Left in chair     Electronically signed by Hector Meraz PTA on 3/8/2024 at 8:45 AM     
Physical Therapy     03/09/24 0900   Restrictions/Precautions   Restrictions/Precautions Isolation   Position Activity Restriction   Other position/activity restrictions neutropenic   General   Diagnosis septic shock, AURELIANO, hypotension due to hypovolemia, N,V, D, chemo induced neutropenia, stage 4 malignant neoplasm lung, afib RVR   Subjective   Subjective pt agreeable to tx   Subjective   Pain none   Bed mobility   Bed Mobility Comments pt already up to chair   Transfers   Sit to Stand Stand by assistance   Stand to Sit Stand by assistance   Ambulation   Surface Level tile   Device No Device   Assistance Stand by assistance;Contact guard assistance   Quality of Gait improved sheila/speed/no LOB   Distance 250'   Comments multiple L/R turns and distractions with no LOB   Short Term Goals   Time Frame for Short Term Goals 2 wks   Short Term Goal 1 supine to sit indep   Short Term Goal 2 sit to stand indep   Short Term Goal 3 amb. 200' with or without RW SBA   Short Term Goal 4 bed to chair SBA   Activity Tolerance   Activity Tolerance Patient tolerated treatment well   Assessment   Assessment pt able to demonstrate progress with improved sheila/speed and increased distance up to 250' no device mostly SBA and no LOB. left in chair with all needs in reach.   PT Plan of Care   Saturday X   Safety Devices   Type of Devices Left in chair;Gait belt;Call light within reach     Electronically signed by Hector Meraz PTA on 3/9/2024 at 9:55 AM     
Physical Therapy  Facility/Department: [facility] ICU  Physical Therapy Initial Assessment    Name: Nikkie Banks  : 1950  MRN: [id number]  Date of Service: 3/6/2024    Discharge Recommendations:  Continue to assess pending progress, 24 hour supervision or assist, Patient would benefit from continued therapy after discharge          Patient Diagnosis(es): The primary encounter diagnosis was AURELIANO (acute kidney injury) (HCC). Diagnoses of Hypotension due to hypovolemia, Nausea vomiting and diarrhea, Chemotherapy-induced neutropenia (HCC), Stage 4 malignant neoplasm of lung, unspecified laterality (HCC), and Atrial fibrillation with RVR (HCC) were also pertinent to this visit.  Past Medical History:  has a past medical history of Acquired hypothyroidism, Alopecia, Breast implant capsular contracture, Esophageal mass, Essential hypertension, Familial hypercholesterolemia, Lung cancer (HCC), Lung mass, Nodule of chest wall, Palliative care patient, PONV (postoperative nausea and vomiting), Prolonged emergence from general anesthesia, Stage 1 chronic kidney disease, and Tobacco abuse, in remission.  Past Surgical History:  has a past surgical history that includes Rectal surgery (); Bunionectomy (Bilateral, ); Colonoscopy (); Hemorrhoid surgery (); knee surgery (Right, ); Total abdominal hysterectomy w/ bilateral salpingoophorectomy (Bilateral, ); Breast enhancement surgery (Bilateral, 2020); Breast cyst excision (); Tubal ligation (); and Upper gastrointestinal endoscopy (N/A, 2023).    Assessment   Body Structures, Functions, Activity Limitations Requiring Skilled Therapeutic Intervention: Decreased functional mobility ;Decreased ADL status;Decreased endurance;Decreased strength;Decreased sensation;Decreased balance  Assessment: Pt. tolerated mobility well. Pt. did not think she wanted to sit in the chair initially, but she agreed to sit to stand twice on her own. Pt. then 
Pt assisted up to sit on side of bed then ambulated with walker and gait belt over to the door entrance and back to sit up in chair. Tolerated well. Family in room visiting.   
Pt c/o nausea, sudden onset. Zofran 4mg IV given. Will monitor for relief. Electronically signed by Amy Chandra RN on 3/5/2024 at 12:44 PM    
Pt has not had any nausea since dose of Decadron at   1528 . Pt has no c/o pain. Resting comfortably. Does not feel hungry but is OK with ice chips. HR remains afib 130's at present. /66. Sats 96% on 2L NC. Electronically signed by Amy Chandra RN on 3/5/2024 at 6:25 PM    
Pt requested to change code status to DNR. Verified by two nurses. Dr. Cole notified.     Electronically signed by Anu Smith RN on 3/5/2024 at 3:08 AM  Electronically signed by Tiago Main RN on 3/5/2024 at 3:08 AM     
Pt stated she was feeling better after the Phenergan and Reglan pills , then suddenly vomited , clear with brown curdled pieces of her Ensure. Approx 25ml. Will monitor for relief from Phenergan, Reglan, and Decadron. HR remains afib 130'-140's at presenrt. RR 17 Sats 96% on 2L NC and /71. Electronically signed by Amy Chandra RN on 3/5/2024 at 3:47 PM    
Pt was given Reglan 10mg PO and Phenergan 25mg PO at 1439. Shortly after pt vomited several times small amts with total approx 50ml clear brown fluid. Pt has drank some chocolate Ensure earlier. No pills were noted. I informed Dr Oconnor and he is ordering some Decadron. Pt stated she felt better after vomiting, still mild nausea. HR increased to 152 with episode of vomiting. Dr Oconnor also made aware of this. Pt had been afib 110's prior to episode. Will monitor, not starting Diltiazem at this time. Electronically signed by Amy Chandra RN on 3/5/2024 at 3:24 PM    
Report called to Bruna on 4th. Pt is transferring with a bag of personal belongings clothing and shoes, has a black purse. Has a phone  in her personal bag, cell phone is in her purse. Pt has a necrotic appearing black right molar tooth. Pt states is sore. Discontinued ferguson, will be due to void.   
Spoke with Dr Oconnor re: current labs. Glucose per labs now 56. Will restart Sodium Bicarb fluids in D5% at previous rate 100ml/hr. Electronically signed by Amy Chandra RN on 3/5/2024 at 11:54 AM\    
Spoke with Dr Oconnor re: tachy HR afib 140's-150's. Pt denies pain or nausea. Comfortable at present. UOP this shift total 300ml. Hazy yellow. LR bolus ordered. Electronically signed by Amy Chandra RN on 3/5/2024 at 6:58 PM    
Nutrition Care: Continue to monitor while inpatient    Goals:  Goals: PO intake 50% or greater    Nutrition Monitoring and Evaluation:   Food/Nutrient Intake Outcomes: Food and Nutrient Intake, Supplement Intake  Physical Signs/Symptoms Outcomes: Biochemical Data, Diarrhea, Nausea or Vomiting, Nutrition Focused Physical Findings, Weight    Heydi Love MS, RD, LD  Contact: [phone]    
of cancer-not leukemia     Obesity Brother     No Known Problems Son     No Known Problems Son        Social History     Socioeconomic History    Marital status:      Spouse name: Mr. Soy Eldridge    Number of children: 2    Years of education: 14    Highest education level: Not on file   Occupational History     Employer: RETIRED    Occupation: [employer]     Comment: retired   Tobacco Use    Smoking status: Former     Current packs/day: 0.00     Average packs/day: 2.0 packs/day for 15.0 years (30.0 ttl pk-yrs)     Types: Cigarettes     Start date:      Quit date:      Years since quittin.2    Smokeless tobacco: Never    Tobacco comments:     Smoked 15 years at  [employer] for 30 pack-years   Vaping Use    Vaping Use: Never used   Substance and Sexual Activity    Alcohol use: Not Currently     Comment: rare social occasions    Drug use: Never    Sexual activity: Defer     Partners: Male     Comment: has 2 sons, Has a    Other Topics Concern    Not on file   Social History Narrative    CODE STATUS: Full Code    HEALTH CARE PROXY: Mr. Soy Eldridge, her , [phone]    AMBULATES: independent    DOMICILED: has 1 step to enter the home, has no stairs in the home, has no pets and lives alone with her      Social Determinants of Health     Financial Resource Strain: Low Risk  (3/2/2023)    Overall Financial Resource Strain (CARDIA)     Difficulty of Paying Living Expenses: Not hard at all   Food Insecurity: No Food Insecurity (3/5/2024)    Hunger Vital Sign     Worried About Running Out of Food in the Last Year: Never true     Ran Out of Food in the Last Year: Never true   Transportation Needs: No Transportation Needs (3/5/2024)    PRAPARE - Transportation     Lack of Transportation (Medical): No     Lack of Transportation (Non-Medical): No   Physical Activity: Insufficiently Active (8/15/2023)    Exercise Vital Sign     Days of Exercise per Week: 3 
pleural effusion and adjacent atelectasis, pneumonia cannot be excluded.  She was admitted to ICU under hospitalist services with oncology evaluation and initiated on broad-spectrum antibiotics and IVF resuscitation.  Pancultures obtained.  Palliative care is consulted for goals of care discussions and symptom management.     Review of Systems:   14 point review of systems is negative except as specifically addressed above.    Objective:   VITALS:  /71   Pulse (!) 102   Temp (!) 96.6 °F (35.9 °C)   Resp 22   Ht 1.524 m (5')   Wt 49.8 kg (109 lb 11.2 oz)   SpO2 94%   BMI 21.42 kg/m²   24HR INTAKE/OUTPUT:    Intake/Output Summary (Last 24 hours) at 3/8/2024 0906  Last data filed at 3/8/2024 0333  Gross per 24 hour   Intake --   Output 850 ml   Net -850 ml       General appearance: 72 yo female, ill appearing, no acute distress  Head: Normocephalic, without obvious abnormality, atraumatic  Eyes: conjunctivae/corneas clear. PERRL, EOM's intact.   Ears/Nose: normal external ears and nose  Neck/Throat: supple, symmetrical, trachea midline  Pulmonary: diminished to auscultation bilaterally,no rales or wheezes, unlabored on room air  Cardiovascular: regular rate and rhythm  Gastrointestinal:soft, non-tender; non-distended, bowel sounds present  Musculoskeletal:No lower extremity edema,  No erythema, no tenderness to palpation  Skin: Warm, dry, pale  Neurologic: Alert and oriented X 4, nonfocal  Psychiatric: Calm and cooperative    Medications:      dextrose      sodium chloride        lactobacillus  1 capsule Oral Daily with breakfast    levofloxacin  750 mg IntraVENous Q48H    arformoterol tartrate  15 mcg Nebulization BID RT    budesonide  0.5 mg Nebulization Q12H    vitamin D  5,000 Units Oral Daily    thyroid  60 mg Oral QAM AC    oyster shell calcium w/D  1 tablet Oral BID    pantoprazole  20 mg Oral QAM AC    atorvastatin  10 mg Oral Nightly     temazepam, metoclopramide, magnesium sulfate, potassium 
IntraVENous Continuous PRN Haider Cole MD        arformoterol tartrate (BROVANA) nebulizer solution 15 mcg  15 mcg Nebulization BID RT Haider Cole MD   15 mcg at 03/08/24 0709    budesonide (PULMICORT) nebulizer suspension 500 mcg  0.5 mg Nebulization Q12H Haider Cole MD   500 mcg at 03/08/24 0709    albuterol (PROVENTIL) (2.5 MG/3ML) 0.083% nebulizer solution 2.5 mg  2.5 mg Nebulization Q4H PRN Haider Cole MD        acetylcysteine (MUCOMYST) 20 % solution 600 mg  600 mg Inhalation BID PRN Haider Cole MD        vitamin D (CHOLECALCIFEROL) capsule 5,000 Units  5,000 Units Oral Daily Haider Cole MD   5,000 Units at 03/08/24 0909    thyroid (ARMOUR) tablet 60 mg  60 mg Oral QAM AC Haider Cole MD   60 mg at 03/08/24 0631    oyster shell calcium w/D 500-5 MG-MCG tablet 1 tablet  1 tablet Oral BID Haider Cole MD   1 tablet at 03/08/24 0909    pantoprazole (PROTONIX) tablet 20 mg  20 mg Oral QAM AC Haider Cole MD   20 mg at 03/08/24 0631    atorvastatin (LIPITOR) tablet 10 mg  10 mg Oral Nightly Haider Cole MD   10 mg at 03/07/24 2016    sodium chloride flush 0.9 % injection 5-40 mL  5-40 mL IntraVENous PRN Haider Cole MD   10 mL at 03/07/24 0746    0.9 % sodium chloride infusion   IntraVENous PRN Haider Cole MD        acetaminophen (TYLENOL) tablet 650 mg  650 mg Oral Q4H PRN Haider Cole MD   650 mg at 03/05/24 0700    ondansetron (ZOFRAN-ODT) disintegrating tablet 4 mg  4 mg Oral Q6H PRN Haider Cole MD        Or    ondansetron (ZOFRAN) injection 4 mg  4 mg IntraVENous Q6H PRN Haider Cole MD   4 mg at 03/05/24 1206    polyethylene glycol (GLYCOLAX) packet 17 g  17 g Oral Daily PRN Haider Cole MD        melatonin disintegrating tablet 5 mg  5 mg Oral Nightly PRN Haider Cole MD   5 mg at 03/08/24 0223    calcium carbonate (TUMS) chewable tablet 500 mg  500 mg Oral TID PRN Haider Cole MD        promethazine (PHENERGAN) tablet 25 mg  25 mg Oral Q6H PRN 
1.5-cm nodule in the right middle lobe anteriorly on image 71, left upper lobe nodule medially anteriorly measuring 0.9 cm on image 34, and left lower lobe nodule anteriorly inferiorly measuring 2.3 cm on the image 69.  Several other smaller nodules.Pleural Space: Small left pleural effusion.  No right pleural effusion.  No pneumothorax.Thoracic Inlet, Mediastinum, and [place]: Thyroid gland is normal.  Right inferior paratracheal lymphadenopathy measuring 1.6 cm; prior was 0.9 cm.Heart, Vessels, and Pericardium: The main pulmonary artery is normal caliber.  There is no central pulmonary embolism.  The thoracic aorta is not dilated.  Calcification in the aorta consistent with atherosclerosis.  Coronary artery calcification.  The heart chambers are not enlarged.  There is no pericardial effusion or thickening.Bones and Soft Tissues: There is no fracture, lytic lesion, or blastic lesion.  Chest wall soft tissues are unremarkable.Upper Abdomen: See same day report CT scan abdomen and pelvis.  Small hiatus hernia.  Multiple new liver masses. Multiple new bilateral lung nodules consistent with neoplasm.  Previously visualized mass in the left lower lobe is slightly smaller. Small left pleural effusion.  Mild left basilar atelectasis.  New right inferior paratracheal lymphadenopathy measuring 1.6 cm. Atherosclerosis and coronary artery calcification. Small hiatus hernia.  Multiple liver masses.  See same day report CT scan abdomen and pelvis.   1/9/24 CT Abd/Pelvis W IV Contrast (oral) There are several new or progressed heterogeneous hypodense mass lesions in the liver, the largest in the right hepatic lobe measuring 3.1 cm. Additional similar appearing versus lesions in the liver measure 2.1 cm, 1.9 cm, 1.7 cm, 1.6 cm, and 1.1 cm. These are seen in both the right and left hepatic lobes. Additional smaller suspicious lesions are noted.  Previously seen 11 mm low density lesion in the right hepatic lobe itself has slightly 
as warranted  Creatinine back to baseline  Palliative following for goals of care  SCDs for DVT prophylaxis  Discussed treatment plan with patient/family/RN    The above note was generated using voice recognition software. Inadvertent typographical errors in transcription may have occurred.    Gideon Eddy MD   3/9/2024 11:13 AM    
\"PO2\", \"HCO3\", \"O2SAT\" in the last 72 hours.  CRP:  No results for input(s): \"CRP\" in the last 72 hours.  Sed Rate:  No results for input(s): \"SEDRATE\" in the last 72 hours.    Cultures:   No results for input(s): \"CULTURE\" in the last 72 hours.  No results for input(s): \"BC\", \"BLOODCULT2\" in the last 72 hours.  No results for input(s): \"CXSURG\" in the last 72 hours.    Radiology reports as per the Radiologist  Radiology: XR CHEST PORTABLE    Result Date: 3/4/2024  EXAM:  FRONTAL VIEW OF THE CHEST.  HISTORY:  Sepsis workup.  COMPARISON:  CT chest 01/09/2024.  FINDINGS:   Normal heart size.  Small left pleural effusion and adjacent opacities at the left lung base.  Scattered lung nodules redemonstrated.  No visible pneumothorax.  No acute osseous abnormality. Bones appear demineralized.       Small left pleural effusion and adjacent atelectasis.  Pneumonia cannot be excluded.  Bilateral lung nodules redemonstrated.     ______________________________________ Electronically signed by: YOSEF WHITAKER M.D. Date:     03/04/2024 Time:    21:55        Assessment     NSCLC of left lung.  Continue supportive care.    Chemotherapy induced pancytopenia.  Continue growth factors.    AURELIANO (acute kidney injury).  She may have a component of tubular injury.  Continue fluid resuscitation.    Acute metabolic acidosis.  Continue fluid resuscitation and bicarbonate replacement.    Sepsis.  Continue broad-spectrum antibiotics.    Please document 40 minutes of critical care time for patient assessment, chart review, discussion with staff, .      Edward Oconnor, DO  
______________________________________ Electronically signed by: YOSEF WHITAKER M.D. Date:     03/04/2024 Time:    21:55      Assessment and Plan:   73-year-old female with history of stage IV NSCLC on palliative chemotherapy admitted to critical care unit for sepsis, Pseudomonas aeruginosa bacteremia, pancytopenia secondary to chemotherapy, and AURELIANO.     Heme-onc following  Broad-spectrum antibiotics while awaiting culture finalization  IVF  Transfuse as warranted  Creatinine continues to improve  Electrolytes replaced  Palliative following for goals of care  SCDs for DVT prophylaxis  Discussed treatment plan with patient/family/RN    Total critical care time: 44 minutes    The above note was generated using voice recognition software. Inadvertent typographical errors in transcription may have occurred.    Gideon Eddy MD   3/6/2024 9:49 PM    
DO        lactated ringers IV soln infusion   IntraVENous Continuous Gideon Eddy MD 50 mL/hr at 03/07/24 0013 Rate Change at 03/07/24 0013    magnesium sulfate 2000 mg in 50 mL IVPB premix  2,000 mg IntraVENous PRN Gideon Eddy MD   Stopped at 03/07/24 0012    potassium chloride (KLOR-CON M) extended release tablet 40 mEq  40 mEq Oral PRN Gideon Eddy MD   40 mEq at 03/06/24 1623    Or    potassium bicarb-citric acid (EFFER-K) effervescent tablet 40 mEq  40 mEq Oral PRN Gideon Eddy MD        Or    potassium chloride 10 mEq/100 mL IVPB (Peripheral Line)  10 mEq IntraVENous PRN Gideon Eddy MD        sodium phosphate 15 mmol in sodium chloride 0.9 % 250 mL IVPB  15 mmol IntraVENous PRN Gideon Eddy MD        glucose chewable tablet 16 g  4 tablet Oral PRN Haider Cole MD        dextrose bolus 10% 125 mL  125 mL IntraVENous PRN Haider Cole MD        Or    dextrose bolus 10% 250 mL  250 mL IntraVENous PRN Haider Cole MD        glucagon injection 1 mg  1 mg SubCUTAneous PRN Haider Cole MD        dextrose 10 % infusion   IntraVENous Continuous PRN Haider Cole MD        arformoterol tartrate (BROVANA) nebulizer solution 15 mcg  15 mcg Nebulization BID RT Haider Cole MD   15 mcg at 03/07/24 0638    budesonide (PULMICORT) nebulizer suspension 500 mcg  0.5 mg Nebulization Q12H Haider Cole MD   500 mcg at 03/07/24 0638    albuterol (PROVENTIL) (2.5 MG/3ML) 0.083% nebulizer solution 2.5 mg  2.5 mg Nebulization Q4H PRN Haider Cole MD        acetylcysteine (MUCOMYST) 20 % solution 600 mg  600 mg Inhalation BID PRN Haider Cole MD        vitamin D (CHOLECALCIFEROL) capsule 5,000 Units  5,000 Units Oral Daily Haider Cole MD   5,000 Units at 03/06/24 0841    thyroid (ARMOUR) tablet 60 mg  60 mg Oral QAM AC Haider Cole MD   60 mg at 03/06/24 0616    oyster shell calcium w/D 500-5 MG-MCG tablet 1 tablet  1 tablet Oral BID Haider Cole MD   1 tablet at 03/06/24 3192    
  MCV 86.1 92.9 88.2   PLT 15* 20* 24*     Lab Results   Component Value Date    NEUTROABS 1.3 (L) 03/06/2024   ANC is improving.  Hemoglobin is stable.  Thrombocytopenia worse.      Non-small cell lung cancer, adenocarcinoma stage IV-currently on palliative chemotherapy as outpatient.      Acute kidney injury-improving      Latest Ref Rng & Units 3/6/2024     2:04 AM 3/5/2024    10:39 AM 3/4/2024     6:34 PM 2/21/2024    11:40 AM 1/19/2024    10:56 AM   Labs Renal   BUN 8 - 23 mg/dL 36  50  48  22  25    Cr 0.5 - 0.9 mg/dL 1.1  2.4  2.5  0.7  1.2    K 3.5 - 5.0 mmol/L 3.2  3.8  3.8  4.2  4.6    Na 136 - 145 mmol/L 126  130  127  139  143        Physical deconditioning  Normocytic anemia-related to chemotherapy.  9.6  Hypovolemic hyponatremia-sodium 126.  Worsening  Hypokalemia-replace as per protocol  Complex medical patient  Life threating medical illness  Persistent hypoglycemia-improved    PLAN:  Continue broad-spectrum antibiotics cefepime and vancomycin  Growth factor, filgrastim day 1, started 3/5/2024, day 2  Transfuse for hemoglobin<7  Transfuse for platelet counts 10,000 or if any bleeding   Follow-up results blood cultures   Continue IV fluid resuscitation   Discussed care plan with bedside RN  Palliative care consultation note reviewed  Continue to monitor hypoglycemia  Replace calcium gluconate      (Please note that portions of this note were completed with a voice recognition program. Efforts were made to edit the dictations but occasionally words are mis-transcribed.)        Jennifer Valdivia MD    03/06/24  6:09 AM  
chemotherapy as outpatient.      Acute kidney injury-improving      Latest Ref Rng & Units 3/8/2024     2:20 AM 3/7/2024     3:57 AM 3/6/2024     2:04 AM 3/5/2024    10:39 AM 3/4/2024     6:34 PM   Labs Renal   BUN 8 - 23 mg/dL 42  42  36  50  48    Cr 0.5 - 0.9 mg/dL 1.0  1.1  1.1  2.4  2.5    K 3.5 - 5.0 mmol/L 3.9  3.8  3.2  3.8  3.8    Na 136 - 145 mmol/L 135  128  126  130  127        Physical deconditioning-improving  Normocytic anemia-related to chemotherapy.  9.1  Hypovolemic hyponatremia-sodium 135.  Worsening  Hypokalemia-resolved.  3.9  Complex medical patient  Life threating medical illness  Persistent hypoglycemia-resolved.  She is asking for a regular diet.  Poor venous access-vascular consult for outpatient port placement.  Discussed with     PLAN:  Antibiotics per primary team-currently on Levaquin  Transfuse for hemoglobin<7  Transfuse for platelet counts 10,000 or if any bleeding   Follow-up results blood cultures   Discussed care plan with bedside RN  Continue to monitor hypoglycemia  Palliative care following  Decrease carboplatin AUC 4 and etoposide 80 mg/m² days 1-3  Add long-acting G-CSF for subsequent cycles          (Please note that portions of this note were completed with a voice recognition program. Efforts were made to edit the dictations but occasionally words are mis-transcribed.)        Jennifer Valdivia MD    03/08/24  5:43 AM  
are mis-transcribed.)        Jennifer Valdivia MD    03/10/24  8:51 AM

## 2024-03-10 NOTE — DISCHARGE SUMMARY
hours.  ABG:No results for input(s): \"PHART\", \"WHI7CWS\", \"PO2ART\", \"JUU4AEI\", \"BEART\", \"HGBAE\", \"A1DRUCSB\", \"CARBOXHGBART\" in the last 72 hours.     Discharge Disposition: Home    Total time spent on discharge: 45 minutes    The above note was generated using voice recognition software. Inadvertent typographical errors in transcription may have occurred.    Gideon Eddy MD  3/10/2024 1:30 PM

## 2024-03-11 ENCOUNTER — PREP FOR PROCEDURE (OUTPATIENT)
Dept: VASCULAR SURGERY | Age: 74
End: 2024-03-11

## 2024-03-11 ENCOUNTER — CARE COORDINATION (OUTPATIENT)
Dept: CASE MANAGEMENT | Age: 74
End: 2024-03-11

## 2024-03-11 DIAGNOSIS — G47.00 INSOMNIA, UNSPECIFIED TYPE: Primary | ICD-10-CM

## 2024-03-11 DIAGNOSIS — A41.9 SEPTIC SHOCK (HCC): Primary | ICD-10-CM

## 2024-03-11 DIAGNOSIS — R65.21 SEPTIC SHOCK (HCC): Primary | ICD-10-CM

## 2024-03-11 PROCEDURE — 1111F DSCHRG MED/CURRENT MED MERGE: CPT | Performed by: NURSE PRACTITIONER

## 2024-03-11 RX ORDER — SODIUM CHLORIDE 0.9 % (FLUSH) 0.9 %
5-40 SYRINGE (ML) INJECTION PRN
Status: CANCELLED | OUTPATIENT
Start: 2024-03-11

## 2024-03-11 RX ORDER — SODIUM CHLORIDE 9 MG/ML
INJECTION, SOLUTION INTRAVENOUS PRN
Status: CANCELLED | OUTPATIENT
Start: 2024-03-11

## 2024-03-11 RX ORDER — TEMAZEPAM 22.5 MG/1
22.5 CAPSULE ORAL NIGHTLY PRN
Qty: 30 CAPSULE | Refills: 0 | Status: SHIPPED | OUTPATIENT
Start: 2024-03-11 | End: 2024-03-13

## 2024-03-11 RX ORDER — SODIUM CHLORIDE 0.9 % (FLUSH) 0.9 %
5-40 SYRINGE (ML) INJECTION EVERY 12 HOURS SCHEDULED
Status: CANCELLED | OUTPATIENT
Start: 2024-03-11

## 2024-03-11 NOTE — CARE COORDINATION
positive attitude.      Informed of Care Transitions Coordination purpose, process, future calls, etc and is agreeable with appropriate follow up.  Encouraged to call for new/ongoing issues, questions, concerns, etc.  Encouraged to make contact with PCP as indicated for any further needs as well.  Given the opportunity to ask questions and answered these appropriately.  CTN to follow up as indicated in a few days based on the severity of symptoms and risk factors for assessment of new/ongoing symptoms, assessment of risk for readmission, management of self care needs in the home environment, teaching needs as indicated related to disease process, management of diet, medications, activity needs, fall prevention, etc, as well as other needs as indicated below.    Care Transition Nurse provided contact information.  Plan for follow-up call in 5-7 days based on severity of symptoms and risk factors.    Plan for next call: symptom management-GI issues, fever, weakness, other   self management-symptom mgmt, other  follow-up appointment-Did HFU get made? Findings from HFU  medication management-any med changes, side effects    Lashonda Lin RN  Care Transitions Nurse  [phone]

## 2024-03-11 NOTE — CARE COORDINATION
Received an incoming call with voice mail left requesting a call back.  Will return patient's call.

## 2024-03-11 NOTE — CARE COORDINATION
Attempted x 3 to reach patient.  When I did, she was needing to know what day she was admitted to the hospital.

## 2024-03-11 NOTE — TELEPHONE ENCOUNTER
Nikkie called requesting a refill of the below medication which has been pended for you:     Requested Prescriptions     Pending Prescriptions Disp Refills    temazepam (RESTORIL) 22.5 MG capsule 30 capsule 0     Sig: Take 1 capsule by mouth nightly as needed for Sleep for up to 30 days. Max Daily Amount: 22.5 mg       Last Appointment Date: Visit date not found  Next Appointment Date: Visit date not found    Allergies   Allergen Reactions    Compazine [Prochlorperazine Maleate] Other (See Comments)     Eyelids flipped up and mouth stretched to the side.     Prochlorperazine Maleate Other (See Comments)     Eyelids flipped up and mouth stretched to the side.    Thorazine [Chlorpromazine] Other (See Comments)     Eyelids flipped up and mouth stretched to the side

## 2024-03-12 ENCOUNTER — TELEPHONE (OUTPATIENT)
Dept: PALLATIVE CARE | Age: 74
End: 2024-03-12

## 2024-03-12 ENCOUNTER — TELEPHONE (OUTPATIENT)
Dept: VASCULAR SURGERY | Age: 74
End: 2024-03-12

## 2024-03-12 PROBLEM — Z87.891 FORMER SMOKER: Status: ACTIVE | Noted: 2024-02-09

## 2024-03-12 PROBLEM — Z92.3 HISTORY OF RADIATION THERAPY: Status: ACTIVE | Noted: 2024-02-08

## 2024-03-12 NOTE — TELEPHONE ENCOUNTER
The patient left a message yesterday, 03/11/2024.  I tried returning the patient's call today, 03/12/2024.  The patient did not answer but I left a voicemail for her to return my  phone call.

## 2024-03-12 NOTE — TELEPHONE ENCOUNTER
I spoke to the patient about Supportive Care. She declined to be seen at this time. She has a brochure with the number and when she is ready she will call to schedule an appointment.

## 2024-03-13 ENCOUNTER — OFFICE VISIT (OUTPATIENT)
Dept: INTERNAL MEDICINE | Age: 74
End: 2024-03-13

## 2024-03-13 ENCOUNTER — APPOINTMENT (OUTPATIENT)
Dept: INFUSION THERAPY | Age: 74
End: 2024-03-13
Payer: MEDICARE

## 2024-03-13 VITALS
SYSTOLIC BLOOD PRESSURE: 144 MMHG | OXYGEN SATURATION: 99 % | DIASTOLIC BLOOD PRESSURE: 60 MMHG | HEART RATE: 100 BPM | BODY MASS INDEX: 23.47 KG/M2 | HEIGHT: 60 IN

## 2024-03-13 DIAGNOSIS — N17.9 AKI (ACUTE KIDNEY INJURY) (HCC): ICD-10-CM

## 2024-03-13 DIAGNOSIS — Z09 HOSPITAL DISCHARGE FOLLOW-UP: Primary | ICD-10-CM

## 2024-03-13 DIAGNOSIS — D61.818 PANCYTOPENIA (HCC): ICD-10-CM

## 2024-03-13 DIAGNOSIS — C34.92 NSCLC OF LEFT LUNG (HCC): ICD-10-CM

## 2024-03-13 DIAGNOSIS — K06.9: ICD-10-CM

## 2024-03-13 RX ORDER — TRAZODONE HYDROCHLORIDE 50 MG/1
50 TABLET ORAL NIGHTLY
Qty: 90 TABLET | Refills: 0 | Status: SHIPPED | OUTPATIENT
Start: 2024-03-13

## 2024-03-13 SDOH — ECONOMIC STABILITY: FOOD INSECURITY: WITHIN THE PAST 12 MONTHS, THE FOOD YOU BOUGHT JUST DIDN'T LAST AND YOU DIDN'T HAVE MONEY TO GET MORE.: NEVER TRUE

## 2024-03-13 SDOH — ECONOMIC STABILITY: FOOD INSECURITY: WITHIN THE PAST 12 MONTHS, YOU WORRIED THAT YOUR FOOD WOULD RUN OUT BEFORE YOU GOT MONEY TO BUY MORE.: NEVER TRUE

## 2024-03-13 SDOH — ECONOMIC STABILITY: INCOME INSECURITY: HOW HARD IS IT FOR YOU TO PAY FOR THE VERY BASICS LIKE FOOD, HOUSING, MEDICAL CARE, AND HEATING?: NOT HARD AT ALL

## 2024-03-13 NOTE — PROGRESS NOTES
surgeon tomorrow  Dr Otto;    Neck: supple and non-tender without mass, no thyromegaly or thyroid nodules, no cervical lymphadenopathy  Pulmonary/Chest: clear to auscultation bilaterally- no wheezes, rales or rhonchi, normal air movement, no respiratory distress  Cardiovascular: normal rate, regular rhythm, normal S1 and S2, no murmurs, rubs, clicks, or gallops, distal pulses intact, no carotid bruits  Abdomen: soft, non-tender, non-distended, normal bowel sounds, no masses or organomegaly  Extremities: no cyanosis, clubbing or edema  Musculoskeletal: normal range of motion, no joint swelling, deformity or tenderness  Neurologic: reflexes normal and symmetric, no cranial nerve deficit, gait, coordination and speech normal      An electronic signature was used to authenticate this note.  --Lovely Ayala, APRN

## 2024-03-13 NOTE — PATIENT INSTRUCTIONS
diet is one of your best weapons for fighting cardiovascular disease.  When you eat a heart healthy diet, you improve your chances for feeling good and staying healthy for life.  6)  Lose weight - when you shed extra fat an unnecessary pounds, you reduce the burden on your hear, lungs, blood vessels and skeleton.  You give yourself the gift of active living, you lower your blood pressure and help yourself feel better.  7) Stop smoking - cigarette smokers have a higher risk of developing cardiovascular disease.  If  You smoke, quitting is the best thing you can do for your health.

## 2024-03-14 ENCOUNTER — APPOINTMENT (OUTPATIENT)
Dept: INFUSION THERAPY | Age: 74
End: 2024-03-14
Payer: MEDICARE

## 2024-03-14 ENCOUNTER — CARE COORDINATION (OUTPATIENT)
Dept: CASE MANAGEMENT | Age: 74
End: 2024-03-14

## 2024-03-14 ENCOUNTER — TELEPHONE (OUTPATIENT)
Dept: INTERNAL MEDICINE | Age: 74
End: 2024-03-14

## 2024-03-14 NOTE — CARE COORDINATION
Care Transitions Follow Up Call    Patient Current Location:  [place]    Attempted to make contact with patient/caregiver for a routine Care Transitions Coordination follow up call without success. Left a HIPAA compliant message regarding intent of call and call back information.  CTN will follow up again at another time.      Patient: Nikkie Banks  Patient : 1950   MRN: [id number]  Discharge Date: 3/10/24 RARS: Readmission Risk Score: 14.6    Follow Up  Future Appointments   Date Time Provider Department Center   3/15/2024  2:00 PM SCHEDULE, [facility]-AD[facility]   3/27/2024  9:15 AM Jennifer Valdivia MD [facility]   3/27/2024  9:15 AM Lainey Gamez CSW N [facility]   3/27/2024  9:15 AM INFUSION SCHEDULE, [facility]   3/28/2024  9:45 AM INFUSION SCHEDULE, [facility]   3/29/2024 10:00 AM INFUSION SCHEDULE, [facility]   2024 10:00 AM INFUSION SCHEDULE, [facility]   2024 10:00 AM INFUSION SCHEDULE, [facility]   2024 10:00 AM INFUSION SCHEDULE, [facility]     Plan for next call: Re-attempt at ongoing follow up calls for Care Transitions Coordination.    Lashonda Lin RN

## 2024-03-14 NOTE — TELEPHONE ENCOUNTER
Per olvin pt is to stay on trazodone . Ambien has too many side effects and olvin isn't comfortable with changing from trazodone right now pt can go up to 100 mg of trazodone if needed .

## 2024-03-14 NOTE — TELEPHONE ENCOUNTER
Sw pt she doesn't feel that the trazadone is going to help she said she still couldn't rest she would like to try ambien 5mg since her insurance is willing to pay for it. Pt states she doesn't want to go all weekend with out resting [facility]

## 2024-03-15 ENCOUNTER — HOSPITAL ENCOUNTER (OUTPATIENT)
Dept: PREADMISSION TESTING | Age: 74
Discharge: HOME | End: 2024-03-19
Payer: MEDICARE

## 2024-03-15 VITALS — WEIGHT: 109 LBS | BODY MASS INDEX: 21.29 KG/M2

## 2024-03-15 DIAGNOSIS — N17.9 AKI (ACUTE KIDNEY INJURY) (HCC): ICD-10-CM

## 2024-03-15 LAB
ALBUMIN SERPL-MCNC: 3.8 G/DL (ref 3.5–5.2)
ALP SERPL-CCNC: 162 U/L (ref 35–104)
ALT SERPL-CCNC: 16 U/L (ref 5–33)
ANION GAP SERPL CALCULATED.3IONS-SCNC: 12 MMOL/L (ref 7–19)
AST SERPL-CCNC: 26 U/L (ref 5–32)
BASOPHILS # BLD: 0.1 K/UL (ref 0–0.2)
BASOPHILS NFR BLD: 0.7 % (ref 0–1)
BILIRUB SERPL-MCNC: 0.4 MG/DL (ref 0.2–1.2)
BUN SERPL-MCNC: 15 MG/DL (ref 8–23)
CALCIUM SERPL-MCNC: 8.8 MG/DL (ref 8.8–10.2)
CHLORIDE SERPL-SCNC: 102 MMOL/L (ref 98–111)
CO2 SERPL-SCNC: 24 MMOL/L (ref 22–29)
CREAT SERPL-MCNC: 1.2 MG/DL (ref 0.5–0.9)
EOSINOPHIL # BLD: 0 K/UL (ref 0–0.6)
EOSINOPHIL NFR BLD: 0 % (ref 0–5)
ERYTHROCYTE [DISTWIDTH] IN BLOOD BY AUTOMATED COUNT: 14.7 % (ref 11.5–14.5)
GLUCOSE SERPL-MCNC: 89 MG/DL (ref 74–109)
HCT VFR BLD AUTO: 31.2 % (ref 37–47)
HGB BLD-MCNC: 10.3 G/DL (ref 12–16)
IMM GRANULOCYTES # BLD: 0.5 K/UL
LYMPHOCYTES # BLD: 0.8 K/UL (ref 1.1–4.5)
LYMPHOCYTES NFR BLD: 8.4 % (ref 20–40)
MCH RBC QN AUTO: 29.7 PG (ref 27–31)
MCHC RBC AUTO-ENTMCNC: 33 G/DL (ref 33–37)
MCV RBC AUTO: 89.9 FL (ref 81–99)
MONOCYTES # BLD: 1.1 K/UL (ref 0–0.9)
MONOCYTES NFR BLD: 12.6 % (ref 0–10)
NEUTROPHILS # BLD: 6.6 K/UL (ref 1.5–7.5)
NEUTS SEG NFR BLD: 72.6 % (ref 50–65)
PLATELET # BLD AUTO: 298 K/UL (ref 130–400)
PMV BLD AUTO: 11.1 FL (ref 9.4–12.3)
POTASSIUM SERPL-SCNC: 4.9 MMOL/L (ref 3.5–5)
PROT SERPL-MCNC: 5.8 G/DL (ref 6.6–8.7)
RBC # BLD AUTO: 3.47 M/UL (ref 4.2–5.4)
SODIUM SERPL-SCNC: 138 MMOL/L (ref 136–145)
WBC # BLD AUTO: 9.1 K/UL (ref 4.8–10.8)

## 2024-03-15 PROCEDURE — 80053 COMPREHEN METABOLIC PANEL: CPT

## 2024-03-15 PROCEDURE — 85025 COMPLETE CBC W/AUTO DIFF WBC: CPT

## 2024-03-15 PROCEDURE — 93005 ELECTROCARDIOGRAM TRACING: CPT | Performed by: ANESTHESIOLOGY

## 2024-03-15 NOTE — DISCHARGE INSTRUCTIONS
PREOPERATIVE GUIDELINES WHEN RECEIVING ANESTHESIA    Do not eat or drink anything after midnight, the night before your surgery. No gum or candy the morning of surgery.  This is extremely important for your safety.    Take a bath (or shower) the night before your surgery and you may brush your teeth the morning of your surgery.    You will be scheduled to arrive at the hospital 2 hours before your surgery, or follow your surgeon's instructions.    Dress comfortably.  Wear loose clothing that will be easy to remove and comfortable for your trip home.    You may wear eyeglasses or contacts but bring your cases with you as they must be remove before your surgery.    Hearing aids and dentures will need to be removed before your surgery.    Do not wear any jewelry, including body jewelry.  All jewelry will need to be removed prior to your surgery.    Do not wear fingernail polish or make-up.    It is best not to bring any valuables with you.    If you are to stay in the hospital overnight, bring your robe, slippers and personal toiletries that you may need.      POSTOPERATIVE GUIDELINES AFTER RECEIVING ANESTHESIA    If you are to go home after your surgery, you will need a responsible adult to drive you home.     You will not be able to take public transportation after your discharge from the Operative Care Unit unless you are accompanied by a        responsible adult.    On returning home, be sure to follow your physician's orders regarding diet, activity and medications.    Remember, surgery with general anesthesia or sedation may leave you sleepy, very tired and with a decreased appetite for 12 to 24 hours.    If you develop any post-surgical complications or problems, call your surgeon or [facility] Emergency Department ([phone]).          The day before surgery you will receive a phone call from the surgery nurse to let you know what time to arrive on the day of surgery. This call will usually be between 2-4 PM.

## 2024-03-16 LAB
EKG P AXIS: -4 DEGREES
EKG P-R INTERVAL: 128 MS
EKG Q-T INTERVAL: 366 MS
EKG QRS DURATION: 84 MS
EKG QTC CALCULATION (BAZETT): 432 MS
EKG T AXIS: 8 DEGREES

## 2024-03-16 PROCEDURE — 93010 ELECTROCARDIOGRAM REPORT: CPT | Performed by: INTERNAL MEDICINE

## 2024-03-18 ENCOUNTER — ANESTHESIA (OUTPATIENT)
Dept: OPERATING ROOM | Age: 74
End: 2024-03-18
Payer: MEDICARE

## 2024-03-18 ENCOUNTER — HOSPITAL ENCOUNTER (OUTPATIENT)
Age: 74
Setting detail: OUTPATIENT SURGERY
Discharge: HOME | End: 2024-03-18
Attending: SURGERY | Admitting: SURGERY
Payer: MEDICARE

## 2024-03-18 ENCOUNTER — APPOINTMENT (OUTPATIENT)
Dept: INTERVENTIONAL RADIOLOGY/VASCULAR | Age: 74
End: 2024-03-18
Attending: SURGERY
Payer: MEDICARE

## 2024-03-18 ENCOUNTER — ANESTHESIA EVENT (OUTPATIENT)
Dept: OPERATING ROOM | Age: 74
End: 2024-03-18
Payer: MEDICARE

## 2024-03-18 VITALS
HEART RATE: 88 BPM | BODY MASS INDEX: 21.4 KG/M2 | WEIGHT: 109 LBS | HEIGHT: 60 IN | DIASTOLIC BLOOD PRESSURE: 61 MMHG | TEMPERATURE: 97.4 F | OXYGEN SATURATION: 96 % | RESPIRATION RATE: 18 BRPM | SYSTOLIC BLOOD PRESSURE: 124 MMHG

## 2024-03-18 DIAGNOSIS — C34.90 SMALL CELL LUNG CANCER (HCC): Primary | ICD-10-CM

## 2024-03-18 LAB
EKG P AXIS: 5 DEGREES
EKG P-R INTERVAL: 176 MS
EKG Q-T INTERVAL: 360 MS
EKG QRS DURATION: 90 MS
EKG QTC CALCULATION (BAZETT): 447 MS
EKG T AXIS: 34 DEGREES

## 2024-03-18 PROCEDURE — 7100000011 HC PHASE II RECOVERY - ADDTL 15 MIN: Performed by: SURGERY

## 2024-03-18 PROCEDURE — 76937 US GUIDE VASCULAR ACCESS: CPT

## 2024-03-18 PROCEDURE — 7100000000 HC PACU RECOVERY - FIRST 15 MIN: Performed by: SURGERY

## 2024-03-18 PROCEDURE — 2500000003 HC RX 250 WO HCPCS: Performed by: NURSE ANESTHETIST, CERTIFIED REGISTERED

## 2024-03-18 PROCEDURE — 3600000003 HC SURGERY LEVEL 3 BASE: Performed by: SURGERY

## 2024-03-18 PROCEDURE — A4217 STERILE WATER/SALINE, 500 ML: HCPCS | Performed by: SURGERY

## 2024-03-18 PROCEDURE — C1788 PORT, INDWELLING, IMP: HCPCS | Performed by: SURGERY

## 2024-03-18 PROCEDURE — 6360000002 HC RX W HCPCS: Performed by: ANESTHESIOLOGY

## 2024-03-18 PROCEDURE — 2580000003 HC RX 258: Performed by: ANESTHESIOLOGY

## 2024-03-18 PROCEDURE — 93005 ELECTROCARDIOGRAM TRACING: CPT | Performed by: SURGERY

## 2024-03-18 PROCEDURE — 6360000002 HC RX W HCPCS: Performed by: NURSE ANESTHETIST, CERTIFIED REGISTERED

## 2024-03-18 PROCEDURE — 3700000000 HC ANESTHESIA ATTENDED CARE: Performed by: SURGERY

## 2024-03-18 PROCEDURE — 77001 FLUOROGUIDE FOR VEIN DEVICE: CPT

## 2024-03-18 PROCEDURE — 3600000013 HC SURGERY LEVEL 3 ADDTL 15MIN: Performed by: SURGERY

## 2024-03-18 PROCEDURE — 6360000002 HC RX W HCPCS: Performed by: NURSE PRACTITIONER

## 2024-03-18 PROCEDURE — 93010 ELECTROCARDIOGRAM REPORT: CPT | Performed by: INTERNAL MEDICINE

## 2024-03-18 PROCEDURE — C1769 GUIDE WIRE: HCPCS | Performed by: SURGERY

## 2024-03-18 PROCEDURE — 2709999900 HC NON-CHARGEABLE SUPPLY: Performed by: SURGERY

## 2024-03-18 PROCEDURE — 7100000001 HC PACU RECOVERY - ADDTL 15 MIN: Performed by: SURGERY

## 2024-03-18 PROCEDURE — 2580000003 HC RX 258: Performed by: SURGERY

## 2024-03-18 PROCEDURE — 2500000003 HC RX 250 WO HCPCS: Performed by: ANESTHESIOLOGY

## 2024-03-18 PROCEDURE — 6360000002 HC RX W HCPCS: Performed by: SURGERY

## 2024-03-18 PROCEDURE — A4216 STERILE WATER/SALINE, 10 ML: HCPCS | Performed by: ANESTHESIOLOGY

## 2024-03-18 PROCEDURE — 7100000010 HC PHASE II RECOVERY - FIRST 15 MIN: Performed by: SURGERY

## 2024-03-18 PROCEDURE — 6360000002 HC RX W HCPCS

## 2024-03-18 PROCEDURE — 2580000003 HC RX 258: Performed by: NURSE PRACTITIONER

## 2024-03-18 PROCEDURE — 3700000001 HC ADD 15 MINUTES (ANESTHESIA): Performed by: SURGERY

## 2024-03-18 DEVICE — PORT INFUS PLAS SGL LUMN W/ 9.6FR SIL CATH AIRGUARD VLV: Type: IMPLANTABLE DEVICE | Status: FUNCTIONAL

## 2024-03-18 RX ORDER — HYDROMORPHONE HYDROCHLORIDE 1 MG/ML
0.25 INJECTION, SOLUTION INTRAMUSCULAR; INTRAVENOUS; SUBCUTANEOUS EVERY 5 MIN PRN
Status: DISCONTINUED | OUTPATIENT
Start: 2024-03-18 | End: 2024-03-18 | Stop reason: HOSPADM

## 2024-03-18 RX ORDER — APREPITANT 40 MG/1
40 CAPSULE ORAL ONCE
Status: COMPLETED | OUTPATIENT
Start: 2024-03-18 | End: 2024-03-18

## 2024-03-18 RX ORDER — LIDOCAINE HYDROCHLORIDE 10 MG/ML
INJECTION, SOLUTION INFILTRATION; PERINEURAL PRN
Status: DISCONTINUED | OUTPATIENT
Start: 2024-03-18 | End: 2024-03-18 | Stop reason: SDUPTHER

## 2024-03-18 RX ORDER — HYDROMORPHONE HYDROCHLORIDE 1 MG/ML
0.5 INJECTION, SOLUTION INTRAMUSCULAR; INTRAVENOUS; SUBCUTANEOUS EVERY 5 MIN PRN
Status: DISCONTINUED | OUTPATIENT
Start: 2024-03-18 | End: 2024-03-18 | Stop reason: HOSPADM

## 2024-03-18 RX ORDER — SODIUM CHLORIDE, SODIUM LACTATE, POTASSIUM CHLORIDE, CALCIUM CHLORIDE 600; 310; 30; 20 MG/100ML; MG/100ML; MG/100ML; MG/100ML
INJECTION, SOLUTION INTRAVENOUS CONTINUOUS
Status: DISCONTINUED | OUTPATIENT
Start: 2024-03-18 | End: 2024-03-18 | Stop reason: HOSPADM

## 2024-03-18 RX ORDER — HYDROCODONE BITARTRATE AND ACETAMINOPHEN 5; 325 MG/1; MG/1
1 TABLET ORAL EVERY 6 HOURS PRN
Qty: 12 TABLET | Refills: 0 | Status: SHIPPED | OUTPATIENT
Start: 2024-03-18 | End: 2024-03-21

## 2024-03-18 RX ORDER — EPHEDRINE SULFATE 50 MG/ML
INJECTION, SOLUTION INTRAVENOUS PRN
Status: DISCONTINUED | OUTPATIENT
Start: 2024-03-18 | End: 2024-03-18 | Stop reason: SDUPTHER

## 2024-03-18 RX ORDER — SODIUM CHLORIDE 0.9 % (FLUSH) 0.9 %
5-40 SYRINGE (ML) INJECTION EVERY 12 HOURS SCHEDULED
Status: DISCONTINUED | OUTPATIENT
Start: 2024-03-18 | End: 2024-03-18 | Stop reason: HOSPADM

## 2024-03-18 RX ORDER — SODIUM CHLORIDE 0.9 % (FLUSH) 0.9 %
5-40 SYRINGE (ML) INJECTION EVERY 12 HOURS SCHEDULED
Status: CANCELLED | OUTPATIENT
Start: 2024-03-18

## 2024-03-18 RX ORDER — SODIUM CHLORIDE 0.9 % (FLUSH) 0.9 %
5-40 SYRINGE (ML) INJECTION PRN
Status: DISCONTINUED | OUTPATIENT
Start: 2024-03-18 | End: 2024-03-18 | Stop reason: HOSPADM

## 2024-03-18 RX ORDER — SODIUM CHLORIDE 9 MG/ML
INJECTION, SOLUTION INTRAVENOUS PRN
Status: DISCONTINUED | OUTPATIENT
Start: 2024-03-18 | End: 2024-03-18 | Stop reason: HOSPADM

## 2024-03-18 RX ORDER — DEXAMETHASONE SODIUM PHOSPHATE 4 MG/ML
4 INJECTION, SOLUTION INTRA-ARTICULAR; INTRALESIONAL; INTRAMUSCULAR; INTRAVENOUS; SOFT TISSUE ONCE
Status: DISCONTINUED | OUTPATIENT
Start: 2024-03-18 | End: 2024-03-18 | Stop reason: HOSPADM

## 2024-03-18 RX ORDER — NALOXONE HYDROCHLORIDE 0.4 MG/ML
INJECTION, SOLUTION INTRAMUSCULAR; INTRAVENOUS; SUBCUTANEOUS PRN
Status: DISCONTINUED | OUTPATIENT
Start: 2024-03-18 | End: 2024-03-18 | Stop reason: HOSPADM

## 2024-03-18 RX ORDER — SODIUM CHLORIDE 0.9 % (FLUSH) 0.9 %
5-40 SYRINGE (ML) INJECTION PRN
Status: CANCELLED | OUTPATIENT
Start: 2024-03-18

## 2024-03-18 RX ORDER — FENTANYL CITRATE 50 UG/ML
INJECTION, SOLUTION INTRAMUSCULAR; INTRAVENOUS PRN
Status: DISCONTINUED | OUTPATIENT
Start: 2024-03-18 | End: 2024-03-18 | Stop reason: SDUPTHER

## 2024-03-18 RX ORDER — PROPOFOL 10 MG/ML
INJECTION, EMULSION INTRAVENOUS PRN
Status: DISCONTINUED | OUTPATIENT
Start: 2024-03-18 | End: 2024-03-18 | Stop reason: SDUPTHER

## 2024-03-18 RX ORDER — SODIUM CHLORIDE 9 MG/ML
INJECTION, SOLUTION INTRAVENOUS PRN
Status: CANCELLED | OUTPATIENT
Start: 2024-03-18

## 2024-03-18 RX ORDER — ONDANSETRON 2 MG/ML
4 INJECTION INTRAMUSCULAR; INTRAVENOUS
Status: DISCONTINUED | OUTPATIENT
Start: 2024-03-18 | End: 2024-03-18 | Stop reason: HOSPADM

## 2024-03-18 RX ADMIN — FENTANYL CITRATE 25 MCG: 0.05 INJECTION, SOLUTION INTRAMUSCULAR; INTRAVENOUS at 12:34

## 2024-03-18 RX ADMIN — FENTANYL CITRATE 25 MCG: 0.05 INJECTION, SOLUTION INTRAMUSCULAR; INTRAVENOUS at 12:40

## 2024-03-18 RX ADMIN — EPHEDRINE SULFATE 5 MG: 50 INJECTION INTRAMUSCULAR; INTRAVENOUS; SUBCUTANEOUS at 12:25

## 2024-03-18 RX ADMIN — EPHEDRINE SULFATE 5 MG: 50 INJECTION INTRAMUSCULAR; INTRAVENOUS; SUBCUTANEOUS at 12:23

## 2024-03-18 RX ADMIN — FENTANYL CITRATE 25 MCG: 0.05 INJECTION, SOLUTION INTRAMUSCULAR; INTRAVENOUS at 12:38

## 2024-03-18 RX ADMIN — HYDROMORPHONE HYDROCHLORIDE 0.5 MG: 1 INJECTION, SOLUTION INTRAMUSCULAR; INTRAVENOUS; SUBCUTANEOUS at 13:24

## 2024-03-18 RX ADMIN — EPHEDRINE SULFATE 5 MG: 50 INJECTION INTRAMUSCULAR; INTRAVENOUS; SUBCUTANEOUS at 12:29

## 2024-03-18 RX ADMIN — APREPITANT 40 MG: 40 CAPSULE ORAL at 09:46

## 2024-03-18 RX ADMIN — SODIUM CHLORIDE, SODIUM LACTATE, POTASSIUM CHLORIDE, AND CALCIUM CHLORIDE: 600; 310; 30; 20 INJECTION, SOLUTION INTRAVENOUS at 12:54

## 2024-03-18 RX ADMIN — FAMOTIDINE 20 MG: 10 INJECTION, SOLUTION INTRAVENOUS at 09:46

## 2024-03-18 RX ADMIN — HYDROMORPHONE HYDROCHLORIDE 0.5 MG: 1 INJECTION, SOLUTION INTRAMUSCULAR; INTRAVENOUS; SUBCUTANEOUS at 13:37

## 2024-03-18 RX ADMIN — PROPOFOL 50 MG: 10 INJECTION, EMULSION INTRAVENOUS at 12:38

## 2024-03-18 RX ADMIN — PROPOFOL 30 MG: 10 INJECTION, EMULSION INTRAVENOUS at 12:43

## 2024-03-18 RX ADMIN — SODIUM CHLORIDE, SODIUM LACTATE, POTASSIUM CHLORIDE, AND CALCIUM CHLORIDE: 600; 310; 30; 20 INJECTION, SOLUTION INTRAVENOUS at 08:52

## 2024-03-18 RX ADMIN — FENTANYL CITRATE 25 MCG: 0.05 INJECTION, SOLUTION INTRAMUSCULAR; INTRAVENOUS at 12:57

## 2024-03-18 RX ADMIN — HYDROMORPHONE HYDROCHLORIDE 0.5 MG: 1 INJECTION, SOLUTION INTRAMUSCULAR; INTRAVENOUS; SUBCUTANEOUS at 13:30

## 2024-03-18 RX ADMIN — PROPOFOL 100 MG: 10 INJECTION, EMULSION INTRAVENOUS at 12:15

## 2024-03-18 RX ADMIN — LIDOCAINE HYDROCHLORIDE 50 MG: 10 INJECTION, SOLUTION INFILTRATION; PERINEURAL at 12:15

## 2024-03-18 RX ADMIN — CEFAZOLIN 2000 MG: 2 INJECTION, POWDER, FOR SOLUTION INTRAMUSCULAR; INTRAVENOUS at 12:21

## 2024-03-18 ASSESSMENT — PAIN DESCRIPTION - LOCATION: LOCATION: NECK

## 2024-03-18 ASSESSMENT — PAIN SCALES - GENERAL
PAINLEVEL_OUTOF10: 0
PAINLEVEL_OUTOF10: 7
PAINLEVEL_OUTOF10: 6
PAINLEVEL_OUTOF10: 8
PAINLEVEL_OUTOF10: 7

## 2024-03-18 ASSESSMENT — PAIN DESCRIPTION - ORIENTATION: ORIENTATION: RIGHT

## 2024-03-18 ASSESSMENT — PAIN - FUNCTIONAL ASSESSMENT
PAIN_FUNCTIONAL_ASSESSMENT: NONE - DENIES PAIN
PAIN_FUNCTIONAL_ASSESSMENT: NONE - DENIES PAIN

## 2024-03-18 ASSESSMENT — PAIN DESCRIPTION - DESCRIPTORS: DESCRIPTORS: BURNING

## 2024-03-18 ASSESSMENT — LIFESTYLE VARIABLES: SMOKING_STATUS: 0

## 2024-03-18 NOTE — PROGRESS NOTES
Orders for EKG received for tachycardia. Electronically signed by Mark Gifford RN on 3/18/2024 at 1:15 PM

## 2024-03-18 NOTE — DISCHARGE INSTRUCTIONS
May shower starting tomorrow.  Do not submerge incision for 2 weeks.  Call Dr Foster with questions or concerns.

## 2024-03-18 NOTE — H&P
Patient needs chemotherapy. She has poor venous access. She has lung cancer stage IV . She needs palliative chemotherapy.    Old records have been obtained, reviewed, and summarized.    Nikkie Banks is a 73 y.o. female with the following history reviewed and recorded in [facility]:  Patient Active Problem List    Diagnosis Date Noted    NSCLC of left lung (HCC) 02/08/2023     Priority: Medium    Palliative care patient 01/31/2023     Priority: Medium    Cancer related pain 01/31/2023     Priority: Medium    Mass of mediastinum 01/30/2023     Priority: Medium    Abnormal color of gingiva 03/13/2024    Moderate malnutrition (HCC) 03/05/2024    Pancytopenia (HCC) 03/04/2024    Hypoglycemia 03/04/2024    Lactic acidosis 03/04/2024    Hyponatremia 03/04/2024    AURELIANO (acute kidney injury) (HCC), baseline 0.7Cr now 2.5Cr 03/04/2024    Nausea & vomiting 03/04/2024    Diarrhea 03/04/2024    Septic shock (HCC) 03/04/2024    Former smoker 02/09/2024    History of radiation therapy 02/08/2024    Small cell lung cancer (HCC) 01/26/2024    Other fatigue 11/13/2023    Mouth ulcers 11/13/2023    Gastroesophageal reflux disease without esophagitis 08/15/2023    Anemia 01/30/2023     Added automatically from request for surgery [id number]      Stage 1 chronic kidney disease 06/29/2021    Status post implant removal from both breasts 6/11/2020 06/21/2020    Breast implant capsular contracture 02/18/2020    Mixed hyperlipidemia 03/19/2019    Vitamin D deficiency 01/18/2018    Alopecia 08/21/2017    Essential hypertension 08/20/2017    Acquired hypothyroidism 08/20/2017     Current Facility-Administered Medications   Medication Dose Route Frequency Provider Last Rate Last Admin    sodium chloride flush 0.9 % injection 5-40 mL  5-40 mL IntraVENous 2 times per day Mckayenigunner, Berenice, APRN        sodium chloride flush 0.9 % injection 5-40 mL  5-40 mL IntraVENous PRN Christiano, Berenice, APRN        0.9 % sodium chloride infusion

## 2024-03-18 NOTE — PROGRESS NOTES
Dr. Lozano made aware of patients tachycardia. EKG shows no signs of afib. Dr. Lozano to come see the patient soon. Electronically signed by Mark Gifford RN on 3/18/2024 at 1:27 PM

## 2024-03-18 NOTE — ANESTHESIA PRE PROCEDURE
ovaries removed, had surgery as a result of endometriosis   • TUBAL LIGATION     • UPPER GASTROINTESTINAL ENDOSCOPY N/A 2023    Dr Parker-Normal       Social History:    Social History     Tobacco Use   • Smoking status: Former     Current packs/day: 0.00     Average packs/day: 2.0 packs/day for 15.0 years (30.0 ttl pk-yrs)     Types: Cigarettes     Start date:      Quit date:      Years since quittin.2   • Smokeless tobacco: Never   • Tobacco comments:     Smoked 15 years at  [facility] for 30 pack-years   Substance Use Topics   • Alcohol use: Not Currently     Comment: rare social occasions                                Counseling given: Not Answered  Tobacco comments: Smoked 15 years at  [facility] for 30 pack-years      Vital Signs (Current):   Vitals:    24 0836   BP: (!) 130/57   Pulse: 67   Resp: 18   Temp: 98.2 °F (36.8 °C)   TempSrc: Temporal   SpO2: 95%   Weight: 49.4 kg (109 lb)   Height: 1.524 m (5')                                              BP Readings from Last 3 Encounters:   24 (!) 130/57   24 (!) 144/60   03/10/24 (!) 155/78       NPO Status: Time of last liquid consumption: 0000                        Time of last solid consumption: 0000                        Date of last liquid consumption: 24                        Date of last solid food consumption: 24    BMI:   Wt Readings from Last 3 Encounters:   24 49.4 kg (109 lb)   03/15/24 49.4 kg (109 lb)   03/10/24 54.5 kg (120 lb 3.2 oz)     Body mass index is 21.29 kg/m².    CBC:   Lab Results   Component Value Date/Time    WBC 9.1 03/15/2024 02:42 PM    RBC 3.47 03/15/2024 02:42 PM    HGB 10.3 03/15/2024 02:42 PM    HCT 31.2 03/15/2024 02:42 PM    MCV 89.9 03/15/2024 02:42 PM    RDW 14.7 03/15/2024 02:42 PM     03/15/2024 02:42 PM       CMP:   Lab Results   Component Value Date/Time     03/15/2024 02:42 PM    K 4.9 03/15/2024 02:42 PM    K 3.2 2024 02:04 AM     03/15/2024 02:42

## 2024-03-18 NOTE — PROGRESS NOTES
Ok for op care per Dr. Lozano. EKG reviewed. Electronically signed by Mark Gifford RN on 3/18/2024 at 1:34 PM

## 2024-03-18 NOTE — ANESTHESIA POSTPROCEDURE EVALUATION
Department of Anesthesiology  Postprocedure Note    Patient: Nikkie Banks  MRN: [id number]  YOB: 1950  Date of evaluation: 3/18/2024    Procedure Summary       Date: 03/18/24 Room / Location: [address]    Anesthesia Start: 1209 Anesthesia Stop: 1312    Procedure: PORT INSERTION Diagnosis:       Small cell lung cancer (HCC)      (Small cell lung cancer (HCC) [C34.90])    Surgeons: Desire Foster DO Responsible Provider: Dez Sorto APRN - CRNA    Anesthesia Type: MAC ASA Status: 3            Anesthesia Type: No value filed.    Reena Phase I:      Reena Phase II:      Anesthesia Post Evaluation    Patient location during evaluation: PACU  Patient participation: complete - patient participated  Level of consciousness: sleepy but conscious  Pain score: 0  Airway patency: patent  Nausea & Vomiting: no nausea and no vomiting  Cardiovascular status: hemodynamically stable  Respiratory status: acceptable, spontaneous ventilation and room air  Hydration status: euvolemic  Pain management: adequate    No notable events documented.

## 2024-03-18 NOTE — OP NOTE
Operative Note      Patient: Nikkie Banks  YOB: 1950  MRN: [id number]    Date of Procedure: 3/18/2024    Pre-Op Diagnosis Codes:     * Small cell lung cancer (HCC) [C34.90]    Post-Op Diagnosis: Same       Procedure(s):  PORT INSERTION right internal jugular single lumen    Surgeon(s):  Desire Foster DO    Assistant:   * No surgical staff found *    Anesthesia: General    Estimated Blood Loss (mL): less than 50     Complications: None    Specimens:   * No specimens in log *    Implants:  Implant Name Type Inv. Item Serial No.  Lot No. LRB No. Used Action   PORT INFUS PLAS SGL LUMN W/ 9.6FR TONYA CATH AIRGUARD VLV - [id number]  PORT INFUS PLAS SGL LUMN W/ 9.6FR TONYA CATH AIRGUARD VLV  [employer]- [id number] N/A 1 Implanted         Drains:   [REMOVED] Urinary Catheter 03/04/24 Pacheco (Removed)   $ Urethral catheter insertion $ Not inserted for procedure 03/04/24 2053   Catheter Indications Need for fluid volume management of the critically ill patient in a critical care setting 03/06/24 1600   Site Assessment No urethral drainage 03/06/24 1600   Urine Color Yellow;Padmini 03/06/24 1600   Urine Appearance Hazy 03/06/24 0600   Collection Container Standard 03/06/24 1600   Securement Method Securing device (Describe) 03/06/24 1600   Catheter Care  Perineal wipes 03/06/24 0800   Catheter Best Practices  Drainage tube clipped to bed;Catheter secured to thigh;Tamper seal intact;Bag below bladder;Bag not on floor;Lack of dependent loop in tubing;Drainage bag less than half full 03/06/24 1600   Status Draining 03/06/24 1600   Output (mL) 150 mL 03/06/24 1600       Findings:  1.  The right internal jugular vein is patent.  2.  The catheter tip is in the right atrium/superior vena cava junction and ready for use when needed.    Procedure in Detail:    After the patient was consented and given IV antibiotics, she was brought to the operating room and placed on the operating room

## 2024-03-19 ENCOUNTER — TELEPHONE (OUTPATIENT)
Dept: INTERNAL MEDICINE | Age: 74
End: 2024-03-19

## 2024-03-19 ENCOUNTER — CARE COORDINATION (OUTPATIENT)
Dept: CASE MANAGEMENT | Age: 74
End: 2024-03-19

## 2024-03-19 DIAGNOSIS — R53.83 OTHER FATIGUE: Primary | ICD-10-CM

## 2024-03-19 NOTE — CARE COORDINATION
Care Transitions Follow Up Call    Patient Current Location:    Home: [address] [place] [address] [place] [address]    Care Transition Nurse contacted the patient by telephone to follow up.    Patient: Nikkie Banks  Patient : 1950   MRN: [id number]  Discharge Date: 3/18/24 RARS: Readmission Risk Score: 14.6      Needs to be reviewed by the provider   Additional needs identified to be addressed with provider: No       Method of communication with provider: none.    Addressed changes since last contact:  Patient had a port placement yesterday.    Follow Up  Future Appointments   Date Time Provider Department Center   3/27/2024  9:15 AM Jennifer Valdivia MD N [facility]   3/27/2024  9:15 AM Lainey Gamez CSW N [facility]   3/27/2024  9:15 AM INFUSION SCHEDULE, [facility]   3/28/2024  9:45 AM INFUSION SCHEDULE, [facility]   3/29/2024 10:00 AM INFUSION SCHEDULE, [facility]   2024 10:00 AM INFUSION SCHEDULE, [facility]   2024 10:00 AM INFUSION SCHEDULE, [facility]   2024 10:00 AM INFUSION SCHEDULE, [facility]     The patient agrees to contact the PCP office for questions related to their healthcare.     Patients top risk factors for readmission: medical condition-lung cancer, other issues, recent port placement  Interventions to address risk factors: Education of patient/family/caregiver/guardian to support self-management-care/mgmt of new port    Offered patient enrollment in the Remote Patient Monitoring (RPM) program for in-home monitoring: Patient is not eligible for RPM program.     Care Transitions Subsequent and Final Call    Schedule Follow Up Appointment with PCP: Completed  Subsequent and Final Calls  Do you have any ongoing symptoms?: No  Have your medications changed?: No  Do you have any questions related to your medications?: No  Do you currently have any active

## 2024-03-19 NOTE — TELEPHONE ENCOUNTER
Sw pt she is needing some numbing cream to rub over port placement would like it sent to [facility] [place]

## 2024-03-20 RX ORDER — LIDOCAINE 50 MG/G
OINTMENT TOPICAL
Qty: 30 G | Refills: 0 | Status: SHIPPED | OUTPATIENT
Start: 2024-03-20

## 2024-03-22 NOTE — PROGRESS NOTES
limited to doses/regimen modifications by provider, increased patient education awareness of certain toxicities, prompt notification to provider or nursing staff by the patient all certain side effects, proactive measures etc.  We also discussed about the importance of compliance with treatment with providers visits to assure early identification and management of side effects.  Consent signed for carboplatin and etoposide.     # Liver lesions-metastatic disease lung canceer    Evidence of disease progression  Proceed with CT-guided biopsy of liver lesion to rule out small cell lung cancer transformation.  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 Ki-67 proliferation index is greater than 95%.      # Lytic lesion-pathologic fracture right posterior 6th rib  2/15/23 NM BONE SCAN WHOLE BODY There is abnormal focal radiotracer accumulation corresponding to the RIGHT posterior rib cage.This corresponds to a pathologic fracture of the RIGHT posterior 6th rib with associated lytic lucency, seen on chest CT dated 01/30/2023 (see series 4, image 58 of prior study).        # Non-small cell lung cancer, adenocarcinoma stage IV-currently on palliative chemotherapy as outpatient.  I have reduced carboplatin to AUC 4 and etoposide 80 mg/M2 days 1-3.  I have also added G-CSF, long-acting to her regimen.        # Acute kidney injury-resolved      Latest Ref Rng & Units 3/27/2024    10:05 AM 3/15/2024     2:42 PM 3/10/2024     3:49 AM 3/9/2024     2:13 AM 3/8/2024     2:20 AM   Labs Renal   BUN 7 - 17 mg/dL 12  15  25  35  42    Cr 0.5 - 1.0 mg/dL 0.7  1.2  0.9  0.9  1.0    K 3.5 - 5.1 mmol/L 3.8  4.9  4.7  4.6  3.9    Na 137 - 145 mmol/L 138  138  134  138  135      # Normocytic anemia-related to chemotherapy.

## 2024-03-26 ENCOUNTER — CARE COORDINATION (OUTPATIENT)
Dept: CASE MANAGEMENT | Age: 74
End: 2024-03-26

## 2024-03-26 NOTE — CARE COORDINATION
Care Transitions Follow Up Call    Patient Current Location:  [place]    Care Transition Nurse contacted the patient by telephone to follow up.    Patient: Nikkie Banks  Patient : 1950   MRN: [id number]  Discharge Date: 3/18/24 RARS: Readmission Risk Score: 14.6    Needs to be reviewed by the provider   Additional needs identified to be addressed with provider: No       Method of communication with provider: none.    Follow Up  Future Appointments   Date Time Provider Department Center   3/27/2024  9:15 AM Jennifer Valdivia MD N [facility]   3/27/2024  9:15 AM Lainey Gamez CSW N [facility]   3/27/2024  9:15 AM INFUSION SCHEDULE, [facility]   3/28/2024  9:45 AM INFUSION SCHEDULE, [facility]   3/29/2024 10:00 AM INFUSION SCHEDULE, [facility]   2024 10:00 AM INFUSION SCHEDULE, [facility]   2024 10:00 AM INFUSION SCHEDULE, [facility]   2024 10:00 AM INFUSION SCHEDULE, [facility]      Care Transitions Subsequent and Final Call    Schedule Follow Up Appointment with PCP: Completed  Subsequent and Final Calls  Do you have any ongoing symptoms?: No  Have your medications changed?: No  Do you have any questions related to your medications?: No  Do you currently have any active services?: No  Do you have any needs or concerns that I can assist you with?: No  Identified Barriers: None  Care Transitions Interventions  Other Interventions:           Placed a call to the patient for follow up.  She reported she is doing fine.  Said the port has healed and looks good.  Denied any soreness or other issues with it at this time.  Said she is on her way to the dentist right now and that she starts chemo tomorrow.  It will be the first time the port will be accessed and used for chemo.  She was able to get Lidocaine cream to use on it for accessing.  I instructed her on applying a

## 2024-03-27 ENCOUNTER — OFFICE VISIT (OUTPATIENT)
Dept: HEMATOLOGY | Age: 74
End: 2024-03-27

## 2024-03-27 ENCOUNTER — OFFICE VISIT (OUTPATIENT)
Dept: HEMATOLOGY | Age: 74
End: 2024-03-27
Payer: MEDICARE

## 2024-03-27 ENCOUNTER — HOSPITAL ENCOUNTER (OUTPATIENT)
Dept: INFUSION THERAPY | Age: 74
Discharge: HOME | End: 2024-03-27
Payer: MEDICARE

## 2024-03-27 VITALS
DIASTOLIC BLOOD PRESSURE: 64 MMHG | HEART RATE: 99 BPM | HEIGHT: 60 IN | SYSTOLIC BLOOD PRESSURE: 144 MMHG | RESPIRATION RATE: 16 BRPM | BODY MASS INDEX: 22.16 KG/M2 | OXYGEN SATURATION: 98 % | TEMPERATURE: 98.6 F | WEIGHT: 112.9 LBS

## 2024-03-27 DIAGNOSIS — Z71.89 CARE PLAN DISCUSSED WITH PATIENT: ICD-10-CM

## 2024-03-27 DIAGNOSIS — C78.7 NON-SMALL CELL LUNG CANCER METASTATIC TO LIVER (HCC): ICD-10-CM

## 2024-03-27 DIAGNOSIS — C34.92 NSCLC OF LEFT LUNG (HCC): ICD-10-CM

## 2024-03-27 DIAGNOSIS — D64.81 ANTINEOPLASTIC CHEMOTHERAPY INDUCED ANEMIA: ICD-10-CM

## 2024-03-27 DIAGNOSIS — C34.90 NON-SMALL CELL LUNG CANCER METASTATIC TO LIVER (HCC): ICD-10-CM

## 2024-03-27 DIAGNOSIS — Z51.11 CHEMOTHERAPY MANAGEMENT, ENCOUNTER FOR: ICD-10-CM

## 2024-03-27 DIAGNOSIS — C80.1 CANCER (HCC): Primary | ICD-10-CM

## 2024-03-27 DIAGNOSIS — C34.90 SMALL CELL LUNG CANCER (HCC): Primary | ICD-10-CM

## 2024-03-27 DIAGNOSIS — C78.7 NSCLC METASTATIC TO LIVER (HCC): ICD-10-CM

## 2024-03-27 DIAGNOSIS — C34.90 EGFR-RELATED LUNG CANCER (HCC): ICD-10-CM

## 2024-03-27 DIAGNOSIS — T45.1X5A ANTINEOPLASTIC CHEMOTHERAPY INDUCED ANEMIA: ICD-10-CM

## 2024-03-27 DIAGNOSIS — C34.90 NSCLC METASTATIC TO LIVER (HCC): ICD-10-CM

## 2024-03-27 LAB
ALBUMIN SERPL-MCNC: 3.2 G/DL (ref 3.5–5.2)
ALP SERPL-CCNC: 89 U/L (ref 35–104)
ALT SERPL-CCNC: 14 U/L (ref 9–52)
ANION GAP SERPL CALCULATED.3IONS-SCNC: 10 MMOL/L (ref 7–19)
AST SERPL-CCNC: 29 U/L (ref 14–36)
BASOPHILS # BLD: 0.03 K/UL (ref 0.01–0.08)
BASOPHILS NFR BLD: 0.6 % (ref 0.1–1.2)
BILIRUB SERPL-MCNC: 0.3 MG/DL (ref 0.2–1.3)
BUN SERPL-MCNC: 12 MG/DL (ref 7–17)
CALCIUM SERPL-MCNC: 8.5 MG/DL (ref 8.4–10.2)
CHLORIDE SERPL-SCNC: 104 MMOL/L (ref 98–111)
CO2 SERPL-SCNC: 24 MMOL/L (ref 22–29)
CREAT SERPL-MCNC: 0.7 MG/DL (ref 0.5–1)
EOSINOPHIL # BLD: 0.56 K/UL (ref 0.04–0.54)
EOSINOPHIL NFR BLD: 10.4 % (ref 0.7–7)
ERYTHROCYTE [DISTWIDTH] IN BLOOD BY AUTOMATED COUNT: 17.2 % (ref 11.7–14.4)
GLOBULIN: 2.3 G/DL
GLUCOSE SERPL-MCNC: 106 MG/DL (ref 74–106)
HCT VFR BLD AUTO: 28.5 % (ref 34.1–44.9)
HGB BLD-MCNC: 9.3 G/DL (ref 11.2–15.7)
LYMPHOCYTES # BLD: 1.11 K/UL (ref 1.18–3.74)
LYMPHOCYTES NFR BLD: 20.6 % (ref 19.3–53.1)
MCH RBC QN AUTO: 30 PG (ref 25.6–32.2)
MCHC RBC AUTO-ENTMCNC: 32.6 G/DL (ref 32.3–35.5)
MCV RBC AUTO: 91.9 FL (ref 79.4–94.8)
MONOCYTES # BLD: 0.55 K/UL (ref 0.24–0.82)
MONOCYTES NFR BLD: 10.2 % (ref 4.7–12.5)
NEUTROPHILS # BLD: 3.13 K/UL (ref 1.56–6.13)
NEUTS SEG NFR BLD: 58 % (ref 34–71.1)
PLATELET # BLD AUTO: 160 K/UL (ref 182–369)
PMV BLD AUTO: 10.6 FL (ref 7.4–10.4)
POTASSIUM SERPL-SCNC: 3.8 MMOL/L (ref 3.5–5.1)
PROT SERPL-MCNC: 5.6 G/DL (ref 6.3–8.2)
RBC # BLD AUTO: 3.1 M/UL (ref 3.93–5.22)
SODIUM SERPL-SCNC: 138 MMOL/L (ref 137–145)
WBC # BLD AUTO: 5.39 K/UL (ref 3.98–10.04)

## 2024-03-27 PROCEDURE — NBSRV NON-BILLABLE SERVICE: Performed by: INTERNAL MEDICINE

## 2024-03-27 PROCEDURE — 1123F ACP DISCUSS/DSCN MKR DOCD: CPT | Performed by: INTERNAL MEDICINE

## 2024-03-27 PROCEDURE — G2211 COMPLEX E/M VISIT ADD ON: HCPCS | Performed by: INTERNAL MEDICINE

## 2024-03-27 PROCEDURE — 85025 COMPLETE CBC W/AUTO DIFF WBC: CPT

## 2024-03-27 PROCEDURE — 2580000003 HC RX 258: Performed by: INTERNAL MEDICINE

## 2024-03-27 PROCEDURE — 1036F TOBACCO NON-USER: CPT | Performed by: INTERNAL MEDICINE

## 2024-03-27 PROCEDURE — 1090F PRES/ABSN URINE INCON ASSESS: CPT | Performed by: INTERNAL MEDICINE

## 2024-03-27 PROCEDURE — 96413 CHEMO IV INFUSION 1 HR: CPT

## 2024-03-27 PROCEDURE — 96375 TX/PRO/DX INJ NEW DRUG ADDON: CPT

## 2024-03-27 PROCEDURE — 3077F SYST BP >= 140 MM HG: CPT | Performed by: INTERNAL MEDICINE

## 2024-03-27 PROCEDURE — 6360000002 HC RX W HCPCS: Performed by: INTERNAL MEDICINE

## 2024-03-27 PROCEDURE — 96367 TX/PROPH/DG ADDL SEQ IV INF: CPT

## 2024-03-27 PROCEDURE — 3017F COLORECTAL CA SCREEN DOC REV: CPT | Performed by: INTERNAL MEDICINE

## 2024-03-27 PROCEDURE — 3078F DIAST BP <80 MM HG: CPT | Performed by: INTERNAL MEDICINE

## 2024-03-27 PROCEDURE — 99214 OFFICE O/P EST MOD 30 MIN: CPT | Performed by: INTERNAL MEDICINE

## 2024-03-27 PROCEDURE — G8428 CUR MEDS NOT DOCUMENT: HCPCS | Performed by: INTERNAL MEDICINE

## 2024-03-27 PROCEDURE — 80053 COMPREHEN METABOLIC PANEL: CPT

## 2024-03-27 PROCEDURE — G8484 FLU IMMUNIZE NO ADMIN: HCPCS | Performed by: INTERNAL MEDICINE

## 2024-03-27 PROCEDURE — 96417 CHEMO IV INFUS EACH ADDL SEQ: CPT

## 2024-03-27 PROCEDURE — 1111F DSCHRG MED/CURRENT MED MERGE: CPT | Performed by: INTERNAL MEDICINE

## 2024-03-27 PROCEDURE — G8420 CALC BMI NORM PARAMETERS: HCPCS | Performed by: INTERNAL MEDICINE

## 2024-03-27 PROCEDURE — 36415 COLL VENOUS BLD VENIPUNCTURE: CPT

## 2024-03-27 PROCEDURE — G8399 PT W/DXA RESULTS DOCUMENT: HCPCS | Performed by: INTERNAL MEDICINE

## 2024-03-27 RX ORDER — SODIUM CHLORIDE 0.9 % (FLUSH) 0.9 %
5-40 SYRINGE (ML) INJECTION PRN
Status: CANCELLED | OUTPATIENT
Start: 2024-03-27

## 2024-03-27 RX ORDER — ALBUTEROL SULFATE 90 UG/1
4 AEROSOL, METERED RESPIRATORY (INHALATION) PRN
Status: CANCELLED | OUTPATIENT
Start: 2024-03-27

## 2024-03-27 RX ORDER — MEPERIDINE HYDROCHLORIDE 50 MG/ML
12.5 INJECTION INTRAMUSCULAR; INTRAVENOUS; SUBCUTANEOUS PRN
Status: CANCELLED | OUTPATIENT
Start: 2024-03-27

## 2024-03-27 RX ORDER — FAMOTIDINE 10 MG/ML
20 INJECTION, SOLUTION INTRAVENOUS
Status: CANCELLED | OUTPATIENT
Start: 2024-03-27

## 2024-03-27 RX ORDER — HEPARIN 100 UNIT/ML
500 SYRINGE INTRAVENOUS PRN
Status: DISCONTINUED | OUTPATIENT
Start: 2024-03-27 | End: 2024-03-28 | Stop reason: HOSPADM

## 2024-03-27 RX ORDER — EPINEPHRINE 1 MG/ML
0.3 INJECTION, SOLUTION, CONCENTRATE INTRAVENOUS PRN
Status: CANCELLED | OUTPATIENT
Start: 2024-03-27

## 2024-03-27 RX ORDER — SODIUM CHLORIDE 9 MG/ML
5-250 INJECTION, SOLUTION INTRAVENOUS PRN
Status: DISCONTINUED | OUTPATIENT
Start: 2024-03-27 | End: 2024-03-28 | Stop reason: HOSPADM

## 2024-03-27 RX ORDER — SODIUM CHLORIDE 9 MG/ML
5-250 INJECTION, SOLUTION INTRAVENOUS PRN
Status: CANCELLED | OUTPATIENT
Start: 2024-03-27

## 2024-03-27 RX ORDER — ACETAMINOPHEN 325 MG/1
650 TABLET ORAL
Status: CANCELLED
Start: 2024-03-27

## 2024-03-27 RX ORDER — ONDANSETRON 2 MG/ML
8 INJECTION INTRAMUSCULAR; INTRAVENOUS
Status: CANCELLED | OUTPATIENT
Start: 2024-03-27

## 2024-03-27 RX ORDER — ACETAMINOPHEN 325 MG/1
650 TABLET ORAL
Status: CANCELLED | OUTPATIENT
Start: 2024-03-27

## 2024-03-27 RX ORDER — HEPARIN SODIUM (PORCINE) LOCK FLUSH IV SOLN 100 UNIT/ML 100 UNIT/ML
500 SOLUTION INTRAVENOUS PRN
Status: CANCELLED | OUTPATIENT
Start: 2024-03-27

## 2024-03-27 RX ORDER — DIPHENHYDRAMINE HYDROCHLORIDE 50 MG/ML
50 INJECTION INTRAMUSCULAR; INTRAVENOUS
Status: CANCELLED | OUTPATIENT
Start: 2024-03-27

## 2024-03-27 RX ORDER — PALONOSETRON 0.05 MG/ML
0.25 INJECTION, SOLUTION INTRAVENOUS ONCE
Status: COMPLETED | OUTPATIENT
Start: 2024-03-27 | End: 2024-03-27

## 2024-03-27 RX ORDER — PALONOSETRON 0.05 MG/ML
0.25 INJECTION, SOLUTION INTRAVENOUS ONCE
Status: CANCELLED | OUTPATIENT
Start: 2024-03-27 | End: 2024-03-27

## 2024-03-27 RX ORDER — SODIUM CHLORIDE 9 MG/ML
INJECTION, SOLUTION INTRAVENOUS CONTINUOUS
Status: CANCELLED | OUTPATIENT
Start: 2024-03-27

## 2024-03-27 RX ORDER — SODIUM CHLORIDE 0.9 % (FLUSH) 0.9 %
5-40 SYRINGE (ML) INJECTION PRN
Status: DISCONTINUED | OUTPATIENT
Start: 2024-03-27 | End: 2024-03-28 | Stop reason: HOSPADM

## 2024-03-27 RX ADMIN — PALONOSETRON 0.25 MG: 0.05 INJECTION, SOLUTION INTRAVENOUS at 10:35

## 2024-03-27 RX ADMIN — ETOPOSIDE 120 MG: 20 INJECTION, SOLUTION INTRAVENOUS at 12:18

## 2024-03-27 RX ADMIN — SODIUM CHLORIDE 150 MG: 900 INJECTION, SOLUTION INTRAVENOUS at 10:47

## 2024-03-27 RX ADMIN — SODIUM CHLORIDE, PRESERVATIVE FREE 10 ML: 5 INJECTION INTRAVENOUS at 13:21

## 2024-03-27 RX ADMIN — ATEZOLIZUMAB 1200 MG: 1200 INJECTION, SOLUTION INTRAVENOUS at 11:13

## 2024-03-27 RX ADMIN — HEPARIN 500 UNITS: 100 SYRINGE at 13:21

## 2024-03-27 RX ADMIN — DEXAMETHASONE SODIUM PHOSPHATE: 100 INJECTION INTRAMUSCULAR; INTRAVENOUS at 10:35

## 2024-03-27 RX ADMIN — SODIUM CHLORIDE 50 ML/HR: 9 INJECTION, SOLUTION INTRAVENOUS at 10:34

## 2024-03-27 RX ADMIN — CARBOPLATIN 330 MG: 10 INJECTION, SOLUTION INTRAVENOUS at 11:44

## 2024-03-27 NOTE — PROGRESS NOTES
YASSINE Henley completed initial treatment visit. RODDY introduced self and explained role and source of support. Patient completed [facility] Distress Thermometer and Problems List. Patient is accompanied by her spouse of 14 years Soy. Paola has two sons who live locally and are involvede and supportive of the patient. Patient has a strong leisa and attends [facility]. She reports her  is involved in providing spiritual and emotional support to the patient. Patient denies needs or concerns at this time. SW encouraged the patient to call if assistance is needed in the future.

## 2024-03-27 NOTE — PROGRESS NOTES
Lab Results   Component Value Date    WBC 5.39 03/27/2024    HGB 9.3 (L) 03/27/2024    HCT 28.5 (L) 03/27/2024    MCV 91.9 03/27/2024     (L) 03/27/2024     Lab Results   Component Value Date    NEUTROABS 3.13 03/27/2024

## 2024-03-28 ENCOUNTER — HOSPITAL ENCOUNTER (OUTPATIENT)
Dept: INFUSION THERAPY | Age: 74
Discharge: HOME | End: 2024-03-28
Payer: MEDICARE

## 2024-03-28 VITALS
OXYGEN SATURATION: 97 % | TEMPERATURE: 98 F | SYSTOLIC BLOOD PRESSURE: 160 MMHG | DIASTOLIC BLOOD PRESSURE: 80 MMHG | RESPIRATION RATE: 18 BRPM | HEART RATE: 80 BPM

## 2024-03-28 DIAGNOSIS — C34.90 SMALL CELL LUNG CANCER (HCC): Primary | ICD-10-CM

## 2024-03-28 PROCEDURE — 6360000002 HC RX W HCPCS: Performed by: INTERNAL MEDICINE

## 2024-03-28 PROCEDURE — 96413 CHEMO IV INFUSION 1 HR: CPT

## 2024-03-28 PROCEDURE — 2580000003 HC RX 258: Performed by: INTERNAL MEDICINE

## 2024-03-28 PROCEDURE — 96367 TX/PROPH/DG ADDL SEQ IV INF: CPT

## 2024-03-28 RX ORDER — ALBUTEROL SULFATE 90 UG/1
4 AEROSOL, METERED RESPIRATORY (INHALATION) PRN
Status: CANCELLED | OUTPATIENT
Start: 2024-03-28

## 2024-03-28 RX ORDER — ACETAMINOPHEN 325 MG/1
650 TABLET ORAL
Status: CANCELLED | OUTPATIENT
Start: 2024-03-29

## 2024-03-28 RX ORDER — ACETAMINOPHEN 325 MG/1
650 TABLET ORAL
Status: CANCELLED | OUTPATIENT
Start: 2024-03-28

## 2024-03-28 RX ORDER — HEPARIN 100 UNIT/ML
500 SYRINGE INTRAVENOUS PRN
Status: CANCELLED | OUTPATIENT
Start: 2024-03-29

## 2024-03-28 RX ORDER — SODIUM CHLORIDE 9 MG/ML
5-250 INJECTION, SOLUTION INTRAVENOUS PRN
Status: CANCELLED | OUTPATIENT
Start: 2024-03-29

## 2024-03-28 RX ORDER — FAMOTIDINE 10 MG/ML
20 INJECTION, SOLUTION INTRAVENOUS
Status: CANCELLED | OUTPATIENT
Start: 2024-03-29

## 2024-03-28 RX ORDER — DIPHENHYDRAMINE HYDROCHLORIDE 50 MG/ML
50 INJECTION INTRAMUSCULAR; INTRAVENOUS
Status: CANCELLED | OUTPATIENT
Start: 2024-03-28

## 2024-03-28 RX ORDER — SODIUM CHLORIDE 9 MG/ML
INJECTION, SOLUTION INTRAVENOUS CONTINUOUS
Status: CANCELLED | OUTPATIENT
Start: 2024-03-29

## 2024-03-28 RX ORDER — MEPERIDINE HYDROCHLORIDE 25 MG/ML
12.5 INJECTION INTRAMUSCULAR; INTRAVENOUS; SUBCUTANEOUS PRN
Status: CANCELLED | OUTPATIENT
Start: 2024-03-29

## 2024-03-28 RX ORDER — ALBUTEROL SULFATE 90 UG/1
4 AEROSOL, METERED RESPIRATORY (INHALATION) PRN
Status: CANCELLED | OUTPATIENT
Start: 2024-03-29

## 2024-03-28 RX ORDER — EPINEPHRINE 1 MG/ML
0.3 INJECTION, SOLUTION, CONCENTRATE INTRAVENOUS PRN
Status: CANCELLED | OUTPATIENT
Start: 2024-03-28

## 2024-03-28 RX ORDER — SODIUM CHLORIDE 9 MG/ML
INJECTION, SOLUTION INTRAVENOUS CONTINUOUS
Status: CANCELLED | OUTPATIENT
Start: 2024-03-28

## 2024-03-28 RX ORDER — DIPHENHYDRAMINE HYDROCHLORIDE 50 MG/ML
50 INJECTION INTRAMUSCULAR; INTRAVENOUS
Status: CANCELLED | OUTPATIENT
Start: 2024-03-29

## 2024-03-28 RX ORDER — SODIUM CHLORIDE 0.9 % (FLUSH) 0.9 %
5-40 SYRINGE (ML) INJECTION PRN
Status: CANCELLED | OUTPATIENT
Start: 2024-03-29

## 2024-03-28 RX ORDER — MEPERIDINE HYDROCHLORIDE 25 MG/ML
12.5 INJECTION INTRAMUSCULAR; INTRAVENOUS; SUBCUTANEOUS PRN
Status: CANCELLED | OUTPATIENT
Start: 2024-03-28

## 2024-03-28 RX ORDER — ONDANSETRON 2 MG/ML
8 INJECTION INTRAMUSCULAR; INTRAVENOUS
Status: CANCELLED | OUTPATIENT
Start: 2024-03-29

## 2024-03-28 RX ORDER — EPINEPHRINE 1 MG/ML
0.3 INJECTION, SOLUTION, CONCENTRATE INTRAVENOUS PRN
Status: CANCELLED | OUTPATIENT
Start: 2024-03-29

## 2024-03-28 RX ORDER — HEPARIN 100 UNIT/ML
500 SYRINGE INTRAVENOUS PRN
Status: DISCONTINUED | OUTPATIENT
Start: 2024-03-28 | End: 2024-03-29 | Stop reason: HOSPADM

## 2024-03-28 RX ORDER — FAMOTIDINE 10 MG/ML
20 INJECTION, SOLUTION INTRAVENOUS
Status: CANCELLED | OUTPATIENT
Start: 2024-03-28

## 2024-03-28 RX ORDER — ONDANSETRON 2 MG/ML
8 INJECTION INTRAMUSCULAR; INTRAVENOUS
Status: CANCELLED | OUTPATIENT
Start: 2024-03-28

## 2024-03-28 RX ORDER — SODIUM CHLORIDE 0.9 % (FLUSH) 0.9 %
5-40 SYRINGE (ML) INJECTION PRN
Status: DISCONTINUED | OUTPATIENT
Start: 2024-03-28 | End: 2024-03-29 | Stop reason: HOSPADM

## 2024-03-28 RX ORDER — SODIUM CHLORIDE 9 MG/ML
5-250 INJECTION, SOLUTION INTRAVENOUS PRN
Status: CANCELLED | OUTPATIENT
Start: 2024-03-28

## 2024-03-28 RX ORDER — SODIUM CHLORIDE 9 MG/ML
5-250 INJECTION, SOLUTION INTRAVENOUS PRN
Status: DISCONTINUED | OUTPATIENT
Start: 2024-03-28 | End: 2024-03-29 | Stop reason: HOSPADM

## 2024-03-28 RX ADMIN — HEPARIN 500 UNITS: 100 SYRINGE at 11:49

## 2024-03-28 RX ADMIN — ETOPOSIDE 120 MG: 20 INJECTION, SOLUTION INTRAVENOUS at 10:46

## 2024-03-28 RX ADMIN — SODIUM CHLORIDE 25 ML/HR: 9 INJECTION, SOLUTION INTRAVENOUS at 10:09

## 2024-03-28 RX ADMIN — SODIUM CHLORIDE, PRESERVATIVE FREE 10 ML: 5 INJECTION INTRAVENOUS at 11:49

## 2024-03-28 RX ADMIN — ONDANSETRON 16 MG: 2 INJECTION INTRAMUSCULAR; INTRAVENOUS at 10:10

## 2024-03-29 ENCOUNTER — HOSPITAL ENCOUNTER (OUTPATIENT)
Dept: INFUSION THERAPY | Age: 74
Discharge: HOME | End: 2024-03-29
Payer: MEDICARE

## 2024-03-29 VITALS
DIASTOLIC BLOOD PRESSURE: 78 MMHG | SYSTOLIC BLOOD PRESSURE: 136 MMHG | RESPIRATION RATE: 18 BRPM | OXYGEN SATURATION: 99 % | HEART RATE: 83 BPM | TEMPERATURE: 98 F

## 2024-03-29 DIAGNOSIS — C34.90 SMALL CELL LUNG CANCER (HCC): Primary | ICD-10-CM

## 2024-03-29 PROCEDURE — 96413 CHEMO IV INFUSION 1 HR: CPT

## 2024-03-29 PROCEDURE — 96367 TX/PROPH/DG ADDL SEQ IV INF: CPT

## 2024-03-29 PROCEDURE — 2580000003 HC RX 258: Performed by: INTERNAL MEDICINE

## 2024-03-29 PROCEDURE — 6360000002 HC RX W HCPCS: Performed by: INTERNAL MEDICINE

## 2024-03-29 PROCEDURE — 96377 APPLICATON ON-BODY INJECTOR: CPT

## 2024-03-29 RX ORDER — SODIUM CHLORIDE 0.9 % (FLUSH) 0.9 %
5-40 SYRINGE (ML) INJECTION PRN
Status: DISCONTINUED | OUTPATIENT
Start: 2024-03-29 | End: 2024-03-30 | Stop reason: HOSPADM

## 2024-03-29 RX ORDER — SODIUM CHLORIDE 9 MG/ML
5-250 INJECTION, SOLUTION INTRAVENOUS PRN
Status: DISCONTINUED | OUTPATIENT
Start: 2024-03-29 | End: 2024-03-30 | Stop reason: HOSPADM

## 2024-03-29 RX ORDER — HEPARIN 100 UNIT/ML
500 SYRINGE INTRAVENOUS PRN
Status: DISCONTINUED | OUTPATIENT
Start: 2024-03-29 | End: 2024-03-30 | Stop reason: HOSPADM

## 2024-03-29 RX ADMIN — ONDANSETRON 16 MG: 2 INJECTION INTRAMUSCULAR; INTRAVENOUS at 10:17

## 2024-03-29 RX ADMIN — ETOPOSIDE 120 MG: 20 INJECTION, SOLUTION INTRAVENOUS at 10:52

## 2024-03-29 RX ADMIN — SODIUM CHLORIDE 50 ML/HR: 9 INJECTION, SOLUTION INTRAVENOUS at 10:16

## 2024-03-29 RX ADMIN — SODIUM CHLORIDE, PRESERVATIVE FREE 10 ML: 5 INJECTION INTRAVENOUS at 11:54

## 2024-03-29 RX ADMIN — HEPARIN 500 UNITS: 100 SYRINGE at 11:54

## 2024-03-29 RX ADMIN — PEGFILGRASTIM 6 MG: KIT SUBCUTANEOUS at 11:54

## 2024-04-02 ENCOUNTER — CARE COORDINATION (OUTPATIENT)
Dept: CASE MANAGEMENT | Age: 74
End: 2024-04-02

## 2024-04-02 NOTE — CARE COORDINATION
Care Transitions Follow Up Call    Patient Current Location:   Home: [address] [place] [address] [place] [address]    Patient: Nikkie Banks  Patient : 1950   MRN: [id number]   Discharge Date: 3/18/24 RARS: Readmission Risk Score: 14.6    Needs to be reviewed by the provider   Additional needs identified to be addressed with provider: No       Method of communication with provider: none.    Follow Up  Future Appointments   Date Time Provider Department Center   2024 10:00 AM INFUSION SCHEDULE, [facility]   2024 10:45 AM Jennifer Valdivia MD N [facility]   2024 10:00 AM INFUSION SCHEDULE, [facility]   2024 10:00 AM INFUSION SCHEDULE, [facility]   2024 10:00 AM INFUSION SCHEDULE, [facility]   2024 10:00 AM INFUSION SCHEDULE, [facility]   5/10/2024 10:00 AM INFUSION SCHEDULE, [facility]   The patient agrees to contact the PCP office for questions related to their healthcare.      Care Transitions Subsequent and Final Call    Subsequent and Final Calls  Have your medications changed?: No  Do you have any questions related to your medications?: No  Do you currently have any active services?: No  Do you have any needs or concerns that I can assist you with?: No  Identified Barriers: None  Care Transitions Interventions  Other Interventions:           Received a call back from the patient.  She reported she and her  moved from their house to an apartment over the weekend.  They are downsizing.  She has movers and different people in and out today.  She said overall, she is feeling well.  She denied any nausea, other symptoms at this time.  She said she is eating well, trying to drink plenty of fluids and also drinking Boost.  Encouraged to try to snack in between meals and to take in 200-300 extra calories per day to compensate for any she may lose over time as chemo

## 2024-04-02 NOTE — CARE COORDINATION
Care Transitions Follow Up Call    Patient Current Location:    Home: [address] [place] [address] [place] [address]    Care Transition Nurse contacted the patient by telephone to follow up.    Patient: Nikkie Banks  Patient : 1950   MRN: [id number]   Discharge Date: 3/18/24 RARS: Readmission Risk Score: 14.6    Needs to be reviewed by the provider   Additional needs identified to be addressed with provider: No       Follow Up  Future Appointments   Date Time Provider Department Center   2024 10:00 AM INFUSION SCHEDULE, [facility]   2024 10:45 AM [facility]   2024 10:00 AM INFUSION SCHEDULE, [facility]   2024 10:00 AM INFUSION SCHEDULE, [facility]   2024 10:00 AM INFUSION SCHEDULE, [facility]   2024 10:00 AM INFUSION SCHEDULE, [facility]   5/10/2024 10:00 AM INFUSION SCHEDULE, [facility]      Care Transitions Subsequent and Final Call    Subsequent and Final Calls  Have your medications changed?: No  Do you have any questions related to your medications?: No  Do you currently have any active services?: No  Do you have any needs or concerns that I can assist you with?: No  Identified Barriers: None  Care Transitions Interventions  Other Interventions:           Placed a call to the patient for follow up. She reported she is doing well.  She had chemo last week.  Said she was weak up until yesterday, but feels better today.  Said she is getting some strength back and feels like she is able to tolerate food better.  Said she really didn't have nausea, just didn't seem to have much of a desire for food or to eat.  Said she has been trying to drink plenty though.  She did say that she has had some swelling in btoh lower extremities, all the way up to her thighs.  Said it goes down at night when she goes to bed and is gone in the morning when she gets

## 2024-04-03 ENCOUNTER — APPOINTMENT (OUTPATIENT)
Dept: INFUSION THERAPY | Age: 74
End: 2024-04-03
Payer: MEDICARE

## 2024-04-09 ENCOUNTER — CARE COORDINATION (OUTPATIENT)
Dept: CASE MANAGEMENT | Age: 74
End: 2024-04-09

## 2024-04-09 NOTE — CARE COORDINATION
Care Transitions Follow Up Call    Patient Current Location:  [place]    Attempted to make contact with patient/caregiver for a routine Care Transitions Coordination follow up call without success. Unable to leave a message regarding intent of call and call back information.  CTN will follow up again at another time.    Patient: Nikkie Banks  Patient : 1950   MRN: [id number]    Discharge Date: 3/18/24 RARS: Readmission Risk Score: 14.6    Follow Up  Future Appointments   Date Time Provider Department Center   2024 10:00 AM INFUSION SCHEDULE, [facility]   2024 10:45 AM Jennifer Valdivia MD [facility]   2024 10:00 AM INFUSION SCHEDULE, [facility]   2024 10:00 AM INFUSION SCHEDULE, [facility]   2024 10:00 AM INFUSION SCHEDULE, [facility]   2024 10:00 AM INFUSION SCHEDULE, [facility]   5/10/2024 10:00 AM INFUSION SCHEDULE, [facility]     Plan for next call: Re-attempt at ongoing follow up calls for Care Transitions Coordination.    Lashonda Lin RN

## 2024-04-12 ENCOUNTER — CARE COORDINATION (OUTPATIENT)
Dept: CASE MANAGEMENT | Age: 74
End: 2024-04-12

## 2024-04-12 ENCOUNTER — TELEPHONE (OUTPATIENT)
Facility: HOSPITAL | Age: 74
End: 2024-04-12

## 2024-04-12 NOTE — CARE COORDINATION
Care Transitions Follow Up Call    Patient Current Location:  [place]    Attempted to make contact with patient/caregiver for a routine Care Transitions Coordination follow up call without success. Left a HIPAA compliant message regarding intent of call and call back information.  As this repeated attempt to make contact was unsuccessful, will disengage at this time.      Patient: Nikkie Banks  Patient : 1950   MRN: [id number]    Discharge Date: 3/18/24 RARS: Readmission Risk Score: 14.6    Follow Up  Future Appointments   Date Time Provider Department Center   2024 10:00 AM INFUSION SCHEDULE, [facility]   2024 10:45 AM Jennifer Valdivia MD N [facility]   2024 10:00 AM INFUSION SCHEDULE, [facility]   2024 10:00 AM INFUSION SCHEDULE, [facility]   2024 10:00 AM INFUSION SCHEDULE, [facility]   2024 10:00 AM INFUSION SCHEDULE, [facility]   5/10/2024 10:00 AM INFUSION SCHEDULE, [facility]     Lashonda Lin RN

## 2024-04-12 NOTE — TELEPHONE ENCOUNTER
Called and left a message for Nikkie to return my call regarding her [employer].  She called back and said she has enough to last a few more weeks.  She did give me a new address to put in [employer]  I changed the address in Epic and gave new address to [employer] and it will be processed on 5-17-24

## 2024-04-16 NOTE — PROGRESS NOTES
lesion.  Interval response of the left lower lobe lesion but interbowel development of pulmonary nodules consistent Disease.  In addition, CT abdomen showed interval progression of the liver lesions.     1/25/[id number]-nlrnif biopsy liver lesion  FINAL DIAGNOSIS:   Lesion, right lobe of liver, CT-guided biopsies (touch prep and cell   block section):       Metastatic small cell carcinoma (poorly differentiated neuroendocrine carcinoma), favor lung primary. Ki-67 proliferation index is greater than 95%.      Essentially, evidence of small cell transformation.  Therefore, I recommended frontline palliative chemotherapy with carboplatin, etoposide and osimertinib.  Treatment is palliative.     Palliative treatment with cycle #4-cycle #12 will be held today due to patient's severe fatigue/physical deconditioning  Carbo AUC 4-day 1  Etoposide 80 mg/m² days 1-3 q.   Continue osimertinib 80 mg p.o. daily       # Liver lesions-metastatic disease lung canceer    Evidence of disease progression  Proceed with CT-guided biopsy of liver lesion to rule out small cell lung cancer transformation.  1/25/[id number] biopsy liver lesion  FINAL DIAGNOSIS:   Lesion, right lobe of liver, CT-guided biopsies (touch prep and cell   block section):     Metastatic small cell carcinoma (poorly differentiated neuroendocrine carcinoma), favor lung primary. Ki-67 proliferation index is greater than 95%.      Lytic lesion-pathologic fracture right posterior 6th rib  2/15/23 NM BONE SCAN WHOLE BODY There is abnormal focal radiotracer accumulation corresponding to the RIGHT posterior rib cage.This corresponds to a pathologic fracture of the RIGHT posterior 6th rib with associated lytic lucency, seen on chest CT dated 01/30/2023 (see series 4, image 58 of prior study).      Non-small cell lung cancer, adenocarcinoma stage IV-currently on palliative chemotherapy as outpatient.  I have reduced carboplatin to AUC 4 and etoposide 80 mg/M2 days 1-3.  I

## 2024-04-17 ENCOUNTER — OFFICE VISIT (OUTPATIENT)
Dept: HEMATOLOGY | Age: 74
End: 2024-04-17
Payer: MEDICARE

## 2024-04-17 ENCOUNTER — HOSPITAL ENCOUNTER (OUTPATIENT)
Dept: INFUSION THERAPY | Age: 74
Discharge: HOME | End: 2024-04-17
Payer: MEDICARE

## 2024-04-17 VITALS
DIASTOLIC BLOOD PRESSURE: 65 MMHG | HEART RATE: 109 BPM | WEIGHT: 110.4 LBS | BODY MASS INDEX: 21.68 KG/M2 | SYSTOLIC BLOOD PRESSURE: 146 MMHG | HEIGHT: 60 IN | RESPIRATION RATE: 20 BRPM | TEMPERATURE: 99.6 F | OXYGEN SATURATION: 96 %

## 2024-04-17 DIAGNOSIS — C34.90 SMALL CELL LUNG CANCER (HCC): Primary | ICD-10-CM

## 2024-04-17 DIAGNOSIS — D64.81 ANTINEOPLASTIC CHEMOTHERAPY INDUCED ANEMIA: ICD-10-CM

## 2024-04-17 DIAGNOSIS — C34.90 EGFR-RELATED LUNG CANCER (HCC): ICD-10-CM

## 2024-04-17 DIAGNOSIS — Z51.12 ENCOUNTER FOR ANTINEOPLASTIC IMMUNOTHERAPY: ICD-10-CM

## 2024-04-17 DIAGNOSIS — T45.1X5A CHEMOTHERAPY-INDUCED FATIGUE: ICD-10-CM

## 2024-04-17 DIAGNOSIS — R53.83 FATIGUE, UNSPECIFIED TYPE: ICD-10-CM

## 2024-04-17 DIAGNOSIS — Z71.89 CARE PLAN DISCUSSED WITH PATIENT: ICD-10-CM

## 2024-04-17 DIAGNOSIS — R53.83 CHEMOTHERAPY-INDUCED FATIGUE: ICD-10-CM

## 2024-04-17 DIAGNOSIS — C78.7 NSCLC METASTATIC TO LIVER (HCC): ICD-10-CM

## 2024-04-17 DIAGNOSIS — E86.0 DEHYDRATION: Primary | ICD-10-CM

## 2024-04-17 DIAGNOSIS — Z51.11 CHEMOTHERAPY MANAGEMENT, ENCOUNTER FOR: ICD-10-CM

## 2024-04-17 DIAGNOSIS — C34.90 NSCLC METASTATIC TO LIVER (HCC): ICD-10-CM

## 2024-04-17 DIAGNOSIS — T45.1X5A ANTINEOPLASTIC CHEMOTHERAPY INDUCED ANEMIA: ICD-10-CM

## 2024-04-17 DIAGNOSIS — T45.1X5D ADVERSE EFFECT OF CHEMOTHERAPY, SUBSEQUENT ENCOUNTER: ICD-10-CM

## 2024-04-17 DIAGNOSIS — C34.90 SMALL CELL LUNG CANCER (HCC): ICD-10-CM

## 2024-04-17 DIAGNOSIS — R53.83 FATIGUE, UNSPECIFIED TYPE: Primary | ICD-10-CM

## 2024-04-17 DIAGNOSIS — R53.81 PHYSICAL DECONDITIONING: ICD-10-CM

## 2024-04-17 LAB
ALBUMIN SERPL-MCNC: 3.7 G/DL (ref 3.5–5.2)
ALP SERPL-CCNC: 79 U/L (ref 35–104)
ALT SERPL-CCNC: 11 U/L (ref 9–52)
ANION GAP SERPL CALCULATED.3IONS-SCNC: 10 MMOL/L (ref 7–19)
AST SERPL-CCNC: 23 U/L (ref 14–36)
BASOPHILS # BLD: 0.04 K/UL (ref 0.01–0.08)
BASOPHILS NFR BLD: 0.4 % (ref 0.1–1.2)
BILIRUB SERPL-MCNC: 0.3 MG/DL (ref 0.2–1.3)
BUN SERPL-MCNC: 17 MG/DL (ref 7–17)
CALCIUM SERPL-MCNC: 8.9 MG/DL (ref 8.4–10.2)
CHLORIDE SERPL-SCNC: 103 MMOL/L (ref 98–111)
CO2 SERPL-SCNC: 27 MMOL/L (ref 22–29)
CORTIS AM PEAK SERPL-MCNC: 13.7 UG/DL (ref 6.2–19.4)
CREAT SERPL-MCNC: 0.8 MG/DL (ref 0.5–1)
EOSINOPHIL # BLD: 0.02 K/UL (ref 0.04–0.54)
EOSINOPHIL NFR BLD: 0.2 % (ref 0.7–7)
ERYTHROCYTE [DISTWIDTH] IN BLOOD BY AUTOMATED COUNT: 18.4 % (ref 11.7–14.4)
GLUCOSE SERPL-MCNC: 102 MG/DL (ref 74–106)
HCT VFR BLD AUTO: 24.9 % (ref 34.1–44.9)
HGB BLD-MCNC: 8.3 G/DL (ref 11.2–15.7)
LYMPHOCYTES # BLD: 1.1 K/UL (ref 1.18–3.74)
LYMPHOCYTES NFR BLD: 10.8 % (ref 19.3–53.1)
MAGNESIUM SERPL-MCNC: 1.4 MG/DL (ref 1.6–2.3)
MCH RBC QN AUTO: 30.6 PG (ref 25.6–32.2)
MCHC RBC AUTO-ENTMCNC: 33.3 G/DL (ref 32.3–35.5)
MCV RBC AUTO: 91.9 FL (ref 79.4–94.8)
MONOCYTES # BLD: 0.99 K/UL (ref 0.24–0.82)
MONOCYTES NFR BLD: 9.7 % (ref 4.7–12.5)
NEUTROPHILS # BLD: 8.01 K/UL (ref 1.56–6.13)
NEUTS SEG NFR BLD: 78.4 % (ref 34–71.1)
PLATELET # BLD AUTO: 203 K/UL (ref 182–369)
PMV BLD AUTO: 10.3 FL (ref 7.4–10.4)
POTASSIUM SERPL-SCNC: 4.1 MMOL/L (ref 3.5–5.1)
PROT SERPL-MCNC: 6.1 G/DL (ref 6.3–8.2)
RBC # BLD AUTO: 2.71 M/UL (ref 3.93–5.22)
SODIUM SERPL-SCNC: 140 MMOL/L (ref 137–145)
T4 FREE SERPL-MCNC: 0.79 NG/DL (ref 0.93–1.7)
TSH SERPL DL<=0.005 MIU/L-ACNC: 1.05 UIU/ML (ref 0.27–4.2)
WBC # BLD AUTO: 10.21 K/UL (ref 3.98–10.04)

## 2024-04-17 PROCEDURE — G8427 DOCREV CUR MEDS BY ELIG CLIN: HCPCS | Performed by: INTERNAL MEDICINE

## 2024-04-17 PROCEDURE — 96366 THER/PROPH/DIAG IV INF ADDON: CPT

## 2024-04-17 PROCEDURE — G8399 PT W/DXA RESULTS DOCUMENT: HCPCS | Performed by: INTERNAL MEDICINE

## 2024-04-17 PROCEDURE — 1090F PRES/ABSN URINE INCON ASSESS: CPT | Performed by: INTERNAL MEDICINE

## 2024-04-17 PROCEDURE — 3077F SYST BP >= 140 MM HG: CPT | Performed by: INTERNAL MEDICINE

## 2024-04-17 PROCEDURE — 6360000002 HC RX W HCPCS: Performed by: INTERNAL MEDICINE

## 2024-04-17 PROCEDURE — 85025 COMPLETE CBC W/AUTO DIFF WBC: CPT

## 2024-04-17 PROCEDURE — 99214 OFFICE O/P EST MOD 30 MIN: CPT | Performed by: INTERNAL MEDICINE

## 2024-04-17 PROCEDURE — 1036F TOBACCO NON-USER: CPT | Performed by: INTERNAL MEDICINE

## 2024-04-17 PROCEDURE — G2211 COMPLEX E/M VISIT ADD ON: HCPCS | Performed by: INTERNAL MEDICINE

## 2024-04-17 PROCEDURE — 96365 THER/PROPH/DIAG IV INF INIT: CPT

## 2024-04-17 PROCEDURE — 36415 COLL VENOUS BLD VENIPUNCTURE: CPT

## 2024-04-17 PROCEDURE — 96360 HYDRATION IV INFUSION INIT: CPT

## 2024-04-17 PROCEDURE — 80053 COMPREHEN METABOLIC PANEL: CPT

## 2024-04-17 PROCEDURE — 83735 ASSAY OF MAGNESIUM: CPT

## 2024-04-17 PROCEDURE — 96361 HYDRATE IV INFUSION ADD-ON: CPT

## 2024-04-17 PROCEDURE — 1123F ACP DISCUSS/DSCN MKR DOCD: CPT | Performed by: INTERNAL MEDICINE

## 2024-04-17 PROCEDURE — G8420 CALC BMI NORM PARAMETERS: HCPCS | Performed by: INTERNAL MEDICINE

## 2024-04-17 PROCEDURE — 2580000003 HC RX 258: Performed by: INTERNAL MEDICINE

## 2024-04-17 PROCEDURE — 3078F DIAST BP <80 MM HG: CPT | Performed by: INTERNAL MEDICINE

## 2024-04-17 PROCEDURE — 3017F COLORECTAL CA SCREEN DOC REV: CPT | Performed by: INTERNAL MEDICINE

## 2024-04-17 RX ORDER — HEPARIN SODIUM (PORCINE) LOCK FLUSH IV SOLN 100 UNIT/ML 100 UNIT/ML
500 SOLUTION INTRAVENOUS PRN
Status: CANCELLED | OUTPATIENT
Start: 2024-04-30

## 2024-04-17 RX ORDER — DIPHENHYDRAMINE HYDROCHLORIDE 50 MG/ML
50 INJECTION INTRAMUSCULAR; INTRAVENOUS
OUTPATIENT
Start: 2024-05-01

## 2024-04-17 RX ORDER — ALBUTEROL SULFATE 90 UG/1
4 AEROSOL, METERED RESPIRATORY (INHALATION) PRN
Status: CANCELLED | OUTPATIENT
Start: 2024-04-30

## 2024-04-17 RX ORDER — SODIUM CHLORIDE 9 MG/ML
5-250 INJECTION, SOLUTION INTRAVENOUS PRN
OUTPATIENT
Start: 2024-05-01

## 2024-04-17 RX ORDER — ALBUTEROL SULFATE 90 UG/1
4 AEROSOL, METERED RESPIRATORY (INHALATION) PRN
OUTPATIENT
Start: 2024-05-02

## 2024-04-17 RX ORDER — ACETAMINOPHEN 325 MG/1
650 TABLET ORAL
OUTPATIENT
Start: 2024-05-02

## 2024-04-17 RX ORDER — 0.9 % SODIUM CHLORIDE 0.9 %
1000 INTRAVENOUS SOLUTION INTRAVENOUS ONCE
Status: COMPLETED | OUTPATIENT
Start: 2024-04-17 | End: 2024-04-17

## 2024-04-17 RX ORDER — SODIUM CHLORIDE 9 MG/ML
5-250 INJECTION, SOLUTION INTRAVENOUS PRN
OUTPATIENT
Start: 2024-05-02

## 2024-04-17 RX ORDER — SODIUM CHLORIDE 9 MG/ML
5-250 INJECTION, SOLUTION INTRAVENOUS PRN
Status: CANCELLED | OUTPATIENT
Start: 2024-04-30

## 2024-04-17 RX ORDER — ONDANSETRON 2 MG/ML
8 INJECTION INTRAMUSCULAR; INTRAVENOUS
OUTPATIENT
Start: 2024-04-17

## 2024-04-17 RX ORDER — HEPARIN 100 UNIT/ML
500 SYRINGE INTRAVENOUS PRN
Status: DISCONTINUED | OUTPATIENT
Start: 2024-04-17 | End: 2024-04-18 | Stop reason: HOSPADM

## 2024-04-17 RX ORDER — FAMOTIDINE 10 MG/ML
20 INJECTION, SOLUTION INTRAVENOUS
OUTPATIENT
Start: 2024-05-01

## 2024-04-17 RX ORDER — MEPERIDINE HYDROCHLORIDE 50 MG/ML
12.5 INJECTION INTRAMUSCULAR; INTRAVENOUS; SUBCUTANEOUS PRN
OUTPATIENT
Start: 2024-05-01

## 2024-04-17 RX ORDER — ONDANSETRON 2 MG/ML
8 INJECTION INTRAMUSCULAR; INTRAVENOUS
OUTPATIENT
Start: 2024-05-02

## 2024-04-17 RX ORDER — ONDANSETRON 2 MG/ML
8 INJECTION INTRAMUSCULAR; INTRAVENOUS
Status: CANCELLED | OUTPATIENT
Start: 2024-04-17

## 2024-04-17 RX ORDER — ACETAMINOPHEN 325 MG/1
650 TABLET ORAL
Status: CANCELLED | OUTPATIENT
Start: 2024-04-30

## 2024-04-17 RX ORDER — MEPERIDINE HYDROCHLORIDE 50 MG/ML
12.5 INJECTION INTRAMUSCULAR; INTRAVENOUS; SUBCUTANEOUS PRN
Status: CANCELLED | OUTPATIENT
Start: 2024-04-30

## 2024-04-17 RX ORDER — MEPERIDINE HYDROCHLORIDE 50 MG/ML
12.5 INJECTION INTRAMUSCULAR; INTRAVENOUS; SUBCUTANEOUS PRN
OUTPATIENT
Start: 2024-05-02

## 2024-04-17 RX ORDER — HEPARIN SODIUM (PORCINE) LOCK FLUSH IV SOLN 100 UNIT/ML 100 UNIT/ML
500 SOLUTION INTRAVENOUS PRN
OUTPATIENT
Start: 2024-05-01

## 2024-04-17 RX ORDER — MAGNESIUM SULFATE IN WATER 40 MG/ML
4000 INJECTION, SOLUTION INTRAVENOUS ONCE
Status: COMPLETED | OUTPATIENT
Start: 2024-04-17 | End: 2024-04-17

## 2024-04-17 RX ORDER — HEPARIN SODIUM (PORCINE) LOCK FLUSH IV SOLN 100 UNIT/ML 100 UNIT/ML
500 SOLUTION INTRAVENOUS PRN
OUTPATIENT
Start: 2024-05-02

## 2024-04-17 RX ORDER — SODIUM CHLORIDE 9 MG/ML
INJECTION, SOLUTION INTRAVENOUS CONTINUOUS
OUTPATIENT
Start: 2024-05-01

## 2024-04-17 RX ORDER — SODIUM CHLORIDE 0.9 % (FLUSH) 0.9 %
5-40 SYRINGE (ML) INJECTION PRN
OUTPATIENT
Start: 2024-05-02

## 2024-04-17 RX ORDER — EPINEPHRINE 1 MG/ML
0.3 INJECTION, SOLUTION, CONCENTRATE INTRAVENOUS PRN
OUTPATIENT
Start: 2024-05-01

## 2024-04-17 RX ORDER — ACETAMINOPHEN 325 MG/1
650 TABLET ORAL
OUTPATIENT
Start: 2024-05-01

## 2024-04-17 RX ORDER — ALBUTEROL SULFATE 90 UG/1
4 AEROSOL, METERED RESPIRATORY (INHALATION) PRN
OUTPATIENT
Start: 2024-05-01

## 2024-04-17 RX ORDER — ALBUTEROL SULFATE 90 UG/1
4 AEROSOL, METERED RESPIRATORY (INHALATION) PRN
Status: CANCELLED | OUTPATIENT
Start: 2024-04-17

## 2024-04-17 RX ORDER — EPINEPHRINE 1 MG/ML
0.3 INJECTION, SOLUTION, CONCENTRATE INTRAVENOUS PRN
OUTPATIENT
Start: 2024-05-02

## 2024-04-17 RX ORDER — SODIUM CHLORIDE 0.9 % (FLUSH) 0.9 %
5-40 SYRINGE (ML) INJECTION PRN
Status: CANCELLED | OUTPATIENT
Start: 2024-04-30

## 2024-04-17 RX ORDER — FAMOTIDINE 10 MG/ML
20 INJECTION, SOLUTION INTRAVENOUS
Status: CANCELLED | OUTPATIENT
Start: 2024-04-30

## 2024-04-17 RX ORDER — DIPHENHYDRAMINE HYDROCHLORIDE 50 MG/ML
50 INJECTION INTRAMUSCULAR; INTRAVENOUS
OUTPATIENT
Start: 2024-05-02

## 2024-04-17 RX ORDER — ACETAMINOPHEN 325 MG/1
650 TABLET ORAL
OUTPATIENT
Start: 2024-04-17

## 2024-04-17 RX ORDER — SODIUM CHLORIDE 9 MG/ML
5-250 INJECTION, SOLUTION INTRAVENOUS PRN
Status: CANCELLED | OUTPATIENT
Start: 2024-04-17

## 2024-04-17 RX ORDER — SODIUM CHLORIDE 9 MG/ML
INJECTION, SOLUTION INTRAVENOUS CONTINUOUS
OUTPATIENT
Start: 2024-04-17

## 2024-04-17 RX ORDER — 0.9 % SODIUM CHLORIDE 0.9 %
1000 INTRAVENOUS SOLUTION INTRAVENOUS ONCE
Status: CANCELLED | OUTPATIENT
Start: 2024-04-17 | End: 2024-04-17

## 2024-04-17 RX ORDER — EPINEPHRINE 1 MG/ML
0.3 INJECTION, SOLUTION, CONCENTRATE INTRAVENOUS PRN
OUTPATIENT
Start: 2024-04-17

## 2024-04-17 RX ORDER — DIPHENHYDRAMINE HYDROCHLORIDE 50 MG/ML
50 INJECTION INTRAMUSCULAR; INTRAVENOUS
Status: CANCELLED | OUTPATIENT
Start: 2024-04-30

## 2024-04-17 RX ORDER — FAMOTIDINE 10 MG/ML
20 INJECTION, SOLUTION INTRAVENOUS
OUTPATIENT
Start: 2024-05-02

## 2024-04-17 RX ORDER — DIPHENHYDRAMINE HYDROCHLORIDE 50 MG/ML
50 INJECTION INTRAMUSCULAR; INTRAVENOUS
Status: CANCELLED | OUTPATIENT
Start: 2024-04-17

## 2024-04-17 RX ORDER — SODIUM CHLORIDE 0.9 % (FLUSH) 0.9 %
5-40 SYRINGE (ML) INJECTION PRN
OUTPATIENT
Start: 2024-05-01

## 2024-04-17 RX ORDER — SODIUM CHLORIDE 9 MG/ML
5-250 INJECTION, SOLUTION INTRAVENOUS PRN
OUTPATIENT
Start: 2024-04-17

## 2024-04-17 RX ORDER — SODIUM CHLORIDE 0.9 % (FLUSH) 0.9 %
5-40 SYRINGE (ML) INJECTION PRN
Status: CANCELLED | OUTPATIENT
Start: 2024-04-17

## 2024-04-17 RX ORDER — ACETAMINOPHEN 325 MG/1
650 TABLET ORAL
Status: CANCELLED | OUTPATIENT
Start: 2024-04-17

## 2024-04-17 RX ORDER — HEPARIN 100 UNIT/ML
500 SYRINGE INTRAVENOUS PRN
Status: CANCELLED | OUTPATIENT
Start: 2024-04-17

## 2024-04-17 RX ORDER — SODIUM CHLORIDE 0.9 % (FLUSH) 0.9 %
5-40 SYRINGE (ML) INJECTION PRN
Status: DISCONTINUED | OUTPATIENT
Start: 2024-04-17 | End: 2024-04-18 | Stop reason: HOSPADM

## 2024-04-17 RX ORDER — DIPHENHYDRAMINE HYDROCHLORIDE 50 MG/ML
50 INJECTION INTRAMUSCULAR; INTRAVENOUS
OUTPATIENT
Start: 2024-04-17

## 2024-04-17 RX ORDER — SODIUM CHLORIDE 9 MG/ML
INJECTION, SOLUTION INTRAVENOUS CONTINUOUS
Status: CANCELLED | OUTPATIENT
Start: 2024-04-17

## 2024-04-17 RX ORDER — ONDANSETRON 2 MG/ML
8 INJECTION INTRAMUSCULAR; INTRAVENOUS
OUTPATIENT
Start: 2024-05-01

## 2024-04-17 RX ORDER — ONDANSETRON 2 MG/ML
8 INJECTION INTRAMUSCULAR; INTRAVENOUS
Status: CANCELLED | OUTPATIENT
Start: 2024-04-30

## 2024-04-17 RX ORDER — ACETAMINOPHEN 325 MG/1
650 TABLET ORAL
Status: CANCELLED
Start: 2024-04-30

## 2024-04-17 RX ORDER — FAMOTIDINE 10 MG/ML
20 INJECTION, SOLUTION INTRAVENOUS
OUTPATIENT
Start: 2024-04-17

## 2024-04-17 RX ORDER — EPINEPHRINE 1 MG/ML
0.3 INJECTION, SOLUTION, CONCENTRATE INTRAVENOUS PRN
Status: CANCELLED | OUTPATIENT
Start: 2024-04-30

## 2024-04-17 RX ORDER — SODIUM CHLORIDE 9 MG/ML
INJECTION, SOLUTION INTRAVENOUS CONTINUOUS
OUTPATIENT
Start: 2024-05-02

## 2024-04-17 RX ORDER — FAMOTIDINE 10 MG/ML
20 INJECTION, SOLUTION INTRAVENOUS
Status: CANCELLED | OUTPATIENT
Start: 2024-04-17

## 2024-04-17 RX ORDER — PALONOSETRON 0.05 MG/ML
0.25 INJECTION, SOLUTION INTRAVENOUS ONCE
Status: CANCELLED | OUTPATIENT
Start: 2024-04-30 | End: 2024-04-17

## 2024-04-17 RX ORDER — SODIUM CHLORIDE 9 MG/ML
INJECTION, SOLUTION INTRAVENOUS CONTINUOUS
Status: CANCELLED | OUTPATIENT
Start: 2024-04-30

## 2024-04-17 RX ORDER — EPINEPHRINE 1 MG/ML
0.3 INJECTION, SOLUTION, CONCENTRATE INTRAVENOUS PRN
Status: CANCELLED | OUTPATIENT
Start: 2024-04-17

## 2024-04-17 RX ORDER — ALBUTEROL SULFATE 90 UG/1
4 AEROSOL, METERED RESPIRATORY (INHALATION) PRN
OUTPATIENT
Start: 2024-04-17

## 2024-04-17 RX ADMIN — HEPARIN 500 UNITS: 100 SYRINGE at 14:41

## 2024-04-17 RX ADMIN — SODIUM CHLORIDE, PRESERVATIVE FREE 10 ML: 5 INJECTION INTRAVENOUS at 14:41

## 2024-04-17 RX ADMIN — MAGNESIUM SULFATE 4000 MG: 4 INJECTION INTRAVENOUS at 11:09

## 2024-04-17 RX ADMIN — SODIUM CHLORIDE 1000 ML: 9 INJECTION, SOLUTION INTRAVENOUS at 11:08

## 2024-04-17 NOTE — PROGRESS NOTES
Lab Results   Component Value Date    WBC 10.21 (H) 04/17/2024    HGB 8.3 (L) 04/17/2024    HCT 24.9 (LL) 04/17/2024    MCV 91.9 04/17/2024     04/17/2024     Lab Results   Component Value Date    NEUTROABS 8.01 (H) 04/17/2024     Lab Results   Component Value Date     03/27/2024    K 3.8 03/27/2024     03/27/2024    CO2 24 03/27/2024    BUN 12 03/27/2024    CREATININE 0.7 03/27/2024    GLUCOSE 106 03/27/2024    CALCIUM 8.5 03/27/2024    PROT 5.6 (L) 03/27/2024    BILITOT 0.3 03/27/2024    ALKPHOS 89 03/27/2024    AST 29 03/27/2024    ALT 14 03/27/2024    LABGLOM >90 03/27/2024    GFRAA >59 06/28/2022    GLOB 2.3 03/27/2024

## 2024-04-22 ENCOUNTER — HOSPITAL ENCOUNTER (OUTPATIENT)
Dept: INFUSION THERAPY | Age: 74
Discharge: HOME | End: 2024-04-22
Payer: MEDICARE

## 2024-04-22 DIAGNOSIS — E86.0 DEHYDRATION: ICD-10-CM

## 2024-04-22 DIAGNOSIS — E86.0 DEHYDRATION: Primary | ICD-10-CM

## 2024-04-22 DIAGNOSIS — C34.90 SMALL CELL LUNG CANCER (HCC): ICD-10-CM

## 2024-04-22 DIAGNOSIS — C34.90 SMALL CELL LUNG CANCER (HCC): Primary | ICD-10-CM

## 2024-04-22 LAB
ALBUMIN SERPL-MCNC: 4 G/DL (ref 3.5–5.2)
ALP SERPL-CCNC: 77 U/L (ref 35–104)
ALT SERPL-CCNC: 14 U/L (ref 9–52)
ANION GAP SERPL CALCULATED.3IONS-SCNC: 8 MMOL/L (ref 7–19)
AST SERPL-CCNC: 31 U/L (ref 14–36)
BASOPHILS # BLD: 0.04 K/UL (ref 0.01–0.08)
BASOPHILS NFR BLD: 0.6 % (ref 0.1–1.2)
BILIRUB SERPL-MCNC: 0.4 MG/DL (ref 0.2–1.3)
BUN SERPL-MCNC: 18 MG/DL (ref 7–17)
CALCIUM SERPL-MCNC: 9.1 MG/DL (ref 8.4–10.2)
CHLORIDE SERPL-SCNC: 107 MMOL/L (ref 98–111)
CO2 SERPL-SCNC: 25 MMOL/L (ref 22–29)
CREAT SERPL-MCNC: 0.7 MG/DL (ref 0.5–1)
EOSINOPHIL # BLD: 0.11 K/UL (ref 0.04–0.54)
EOSINOPHIL NFR BLD: 1.6 % (ref 0.7–7)
ERYTHROCYTE [DISTWIDTH] IN BLOOD BY AUTOMATED COUNT: 17.9 % (ref 11.7–14.4)
GLOBULIN: 2.3 G/DL
GLUCOSE SERPL-MCNC: 97 MG/DL (ref 74–106)
HCT VFR BLD AUTO: 26.2 % (ref 34.1–44.9)
HGB BLD-MCNC: 8.9 G/DL (ref 11.2–15.7)
LYMPHOCYTES # BLD: 1.09 K/UL (ref 1.18–3.74)
LYMPHOCYTES NFR BLD: 16 % (ref 19.3–53.1)
MAGNESIUM SERPL-MCNC: 1.7 MG/DL (ref 1.6–2.3)
MCH RBC QN AUTO: 30.9 PG (ref 25.6–32.2)
MCHC RBC AUTO-ENTMCNC: 34 G/DL (ref 32.3–35.5)
MCV RBC AUTO: 91 FL (ref 79.4–94.8)
MONOCYTES # BLD: 0.78 K/UL (ref 0.24–0.82)
MONOCYTES NFR BLD: 11.4 % (ref 4.7–12.5)
NEUTROPHILS # BLD: 4.79 K/UL (ref 1.56–6.13)
NEUTS SEG NFR BLD: 70.1 % (ref 34–71.1)
PLATELET # BLD AUTO: 170 K/UL (ref 182–369)
PMV BLD AUTO: 10.3 FL (ref 7.4–10.4)
POTASSIUM SERPL-SCNC: 4.2 MMOL/L (ref 3.5–5.1)
PROT SERPL-MCNC: 6.3 G/DL (ref 6.3–8.2)
RBC # BLD AUTO: 2.88 M/UL (ref 3.93–5.22)
SODIUM SERPL-SCNC: 140 MMOL/L (ref 137–145)
WBC # BLD AUTO: 6.83 K/UL (ref 3.98–10.04)

## 2024-04-22 PROCEDURE — 83735 ASSAY OF MAGNESIUM: CPT

## 2024-04-22 PROCEDURE — 6360000002 HC RX W HCPCS: Performed by: INTERNAL MEDICINE

## 2024-04-22 PROCEDURE — 36415 COLL VENOUS BLD VENIPUNCTURE: CPT

## 2024-04-22 PROCEDURE — 96360 HYDRATION IV INFUSION INIT: CPT

## 2024-04-22 PROCEDURE — 80053 COMPREHEN METABOLIC PANEL: CPT

## 2024-04-22 PROCEDURE — 96361 HYDRATE IV INFUSION ADD-ON: CPT

## 2024-04-22 PROCEDURE — 85025 COMPLETE CBC W/AUTO DIFF WBC: CPT

## 2024-04-22 PROCEDURE — 2580000003 HC RX 258: Performed by: INTERNAL MEDICINE

## 2024-04-22 RX ORDER — SODIUM CHLORIDE 9 MG/ML
INJECTION, SOLUTION INTRAVENOUS CONTINUOUS
OUTPATIENT
Start: 2024-04-22

## 2024-04-22 RX ORDER — SODIUM CHLORIDE 0.9 % (FLUSH) 0.9 %
5-40 SYRINGE (ML) INJECTION PRN
Status: DISCONTINUED | OUTPATIENT
Start: 2024-04-22 | End: 2024-04-23 | Stop reason: HOSPADM

## 2024-04-22 RX ORDER — HEPARIN 100 UNIT/ML
500 SYRINGE INTRAVENOUS PRN
Status: DISCONTINUED | OUTPATIENT
Start: 2024-04-22 | End: 2024-04-23 | Stop reason: HOSPADM

## 2024-04-22 RX ORDER — ONDANSETRON 2 MG/ML
8 INJECTION INTRAMUSCULAR; INTRAVENOUS
OUTPATIENT
Start: 2024-04-22

## 2024-04-22 RX ORDER — ACETAMINOPHEN 325 MG/1
650 TABLET ORAL
OUTPATIENT
Start: 2024-04-22

## 2024-04-22 RX ORDER — SODIUM CHLORIDE 9 MG/ML
5-250 INJECTION, SOLUTION INTRAVENOUS PRN
OUTPATIENT
Start: 2024-04-22

## 2024-04-22 RX ORDER — 0.9 % SODIUM CHLORIDE 0.9 %
1000 INTRAVENOUS SOLUTION INTRAVENOUS ONCE
Status: COMPLETED | OUTPATIENT
Start: 2024-04-22 | End: 2024-04-22

## 2024-04-22 RX ORDER — FAMOTIDINE 10 MG/ML
20 INJECTION, SOLUTION INTRAVENOUS
OUTPATIENT
Start: 2024-04-22

## 2024-04-22 RX ORDER — DIPHENHYDRAMINE HYDROCHLORIDE 50 MG/ML
50 INJECTION INTRAMUSCULAR; INTRAVENOUS
OUTPATIENT
Start: 2024-04-22

## 2024-04-22 RX ORDER — EPINEPHRINE 1 MG/ML
0.3 INJECTION, SOLUTION, CONCENTRATE INTRAVENOUS PRN
OUTPATIENT
Start: 2024-04-22

## 2024-04-22 RX ORDER — SODIUM CHLORIDE 0.9 % (FLUSH) 0.9 %
5-40 SYRINGE (ML) INJECTION PRN
OUTPATIENT
Start: 2024-04-22

## 2024-04-22 RX ORDER — HEPARIN 100 UNIT/ML
500 SYRINGE INTRAVENOUS PRN
OUTPATIENT
Start: 2024-04-22

## 2024-04-22 RX ORDER — 0.9 % SODIUM CHLORIDE 0.9 %
1000 INTRAVENOUS SOLUTION INTRAVENOUS ONCE
OUTPATIENT
Start: 2024-04-22 | End: 2024-04-22

## 2024-04-22 RX ORDER — ALBUTEROL SULFATE 90 UG/1
4 AEROSOL, METERED RESPIRATORY (INHALATION) PRN
OUTPATIENT
Start: 2024-04-22

## 2024-04-22 RX ADMIN — SODIUM CHLORIDE, PRESERVATIVE FREE 10 ML: 5 INJECTION INTRAVENOUS at 13:33

## 2024-04-22 RX ADMIN — HEPARIN 500 UNITS: 100 SYRINGE at 13:33

## 2024-04-22 RX ADMIN — SODIUM CHLORIDE 1000 ML: 9 INJECTION, SOLUTION INTRAVENOUS at 11:29

## 2024-04-30 ENCOUNTER — HOSPITAL ENCOUNTER (OUTPATIENT)
Dept: INFUSION THERAPY | Age: 74
Discharge: HOME | End: 2024-04-30
Payer: MEDICARE

## 2024-04-30 VITALS
TEMPERATURE: 97.9 F | BODY MASS INDEX: 20.55 KG/M2 | OXYGEN SATURATION: 100 % | HEART RATE: 101 BPM | WEIGHT: 104.7 LBS | SYSTOLIC BLOOD PRESSURE: 126 MMHG | DIASTOLIC BLOOD PRESSURE: 54 MMHG | HEIGHT: 60 IN | RESPIRATION RATE: 20 BRPM

## 2024-04-30 DIAGNOSIS — C34.92 NSCLC OF LEFT LUNG (HCC): ICD-10-CM

## 2024-04-30 DIAGNOSIS — C34.92 NSCLC OF LEFT LUNG (HCC): Primary | ICD-10-CM

## 2024-04-30 DIAGNOSIS — C34.90 SMALL CELL LUNG CANCER (HCC): Primary | ICD-10-CM

## 2024-04-30 DIAGNOSIS — C78.7 NON-SMALL CELL LUNG CANCER METASTATIC TO LIVER (HCC): ICD-10-CM

## 2024-04-30 DIAGNOSIS — C34.90 NON-SMALL CELL LUNG CANCER METASTATIC TO LIVER (HCC): ICD-10-CM

## 2024-04-30 LAB
ALBUMIN SERPL-MCNC: 4.3 G/DL (ref 3.5–5.2)
ALP SERPL-CCNC: 66 U/L (ref 35–104)
ALT SERPL-CCNC: 17 U/L (ref 9–52)
ANION GAP SERPL CALCULATED.3IONS-SCNC: 11 MMOL/L (ref 7–19)
AST SERPL-CCNC: 34 U/L (ref 14–36)
BASOPHILS # BLD: 0.05 K/UL (ref 0.01–0.08)
BASOPHILS NFR BLD: 0.6 % (ref 0.1–1.2)
BILIRUB SERPL-MCNC: 0.4 MG/DL (ref 0.2–1.3)
BUN SERPL-MCNC: 17 MG/DL (ref 7–17)
CALCIUM SERPL-MCNC: 9.1 MG/DL (ref 8.4–10.2)
CHLORIDE SERPL-SCNC: 105 MMOL/L (ref 98–111)
CO2 SERPL-SCNC: 21 MMOL/L (ref 22–29)
CREAT SERPL-MCNC: 0.6 MG/DL (ref 0.5–1)
EOSINOPHIL # BLD: 1.48 K/UL (ref 0.04–0.54)
EOSINOPHIL NFR BLD: 16.4 % (ref 0.7–7)
ERYTHROCYTE [DISTWIDTH] IN BLOOD BY AUTOMATED COUNT: 17.8 % (ref 11.7–14.4)
GLOBULIN: 1.9 G/DL
GLUCOSE SERPL-MCNC: 122 MG/DL (ref 74–106)
HCT VFR BLD AUTO: 28.7 % (ref 34.1–44.9)
HGB BLD-MCNC: 9.8 G/DL (ref 11.2–15.7)
LYMPHOCYTES # BLD: 1.21 K/UL (ref 1.18–3.74)
LYMPHOCYTES NFR BLD: 13.4 % (ref 19.3–53.1)
MAGNESIUM SERPL-MCNC: 1.8 MG/DL (ref 1.6–2.3)
MCH RBC QN AUTO: 31.6 PG (ref 25.6–32.2)
MCHC RBC AUTO-ENTMCNC: 34.1 G/DL (ref 32.3–35.5)
MCV RBC AUTO: 92.6 FL (ref 79.4–94.8)
MONOCYTES # BLD: 1.01 K/UL (ref 0.24–0.82)
MONOCYTES NFR BLD: 11.2 % (ref 4.7–12.5)
NEUTROPHILS # BLD: 5.28 K/UL (ref 1.56–6.13)
NEUTS SEG NFR BLD: 58.2 % (ref 34–71.1)
PLATELET # BLD AUTO: 183 K/UL (ref 182–369)
PMV BLD AUTO: 10.1 FL (ref 7.4–10.4)
POTASSIUM SERPL-SCNC: 4.3 MMOL/L (ref 3.5–5.1)
PROT SERPL-MCNC: 6.2 G/DL (ref 6.3–8.2)
RBC # BLD AUTO: 3.1 M/UL (ref 3.93–5.22)
SODIUM SERPL-SCNC: 137 MMOL/L (ref 137–145)
WBC # BLD AUTO: 9.05 K/UL (ref 3.98–10.04)

## 2024-04-30 PROCEDURE — 96367 TX/PROPH/DG ADDL SEQ IV INF: CPT

## 2024-04-30 PROCEDURE — 80053 COMPREHEN METABOLIC PANEL: CPT

## 2024-04-30 PROCEDURE — 83735 ASSAY OF MAGNESIUM: CPT

## 2024-04-30 PROCEDURE — 96375 TX/PRO/DX INJ NEW DRUG ADDON: CPT

## 2024-04-30 PROCEDURE — 2580000003 HC RX 258: Performed by: INTERNAL MEDICINE

## 2024-04-30 PROCEDURE — 36415 COLL VENOUS BLD VENIPUNCTURE: CPT

## 2024-04-30 PROCEDURE — 85025 COMPLETE CBC W/AUTO DIFF WBC: CPT

## 2024-04-30 PROCEDURE — 96413 CHEMO IV INFUSION 1 HR: CPT

## 2024-04-30 PROCEDURE — 6360000002 HC RX W HCPCS: Performed by: INTERNAL MEDICINE

## 2024-04-30 PROCEDURE — 96417 CHEMO IV INFUS EACH ADDL SEQ: CPT

## 2024-04-30 RX ORDER — ALBUTEROL SULFATE 90 UG/1
4 AEROSOL, METERED RESPIRATORY (INHALATION) PRN
Status: CANCELLED | OUTPATIENT
Start: 2024-04-30

## 2024-04-30 RX ORDER — DIPHENHYDRAMINE HYDROCHLORIDE 50 MG/ML
50 INJECTION INTRAMUSCULAR; INTRAVENOUS
Status: CANCELLED | OUTPATIENT
Start: 2024-04-30

## 2024-04-30 RX ORDER — HEPARIN 100 UNIT/ML
500 SYRINGE INTRAVENOUS PRN
Status: DISCONTINUED | OUTPATIENT
Start: 2024-04-30 | End: 2024-05-01 | Stop reason: HOSPADM

## 2024-04-30 RX ORDER — ACETAMINOPHEN 325 MG/1
650 TABLET ORAL
Status: CANCELLED
Start: 2024-04-30

## 2024-04-30 RX ORDER — MEPERIDINE HYDROCHLORIDE 50 MG/ML
12.5 INJECTION INTRAMUSCULAR; INTRAVENOUS; SUBCUTANEOUS PRN
Status: CANCELLED | OUTPATIENT
Start: 2024-04-30

## 2024-04-30 RX ORDER — FAMOTIDINE 10 MG/ML
20 INJECTION, SOLUTION INTRAVENOUS
Status: CANCELLED | OUTPATIENT
Start: 2024-04-30

## 2024-04-30 RX ORDER — SODIUM CHLORIDE 9 MG/ML
5-250 INJECTION, SOLUTION INTRAVENOUS PRN
Status: CANCELLED | OUTPATIENT
Start: 2024-04-30

## 2024-04-30 RX ORDER — ACETAMINOPHEN 325 MG/1
650 TABLET ORAL
Status: CANCELLED | OUTPATIENT
Start: 2024-04-30

## 2024-04-30 RX ORDER — SODIUM CHLORIDE 0.9 % (FLUSH) 0.9 %
5-40 SYRINGE (ML) INJECTION PRN
Status: DISCONTINUED | OUTPATIENT
Start: 2024-04-30 | End: 2024-05-01 | Stop reason: HOSPADM

## 2024-04-30 RX ORDER — PALONOSETRON 0.05 MG/ML
0.25 INJECTION, SOLUTION INTRAVENOUS ONCE
Status: COMPLETED | OUTPATIENT
Start: 2024-04-30 | End: 2024-04-30

## 2024-04-30 RX ORDER — ONDANSETRON 2 MG/ML
8 INJECTION INTRAMUSCULAR; INTRAVENOUS
Status: CANCELLED | OUTPATIENT
Start: 2024-04-30

## 2024-04-30 RX ORDER — SODIUM CHLORIDE 9 MG/ML
5-250 INJECTION, SOLUTION INTRAVENOUS PRN
Status: DISCONTINUED | OUTPATIENT
Start: 2024-04-30 | End: 2024-05-01 | Stop reason: HOSPADM

## 2024-04-30 RX ORDER — SODIUM CHLORIDE 9 MG/ML
INJECTION, SOLUTION INTRAVENOUS CONTINUOUS
Status: CANCELLED | OUTPATIENT
Start: 2024-04-30

## 2024-04-30 RX ORDER — EPINEPHRINE 1 MG/ML
0.3 INJECTION, SOLUTION, CONCENTRATE INTRAVENOUS PRN
Status: CANCELLED | OUTPATIENT
Start: 2024-04-30

## 2024-04-30 RX ADMIN — HEPARIN 500 UNITS: 100 SYRINGE at 16:24

## 2024-04-30 RX ADMIN — SODIUM CHLORIDE 150 MG: 900 INJECTION, SOLUTION INTRAVENOUS at 13:50

## 2024-04-30 RX ADMIN — DEXAMETHASONE SODIUM PHOSPHATE: 100 INJECTION INTRAMUSCULAR; INTRAVENOUS at 13:37

## 2024-04-30 RX ADMIN — PALONOSETRON 0.25 MG: 0.05 INJECTION, SOLUTION INTRAVENOUS at 13:38

## 2024-04-30 RX ADMIN — SODIUM CHLORIDE, PRESERVATIVE FREE 10 ML: 5 INJECTION INTRAVENOUS at 16:24

## 2024-04-30 RX ADMIN — CARBOPLATIN 310 MG: 10 INJECTION, SOLUTION INTRAVENOUS at 14:51

## 2024-04-30 RX ADMIN — SODIUM CHLORIDE 25 ML/HR: 9 INJECTION, SOLUTION INTRAVENOUS at 13:35

## 2024-04-30 RX ADMIN — ATEZOLIZUMAB 1200 MG: 1200 INJECTION, SOLUTION INTRAVENOUS at 14:18

## 2024-04-30 RX ADMIN — ETOPOSIDE 120 MG: 20 INJECTION, SOLUTION INTRAVENOUS at 15:23

## 2024-04-30 NOTE — PROGRESS NOTES
Lab Results   Component Value Date    WBC 9.05 04/30/2024    HGB 9.8 (L) 04/30/2024    HCT 28.7 (L) 04/30/2024    MCV 92.6 04/30/2024     04/30/2024     Lab Results   Component Value Date    NEUTROABS 5.28 04/30/2024     Lab Results   Component Value Date     04/30/2024    K 4.3 04/30/2024     04/30/2024    CO2 21 (L) 04/30/2024    BUN 17 04/30/2024    CREATININE 0.6 04/30/2024    GLUCOSE 122 (H) 04/30/2024    CALCIUM 9.1 04/30/2024    PROT 6.2 (L) 04/30/2024    BILITOT 0.4 04/30/2024    ALKPHOS 66 04/30/2024    AST 34 04/30/2024    ALT 17 04/30/2024    LABGLOM >90 04/30/2024    GFRAA >59 06/28/2022    GLOB 1.9 04/30/2024

## 2024-05-01 ENCOUNTER — HOSPITAL ENCOUNTER (OUTPATIENT)
Dept: INFUSION THERAPY | Age: 74
Discharge: HOME | End: 2024-05-01
Payer: MEDICARE

## 2024-05-01 VITALS
DIASTOLIC BLOOD PRESSURE: 69 MMHG | RESPIRATION RATE: 18 BRPM | HEART RATE: 96 BPM | SYSTOLIC BLOOD PRESSURE: 137 MMHG | OXYGEN SATURATION: 97 % | TEMPERATURE: 98.7 F

## 2024-05-01 DIAGNOSIS — C34.90 SMALL CELL LUNG CANCER (HCC): Primary | ICD-10-CM

## 2024-05-01 PROCEDURE — 96413 CHEMO IV INFUSION 1 HR: CPT

## 2024-05-01 PROCEDURE — 96367 TX/PROPH/DG ADDL SEQ IV INF: CPT

## 2024-05-01 PROCEDURE — 2580000003 HC RX 258: Performed by: INTERNAL MEDICINE

## 2024-05-01 PROCEDURE — 6360000002 HC RX W HCPCS: Performed by: INTERNAL MEDICINE

## 2024-05-01 RX ORDER — SODIUM CHLORIDE 0.9 % (FLUSH) 0.9 %
5-40 SYRINGE (ML) INJECTION PRN
Status: DISCONTINUED | OUTPATIENT
Start: 2024-05-01 | End: 2024-05-02 | Stop reason: HOSPADM

## 2024-05-01 RX ORDER — SODIUM CHLORIDE 9 MG/ML
5-250 INJECTION, SOLUTION INTRAVENOUS PRN
Status: DISCONTINUED | OUTPATIENT
Start: 2024-05-01 | End: 2024-05-02 | Stop reason: HOSPADM

## 2024-05-01 RX ORDER — HEPARIN 100 UNIT/ML
500 SYRINGE INTRAVENOUS PRN
Status: DISCONTINUED | OUTPATIENT
Start: 2024-05-01 | End: 2024-05-02 | Stop reason: HOSPADM

## 2024-05-01 RX ADMIN — DEXAMETHASONE SODIUM PHOSPHATE 16 MG: 10 INJECTION, SOLUTION INTRAMUSCULAR; INTRAVENOUS at 12:41

## 2024-05-01 RX ADMIN — SODIUM CHLORIDE 25 ML/HR: 9 INJECTION, SOLUTION INTRAVENOUS at 12:41

## 2024-05-01 RX ADMIN — ETOPOSIDE 120 MG: 20 INJECTION, SOLUTION INTRAVENOUS at 13:05

## 2024-05-01 RX ADMIN — HEPARIN 500 UNITS: 100 SYRINGE at 14:07

## 2024-05-01 RX ADMIN — SODIUM CHLORIDE, PRESERVATIVE FREE 10 ML: 5 INJECTION INTRAVENOUS at 14:07

## 2024-05-02 ENCOUNTER — HOSPITAL ENCOUNTER (OUTPATIENT)
Dept: INFUSION THERAPY | Age: 74
Discharge: HOME | End: 2024-05-02
Payer: MEDICARE

## 2024-05-02 VITALS
DIASTOLIC BLOOD PRESSURE: 70 MMHG | HEART RATE: 81 BPM | OXYGEN SATURATION: 100 % | RESPIRATION RATE: 18 BRPM | SYSTOLIC BLOOD PRESSURE: 154 MMHG | TEMPERATURE: 98.2 F

## 2024-05-02 DIAGNOSIS — C34.90 SMALL CELL LUNG CANCER (HCC): Primary | ICD-10-CM

## 2024-05-02 PROCEDURE — 96377 APPLICATON ON-BODY INJECTOR: CPT

## 2024-05-02 PROCEDURE — 2580000003 HC RX 258: Performed by: INTERNAL MEDICINE

## 2024-05-02 PROCEDURE — 6360000002 HC RX W HCPCS: Performed by: INTERNAL MEDICINE

## 2024-05-02 PROCEDURE — 96413 CHEMO IV INFUSION 1 HR: CPT

## 2024-05-02 PROCEDURE — 96367 TX/PROPH/DG ADDL SEQ IV INF: CPT

## 2024-05-02 RX ORDER — SODIUM CHLORIDE 0.9 % (FLUSH) 0.9 %
5-40 SYRINGE (ML) INJECTION PRN
Status: DISCONTINUED | OUTPATIENT
Start: 2024-05-02 | End: 2024-05-03 | Stop reason: HOSPADM

## 2024-05-02 RX ORDER — HEPARIN 100 UNIT/ML
500 SYRINGE INTRAVENOUS PRN
Status: DISCONTINUED | OUTPATIENT
Start: 2024-05-02 | End: 2024-05-03 | Stop reason: HOSPADM

## 2024-05-02 RX ORDER — SODIUM CHLORIDE 9 MG/ML
5-250 INJECTION, SOLUTION INTRAVENOUS PRN
Status: DISCONTINUED | OUTPATIENT
Start: 2024-05-02 | End: 2024-05-03 | Stop reason: HOSPADM

## 2024-05-02 RX ADMIN — ONDANSETRON 16 MG: 2 INJECTION INTRAMUSCULAR; INTRAVENOUS at 14:11

## 2024-05-02 RX ADMIN — ETOPOSIDE 120 MG: 20 INJECTION, SOLUTION INTRAVENOUS at 14:40

## 2024-05-02 RX ADMIN — PEGFILGRASTIM 6 MG: KIT SUBCUTANEOUS at 15:40

## 2024-05-02 RX ADMIN — SODIUM CHLORIDE, PRESERVATIVE FREE 10 ML: 5 INJECTION INTRAVENOUS at 15:45

## 2024-05-02 RX ADMIN — HEPARIN 500 UNITS: 100 SYRINGE at 15:45

## 2024-05-13 DIAGNOSIS — E55.9 VITAMIN D DEFICIENCY: ICD-10-CM

## 2024-05-13 RX ORDER — OMEPRAZOLE 20 MG/1
20 CAPSULE, DELAYED RELEASE ORAL DAILY
Qty: 90 CAPSULE | Refills: 3 | Status: SHIPPED | OUTPATIENT
Start: 2024-05-13

## 2024-05-13 RX ORDER — ERGOCALCIFEROL 1.25 MG/1
50000 CAPSULE ORAL
Qty: 24 CAPSULE | Refills: 3 | Status: SHIPPED | OUTPATIENT
Start: 2024-05-13

## 2024-05-13 NOTE — TELEPHONE ENCOUNTER
Nikkie called requesting a refill of the below medication which has been pended for you:     Requested Prescriptions     Pending Prescriptions Disp Refills    omeprazole (PRILOSEC) 20 MG delayed release capsule [Pharmacy Med Name: OMEPRAZOLE 20 MG Capsule Delayed Release] 90 capsule 3     Sig: TAKE 1 CAPSULE EVERY DAY    vitamin D (ERGOCALCIFEROL) 1.25 MG (14156 UT) CAPS capsule [Pharmacy Med Name: VITAMIN D 1.25 MG (08853 UT) Capsule] 24 capsule 3     Sig: TAKE 1 CAPSULE BY MOUTH TWICE A WEEK       Last Appointment Date: 3/13/2024  Next Appointment Date: 6/3/2024    Allergies   Allergen Reactions    Compazine [Prochlorperazine Maleate] Other (See Comments)     Eyelids flipped up and mouth stretched to the side.     Prochlorperazine Maleate Other (See Comments)     Eyelids flipped up and mouth stretched to the side.    Thorazine [Chlorpromazine] Other (See Comments)     Eyelids flipped up and mouth stretched to the side

## 2024-05-17 ENCOUNTER — HOSPITAL ENCOUNTER (OUTPATIENT)
Dept: CT IMAGING | Age: 74
Discharge: HOME | End: 2024-05-17
Payer: MEDICARE

## 2024-05-17 DIAGNOSIS — C34.90 SMALL CELL LUNG CANCER (HCC): ICD-10-CM

## 2024-05-17 PROCEDURE — 6360000004 HC RX CONTRAST MEDICATION: Performed by: INTERNAL MEDICINE

## 2024-05-17 PROCEDURE — 71260 CT THORAX DX C+: CPT

## 2024-05-17 PROCEDURE — 74177 CT ABD & PELVIS W/CONTRAST: CPT

## 2024-05-17 RX ADMIN — IOPAMIDOL 75 ML: 755 INJECTION, SOLUTION INTRAVENOUS at 13:08

## 2024-05-21 NOTE — PROGRESS NOTES
Lived in the Last Year: 1     Unstable Housing in the Last Year: No     Social History:    Marital status:  Smoking status:Formerly; 1 pack daily; Quit 1983  ETOH status:No  Resides:[place], [place]    FAMILY HISTORY:  Family History   Problem Relation Age of Onset    High Blood Pressure Mother     Diabetes Mother         Type 2    Obesity Mother     High Blood Pressure Father     Diabetes Sister         Type 2    Obesity Sister     Diabetes Sister     Obesity Sister     Diabetes Sister     Obesity Sister     Diabetes Brother         Type 2    Cancer Brother         Blood form of cancer-not leukemia     Obesity Brother     No Known Problems Son     No Known Problems Son         Current Outpatient Medications   Medication Sig Dispense Refill    omeprazole (PRILOSEC) 20 MG delayed release capsule TAKE 1 CAPSULE EVERY DAY 90 capsule 3    vitamin D (ERGOCALCIFEROL) 1.25 MG (92493 UT) CAPS capsule TAKE 1 CAPSULE BY MOUTH TWICE A WEEK 24 capsule 3    lidocaine (XYLOCAINE) 5 % ointment Apply topically as needed. 30 g 0    traZODone (DESYREL) 50 MG tablet Take 1 tablet by mouth nightly 90 tablet 0    lactobacillus (CULTURELLE) capsule Take 1 capsule by mouth daily (with breakfast) 10 capsule 0    Osimertinib Mesylate 80 MG TABS Take 80 mg by mouth daily 30 tablet 5    ondansetron (ZOFRAN) 4 MG tablet Take 1 tablet by mouth every 6 hours as needed for Nausea or Vomiting 120 tablet 5    thyroid (ARMOUR) 60 MG tablet Take 1 tablet by mouth daily Compound from [facility] t-4-60/t3-12 90 tablet 1    lovastatin (MEVACOR) 40 MG tablet TAKE 1 TABLET EVERY NIGHT (Patient taking differently: Take 1 tablet by mouth nightly) 90 tablet 3    losartan (COZAAR) 100 MG tablet TAKE 1 TABLET EVERY DAY (Patient taking differently: Take 1 tablet by mouth daily) 90 tablet 3    albuterol sulfate HFA (PROVENTIL HFA) 108 (90 Base) MCG/ACT inhaler Inhale 2 puffs into the lungs every 6 hours as needed for Wheezing 18 g 3    Omega 3 1200 MG

## 2024-05-22 ENCOUNTER — OFFICE VISIT (OUTPATIENT)
Dept: HEMATOLOGY | Age: 74
End: 2024-05-22
Payer: MEDICARE

## 2024-05-22 ENCOUNTER — HOSPITAL ENCOUNTER (OUTPATIENT)
Dept: INFUSION THERAPY | Age: 74
Discharge: HOME | End: 2024-05-22
Payer: MEDICARE

## 2024-05-22 VITALS
SYSTOLIC BLOOD PRESSURE: 116 MMHG | DIASTOLIC BLOOD PRESSURE: 46 MMHG | HEIGHT: 60 IN | OXYGEN SATURATION: 97 % | HEART RATE: 102 BPM | TEMPERATURE: 98.7 F | RESPIRATION RATE: 18 BRPM | BODY MASS INDEX: 20.97 KG/M2 | WEIGHT: 106.8 LBS

## 2024-05-22 DIAGNOSIS — R53.83 FATIGUE, UNSPECIFIED TYPE: ICD-10-CM

## 2024-05-22 DIAGNOSIS — C34.90 SMALL CELL LUNG CANCER (HCC): Primary | ICD-10-CM

## 2024-05-22 DIAGNOSIS — C34.90 EGFR-RELATED LUNG CANCER (HCC): ICD-10-CM

## 2024-05-22 DIAGNOSIS — R53.83 CHEMOTHERAPY-INDUCED FATIGUE: ICD-10-CM

## 2024-05-22 DIAGNOSIS — T45.1X5A CHEMOTHERAPY-INDUCED FATIGUE: ICD-10-CM

## 2024-05-22 DIAGNOSIS — Z51.12 ENCOUNTER FOR ANTINEOPLASTIC IMMUNOTHERAPY: ICD-10-CM

## 2024-05-22 DIAGNOSIS — T45.1X5D ADVERSE EFFECT OF CHEMOTHERAPY, SUBSEQUENT ENCOUNTER: ICD-10-CM

## 2024-05-22 DIAGNOSIS — C34.92 NSCLC OF LEFT LUNG (HCC): ICD-10-CM

## 2024-05-22 DIAGNOSIS — D64.9 NORMOCHROMIC ANEMIA: ICD-10-CM

## 2024-05-22 DIAGNOSIS — Z71.89 CARE PLAN DISCUSSED WITH PATIENT: ICD-10-CM

## 2024-05-22 DIAGNOSIS — Z51.11 CHEMOTHERAPY MANAGEMENT, ENCOUNTER FOR: ICD-10-CM

## 2024-05-22 LAB
ALBUMIN SERPL-MCNC: 4.4 G/DL (ref 3.5–5.2)
ALP SERPL-CCNC: 76 U/L (ref 35–104)
ALT SERPL-CCNC: 16 U/L (ref 9–52)
ANION GAP SERPL CALCULATED.3IONS-SCNC: 14 MMOL/L (ref 7–19)
AST SERPL-CCNC: 34 U/L (ref 14–36)
BASOPHILS # BLD: 0.03 K/UL (ref 0.01–0.08)
BASOPHILS NFR BLD: 0.4 % (ref 0.1–1.2)
BILIRUB SERPL-MCNC: 0.7 MG/DL (ref 0.2–1.3)
BUN SERPL-MCNC: 17 MG/DL (ref 7–17)
CALCIUM SERPL-MCNC: 9.4 MG/DL (ref 8.4–10.2)
CHLORIDE SERPL-SCNC: 102 MMOL/L (ref 98–111)
CO2 SERPL-SCNC: 21 MMOL/L (ref 22–29)
CORTIS AM PEAK SERPL-MCNC: 12.4 UG/DL (ref 6.2–19.4)
CREAT SERPL-MCNC: 0.9 MG/DL (ref 0.5–1)
EOSINOPHIL # BLD: 0.02 K/UL (ref 0.04–0.54)
EOSINOPHIL NFR BLD: 0.3 % (ref 0.7–7)
ERYTHROCYTE [DISTWIDTH] IN BLOOD BY AUTOMATED COUNT: 16.9 % (ref 11.7–14.4)
GLOBULIN: 1.8 G/DL
GLUCOSE SERPL-MCNC: 139 MG/DL (ref 74–106)
HCT VFR BLD AUTO: 25.9 % (ref 34.1–44.9)
HGB BLD-MCNC: 8.9 G/DL (ref 11.2–15.7)
LYMPHOCYTES # BLD: 0.56 K/UL (ref 1.18–3.74)
LYMPHOCYTES NFR BLD: 7.1 % (ref 19.3–53.1)
MCH RBC QN AUTO: 32 PG (ref 25.6–32.2)
MCHC RBC AUTO-ENTMCNC: 34.4 G/DL (ref 32.3–35.5)
MCV RBC AUTO: 93.2 FL (ref 79.4–94.8)
MONOCYTES # BLD: 0.97 K/UL (ref 0.24–0.82)
MONOCYTES NFR BLD: 12.2 % (ref 4.7–12.5)
NEUTROPHILS # BLD: 6.26 K/UL (ref 1.56–6.13)
NEUTS SEG NFR BLD: 78.7 % (ref 34–71.1)
PLATELET # BLD AUTO: 197 K/UL (ref 182–369)
PMV BLD AUTO: 9.9 FL (ref 7.4–10.4)
POTASSIUM SERPL-SCNC: 4.4 MMOL/L (ref 3.5–5.1)
PROT SERPL-MCNC: 6.2 G/DL (ref 6.3–8.2)
RBC # BLD AUTO: 2.78 M/UL (ref 3.93–5.22)
SODIUM SERPL-SCNC: 137 MMOL/L (ref 137–145)
T4 FREE SERPL-MCNC: 0.8 NG/DL (ref 0.93–1.7)
TSH SERPL DL<=0.005 MIU/L-ACNC: 0.73 UIU/ML (ref 0.27–4.2)
WBC # BLD AUTO: 7.94 K/UL (ref 3.98–10.04)

## 2024-05-22 PROCEDURE — G8427 DOCREV CUR MEDS BY ELIG CLIN: HCPCS | Performed by: INTERNAL MEDICINE

## 2024-05-22 PROCEDURE — 1123F ACP DISCUSS/DSCN MKR DOCD: CPT | Performed by: INTERNAL MEDICINE

## 2024-05-22 PROCEDURE — 96417 CHEMO IV INFUS EACH ADDL SEQ: CPT

## 2024-05-22 PROCEDURE — 3074F SYST BP LT 130 MM HG: CPT | Performed by: INTERNAL MEDICINE

## 2024-05-22 PROCEDURE — G8399 PT W/DXA RESULTS DOCUMENT: HCPCS | Performed by: INTERNAL MEDICINE

## 2024-05-22 PROCEDURE — 3017F COLORECTAL CA SCREEN DOC REV: CPT | Performed by: INTERNAL MEDICINE

## 2024-05-22 PROCEDURE — 3078F DIAST BP <80 MM HG: CPT | Performed by: INTERNAL MEDICINE

## 2024-05-22 PROCEDURE — 96413 CHEMO IV INFUSION 1 HR: CPT

## 2024-05-22 PROCEDURE — 1036F TOBACCO NON-USER: CPT | Performed by: INTERNAL MEDICINE

## 2024-05-22 PROCEDURE — G2211 COMPLEX E/M VISIT ADD ON: HCPCS | Performed by: INTERNAL MEDICINE

## 2024-05-22 PROCEDURE — 99214 OFFICE O/P EST MOD 30 MIN: CPT | Performed by: INTERNAL MEDICINE

## 2024-05-22 PROCEDURE — 96367 TX/PROPH/DG ADDL SEQ IV INF: CPT

## 2024-05-22 PROCEDURE — 85025 COMPLETE CBC W/AUTO DIFF WBC: CPT

## 2024-05-22 PROCEDURE — 36415 COLL VENOUS BLD VENIPUNCTURE: CPT

## 2024-05-22 PROCEDURE — 96375 TX/PRO/DX INJ NEW DRUG ADDON: CPT

## 2024-05-22 PROCEDURE — 1090F PRES/ABSN URINE INCON ASSESS: CPT | Performed by: INTERNAL MEDICINE

## 2024-05-22 PROCEDURE — G8420 CALC BMI NORM PARAMETERS: HCPCS | Performed by: INTERNAL MEDICINE

## 2024-05-22 PROCEDURE — 80053 COMPREHEN METABOLIC PANEL: CPT

## 2024-05-22 PROCEDURE — 2580000003 HC RX 258: Performed by: INTERNAL MEDICINE

## 2024-05-22 PROCEDURE — 6360000002 HC RX W HCPCS: Performed by: INTERNAL MEDICINE

## 2024-05-22 RX ORDER — SODIUM CHLORIDE 0.9 % (FLUSH) 0.9 %
5-40 SYRINGE (ML) INJECTION PRN
Status: CANCELLED | OUTPATIENT
Start: 2024-05-24

## 2024-05-22 RX ORDER — SODIUM CHLORIDE 0.9 % (FLUSH) 0.9 %
5-40 SYRINGE (ML) INJECTION PRN
Status: CANCELLED | OUTPATIENT
Start: 2024-05-23

## 2024-05-22 RX ORDER — MEPERIDINE HYDROCHLORIDE 50 MG/ML
12.5 INJECTION INTRAMUSCULAR; INTRAVENOUS; SUBCUTANEOUS PRN
Status: CANCELLED | OUTPATIENT
Start: 2024-05-22

## 2024-05-22 RX ORDER — SODIUM CHLORIDE 9 MG/ML
INJECTION, SOLUTION INTRAVENOUS CONTINUOUS
Status: CANCELLED | OUTPATIENT
Start: 2024-05-24

## 2024-05-22 RX ORDER — ACETAMINOPHEN 325 MG/1
650 TABLET ORAL
Status: CANCELLED | OUTPATIENT
Start: 2024-05-22

## 2024-05-22 RX ORDER — FAMOTIDINE 10 MG/ML
20 INJECTION, SOLUTION INTRAVENOUS
Status: CANCELLED | OUTPATIENT
Start: 2024-05-23

## 2024-05-22 RX ORDER — EPINEPHRINE 1 MG/ML
0.3 INJECTION, SOLUTION, CONCENTRATE INTRAVENOUS PRN
Status: CANCELLED | OUTPATIENT
Start: 2024-05-22

## 2024-05-22 RX ORDER — FAMOTIDINE 10 MG/ML
20 INJECTION, SOLUTION INTRAVENOUS
Status: CANCELLED | OUTPATIENT
Start: 2024-05-24

## 2024-05-22 RX ORDER — SODIUM CHLORIDE 9 MG/ML
5-250 INJECTION, SOLUTION INTRAVENOUS PRN
Status: CANCELLED | OUTPATIENT
Start: 2024-05-23

## 2024-05-22 RX ORDER — ACETAMINOPHEN 325 MG/1
650 TABLET ORAL
Status: CANCELLED | OUTPATIENT
Start: 2024-05-24

## 2024-05-22 RX ORDER — MEPERIDINE HYDROCHLORIDE 50 MG/ML
12.5 INJECTION INTRAMUSCULAR; INTRAVENOUS; SUBCUTANEOUS PRN
Status: CANCELLED | OUTPATIENT
Start: 2024-05-24

## 2024-05-22 RX ORDER — ALBUTEROL SULFATE 90 UG/1
4 AEROSOL, METERED RESPIRATORY (INHALATION) PRN
Status: CANCELLED | OUTPATIENT
Start: 2024-05-22

## 2024-05-22 RX ORDER — DIPHENHYDRAMINE HYDROCHLORIDE 50 MG/ML
50 INJECTION INTRAMUSCULAR; INTRAVENOUS
Status: CANCELLED | OUTPATIENT
Start: 2024-05-22

## 2024-05-22 RX ORDER — DIPHENHYDRAMINE HYDROCHLORIDE 50 MG/ML
50 INJECTION INTRAMUSCULAR; INTRAVENOUS
Status: CANCELLED | OUTPATIENT
Start: 2024-05-23

## 2024-05-22 RX ORDER — SODIUM CHLORIDE 9 MG/ML
5-250 INJECTION, SOLUTION INTRAVENOUS PRN
Status: DISCONTINUED | OUTPATIENT
Start: 2024-05-22 | End: 2024-05-23 | Stop reason: HOSPADM

## 2024-05-22 RX ORDER — MEPERIDINE HYDROCHLORIDE 50 MG/ML
12.5 INJECTION INTRAMUSCULAR; INTRAVENOUS; SUBCUTANEOUS PRN
Status: CANCELLED | OUTPATIENT
Start: 2024-05-23

## 2024-05-22 RX ORDER — SODIUM CHLORIDE 9 MG/ML
5-250 INJECTION, SOLUTION INTRAVENOUS PRN
Status: CANCELLED | OUTPATIENT
Start: 2024-05-22

## 2024-05-22 RX ORDER — EPINEPHRINE 1 MG/ML
0.3 INJECTION, SOLUTION, CONCENTRATE INTRAVENOUS PRN
Status: CANCELLED | OUTPATIENT
Start: 2024-05-23

## 2024-05-22 RX ORDER — ONDANSETRON 2 MG/ML
8 INJECTION INTRAMUSCULAR; INTRAVENOUS
Status: CANCELLED | OUTPATIENT
Start: 2024-05-22

## 2024-05-22 RX ORDER — FAMOTIDINE 10 MG/ML
20 INJECTION, SOLUTION INTRAVENOUS
Status: CANCELLED | OUTPATIENT
Start: 2024-05-22

## 2024-05-22 RX ORDER — HEPARIN SODIUM (PORCINE) LOCK FLUSH IV SOLN 100 UNIT/ML 100 UNIT/ML
500 SOLUTION INTRAVENOUS PRN
Status: CANCELLED | OUTPATIENT
Start: 2024-05-23

## 2024-05-22 RX ORDER — ALBUTEROL SULFATE 90 UG/1
4 AEROSOL, METERED RESPIRATORY (INHALATION) PRN
Status: CANCELLED | OUTPATIENT
Start: 2024-05-24

## 2024-05-22 RX ORDER — EPINEPHRINE 1 MG/ML
0.3 INJECTION, SOLUTION, CONCENTRATE INTRAVENOUS PRN
Status: CANCELLED | OUTPATIENT
Start: 2024-05-24

## 2024-05-22 RX ORDER — SODIUM CHLORIDE 0.9 % (FLUSH) 0.9 %
5-40 SYRINGE (ML) INJECTION PRN
Status: DISCONTINUED | OUTPATIENT
Start: 2024-05-22 | End: 2024-05-23 | Stop reason: HOSPADM

## 2024-05-22 RX ORDER — ALBUTEROL SULFATE 90 UG/1
4 AEROSOL, METERED RESPIRATORY (INHALATION) PRN
Status: CANCELLED | OUTPATIENT
Start: 2024-05-23

## 2024-05-22 RX ORDER — SODIUM CHLORIDE 9 MG/ML
INJECTION, SOLUTION INTRAVENOUS CONTINUOUS
Status: CANCELLED | OUTPATIENT
Start: 2024-05-23

## 2024-05-22 RX ORDER — HEPARIN 100 UNIT/ML
500 SYRINGE INTRAVENOUS PRN
Status: DISCONTINUED | OUTPATIENT
Start: 2024-05-22 | End: 2024-05-23 | Stop reason: HOSPADM

## 2024-05-22 RX ORDER — ONDANSETRON 2 MG/ML
8 INJECTION INTRAMUSCULAR; INTRAVENOUS
Status: CANCELLED | OUTPATIENT
Start: 2024-05-24

## 2024-05-22 RX ORDER — ACETAMINOPHEN 325 MG/1
650 TABLET ORAL
Status: CANCELLED | OUTPATIENT
Start: 2024-05-23

## 2024-05-22 RX ORDER — SODIUM CHLORIDE 9 MG/ML
5-250 INJECTION, SOLUTION INTRAVENOUS PRN
Status: CANCELLED | OUTPATIENT
Start: 2024-05-24

## 2024-05-22 RX ORDER — ONDANSETRON 2 MG/ML
8 INJECTION INTRAMUSCULAR; INTRAVENOUS
Status: CANCELLED | OUTPATIENT
Start: 2024-05-23

## 2024-05-22 RX ORDER — SODIUM CHLORIDE 9 MG/ML
INJECTION, SOLUTION INTRAVENOUS CONTINUOUS
Status: CANCELLED | OUTPATIENT
Start: 2024-05-22

## 2024-05-22 RX ORDER — DIPHENHYDRAMINE HYDROCHLORIDE 50 MG/ML
50 INJECTION INTRAMUSCULAR; INTRAVENOUS
Status: CANCELLED | OUTPATIENT
Start: 2024-05-24

## 2024-05-22 RX ORDER — ACETAMINOPHEN 325 MG/1
650 TABLET ORAL
Status: CANCELLED
Start: 2024-05-22

## 2024-05-22 RX ORDER — HEPARIN SODIUM (PORCINE) LOCK FLUSH IV SOLN 100 UNIT/ML 100 UNIT/ML
500 SOLUTION INTRAVENOUS PRN
Status: CANCELLED | OUTPATIENT
Start: 2024-05-24

## 2024-05-22 RX ORDER — PALONOSETRON 0.05 MG/ML
0.25 INJECTION, SOLUTION INTRAVENOUS ONCE
Status: COMPLETED | OUTPATIENT
Start: 2024-05-22 | End: 2024-05-22

## 2024-05-22 RX ADMIN — CARBOPLATIN 270 MG: 10 INJECTION, SOLUTION INTRAVENOUS at 12:22

## 2024-05-22 RX ADMIN — SODIUM CHLORIDE 150 MG: 9 INJECTION, SOLUTION INTRAVENOUS at 11:27

## 2024-05-22 RX ADMIN — PALONOSETRON 0.25 MG: 0.05 INJECTION, SOLUTION INTRAVENOUS at 11:17

## 2024-05-22 RX ADMIN — SODIUM CHLORIDE, PRESERVATIVE FREE 10 ML: 5 INJECTION INTRAVENOUS at 13:55

## 2024-05-22 RX ADMIN — HEPARIN 500 UNITS: 100 SYRINGE at 13:55

## 2024-05-22 RX ADMIN — SODIUM CHLORIDE 250 ML/HR: 9 INJECTION, SOLUTION INTRAVENOUS at 11:15

## 2024-05-22 RX ADMIN — ATEZOLIZUMAB 1200 MG: 1200 INJECTION, SOLUTION INTRAVENOUS at 11:51

## 2024-05-22 RX ADMIN — ETOPOSIDE 120 MG: 20 INJECTION, SOLUTION INTRAVENOUS at 12:55

## 2024-05-22 RX ADMIN — DEXAMETHASONE SODIUM PHOSPHATE: 100 INJECTION INTRAMUSCULAR; INTRAVENOUS at 11:17

## 2024-05-23 ENCOUNTER — HOSPITAL ENCOUNTER (OUTPATIENT)
Dept: INFUSION THERAPY | Age: 74
Discharge: HOME | End: 2024-05-23
Payer: MEDICARE

## 2024-05-23 VITALS
RESPIRATION RATE: 18 BRPM | TEMPERATURE: 98 F | SYSTOLIC BLOOD PRESSURE: 137 MMHG | OXYGEN SATURATION: 99 % | HEART RATE: 78 BPM | DIASTOLIC BLOOD PRESSURE: 58 MMHG

## 2024-05-23 DIAGNOSIS — C34.90 SMALL CELL LUNG CANCER (HCC): Primary | ICD-10-CM

## 2024-05-23 PROCEDURE — 96367 TX/PROPH/DG ADDL SEQ IV INF: CPT

## 2024-05-23 PROCEDURE — 96413 CHEMO IV INFUSION 1 HR: CPT

## 2024-05-23 PROCEDURE — 6360000002 HC RX W HCPCS: Performed by: INTERNAL MEDICINE

## 2024-05-23 PROCEDURE — 2580000003 HC RX 258: Performed by: INTERNAL MEDICINE

## 2024-05-23 RX ORDER — HEPARIN 100 UNIT/ML
500 SYRINGE INTRAVENOUS PRN
Status: DISCONTINUED | OUTPATIENT
Start: 2024-05-23 | End: 2024-05-24 | Stop reason: HOSPADM

## 2024-05-23 RX ORDER — SODIUM CHLORIDE 9 MG/ML
5-250 INJECTION, SOLUTION INTRAVENOUS PRN
Status: DISCONTINUED | OUTPATIENT
Start: 2024-05-23 | End: 2024-05-24 | Stop reason: HOSPADM

## 2024-05-23 RX ORDER — SODIUM CHLORIDE 0.9 % (FLUSH) 0.9 %
5-40 SYRINGE (ML) INJECTION PRN
Status: DISCONTINUED | OUTPATIENT
Start: 2024-05-23 | End: 2024-05-24 | Stop reason: HOSPADM

## 2024-05-23 RX ADMIN — HEPARIN 500 UNITS: 100 SYRINGE at 14:53

## 2024-05-23 RX ADMIN — ONDANSETRON 16 MG: 2 INJECTION INTRAMUSCULAR; INTRAVENOUS at 13:17

## 2024-05-23 RX ADMIN — SODIUM CHLORIDE, PRESERVATIVE FREE 10 ML: 5 INJECTION INTRAVENOUS at 14:53

## 2024-05-23 RX ADMIN — SODIUM CHLORIDE 250 ML/HR: 9 INJECTION, SOLUTION INTRAVENOUS at 13:17

## 2024-05-23 RX ADMIN — ETOPOSIDE 120 MG: 20 INJECTION, SOLUTION INTRAVENOUS at 13:47

## 2024-05-24 ENCOUNTER — HOSPITAL ENCOUNTER (OUTPATIENT)
Dept: INFUSION THERAPY | Age: 74
Discharge: HOME | End: 2024-05-24
Payer: MEDICARE

## 2024-05-24 VITALS
OXYGEN SATURATION: 97 % | SYSTOLIC BLOOD PRESSURE: 144 MMHG | TEMPERATURE: 97.8 F | DIASTOLIC BLOOD PRESSURE: 68 MMHG | RESPIRATION RATE: 18 BRPM | HEART RATE: 71 BPM

## 2024-05-24 DIAGNOSIS — C34.90 SMALL CELL LUNG CANCER (HCC): Primary | ICD-10-CM

## 2024-05-24 PROCEDURE — 96377 APPLICATON ON-BODY INJECTOR: CPT

## 2024-05-24 PROCEDURE — 6360000002 HC RX W HCPCS: Performed by: INTERNAL MEDICINE

## 2024-05-24 PROCEDURE — 96413 CHEMO IV INFUSION 1 HR: CPT

## 2024-05-24 PROCEDURE — 96417 CHEMO IV INFUS EACH ADDL SEQ: CPT

## 2024-05-24 PROCEDURE — 2580000003 HC RX 258: Performed by: INTERNAL MEDICINE

## 2024-05-24 PROCEDURE — 96367 TX/PROPH/DG ADDL SEQ IV INF: CPT

## 2024-05-24 RX ORDER — SODIUM CHLORIDE 0.9 % (FLUSH) 0.9 %
5-40 SYRINGE (ML) INJECTION PRN
Status: DISCONTINUED | OUTPATIENT
Start: 2024-05-24 | End: 2024-05-25 | Stop reason: HOSPADM

## 2024-05-24 RX ORDER — SODIUM CHLORIDE 9 MG/ML
5-250 INJECTION, SOLUTION INTRAVENOUS PRN
Status: DISCONTINUED | OUTPATIENT
Start: 2024-05-24 | End: 2024-05-25 | Stop reason: HOSPADM

## 2024-05-24 RX ORDER — HEPARIN 100 UNIT/ML
500 SYRINGE INTRAVENOUS PRN
Status: DISCONTINUED | OUTPATIENT
Start: 2024-05-24 | End: 2024-05-25 | Stop reason: HOSPADM

## 2024-05-24 RX ADMIN — ONDANSETRON 16 MG: 2 INJECTION INTRAMUSCULAR; INTRAVENOUS at 13:04

## 2024-05-24 RX ADMIN — HEPARIN 500 UNITS: 100 SYRINGE at 14:42

## 2024-05-24 RX ADMIN — PEGFILGRASTIM 6 MG: KIT SUBCUTANEOUS at 14:40

## 2024-05-24 RX ADMIN — ETOPOSIDE 120 MG: 20 INJECTION, SOLUTION INTRAVENOUS at 13:38

## 2024-05-24 RX ADMIN — SODIUM CHLORIDE, PRESERVATIVE FREE 10 ML: 5 INJECTION INTRAVENOUS at 14:42

## 2024-05-29 DIAGNOSIS — E78.2 MIXED HYPERLIPIDEMIA: ICD-10-CM

## 2024-05-29 DIAGNOSIS — E55.9 VITAMIN D DEFICIENCY: ICD-10-CM

## 2024-05-29 DIAGNOSIS — E55.9 VITAMIN D DEFICIENCY: Primary | ICD-10-CM

## 2024-05-29 DIAGNOSIS — R53.83 OTHER FATIGUE: ICD-10-CM

## 2024-05-29 LAB
25(OH)D3 SERPL-MCNC: 92.5 NG/ML
CHOLEST SERPL-MCNC: 146 MG/DL (ref 160–199)
HDLC SERPL-MCNC: 74 MG/DL (ref 65–121)
LDLC SERPL CALC-MCNC: 38 MG/DL
TRIGL SERPL-MCNC: 172 MG/DL (ref 0–149)
TSH SERPL DL<=0.005 MIU/L-ACNC: 3.66 UIU/ML (ref 0.27–4.2)

## 2024-06-03 ENCOUNTER — OFFICE VISIT (OUTPATIENT)
Dept: INTERNAL MEDICINE | Age: 74
End: 2024-06-03
Payer: MEDICARE

## 2024-06-03 VITALS
SYSTOLIC BLOOD PRESSURE: 128 MMHG | DIASTOLIC BLOOD PRESSURE: 70 MMHG | TEMPERATURE: 98.7 F | HEIGHT: 60 IN | BODY MASS INDEX: 21.01 KG/M2 | OXYGEN SATURATION: 99 % | WEIGHT: 107 LBS | HEART RATE: 83 BPM

## 2024-06-03 DIAGNOSIS — Z00.00 MEDICARE ANNUAL WELLNESS VISIT, SUBSEQUENT: Primary | ICD-10-CM

## 2024-06-03 DIAGNOSIS — E55.9 VITAMIN D DEFICIENCY: ICD-10-CM

## 2024-06-03 DIAGNOSIS — I10 ESSENTIAL HYPERTENSION: Chronic | ICD-10-CM

## 2024-06-03 DIAGNOSIS — E78.2 MIXED HYPERLIPIDEMIA: ICD-10-CM

## 2024-06-03 DIAGNOSIS — E03.9 ACQUIRED HYPOTHYROIDISM: ICD-10-CM

## 2024-06-03 PROCEDURE — 3074F SYST BP LT 130 MM HG: CPT | Performed by: NURSE PRACTITIONER

## 2024-06-03 PROCEDURE — 1090F PRES/ABSN URINE INCON ASSESS: CPT | Performed by: NURSE PRACTITIONER

## 2024-06-03 PROCEDURE — 3078F DIAST BP <80 MM HG: CPT | Performed by: NURSE PRACTITIONER

## 2024-06-03 PROCEDURE — G0439 PPPS, SUBSEQ VISIT: HCPCS | Performed by: NURSE PRACTITIONER

## 2024-06-03 PROCEDURE — G8420 CALC BMI NORM PARAMETERS: HCPCS | Performed by: NURSE PRACTITIONER

## 2024-06-03 PROCEDURE — 1123F ACP DISCUSS/DSCN MKR DOCD: CPT | Performed by: NURSE PRACTITIONER

## 2024-06-03 PROCEDURE — 99214 OFFICE O/P EST MOD 30 MIN: CPT | Performed by: NURSE PRACTITIONER

## 2024-06-03 PROCEDURE — 3017F COLORECTAL CA SCREEN DOC REV: CPT | Performed by: NURSE PRACTITIONER

## 2024-06-03 PROCEDURE — 1036F TOBACCO NON-USER: CPT | Performed by: NURSE PRACTITIONER

## 2024-06-03 PROCEDURE — G8399 PT W/DXA RESULTS DOCUMENT: HCPCS | Performed by: NURSE PRACTITIONER

## 2024-06-03 PROCEDURE — G8427 DOCREV CUR MEDS BY ELIG CLIN: HCPCS | Performed by: NURSE PRACTITIONER

## 2024-06-03 RX ORDER — THYROID 60 MG/1
60 TABLET ORAL DAILY
Qty: 90 TABLET | Refills: 1 | Status: SHIPPED | OUTPATIENT
Start: 2024-06-03 | End: 2024-06-06 | Stop reason: SDUPTHER

## 2024-06-03 RX ORDER — ERGOCALCIFEROL 1.25 MG/1
50000 CAPSULE ORAL
Qty: 24 CAPSULE | Refills: 3 | Status: SHIPPED | OUTPATIENT
Start: 2024-06-03 | End: 2024-06-06 | Stop reason: SDUPTHER

## 2024-06-03 RX ORDER — OMEPRAZOLE 20 MG/1
20 CAPSULE, DELAYED RELEASE ORAL DAILY
Qty: 90 CAPSULE | Refills: 3 | Status: SHIPPED | OUTPATIENT
Start: 2024-06-03 | End: 2024-06-06 | Stop reason: SDUPTHER

## 2024-06-03 RX ORDER — LOVASTATIN 40 MG/1
TABLET ORAL
Qty: 90 TABLET | Refills: 3 | Status: SHIPPED | OUTPATIENT
Start: 2024-06-03

## 2024-06-03 RX ORDER — LOSARTAN POTASSIUM 100 MG/1
TABLET ORAL
Qty: 90 TABLET | Refills: 3 | Status: SHIPPED | OUTPATIENT
Start: 2024-06-03 | End: 2024-06-06 | Stop reason: SDUPTHER

## 2024-06-03 ASSESSMENT — PATIENT HEALTH QUESTIONNAIRE - PHQ9
SUM OF ALL RESPONSES TO PHQ QUESTIONS 1-9: 0
SUM OF ALL RESPONSES TO PHQ9 QUESTIONS 1 & 2: 0
SUM OF ALL RESPONSES TO PHQ QUESTIONS 1-9: 0
SUM OF ALL RESPONSES TO PHQ QUESTIONS 1-9: 0
1. LITTLE INTEREST OR PLEASURE IN DOING THINGS: NOT AT ALL
2. FEELING DOWN, DEPRESSED OR HOPELESS: NOT AT ALL
SUM OF ALL RESPONSES TO PHQ QUESTIONS 1-9: 0

## 2024-06-03 ASSESSMENT — ENCOUNTER SYMPTOMS
CHOKING: 0
WHEEZING: 0
COLOR CHANGE: 0
SHORTNESS OF BREATH: 0
TROUBLE SWALLOWING: 0
EYE ITCHING: 0
ABDOMINAL PAIN: 0
SORE THROAT: 0
ABDOMINAL DISTENTION: 0
CONSTIPATION: 0
VOMITING: 0
EYE DISCHARGE: 0
DIARRHEA: 0
COUGH: 0
BLOOD IN STOOL: 0
NAUSEA: 0
STRIDOR: 0

## 2024-06-03 ASSESSMENT — LIFESTYLE VARIABLES
HOW OFTEN DO YOU HAVE A DRINK CONTAINING ALCOHOL: NEVER
HOW MANY STANDARD DRINKS CONTAINING ALCOHOL DO YOU HAVE ON A TYPICAL DAY: PATIENT DOES NOT DRINK

## 2024-06-03 NOTE — PROGRESS NOTES
Follow-up:  Return in about 6 months (around 12/3/2024) for have labs done prior to appt    new provider;  Dr Givens.    Patient Instructions   1.  Hypertension stable with current dose no changes  2.  lung cancer with liver mets stable with Dr. Valdivia currently undergoing treatment  3.  Hypothyroidism TSH is normal continue current dose  4.  Hyperlipidemia stable with current lovastatin no changes  5.  Vitamin D deficiency level is good at 50,000 weekly no changes  6.  GERD stable with Prilosec  PATIENT INSTRUCTIONS:      IT IS VERY IMPORTANT THAT YOU GET YOUR LABS AT LEAST 1 DAY BEFORE YOUR APPT.  WE MAY HAVE TO RESCHEDULE YOU IF YOU WE DONT HAVE THE RESULTS;      WE CAN DISCUSS THE LABS ON THE DAY OF YOUR OFFICE VISIT AND MAKE CHANGES TOGETHER WHILE YOU ARE HERE.  THEN YOU CAN LEAVE WITH A COPY OF THOSE CHANGES.     CALLING YOU BACK WITH ROUTINE RESULTS IS VERY TIME CONSUMING;  I HAVE TO REVIEW THE LABS, THEN YOUR PAST LABS AND OV, THEN MAKE A NOTE TO THE NURSES TO CALL YOU, OR I CALL MYSELF WITH EXTREME ABNORMALITIES WHICH CAN BE TIME CONSUMING AS WELL;  WHEN I HAVE TO TAKE TIME OUT OF ANOTHER DAY FOR YOUR RESULTS THAT IS TIME AWAY FROM OTHER PATIENTS AND DISCUSSING RESULTS OVER THE PHONE IS JUST NOT A GOOD IDEA;  WHILE HERE WE CAN WRITE DOWN THE CHANGES BEFORE YOU LEAVE THE OFFICE;      Life simple 7  1) Manage blood pressure - high blood pressure is a major risk factor for heart disease and stroke. Keeping blood pressure in health range reduces strain on your heart, arteries and kidneys.  Blood pressure goal is less than 130/80.   2) Control cholesterol - contributes to plaque, which can clog arteries and lead to heart disease and stroke. When you control your cholesterol you are giving your arteries their best chance to remain clear.  It is recommended that you get cholesterol lab work done once a year.  3) Reduce blood sugar - most of the food we eat is turning into glucose or blood sugar that our

## 2024-06-03 NOTE — PATIENT INSTRUCTIONS
1.  Hypertension stable with current dose no changes  2.  lung cancer with liver mets stable with Dr. Valdivia currently undergoing treatment  3.  Hypothyroidism TSH is normal continue current dose  4.  Hyperlipidemia stable with current lovastatin no changes  5.  Vitamin D deficiency level is good at 50,000 weekly no changes  6.  GERD stable with Prilosec  PATIENT INSTRUCTIONS:      IT IS VERY IMPORTANT THAT YOU GET YOUR LABS AT LEAST 1 DAY BEFORE YOUR APPT.  WE MAY HAVE TO RESCHEDULE YOU IF YOU WE DONT HAVE THE RESULTS;      WE CAN DISCUSS THE LABS ON THE DAY OF YOUR OFFICE VISIT AND MAKE CHANGES TOGETHER WHILE YOU ARE HERE.  THEN YOU CAN LEAVE WITH A COPY OF THOSE CHANGES.     CALLING YOU BACK WITH ROUTINE RESULTS IS VERY TIME CONSUMING;  I HAVE TO REVIEW THE LABS, THEN YOUR PAST LABS AND OV, THEN MAKE A NOTE TO THE NURSES TO CALL YOU, OR I CALL MYSELF WITH EXTREME ABNORMALITIES WHICH CAN BE TIME CONSUMING AS WELL;  WHEN I HAVE TO TAKE TIME OUT OF ANOTHER DAY FOR YOUR RESULTS THAT IS TIME AWAY FROM OTHER PATIENTS AND DISCUSSING RESULTS OVER THE PHONE IS JUST NOT A GOOD IDEA;  WHILE HERE WE CAN WRITE DOWN THE CHANGES BEFORE YOU LEAVE THE OFFICE;      Life simple 7  1) Manage blood pressure - high blood pressure is a major risk factor for heart disease and stroke. Keeping blood pressure in health range reduces strain on your heart, arteries and kidneys.  Blood pressure goal is less than 130/80.   2) Control cholesterol - contributes to plaque, which can clog arteries and lead to heart disease and stroke. When you control your cholesterol you are giving your arteries their best chance to remain clear.  It is recommended that you get cholesterol lab work done once a year.  3) Reduce blood sugar - most of the food we eat is turning into glucose or blood sugar that our body uses for energy.  Over time, high levels of blood sugar can damage your heart, kidneys, eyes and nerves.  4) Get active - living an active life is

## 2024-06-06 DIAGNOSIS — E03.9 ACQUIRED HYPOTHYROIDISM: ICD-10-CM

## 2024-06-06 DIAGNOSIS — E55.9 VITAMIN D DEFICIENCY: ICD-10-CM

## 2024-06-06 DIAGNOSIS — C34.92 NSCLC OF LEFT LUNG (HCC): ICD-10-CM

## 2024-06-06 DIAGNOSIS — I10 ESSENTIAL HYPERTENSION: Chronic | ICD-10-CM

## 2024-06-06 RX ORDER — OMEPRAZOLE 20 MG/1
20 CAPSULE, DELAYED RELEASE ORAL DAILY
Qty: 90 CAPSULE | Refills: 3 | Status: SHIPPED | OUTPATIENT
Start: 2024-06-06

## 2024-06-06 RX ORDER — LOSARTAN POTASSIUM 100 MG/1
TABLET ORAL
Qty: 90 TABLET | Refills: 3 | Status: SHIPPED | OUTPATIENT
Start: 2024-06-06

## 2024-06-06 RX ORDER — THYROID 60 MG/1
60 TABLET ORAL DAILY
Qty: 90 TABLET | Refills: 1 | Status: SHIPPED | OUTPATIENT
Start: 2024-06-06

## 2024-06-06 RX ORDER — ERGOCALCIFEROL 1.25 MG/1
50000 CAPSULE ORAL
Qty: 24 CAPSULE | Refills: 3 | Status: SHIPPED | OUTPATIENT
Start: 2024-06-06

## 2024-06-11 NOTE — PROGRESS NOTES
visit.     Facility-Administered Medications Ordered in Other Visits   Medication Dose Route Frequency Provider Last Rate Last Admin    0.9 % sodium chloride infusion  5-250 mL/hr IntraVENous PRN Jennifer Valdivia  mL/hr at 06/12/24 1308 250 mL/hr at 06/12/24 1308    sodium chloride flush 0.9 % injection 5-40 mL  5-40 mL IntraVENous PRN Jennifer Valdivia MD   10 mL at 06/12/24 1553    heparin (PF) 100 UNIT/ML injection 500 Units  500 Units IntraCATHeter PRN Jennifer Valdivia MD   500 Units at 06/12/24 1553      REVIEW OF SYSTEMS:   CONSTITUTIONAL: no fever, no night sweats, fatigue;  HEENT: no blurring of vision, no double vision, no hearing difficulty, no tinnitus, no ulceration, no dysplasia, no epistaxis;   LUNGS: no cough, no hemoptysis, no wheeze,  no shortness of breath;  CARDIOVASCULAR: no palpitation, no chest pain, no shortness of breath;  GI: no abdominal pain, no nausea, no vomiting, diarrhea, no constipation;  CHAVA: no dysuria, no hematuria, no frequency or urgency, no nephrolithiasis;  MUSCULOSKELETAL: no joint pain, no swelling, no stiffness;  ENDOCRINE: no polyuria, no polydipsia, no cold or heat intolerance;  HEMATOLOGY: no easy bruising or bleeding, no history of clotting disorder;  DERMATOLOGY: no skin rash, no eczema, no pruritus;  PSYCHIATRY: no depression, no anxiety, no panic attacks, no suicidal ideation, no homicidal ideation;  NEUROLOGY: no syncope, no seizures, no numbness or tingling of hands, no numbness or tingling of feet, no paresis;     Vitals signs:  BP (!) 117/52   Pulse (!) 113   Temp 98.6 °F (37 °C)   Resp 16   Ht 1.524 m (5')   Wt 48.7 kg (107 lb 4.8 oz)   SpO2 98%   BMI 20.96 kg/m²    Wt Readings from Last 3 Encounters:   06/12/24 48.7 kg (107 lb 4.8 oz)   06/03/24 48.5 kg (107 lb)   05/22/24 48.4 kg (106 lb 12.8 oz)   ECOG PS:(1) Restricted in physically strenuous activity, ambulatory and able to do work of light nature  PHYSICAL EXAM:  CONSTITUTIONAL:

## 2024-06-12 ENCOUNTER — HOSPITAL ENCOUNTER (OUTPATIENT)
Dept: INFUSION THERAPY | Age: 74
Discharge: HOME | End: 2024-06-12
Payer: MEDICARE

## 2024-06-12 ENCOUNTER — OFFICE VISIT (OUTPATIENT)
Dept: HEMATOLOGY | Age: 74
End: 2024-06-12
Payer: MEDICARE

## 2024-06-12 VITALS
BODY MASS INDEX: 21.07 KG/M2 | RESPIRATION RATE: 16 BRPM | DIASTOLIC BLOOD PRESSURE: 52 MMHG | WEIGHT: 107.3 LBS | HEIGHT: 60 IN | TEMPERATURE: 98.6 F | SYSTOLIC BLOOD PRESSURE: 117 MMHG | OXYGEN SATURATION: 98 % | HEART RATE: 113 BPM

## 2024-06-12 DIAGNOSIS — T45.1X5A ANTINEOPLASTIC CHEMOTHERAPY INDUCED ANEMIA: ICD-10-CM

## 2024-06-12 DIAGNOSIS — Z51.12 ENCOUNTER FOR ANTINEOPLASTIC IMMUNOTHERAPY: ICD-10-CM

## 2024-06-12 DIAGNOSIS — K52.1 CHEMOTHERAPY INDUCED DIARRHEA: ICD-10-CM

## 2024-06-12 DIAGNOSIS — T45.1X5A CHEMOTHERAPY INDUCED DIARRHEA: ICD-10-CM

## 2024-06-12 DIAGNOSIS — Z71.89 CARE PLAN DISCUSSED WITH PATIENT: ICD-10-CM

## 2024-06-12 DIAGNOSIS — C34.90 SMALL CELL LUNG CANCER (HCC): ICD-10-CM

## 2024-06-12 DIAGNOSIS — Z51.11 CHEMOTHERAPY MANAGEMENT, ENCOUNTER FOR: ICD-10-CM

## 2024-06-12 DIAGNOSIS — C34.90 EGFR-RELATED LUNG CANCER (HCC): ICD-10-CM

## 2024-06-12 DIAGNOSIS — C34.90 SMALL CELL LUNG CANCER (HCC): Primary | ICD-10-CM

## 2024-06-12 DIAGNOSIS — D64.81 ANTINEOPLASTIC CHEMOTHERAPY INDUCED ANEMIA: ICD-10-CM

## 2024-06-12 DIAGNOSIS — N28.9 RENAL IMPAIRMENT: ICD-10-CM

## 2024-06-12 DIAGNOSIS — T45.1X5D ADVERSE EFFECT OF CHEMOTHERAPY, SUBSEQUENT ENCOUNTER: ICD-10-CM

## 2024-06-12 DIAGNOSIS — R53.83 FATIGUE DUE TO TREATMENT: ICD-10-CM

## 2024-06-12 DIAGNOSIS — T45.1X5A CHEMOTHERAPY-INDUCED FATIGUE: ICD-10-CM

## 2024-06-12 DIAGNOSIS — R53.83 CHEMOTHERAPY-INDUCED FATIGUE: ICD-10-CM

## 2024-06-12 LAB
ALBUMIN SERPL-MCNC: 4.3 G/DL (ref 3.5–5.2)
ALP SERPL-CCNC: 89 U/L (ref 35–104)
ALT SERPL-CCNC: 14 U/L (ref 9–52)
ANION GAP SERPL CALCULATED.3IONS-SCNC: 12 MMOL/L (ref 7–19)
AST SERPL-CCNC: 34 U/L (ref 14–36)
BASOPHILS # BLD: 0.05 K/UL (ref 0.01–0.08)
BASOPHILS NFR BLD: 0.5 % (ref 0.1–1.2)
BILIRUB SERPL-MCNC: 0.5 MG/DL (ref 0.2–1.3)
BUN SERPL-MCNC: 31 MG/DL (ref 7–17)
CALCIUM SERPL-MCNC: 9 MG/DL (ref 8.4–10.2)
CHLORIDE SERPL-SCNC: 104 MMOL/L (ref 98–111)
CO2 SERPL-SCNC: 22 MMOL/L (ref 22–29)
CORTIS AM PEAK SERPL-MCNC: 11.4 UG/DL (ref 6.2–19.4)
CREAT SERPL-MCNC: 1.3 MG/DL (ref 0.5–1)
EOSINOPHIL # BLD: 0.04 K/UL (ref 0.04–0.54)
EOSINOPHIL NFR BLD: 0.4 % (ref 0.7–7)
ERYTHROCYTE [DISTWIDTH] IN BLOOD BY AUTOMATED COUNT: 16.9 % (ref 11.7–14.4)
GLOBULIN: 1.9 G/DL
GLUCOSE SERPL-MCNC: 125 MG/DL (ref 74–106)
HCT VFR BLD AUTO: 25.1 % (ref 34.1–44.9)
HGB BLD-MCNC: 8.5 G/DL (ref 11.2–15.7)
LYMPHOCYTES # BLD: 0.82 K/UL (ref 1.18–3.74)
LYMPHOCYTES NFR BLD: 7.5 % (ref 19.3–53.1)
MCH RBC QN AUTO: 32.7 PG (ref 25.6–32.2)
MCHC RBC AUTO-ENTMCNC: 33.9 G/DL (ref 32.3–35.5)
MCV RBC AUTO: 96.5 FL (ref 79.4–94.8)
MONOCYTES # BLD: 1.23 K/UL (ref 0.24–0.82)
MONOCYTES NFR BLD: 11.3 % (ref 4.7–12.5)
NEUTROPHILS # BLD: 8.67 K/UL (ref 1.56–6.13)
NEUTS SEG NFR BLD: 79.7 % (ref 34–71.1)
PLATELET # BLD AUTO: 212 K/UL (ref 182–369)
PMV BLD AUTO: 10.2 FL (ref 7.4–10.4)
POTASSIUM SERPL-SCNC: 4.7 MMOL/L (ref 3.5–5.1)
PROT SERPL-MCNC: 6.2 G/DL (ref 6.3–8.2)
RBC # BLD AUTO: 2.6 M/UL (ref 3.93–5.22)
SODIUM SERPL-SCNC: 138 MMOL/L (ref 137–145)
T4 FREE SERPL-MCNC: 0.78 NG/DL (ref 0.93–1.7)
TSH SERPL DL<=0.005 MIU/L-ACNC: 1.19 UIU/ML (ref 0.27–4.2)
WBC # BLD AUTO: 10.88 K/UL (ref 3.98–10.04)

## 2024-06-12 PROCEDURE — 96367 TX/PROPH/DG ADDL SEQ IV INF: CPT

## 2024-06-12 PROCEDURE — G8420 CALC BMI NORM PARAMETERS: HCPCS | Performed by: INTERNAL MEDICINE

## 2024-06-12 PROCEDURE — 80053 COMPREHEN METABOLIC PANEL: CPT

## 2024-06-12 PROCEDURE — 96417 CHEMO IV INFUS EACH ADDL SEQ: CPT

## 2024-06-12 PROCEDURE — 6360000002 HC RX W HCPCS: Performed by: INTERNAL MEDICINE

## 2024-06-12 PROCEDURE — 3074F SYST BP LT 130 MM HG: CPT | Performed by: INTERNAL MEDICINE

## 2024-06-12 PROCEDURE — 96375 TX/PRO/DX INJ NEW DRUG ADDON: CPT

## 2024-06-12 PROCEDURE — 1090F PRES/ABSN URINE INCON ASSESS: CPT | Performed by: INTERNAL MEDICINE

## 2024-06-12 PROCEDURE — G8399 PT W/DXA RESULTS DOCUMENT: HCPCS | Performed by: INTERNAL MEDICINE

## 2024-06-12 PROCEDURE — 99214 OFFICE O/P EST MOD 30 MIN: CPT | Performed by: INTERNAL MEDICINE

## 2024-06-12 PROCEDURE — 85025 COMPLETE CBC W/AUTO DIFF WBC: CPT

## 2024-06-12 PROCEDURE — 36415 COLL VENOUS BLD VENIPUNCTURE: CPT

## 2024-06-12 PROCEDURE — 2580000003 HC RX 258: Performed by: INTERNAL MEDICINE

## 2024-06-12 PROCEDURE — 3078F DIAST BP <80 MM HG: CPT | Performed by: INTERNAL MEDICINE

## 2024-06-12 PROCEDURE — G8427 DOCREV CUR MEDS BY ELIG CLIN: HCPCS | Performed by: INTERNAL MEDICINE

## 2024-06-12 PROCEDURE — 96413 CHEMO IV INFUSION 1 HR: CPT

## 2024-06-12 PROCEDURE — 3017F COLORECTAL CA SCREEN DOC REV: CPT | Performed by: INTERNAL MEDICINE

## 2024-06-12 PROCEDURE — 1036F TOBACCO NON-USER: CPT | Performed by: INTERNAL MEDICINE

## 2024-06-12 PROCEDURE — 1123F ACP DISCUSS/DSCN MKR DOCD: CPT | Performed by: INTERNAL MEDICINE

## 2024-06-12 PROCEDURE — G2211 COMPLEX E/M VISIT ADD ON: HCPCS | Performed by: INTERNAL MEDICINE

## 2024-06-12 RX ORDER — DIPHENHYDRAMINE HYDROCHLORIDE 50 MG/ML
50 INJECTION INTRAMUSCULAR; INTRAVENOUS
Status: CANCELLED | OUTPATIENT
Start: 2024-06-12

## 2024-06-12 RX ORDER — PALONOSETRON 0.05 MG/ML
0.25 INJECTION, SOLUTION INTRAVENOUS ONCE
Status: COMPLETED | OUTPATIENT
Start: 2024-06-12 | End: 2024-06-12

## 2024-06-12 RX ORDER — SODIUM CHLORIDE 9 MG/ML
INJECTION, SOLUTION INTRAVENOUS CONTINUOUS
OUTPATIENT
Start: 2024-06-14

## 2024-06-12 RX ORDER — MEPERIDINE HYDROCHLORIDE 50 MG/ML
12.5 INJECTION INTRAMUSCULAR; INTRAVENOUS; SUBCUTANEOUS PRN
Status: CANCELLED | OUTPATIENT
Start: 2024-06-13

## 2024-06-12 RX ORDER — ACETAMINOPHEN 325 MG/1
650 TABLET ORAL
Status: CANCELLED
Start: 2024-06-12

## 2024-06-12 RX ORDER — ALBUTEROL SULFATE 90 UG/1
4 AEROSOL, METERED RESPIRATORY (INHALATION) PRN
Status: CANCELLED | OUTPATIENT
Start: 2024-06-12

## 2024-06-12 RX ORDER — SODIUM CHLORIDE 0.9 % (FLUSH) 0.9 %
5-40 SYRINGE (ML) INJECTION PRN
Status: CANCELLED | OUTPATIENT
Start: 2024-06-12

## 2024-06-12 RX ORDER — MEPERIDINE HYDROCHLORIDE 50 MG/ML
12.5 INJECTION INTRAMUSCULAR; INTRAVENOUS; SUBCUTANEOUS PRN
Status: CANCELLED | OUTPATIENT
Start: 2024-06-12

## 2024-06-12 RX ORDER — HEPARIN 100 UNIT/ML
500 SYRINGE INTRAVENOUS PRN
Status: DISCONTINUED | OUTPATIENT
Start: 2024-06-12 | End: 2024-06-13 | Stop reason: HOSPADM

## 2024-06-12 RX ORDER — FAMOTIDINE 10 MG/ML
20 INJECTION, SOLUTION INTRAVENOUS
Status: CANCELLED | OUTPATIENT
Start: 2024-06-12

## 2024-06-12 RX ORDER — SODIUM CHLORIDE 9 MG/ML
5-250 INJECTION, SOLUTION INTRAVENOUS PRN
Status: CANCELLED | OUTPATIENT
Start: 2024-06-12

## 2024-06-12 RX ORDER — ACETAMINOPHEN 325 MG/1
650 TABLET ORAL
OUTPATIENT
Start: 2024-06-14

## 2024-06-12 RX ORDER — SODIUM CHLORIDE 0.9 % (FLUSH) 0.9 %
5-40 SYRINGE (ML) INJECTION PRN
Status: DISCONTINUED | OUTPATIENT
Start: 2024-06-12 | End: 2024-06-13 | Stop reason: HOSPADM

## 2024-06-12 RX ORDER — HEPARIN SODIUM (PORCINE) LOCK FLUSH IV SOLN 100 UNIT/ML 100 UNIT/ML
500 SOLUTION INTRAVENOUS PRN
Status: CANCELLED | OUTPATIENT
Start: 2024-06-14

## 2024-06-12 RX ORDER — ACETAMINOPHEN 325 MG/1
650 TABLET ORAL
Status: CANCELLED | OUTPATIENT
Start: 2024-06-12

## 2024-06-12 RX ORDER — EPINEPHRINE 1 MG/ML
0.3 INJECTION, SOLUTION, CONCENTRATE INTRAVENOUS PRN
OUTPATIENT
Start: 2024-06-14

## 2024-06-12 RX ORDER — ONDANSETRON 2 MG/ML
8 INJECTION INTRAMUSCULAR; INTRAVENOUS
Status: CANCELLED | OUTPATIENT
Start: 2024-06-12

## 2024-06-12 RX ORDER — PALONOSETRON 0.05 MG/ML
0.25 INJECTION, SOLUTION INTRAVENOUS ONCE
Status: CANCELLED | OUTPATIENT
Start: 2024-06-12 | End: 2024-06-12

## 2024-06-12 RX ORDER — SODIUM CHLORIDE 9 MG/ML
5-250 INJECTION, SOLUTION INTRAVENOUS PRN
Status: DISCONTINUED | OUTPATIENT
Start: 2024-06-12 | End: 2024-06-13 | Stop reason: HOSPADM

## 2024-06-12 RX ORDER — DIPHENHYDRAMINE HYDROCHLORIDE 50 MG/ML
50 INJECTION INTRAMUSCULAR; INTRAVENOUS
OUTPATIENT
Start: 2024-06-14

## 2024-06-12 RX ORDER — ALBUTEROL SULFATE 90 UG/1
4 AEROSOL, METERED RESPIRATORY (INHALATION) PRN
Status: CANCELLED | OUTPATIENT
Start: 2024-06-13

## 2024-06-12 RX ORDER — ONDANSETRON 2 MG/ML
8 INJECTION INTRAMUSCULAR; INTRAVENOUS
OUTPATIENT
Start: 2024-06-14

## 2024-06-12 RX ORDER — SODIUM CHLORIDE 0.9 % (FLUSH) 0.9 %
5-40 SYRINGE (ML) INJECTION PRN
Status: CANCELLED | OUTPATIENT
Start: 2024-06-14

## 2024-06-12 RX ORDER — EPINEPHRINE 1 MG/ML
0.3 INJECTION, SOLUTION, CONCENTRATE INTRAVENOUS PRN
Status: CANCELLED | OUTPATIENT
Start: 2024-06-12

## 2024-06-12 RX ORDER — SODIUM CHLORIDE 0.9 % (FLUSH) 0.9 %
5-40 SYRINGE (ML) INJECTION PRN
Status: CANCELLED | OUTPATIENT
Start: 2024-06-13

## 2024-06-12 RX ORDER — FAMOTIDINE 10 MG/ML
20 INJECTION, SOLUTION INTRAVENOUS
OUTPATIENT
Start: 2024-06-14

## 2024-06-12 RX ORDER — HEPARIN SODIUM (PORCINE) LOCK FLUSH IV SOLN 100 UNIT/ML 100 UNIT/ML
500 SOLUTION INTRAVENOUS PRN
Status: CANCELLED | OUTPATIENT
Start: 2024-06-12

## 2024-06-12 RX ORDER — SODIUM CHLORIDE 9 MG/ML
INJECTION, SOLUTION INTRAVENOUS CONTINUOUS
Status: CANCELLED | OUTPATIENT
Start: 2024-06-13

## 2024-06-12 RX ORDER — ONDANSETRON 2 MG/ML
8 INJECTION INTRAMUSCULAR; INTRAVENOUS
Status: CANCELLED | OUTPATIENT
Start: 2024-06-13

## 2024-06-12 RX ORDER — SODIUM CHLORIDE 9 MG/ML
5-250 INJECTION, SOLUTION INTRAVENOUS PRN
OUTPATIENT
Start: 2024-06-14

## 2024-06-12 RX ORDER — SODIUM CHLORIDE 9 MG/ML
5-250 INJECTION, SOLUTION INTRAVENOUS PRN
Status: CANCELLED | OUTPATIENT
Start: 2024-06-14

## 2024-06-12 RX ORDER — DIPHENHYDRAMINE HYDROCHLORIDE 50 MG/ML
50 INJECTION INTRAMUSCULAR; INTRAVENOUS
Status: CANCELLED | OUTPATIENT
Start: 2024-06-13

## 2024-06-12 RX ORDER — SODIUM CHLORIDE 9 MG/ML
5-250 INJECTION, SOLUTION INTRAVENOUS PRN
Status: CANCELLED | OUTPATIENT
Start: 2024-06-13

## 2024-06-12 RX ORDER — EPINEPHRINE 1 MG/ML
0.3 INJECTION, SOLUTION, CONCENTRATE INTRAVENOUS PRN
Status: CANCELLED | OUTPATIENT
Start: 2024-06-13

## 2024-06-12 RX ORDER — ALBUTEROL SULFATE 90 UG/1
4 AEROSOL, METERED RESPIRATORY (INHALATION) PRN
OUTPATIENT
Start: 2024-06-14

## 2024-06-12 RX ORDER — MEPERIDINE HYDROCHLORIDE 50 MG/ML
12.5 INJECTION INTRAMUSCULAR; INTRAVENOUS; SUBCUTANEOUS PRN
OUTPATIENT
Start: 2024-06-14

## 2024-06-12 RX ORDER — FAMOTIDINE 10 MG/ML
20 INJECTION, SOLUTION INTRAVENOUS
Status: CANCELLED | OUTPATIENT
Start: 2024-06-13

## 2024-06-12 RX ORDER — SODIUM CHLORIDE 9 MG/ML
INJECTION, SOLUTION INTRAVENOUS CONTINUOUS
Status: CANCELLED | OUTPATIENT
Start: 2024-06-12

## 2024-06-12 RX ORDER — ACETAMINOPHEN 325 MG/1
650 TABLET ORAL
Status: CANCELLED | OUTPATIENT
Start: 2024-06-13

## 2024-06-12 RX ORDER — HEPARIN SODIUM (PORCINE) LOCK FLUSH IV SOLN 100 UNIT/ML 100 UNIT/ML
500 SOLUTION INTRAVENOUS PRN
Status: CANCELLED | OUTPATIENT
Start: 2024-06-13

## 2024-06-12 RX ADMIN — ETOPOSIDE 120 MG: 20 INJECTION, SOLUTION INTRAVENOUS at 14:52

## 2024-06-12 RX ADMIN — SODIUM CHLORIDE, PRESERVATIVE FREE 10 ML: 5 INJECTION INTRAVENOUS at 15:53

## 2024-06-12 RX ADMIN — HEPARIN 500 UNITS: 100 SYRINGE at 15:53

## 2024-06-12 RX ADMIN — DEXAMETHASONE SODIUM PHOSPHATE: 100 INJECTION INTRAMUSCULAR; INTRAVENOUS at 13:10

## 2024-06-12 RX ADMIN — SODIUM CHLORIDE 250 ML/HR: 9 INJECTION, SOLUTION INTRAVENOUS at 13:08

## 2024-06-12 RX ADMIN — ATEZOLIZUMAB 1200 MG: 1200 INJECTION, SOLUTION INTRAVENOUS at 13:48

## 2024-06-12 RX ADMIN — SODIUM CHLORIDE 150 MG: 9 INJECTION, SOLUTION INTRAVENOUS at 13:20

## 2024-06-12 RX ADMIN — PALONOSETRON 0.25 MG: 0.05 INJECTION, SOLUTION INTRAVENOUS at 13:09

## 2024-06-12 RX ADMIN — CARBOPLATIN 215 MG: 10 INJECTION, SOLUTION INTRAVENOUS at 14:20

## 2024-06-12 NOTE — PROGRESS NOTES
Lab Results   Component Value Date    WBC 10.88 (H) 06/12/2024    HGB 8.5 (L) 06/12/2024    HCT 25.1 (LL) 06/12/2024    MCV 96.5 (H) 06/12/2024     06/12/2024     Lab Results   Component Value Date    NEUTROABS 8.67 (H) 06/12/2024     Lab Results   Component Value Date     06/12/2024    K 4.7 06/12/2024     06/12/2024    CO2 22 06/12/2024    BUN 31 (H) 06/12/2024    CREATININE 1.3 (H) 06/12/2024    GLUCOSE 125 (H) 06/12/2024    CALCIUM 9.0 06/12/2024    BILITOT 0.5 06/12/2024    ALKPHOS 89 06/12/2024    AST 34 06/12/2024    ALT 14 06/12/2024    LABGLOM 43 (A) 06/12/2024    GFRAA >59 06/28/2022    GLOB 1.9 06/12/2024

## 2024-06-13 ENCOUNTER — HOSPITAL ENCOUNTER (OUTPATIENT)
Dept: INFUSION THERAPY | Age: 74
Discharge: HOME | End: 2024-06-13
Payer: MEDICARE

## 2024-06-13 VITALS
DIASTOLIC BLOOD PRESSURE: 67 MMHG | SYSTOLIC BLOOD PRESSURE: 127 MMHG | HEART RATE: 98 BPM | OXYGEN SATURATION: 100 % | RESPIRATION RATE: 18 BRPM | TEMPERATURE: 97.9 F

## 2024-06-13 DIAGNOSIS — C34.90 SMALL CELL LUNG CANCER (HCC): Primary | ICD-10-CM

## 2024-06-13 PROCEDURE — 96413 CHEMO IV INFUSION 1 HR: CPT

## 2024-06-13 PROCEDURE — 2580000003 HC RX 258: Performed by: INTERNAL MEDICINE

## 2024-06-13 PROCEDURE — 6360000002 HC RX W HCPCS: Performed by: INTERNAL MEDICINE

## 2024-06-13 PROCEDURE — 96367 TX/PROPH/DG ADDL SEQ IV INF: CPT

## 2024-06-13 RX ORDER — SODIUM CHLORIDE 0.9 % (FLUSH) 0.9 %
5-40 SYRINGE (ML) INJECTION PRN
Status: DISCONTINUED | OUTPATIENT
Start: 2024-06-13 | End: 2024-06-14 | Stop reason: HOSPADM

## 2024-06-13 RX ORDER — HEPARIN 100 UNIT/ML
500 SYRINGE INTRAVENOUS PRN
Status: DISCONTINUED | OUTPATIENT
Start: 2024-06-13 | End: 2024-06-14 | Stop reason: HOSPADM

## 2024-06-13 RX ORDER — SODIUM CHLORIDE 9 MG/ML
5-250 INJECTION, SOLUTION INTRAVENOUS PRN
Status: DISCONTINUED | OUTPATIENT
Start: 2024-06-13 | End: 2024-06-14 | Stop reason: HOSPADM

## 2024-06-13 RX ADMIN — SODIUM CHLORIDE, PRESERVATIVE FREE 10 ML: 5 INJECTION INTRAVENOUS at 15:07

## 2024-06-13 RX ADMIN — HEPARIN 500 UNITS: 100 SYRINGE at 15:08

## 2024-06-13 RX ADMIN — ETOPOSIDE 120 MG: 20 INJECTION, SOLUTION INTRAVENOUS at 14:03

## 2024-06-13 RX ADMIN — ONDANSETRON 16 MG: 2 INJECTION INTRAMUSCULAR; INTRAVENOUS at 13:30

## 2024-06-14 ENCOUNTER — HOSPITAL ENCOUNTER (OUTPATIENT)
Dept: INFUSION THERAPY | Age: 74
Discharge: HOME | End: 2024-06-14
Payer: MEDICARE

## 2024-06-14 DIAGNOSIS — C34.90 SMALL CELL LUNG CANCER (HCC): Primary | ICD-10-CM

## 2024-06-14 PROCEDURE — 2580000003 HC RX 258: Performed by: INTERNAL MEDICINE

## 2024-06-14 PROCEDURE — 96413 CHEMO IV INFUSION 1 HR: CPT

## 2024-06-14 PROCEDURE — 96367 TX/PROPH/DG ADDL SEQ IV INF: CPT

## 2024-06-14 PROCEDURE — 96377 APPLICATON ON-BODY INJECTOR: CPT

## 2024-06-14 PROCEDURE — 6360000002 HC RX W HCPCS: Performed by: INTERNAL MEDICINE

## 2024-06-14 RX ORDER — SODIUM CHLORIDE 0.9 % (FLUSH) 0.9 %
5-40 SYRINGE (ML) INJECTION PRN
Status: DISCONTINUED | OUTPATIENT
Start: 2024-06-14 | End: 2024-06-15 | Stop reason: HOSPADM

## 2024-06-14 RX ORDER — HEPARIN 100 UNIT/ML
500 SYRINGE INTRAVENOUS PRN
Status: DISCONTINUED | OUTPATIENT
Start: 2024-06-14 | End: 2024-06-15 | Stop reason: HOSPADM

## 2024-06-14 RX ORDER — SODIUM CHLORIDE 9 MG/ML
5-250 INJECTION, SOLUTION INTRAVENOUS PRN
Status: DISCONTINUED | OUTPATIENT
Start: 2024-06-14 | End: 2024-06-15 | Stop reason: HOSPADM

## 2024-06-14 RX ADMIN — SODIUM CHLORIDE, PRESERVATIVE FREE 10 ML: 5 INJECTION INTRAVENOUS at 15:39

## 2024-06-14 RX ADMIN — HEPARIN 500 UNITS: 100 SYRINGE at 15:39

## 2024-06-14 RX ADMIN — PEGFILGRASTIM 6 MG: KIT SUBCUTANEOUS at 15:36

## 2024-06-14 RX ADMIN — ETOPOSIDE 120 MG: 20 INJECTION, SOLUTION INTRAVENOUS at 14:35

## 2024-06-14 RX ADMIN — SODIUM CHLORIDE 25 ML/HR: 9 INJECTION, SOLUTION INTRAVENOUS at 14:01

## 2024-06-14 RX ADMIN — ONDANSETRON 16 MG: 2 INJECTION INTRAMUSCULAR; INTRAVENOUS at 14:01

## 2024-07-09 NOTE — PROGRESS NOTES
(107 lb)   ECOG PS:(1) Restricted in physically strenuous activity, ambulatory and able to do work of light nature    PHYSICAL EXAM:  CONSTITUTIONAL: Alert, appropriate, no acute distress  EYES: Non icteric, EOM intact, pupils equal round   ENT: Mucus membranes moist, no oral pharyngeal lesions, external inspection of ears and nose are normal.  NECK: Supple, no masses.  No palpable thyroid mass  CHEST/LUNGS: CTA bilaterally, normal respiratory effort   CARDIOVASCULAR: RRR, no murmurs.  No lower extremity edema  ABDOMEN: soft non-tender, active bowel sounds, no HSM.  No palpable masses  EXTREMITIES: warm, full ROM in all 4 extremities, no focal weakness.  SKIN: warm, dry with no rashes or lesions  LYMPH: No cervical, clavicular, axillary, or inguinal lymphadenopathy  NEUROLOGIC: follows commands, non focal   PSYCH: mood and affect appropriate.  Alert and oriented to time, place, person    Relevant Lab findings/reviewed by me:  6/12/24 Labs: Cortisol AM Total 11.4, TSH 1.190, T4,Free 0.78  Lab Results   Component Value Date    WBC 9.41 07/10/2024    HGB 8.9 (L) 07/10/2024    HCT 25.2 (LL) 07/10/2024    MCV 91.6 07/10/2024     (L) 07/10/2024     Lab Results   Component Value Date    NEUTROABS 6.62 (H) 07/10/2024     Lab Results   Component Value Date     (L) 07/10/2024    K 4.1 07/10/2024     07/10/2024    CO2 20 (L) 07/10/2024    BUN 23 (H) 07/10/2024    CREATININE 0.8 07/10/2024    GLUCOSE 154 (H) 07/10/2024    CALCIUM 9.2 07/10/2024    BILITOT 0.5 07/10/2024    ALKPHOS 92 07/10/2024    AST 47 (H) 07/10/2024    ALT 25 07/10/2024    LABGLOM 77 07/10/2024    GFRAA >59 06/28/2022    GLOB 1.9 07/10/2024           Relevant Imaging studies/reviewed by me:  NONE      ASSESSMENT  Orders Placed This Encounter   Procedures    CT ABDOMEN PELVIS W IV CONTRAST Additional Contrast? Oral     Standing Status:   Future     Standing Expiration Date:   1/10/2026     Order Specific Question:   Additional Contrast?

## 2024-07-10 ENCOUNTER — HOSPITAL ENCOUNTER (OUTPATIENT)
Dept: INFUSION THERAPY | Age: 74
Discharge: HOME | End: 2024-07-10
Payer: MEDICARE

## 2024-07-10 ENCOUNTER — OFFICE VISIT (OUTPATIENT)
Dept: HEMATOLOGY | Age: 74
End: 2024-07-10
Payer: MEDICARE

## 2024-07-10 VITALS
OXYGEN SATURATION: 100 % | BODY MASS INDEX: 21.64 KG/M2 | WEIGHT: 110.2 LBS | SYSTOLIC BLOOD PRESSURE: 130 MMHG | HEIGHT: 60 IN | DIASTOLIC BLOOD PRESSURE: 50 MMHG | HEART RATE: 102 BPM | TEMPERATURE: 98 F | RESPIRATION RATE: 18 BRPM

## 2024-07-10 DIAGNOSIS — R53.0 NEOPLASTIC MALIGNANT RELATED FATIGUE: ICD-10-CM

## 2024-07-10 DIAGNOSIS — T45.1X5D ADVERSE EFFECT OF CHEMOTHERAPY, SUBSEQUENT ENCOUNTER: ICD-10-CM

## 2024-07-10 DIAGNOSIS — Z71.89 CARE PLAN DISCUSSED WITH PATIENT: ICD-10-CM

## 2024-07-10 DIAGNOSIS — C34.90 SMALL CELL LUNG CANCER (HCC): ICD-10-CM

## 2024-07-10 DIAGNOSIS — G47.01 INSOMNIA DUE TO MEDICAL CONDITION: ICD-10-CM

## 2024-07-10 DIAGNOSIS — K52.1 CHEMOTHERAPY INDUCED DIARRHEA: ICD-10-CM

## 2024-07-10 DIAGNOSIS — Z51.11 CHEMOTHERAPY MANAGEMENT, ENCOUNTER FOR: ICD-10-CM

## 2024-07-10 DIAGNOSIS — R53.0 NEOPLASTIC MALIGNANT RELATED FATIGUE: Primary | ICD-10-CM

## 2024-07-10 DIAGNOSIS — C34.90 EGFR-RELATED LUNG CANCER (HCC): ICD-10-CM

## 2024-07-10 DIAGNOSIS — R53.83 FATIGUE DUE TO TREATMENT: ICD-10-CM

## 2024-07-10 DIAGNOSIS — C34.90 SMALL CELL LUNG CANCER (HCC): Primary | ICD-10-CM

## 2024-07-10 DIAGNOSIS — T45.1X5A CHEMOTHERAPY INDUCED DIARRHEA: ICD-10-CM

## 2024-07-10 DIAGNOSIS — E86.0 DEHYDRATION: Primary | ICD-10-CM

## 2024-07-10 DIAGNOSIS — Z51.12 ENCOUNTER FOR ANTINEOPLASTIC IMMUNOTHERAPY: ICD-10-CM

## 2024-07-10 LAB
ALBUMIN SERPL-MCNC: 3.9 G/DL (ref 3.5–5.2)
ALP SERPL-CCNC: 92 U/L (ref 35–104)
ALT SERPL-CCNC: 25 U/L (ref 9–52)
ANION GAP SERPL CALCULATED.3IONS-SCNC: 12 MMOL/L (ref 7–19)
AST SERPL-CCNC: 47 U/L (ref 14–36)
BASOPHILS # BLD: 0.04 K/UL (ref 0.01–0.08)
BASOPHILS NFR BLD: 0.4 % (ref 0.1–1.2)
BILIRUB SERPL-MCNC: 0.5 MG/DL (ref 0.2–1.3)
BUN SERPL-MCNC: 23 MG/DL (ref 7–17)
CALCIUM SERPL-MCNC: 9.2 MG/DL (ref 8.4–10.2)
CHLORIDE SERPL-SCNC: 101 MMOL/L (ref 98–111)
CO2 SERPL-SCNC: 20 MMOL/L (ref 22–29)
CORTIS PM SERPL-MCNC: 18.1 UG/DL (ref 2.3–11.9)
CREAT SERPL-MCNC: 0.8 MG/DL (ref 0.5–1)
EOSINOPHIL # BLD: 0.25 K/UL (ref 0.04–0.54)
EOSINOPHIL NFR BLD: 2.7 % (ref 0.7–7)
ERYTHROCYTE [DISTWIDTH] IN BLOOD BY AUTOMATED COUNT: 15.8 % (ref 11.7–14.4)
GLOBULIN: 1.9 G/DL
GLUCOSE SERPL-MCNC: 154 MG/DL (ref 74–106)
HCT VFR BLD AUTO: 25.2 % (ref 34.1–44.9)
HGB BLD-MCNC: 8.9 G/DL (ref 11.2–15.7)
LYMPHOCYTES # BLD: 1.5 K/UL (ref 1.18–3.74)
LYMPHOCYTES NFR BLD: 15.9 % (ref 19.3–53.1)
MCH RBC QN AUTO: 32.4 PG (ref 25.6–32.2)
MCHC RBC AUTO-ENTMCNC: 35.3 G/DL (ref 32.3–35.5)
MCV RBC AUTO: 91.6 FL (ref 79.4–94.8)
MONOCYTES # BLD: 0.97 K/UL (ref 0.24–0.82)
MONOCYTES NFR BLD: 10.3 % (ref 4.7–12.5)
NEUTROPHILS # BLD: 6.62 K/UL (ref 1.56–6.13)
NEUTS SEG NFR BLD: 70.4 % (ref 34–71.1)
PLATELET # BLD AUTO: 151 K/UL (ref 182–369)
PMV BLD AUTO: 10.1 FL (ref 7.4–10.4)
POTASSIUM SERPL-SCNC: 4.1 MMOL/L (ref 3.5–5.1)
PROT SERPL-MCNC: 5.8 G/DL (ref 6.3–8.2)
RBC # BLD AUTO: 2.75 M/UL (ref 3.93–5.22)
SODIUM SERPL-SCNC: 133 MMOL/L (ref 137–145)
T4 FREE SERPL-MCNC: 0.8 NG/DL (ref 0.93–1.7)
TSH SERPL DL<=0.005 MIU/L-ACNC: 7.12 UIU/ML (ref 0.27–4.2)
WBC # BLD AUTO: 9.41 K/UL (ref 3.98–10.04)

## 2024-07-10 PROCEDURE — 3078F DIAST BP <80 MM HG: CPT | Performed by: INTERNAL MEDICINE

## 2024-07-10 PROCEDURE — 36415 COLL VENOUS BLD VENIPUNCTURE: CPT

## 2024-07-10 PROCEDURE — G8420 CALC BMI NORM PARAMETERS: HCPCS | Performed by: INTERNAL MEDICINE

## 2024-07-10 PROCEDURE — G8427 DOCREV CUR MEDS BY ELIG CLIN: HCPCS | Performed by: INTERNAL MEDICINE

## 2024-07-10 PROCEDURE — 2580000003 HC RX 258: Performed by: INTERNAL MEDICINE

## 2024-07-10 PROCEDURE — 99214 OFFICE O/P EST MOD 30 MIN: CPT | Performed by: INTERNAL MEDICINE

## 2024-07-10 PROCEDURE — G8399 PT W/DXA RESULTS DOCUMENT: HCPCS | Performed by: INTERNAL MEDICINE

## 2024-07-10 PROCEDURE — 3017F COLORECTAL CA SCREEN DOC REV: CPT | Performed by: INTERNAL MEDICINE

## 2024-07-10 PROCEDURE — G2211 COMPLEX E/M VISIT ADD ON: HCPCS | Performed by: INTERNAL MEDICINE

## 2024-07-10 PROCEDURE — 80053 COMPREHEN METABOLIC PANEL: CPT

## 2024-07-10 PROCEDURE — 6360000002 HC RX W HCPCS: Performed by: INTERNAL MEDICINE

## 2024-07-10 PROCEDURE — 1036F TOBACCO NON-USER: CPT | Performed by: INTERNAL MEDICINE

## 2024-07-10 PROCEDURE — 85025 COMPLETE CBC W/AUTO DIFF WBC: CPT

## 2024-07-10 PROCEDURE — 1090F PRES/ABSN URINE INCON ASSESS: CPT | Performed by: INTERNAL MEDICINE

## 2024-07-10 PROCEDURE — 3075F SYST BP GE 130 - 139MM HG: CPT | Performed by: INTERNAL MEDICINE

## 2024-07-10 PROCEDURE — 1123F ACP DISCUSS/DSCN MKR DOCD: CPT | Performed by: INTERNAL MEDICINE

## 2024-07-10 PROCEDURE — 96360 HYDRATION IV INFUSION INIT: CPT

## 2024-07-10 RX ORDER — SODIUM CHLORIDE 9 MG/ML
INJECTION, SOLUTION INTRAVENOUS CONTINUOUS
OUTPATIENT
Start: 2024-07-10

## 2024-07-10 RX ORDER — ACETAMINOPHEN 325 MG/1
650 TABLET ORAL
OUTPATIENT
Start: 2024-07-10

## 2024-07-10 RX ORDER — 0.9 % SODIUM CHLORIDE 0.9 %
1000 INTRAVENOUS SOLUTION INTRAVENOUS ONCE
OUTPATIENT
Start: 2024-07-10 | End: 2024-07-10

## 2024-07-10 RX ORDER — SODIUM CHLORIDE 0.9 % (FLUSH) 0.9 %
5-40 SYRINGE (ML) INJECTION PRN
OUTPATIENT
Start: 2024-07-10

## 2024-07-10 RX ORDER — FAMOTIDINE 10 MG/ML
20 INJECTION, SOLUTION INTRAVENOUS
OUTPATIENT
Start: 2024-07-10

## 2024-07-10 RX ORDER — SODIUM CHLORIDE 9 MG/ML
5-250 INJECTION, SOLUTION INTRAVENOUS PRN
OUTPATIENT
Start: 2024-07-10

## 2024-07-10 RX ORDER — EPINEPHRINE 1 MG/ML
0.3 INJECTION, SOLUTION, CONCENTRATE INTRAVENOUS PRN
OUTPATIENT
Start: 2024-07-10

## 2024-07-10 RX ORDER — TEMAZEPAM 7.5 MG/1
7.5 CAPSULE ORAL NIGHTLY PRN
Qty: 30 CAPSULE | Refills: 0 | Status: CANCELLED | OUTPATIENT
Start: 2024-07-10 | End: 2024-08-09

## 2024-07-10 RX ORDER — ALBUTEROL SULFATE 90 UG/1
4 AEROSOL, METERED RESPIRATORY (INHALATION) PRN
OUTPATIENT
Start: 2024-07-10

## 2024-07-10 RX ORDER — TEMAZEPAM 7.5 MG/1
7.5 CAPSULE ORAL NIGHTLY PRN
Qty: 30 CAPSULE | Refills: 0 | Status: SHIPPED | OUTPATIENT
Start: 2024-07-10 | End: 2024-08-09

## 2024-07-10 RX ORDER — HEPARIN 100 UNIT/ML
500 SYRINGE INTRAVENOUS PRN
OUTPATIENT
Start: 2024-07-10

## 2024-07-10 RX ORDER — HEPARIN 100 UNIT/ML
500 SYRINGE INTRAVENOUS PRN
Status: DISCONTINUED | OUTPATIENT
Start: 2024-07-10 | End: 2024-07-11 | Stop reason: HOSPADM

## 2024-07-10 RX ORDER — SODIUM CHLORIDE 0.9 % (FLUSH) 0.9 %
5-40 SYRINGE (ML) INJECTION PRN
Status: DISCONTINUED | OUTPATIENT
Start: 2024-07-10 | End: 2024-07-11 | Stop reason: HOSPADM

## 2024-07-10 RX ORDER — DIPHENHYDRAMINE HYDROCHLORIDE 50 MG/ML
50 INJECTION INTRAMUSCULAR; INTRAVENOUS
OUTPATIENT
Start: 2024-07-10

## 2024-07-10 RX ORDER — 0.9 % SODIUM CHLORIDE 0.9 %
500 INTRAVENOUS SOLUTION INTRAVENOUS ONCE
Status: COMPLETED | OUTPATIENT
Start: 2024-07-10 | End: 2024-07-10

## 2024-07-10 RX ORDER — ONDANSETRON 2 MG/ML
8 INJECTION INTRAMUSCULAR; INTRAVENOUS
OUTPATIENT
Start: 2024-07-10

## 2024-07-10 RX ADMIN — HEPARIN 500 UNITS: 100 SYRINGE at 14:25

## 2024-07-10 RX ADMIN — SODIUM CHLORIDE, PRESERVATIVE FREE 10 ML: 5 INJECTION INTRAVENOUS at 14:25

## 2024-07-10 RX ADMIN — SODIUM CHLORIDE 500 ML: 9 INJECTION, SOLUTION INTRAVENOUS at 13:15

## 2024-07-11 ENCOUNTER — APPOINTMENT (OUTPATIENT)
Dept: INFUSION THERAPY | Age: 74
End: 2024-07-11
Payer: MEDICARE

## 2024-07-12 ENCOUNTER — HOSPITAL ENCOUNTER (OUTPATIENT)
Dept: INFUSION THERAPY | Age: 74
Discharge: HOME | End: 2024-07-12

## 2024-07-16 ENCOUNTER — HOSPITAL ENCOUNTER (OUTPATIENT)
Dept: INFUSION THERAPY | Age: 74
Discharge: HOME | End: 2024-07-16
Payer: MEDICARE

## 2024-07-16 VITALS
TEMPERATURE: 97 F | RESPIRATION RATE: 18 BRPM | SYSTOLIC BLOOD PRESSURE: 122 MMHG | WEIGHT: 108.4 LBS | DIASTOLIC BLOOD PRESSURE: 55 MMHG | HEART RATE: 108 BPM | OXYGEN SATURATION: 97 % | HEIGHT: 60 IN | BODY MASS INDEX: 21.28 KG/M2

## 2024-07-16 DIAGNOSIS — R53.0 NEOPLASTIC MALIGNANT RELATED FATIGUE: Primary | ICD-10-CM

## 2024-07-16 DIAGNOSIS — C34.90 SMALL CELL LUNG CANCER (HCC): Primary | ICD-10-CM

## 2024-07-16 DIAGNOSIS — R53.0 NEOPLASTIC MALIGNANT RELATED FATIGUE: ICD-10-CM

## 2024-07-16 LAB
ALBUMIN SERPL-MCNC: 3.9 G/DL (ref 3.5–5.2)
ALP SERPL-CCNC: 85 U/L (ref 35–104)
ALT SERPL-CCNC: 35 U/L (ref 9–52)
ANION GAP SERPL CALCULATED.3IONS-SCNC: 13 MMOL/L (ref 7–19)
AST SERPL-CCNC: 59 U/L (ref 14–36)
BASOPHILS # BLD: 0.03 K/UL (ref 0.01–0.08)
BASOPHILS NFR BLD: 0.3 % (ref 0.1–1.2)
BILIRUB SERPL-MCNC: 0.4 MG/DL (ref 0.2–1.3)
BUN SERPL-MCNC: 25 MG/DL (ref 7–17)
CALCIUM SERPL-MCNC: 9.6 MG/DL (ref 8.4–10.2)
CHLORIDE SERPL-SCNC: 99 MMOL/L (ref 98–111)
CO2 SERPL-SCNC: 21 MMOL/L (ref 22–29)
CREAT SERPL-MCNC: 0.8 MG/DL (ref 0.5–1)
EOSINOPHIL # BLD: 1.73 K/UL (ref 0.04–0.54)
EOSINOPHIL NFR BLD: 16 % (ref 0.7–7)
ERYTHROCYTE [DISTWIDTH] IN BLOOD BY AUTOMATED COUNT: 15.9 % (ref 11.7–14.4)
GLOBULIN: 2 G/DL
GLUCOSE SERPL-MCNC: 123 MG/DL (ref 74–106)
HCT VFR BLD AUTO: 26.4 % (ref 34.1–44.9)
HGB BLD-MCNC: 9.2 G/DL (ref 11.2–15.7)
LYMPHOCYTES # BLD: 1.56 K/UL (ref 1.18–3.74)
LYMPHOCYTES NFR BLD: 14.4 % (ref 19.3–53.1)
MCH RBC QN AUTO: 31.8 PG (ref 25.6–32.2)
MCHC RBC AUTO-ENTMCNC: 34.8 G/DL (ref 32.3–35.5)
MCV RBC AUTO: 91.3 FL (ref 79.4–94.8)
MONOCYTES # BLD: 1.3 K/UL (ref 0.24–0.82)
MONOCYTES NFR BLD: 12 % (ref 4.7–12.5)
NEUTROPHILS # BLD: 6.13 K/UL (ref 1.56–6.13)
NEUTS SEG NFR BLD: 56.8 % (ref 34–71.1)
PLATELET # BLD AUTO: 169 K/UL (ref 182–369)
PMV BLD AUTO: 9.7 FL (ref 7.4–10.4)
POTASSIUM SERPL-SCNC: 4.3 MMOL/L (ref 3.5–5.1)
PROT SERPL-MCNC: 5.9 G/DL (ref 6.3–8.2)
RBC # BLD AUTO: 2.89 M/UL (ref 3.93–5.22)
SODIUM SERPL-SCNC: 133 MMOL/L (ref 137–145)
T4 FREE SERPL-MCNC: 0.87 NG/DL (ref 0.93–1.7)
TSH SERPL DL<=0.005 MIU/L-ACNC: 4.86 UIU/ML (ref 0.27–4.2)
WBC # BLD AUTO: 10.8 K/UL (ref 3.98–10.04)

## 2024-07-16 PROCEDURE — 6360000002 HC RX W HCPCS: Performed by: PHYSICIAN ASSISTANT

## 2024-07-16 PROCEDURE — 96375 TX/PRO/DX INJ NEW DRUG ADDON: CPT

## 2024-07-16 PROCEDURE — 96367 TX/PROPH/DG ADDL SEQ IV INF: CPT

## 2024-07-16 PROCEDURE — 6360000002 HC RX W HCPCS: Performed by: INTERNAL MEDICINE

## 2024-07-16 PROCEDURE — 96413 CHEMO IV INFUSION 1 HR: CPT

## 2024-07-16 PROCEDURE — 2580000003 HC RX 258: Performed by: PHYSICIAN ASSISTANT

## 2024-07-16 PROCEDURE — 36415 COLL VENOUS BLD VENIPUNCTURE: CPT

## 2024-07-16 PROCEDURE — 85025 COMPLETE CBC W/AUTO DIFF WBC: CPT

## 2024-07-16 PROCEDURE — 80053 COMPREHEN METABOLIC PANEL: CPT

## 2024-07-16 PROCEDURE — 96417 CHEMO IV INFUS EACH ADDL SEQ: CPT

## 2024-07-16 PROCEDURE — 2580000003 HC RX 258: Performed by: INTERNAL MEDICINE

## 2024-07-16 RX ORDER — EPINEPHRINE 1 MG/ML
0.3 INJECTION, SOLUTION, CONCENTRATE INTRAVENOUS PRN
Status: CANCELLED | OUTPATIENT
Start: 2024-07-16

## 2024-07-16 RX ORDER — SODIUM CHLORIDE 9 MG/ML
5-250 INJECTION, SOLUTION INTRAVENOUS PRN
Status: CANCELLED | OUTPATIENT
Start: 2024-07-16

## 2024-07-16 RX ORDER — MEPERIDINE HYDROCHLORIDE 50 MG/ML
12.5 INJECTION INTRAMUSCULAR; INTRAVENOUS; SUBCUTANEOUS PRN
Status: CANCELLED | OUTPATIENT
Start: 2024-07-16

## 2024-07-16 RX ORDER — SODIUM CHLORIDE 9 MG/ML
INJECTION, SOLUTION INTRAVENOUS CONTINUOUS
Status: CANCELLED | OUTPATIENT
Start: 2024-07-16

## 2024-07-16 RX ORDER — HEPARIN 100 UNIT/ML
500 SYRINGE INTRAVENOUS PRN
Status: DISCONTINUED | OUTPATIENT
Start: 2024-07-16 | End: 2024-07-17 | Stop reason: HOSPADM

## 2024-07-16 RX ORDER — ALBUTEROL SULFATE 90 UG/1
4 AEROSOL, METERED RESPIRATORY (INHALATION) PRN
Status: CANCELLED | OUTPATIENT
Start: 2024-07-16

## 2024-07-16 RX ORDER — DIPHENHYDRAMINE HYDROCHLORIDE 50 MG/ML
50 INJECTION INTRAMUSCULAR; INTRAVENOUS
Status: CANCELLED | OUTPATIENT
Start: 2024-07-16

## 2024-07-16 RX ORDER — SODIUM CHLORIDE 0.9 % (FLUSH) 0.9 %
5-40 SYRINGE (ML) INJECTION PRN
Status: DISCONTINUED | OUTPATIENT
Start: 2024-07-16 | End: 2024-07-17 | Stop reason: HOSPADM

## 2024-07-16 RX ORDER — ONDANSETRON 2 MG/ML
8 INJECTION INTRAMUSCULAR; INTRAVENOUS
Status: CANCELLED | OUTPATIENT
Start: 2024-07-16

## 2024-07-16 RX ORDER — FAMOTIDINE 10 MG/ML
20 INJECTION, SOLUTION INTRAVENOUS
Status: CANCELLED | OUTPATIENT
Start: 2024-07-16

## 2024-07-16 RX ORDER — PALONOSETRON 0.05 MG/ML
0.25 INJECTION, SOLUTION INTRAVENOUS ONCE
Status: COMPLETED | OUTPATIENT
Start: 2024-07-16 | End: 2024-07-16

## 2024-07-16 RX ORDER — ACETAMINOPHEN 325 MG/1
650 TABLET ORAL
Status: CANCELLED | OUTPATIENT
Start: 2024-07-16

## 2024-07-16 RX ORDER — ACETAMINOPHEN 325 MG/1
650 TABLET ORAL
Status: CANCELLED
Start: 2024-07-16

## 2024-07-16 RX ADMIN — ETOPOSIDE 120 MG: 20 INJECTION, SOLUTION INTRAVENOUS at 15:36

## 2024-07-16 RX ADMIN — SODIUM CHLORIDE 150 MG: 9 INJECTION, SOLUTION INTRAVENOUS at 13:53

## 2024-07-16 RX ADMIN — DEXAMETHASONE SODIUM PHOSPHATE: 10 INJECTION, SOLUTION INTRAMUSCULAR; INTRAVENOUS at 13:43

## 2024-07-16 RX ADMIN — HEPARIN 500 UNITS: 100 SYRINGE at 16:41

## 2024-07-16 RX ADMIN — CARBOPLATIN 290 MG: 10 INJECTION, SOLUTION INTRAVENOUS at 15:03

## 2024-07-16 RX ADMIN — ATEZOLIZUMAB 1200 MG: 1200 INJECTION, SOLUTION INTRAVENOUS at 14:33

## 2024-07-16 RX ADMIN — PALONOSETRON 0.25 MG: 0.05 INJECTION, SOLUTION INTRAVENOUS at 13:43

## 2024-07-16 RX ADMIN — SODIUM CHLORIDE, PRESERVATIVE FREE 10 ML: 5 INJECTION INTRAVENOUS at 16:40

## 2024-07-17 ENCOUNTER — HOSPITAL ENCOUNTER (OUTPATIENT)
Dept: INFUSION THERAPY | Age: 74
Discharge: HOME | End: 2024-07-17
Payer: MEDICARE

## 2024-07-17 VITALS
SYSTOLIC BLOOD PRESSURE: 139 MMHG | HEART RATE: 93 BPM | TEMPERATURE: 97.9 F | DIASTOLIC BLOOD PRESSURE: 67 MMHG | OXYGEN SATURATION: 100 % | RESPIRATION RATE: 18 BRPM

## 2024-07-17 DIAGNOSIS — C34.90 SMALL CELL LUNG CANCER (HCC): Primary | ICD-10-CM

## 2024-07-17 PROCEDURE — 2580000003 HC RX 258: Performed by: INTERNAL MEDICINE

## 2024-07-17 PROCEDURE — 6360000002 HC RX W HCPCS: Performed by: INTERNAL MEDICINE

## 2024-07-17 PROCEDURE — 96367 TX/PROPH/DG ADDL SEQ IV INF: CPT

## 2024-07-17 PROCEDURE — 96413 CHEMO IV INFUSION 1 HR: CPT

## 2024-07-17 RX ORDER — SODIUM CHLORIDE 0.9 % (FLUSH) 0.9 %
5-40 SYRINGE (ML) INJECTION PRN
Status: DISCONTINUED | OUTPATIENT
Start: 2024-07-17 | End: 2024-07-18 | Stop reason: HOSPADM

## 2024-07-17 RX ORDER — ACETAMINOPHEN 325 MG/1
650 TABLET ORAL
Status: CANCELLED | OUTPATIENT
Start: 2024-07-17

## 2024-07-17 RX ORDER — FAMOTIDINE 10 MG/ML
20 INJECTION, SOLUTION INTRAVENOUS
Status: CANCELLED | OUTPATIENT
Start: 2024-07-17

## 2024-07-17 RX ORDER — DIPHENHYDRAMINE HYDROCHLORIDE 50 MG/ML
50 INJECTION INTRAMUSCULAR; INTRAVENOUS
Status: CANCELLED | OUTPATIENT
Start: 2024-07-17

## 2024-07-17 RX ORDER — SODIUM CHLORIDE 9 MG/ML
5-250 INJECTION, SOLUTION INTRAVENOUS PRN
Status: CANCELLED | OUTPATIENT
Start: 2024-07-17

## 2024-07-17 RX ORDER — HEPARIN 100 UNIT/ML
500 SYRINGE INTRAVENOUS PRN
Status: DISCONTINUED | OUTPATIENT
Start: 2024-07-17 | End: 2024-07-18 | Stop reason: HOSPADM

## 2024-07-17 RX ORDER — EPINEPHRINE 1 MG/ML
0.3 INJECTION, SOLUTION, CONCENTRATE INTRAVENOUS PRN
Status: CANCELLED | OUTPATIENT
Start: 2024-07-17

## 2024-07-17 RX ORDER — ONDANSETRON 2 MG/ML
8 INJECTION INTRAMUSCULAR; INTRAVENOUS
Status: CANCELLED | OUTPATIENT
Start: 2024-07-17

## 2024-07-17 RX ORDER — SODIUM CHLORIDE 9 MG/ML
INJECTION, SOLUTION INTRAVENOUS CONTINUOUS
Status: CANCELLED | OUTPATIENT
Start: 2024-07-17

## 2024-07-17 RX ORDER — MEPERIDINE HYDROCHLORIDE 25 MG/ML
12.5 INJECTION INTRAMUSCULAR; INTRAVENOUS; SUBCUTANEOUS PRN
Status: CANCELLED | OUTPATIENT
Start: 2024-07-17

## 2024-07-17 RX ORDER — ALBUTEROL SULFATE 90 UG/1
4 AEROSOL, METERED RESPIRATORY (INHALATION) PRN
Status: CANCELLED | OUTPATIENT
Start: 2024-07-17

## 2024-07-17 RX ADMIN — HEPARIN 500 UNITS: 100 SYRINGE at 15:23

## 2024-07-17 RX ADMIN — ETOPOSIDE 120 MG: 20 INJECTION, SOLUTION INTRAVENOUS at 14:17

## 2024-07-17 RX ADMIN — SODIUM CHLORIDE, PRESERVATIVE FREE 10 ML: 5 INJECTION INTRAVENOUS at 15:23

## 2024-07-17 RX ADMIN — DEXAMETHASONE SODIUM PHOSPHATE 16 MG: 10 INJECTION, SOLUTION INTRAMUSCULAR; INTRAVENOUS at 13:40

## 2024-07-18 ENCOUNTER — HOSPITAL ENCOUNTER (OUTPATIENT)
Dept: INFUSION THERAPY | Age: 74
Discharge: HOME | End: 2024-07-18
Payer: MEDICARE

## 2024-07-18 VITALS
RESPIRATION RATE: 18 BRPM | SYSTOLIC BLOOD PRESSURE: 138 MMHG | DIASTOLIC BLOOD PRESSURE: 57 MMHG | HEART RATE: 68 BPM | OXYGEN SATURATION: 100 % | TEMPERATURE: 97.9 F

## 2024-07-18 DIAGNOSIS — C34.90 SMALL CELL LUNG CANCER (HCC): Primary | ICD-10-CM

## 2024-07-18 PROCEDURE — 96413 CHEMO IV INFUSION 1 HR: CPT

## 2024-07-18 PROCEDURE — 96377 APPLICATON ON-BODY INJECTOR: CPT

## 2024-07-18 PROCEDURE — 2580000003 HC RX 258: Performed by: INTERNAL MEDICINE

## 2024-07-18 PROCEDURE — 96372 THER/PROPH/DIAG INJ SC/IM: CPT

## 2024-07-18 PROCEDURE — 6360000002 HC RX W HCPCS: Performed by: INTERNAL MEDICINE

## 2024-07-18 PROCEDURE — 96367 TX/PROPH/DG ADDL SEQ IV INF: CPT

## 2024-07-18 RX ORDER — ONDANSETRON 2 MG/ML
8 INJECTION INTRAMUSCULAR; INTRAVENOUS
Status: CANCELLED | OUTPATIENT
Start: 2024-07-18

## 2024-07-18 RX ORDER — EPINEPHRINE 1 MG/ML
0.3 INJECTION, SOLUTION, CONCENTRATE INTRAVENOUS PRN
Status: CANCELLED | OUTPATIENT
Start: 2024-07-18

## 2024-07-18 RX ORDER — DIPHENHYDRAMINE HYDROCHLORIDE 50 MG/ML
50 INJECTION INTRAMUSCULAR; INTRAVENOUS
Status: CANCELLED | OUTPATIENT
Start: 2024-07-18

## 2024-07-18 RX ORDER — ALBUTEROL SULFATE 90 UG/1
4 AEROSOL, METERED RESPIRATORY (INHALATION) PRN
Status: CANCELLED | OUTPATIENT
Start: 2024-07-18

## 2024-07-18 RX ORDER — SODIUM CHLORIDE 9 MG/ML
INJECTION, SOLUTION INTRAVENOUS CONTINUOUS
Status: CANCELLED | OUTPATIENT
Start: 2024-07-18

## 2024-07-18 RX ORDER — MEPERIDINE HYDROCHLORIDE 25 MG/ML
12.5 INJECTION INTRAMUSCULAR; INTRAVENOUS; SUBCUTANEOUS PRN
Status: CANCELLED | OUTPATIENT
Start: 2024-07-18

## 2024-07-18 RX ORDER — FAMOTIDINE 10 MG/ML
20 INJECTION, SOLUTION INTRAVENOUS
Status: CANCELLED | OUTPATIENT
Start: 2024-07-18

## 2024-07-18 RX ORDER — SODIUM CHLORIDE 9 MG/ML
5-250 INJECTION, SOLUTION INTRAVENOUS PRN
Status: CANCELLED | OUTPATIENT
Start: 2024-07-18

## 2024-07-18 RX ORDER — HEPARIN 100 UNIT/ML
500 SYRINGE INTRAVENOUS PRN
Status: DISCONTINUED | OUTPATIENT
Start: 2024-07-18 | End: 2024-07-19 | Stop reason: HOSPADM

## 2024-07-18 RX ORDER — ACETAMINOPHEN 325 MG/1
650 TABLET ORAL
Status: CANCELLED | OUTPATIENT
Start: 2024-07-18

## 2024-07-18 RX ORDER — SODIUM CHLORIDE 0.9 % (FLUSH) 0.9 %
5-40 SYRINGE (ML) INJECTION PRN
Status: DISCONTINUED | OUTPATIENT
Start: 2024-07-18 | End: 2024-07-19 | Stop reason: HOSPADM

## 2024-07-18 RX ADMIN — SODIUM CHLORIDE, PRESERVATIVE FREE 10 ML: 5 INJECTION INTRAVENOUS at 15:20

## 2024-07-18 RX ADMIN — ETOPOSIDE 120 MG: 20 INJECTION, SOLUTION INTRAVENOUS at 14:15

## 2024-07-18 RX ADMIN — PEGFILGRASTIM 6 MG: KIT SUBCUTANEOUS at 15:20

## 2024-07-18 RX ADMIN — DEXAMETHASONE SODIUM PHOSPHATE 16 MG: 10 INJECTION, SOLUTION INTRAMUSCULAR; INTRAVENOUS at 13:39

## 2024-07-18 RX ADMIN — HEPARIN 500 UNITS: 100 SYRINGE at 15:20

## 2024-07-31 ENCOUNTER — HOSPITAL ENCOUNTER (OUTPATIENT)
Dept: CT IMAGING | Age: 74
Discharge: HOME | End: 2024-07-31
Payer: MEDICARE

## 2024-07-31 DIAGNOSIS — C34.90 SMALL CELL LUNG CANCER (HCC): ICD-10-CM

## 2024-07-31 PROCEDURE — 74177 CT ABD & PELVIS W/CONTRAST: CPT

## 2024-07-31 PROCEDURE — 6360000004 HC RX CONTRAST MEDICATION: Performed by: INTERNAL MEDICINE

## 2024-07-31 PROCEDURE — 71260 CT THORAX DX C+: CPT

## 2024-07-31 RX ADMIN — IOPAMIDOL 75 ML: 755 INJECTION, SOLUTION INTRAVENOUS at 09:42

## 2024-08-06 DIAGNOSIS — E78.2 MIXED HYPERLIPIDEMIA: ICD-10-CM

## 2024-08-07 ENCOUNTER — HOSPITAL ENCOUNTER (OUTPATIENT)
Dept: INFUSION THERAPY | Age: 74
Discharge: HOME | End: 2024-08-07
Payer: MEDICARE

## 2024-08-07 ENCOUNTER — OFFICE VISIT (OUTPATIENT)
Dept: HEMATOLOGY | Age: 74
End: 2024-08-07
Payer: MEDICARE

## 2024-08-07 VITALS
BODY MASS INDEX: 21.2 KG/M2 | OXYGEN SATURATION: 97 % | HEART RATE: 107 BPM | HEIGHT: 60 IN | TEMPERATURE: 98.9 F | RESPIRATION RATE: 18 BRPM | DIASTOLIC BLOOD PRESSURE: 50 MMHG | SYSTOLIC BLOOD PRESSURE: 136 MMHG | WEIGHT: 108 LBS

## 2024-08-07 DIAGNOSIS — T45.1X5D ADVERSE EFFECT OF CHEMOTHERAPY, SUBSEQUENT ENCOUNTER: ICD-10-CM

## 2024-08-07 DIAGNOSIS — Z51.12 ENCOUNTER FOR ANTINEOPLASTIC IMMUNOTHERAPY: ICD-10-CM

## 2024-08-07 DIAGNOSIS — R53.83 FATIGUE DUE TO TREATMENT: ICD-10-CM

## 2024-08-07 DIAGNOSIS — D64.81 ANTINEOPLASTIC CHEMOTHERAPY INDUCED ANEMIA: ICD-10-CM

## 2024-08-07 DIAGNOSIS — C34.90 SMALL CELL LUNG CANCER (HCC): ICD-10-CM

## 2024-08-07 DIAGNOSIS — C34.90 SMALL CELL LUNG CANCER (HCC): Primary | ICD-10-CM

## 2024-08-07 DIAGNOSIS — Z71.89 CARE PLAN DISCUSSED WITH PATIENT: ICD-10-CM

## 2024-08-07 DIAGNOSIS — C34.90 EGFR-RELATED LUNG CANCER (HCC): Primary | ICD-10-CM

## 2024-08-07 DIAGNOSIS — E87.1 HYPONATREMIA: ICD-10-CM

## 2024-08-07 DIAGNOSIS — Z51.11 CHEMOTHERAPY MANAGEMENT, ENCOUNTER FOR: ICD-10-CM

## 2024-08-07 DIAGNOSIS — T45.1X5A ANTINEOPLASTIC CHEMOTHERAPY INDUCED ANEMIA: ICD-10-CM

## 2024-08-07 LAB
ALBUMIN SERPL-MCNC: 4 G/DL (ref 3.5–5.2)
ALP SERPL-CCNC: 86 U/L (ref 35–104)
ALT SERPL-CCNC: 17 U/L (ref 5–33)
ANION GAP SERPL CALCULATED.3IONS-SCNC: 13 MMOL/L (ref 7–19)
AST SERPL-CCNC: 37 U/L (ref 5–32)
BASOPHILS # BLD: 0.09 K/UL (ref 0.01–0.08)
BASOPHILS NFR BLD: 0.9 % (ref 0.1–1.2)
BILIRUB SERPL-MCNC: <0.2 MG/DL (ref 0–1.2)
BUN SERPL-MCNC: 23 MG/DL (ref 8–23)
CALCIUM SERPL-MCNC: 8.7 MG/DL (ref 8.8–10.2)
CHLORIDE SERPL-SCNC: 98 MMOL/L (ref 98–107)
CO2 SERPL-SCNC: 21 MMOL/L (ref 22–29)
CORTIS PM SERPL-MCNC: 11.1 UG/DL (ref 2.3–11.9)
CREAT SERPL-MCNC: 1.1 MG/DL (ref 0.5–0.9)
EOSINOPHIL # BLD: 1.51 K/UL (ref 0.04–0.54)
EOSINOPHIL NFR BLD: 14.8 % (ref 0.7–7)
ERYTHROCYTE [DISTWIDTH] IN BLOOD BY AUTOMATED COUNT: 16.6 % (ref 11.7–14.4)
GLUCOSE SERPL-MCNC: 138 MG/DL (ref 70–99)
HCT VFR BLD AUTO: 25.2 % (ref 34.1–44.9)
HGB BLD-MCNC: 8.7 G/DL (ref 11.2–15.7)
LYMPHOCYTES # BLD: 0.69 K/UL (ref 1.18–3.74)
LYMPHOCYTES NFR BLD: 6.8 % (ref 19.3–53.1)
MCH RBC QN AUTO: 32.3 PG (ref 25.6–32.2)
MCHC RBC AUTO-ENTMCNC: 34.5 G/DL (ref 32.3–35.5)
MCV RBC AUTO: 93.7 FL (ref 79.4–94.8)
MONOCYTES # BLD: 1.29 K/UL (ref 0.24–0.82)
MONOCYTES NFR BLD: 12.6 % (ref 4.7–12.5)
NEUTROPHILS # BLD: 6.55 K/UL (ref 1.56–6.13)
NEUTS SEG NFR BLD: 64.2 % (ref 34–71.1)
PLATELET # BLD AUTO: 178 K/UL (ref 182–369)
PMV BLD AUTO: 9.7 FL (ref 7.4–10.4)
POTASSIUM SERPL-SCNC: 4.5 MMOL/L (ref 3.5–5.1)
PROT SERPL-MCNC: 6 G/DL (ref 6.4–8.3)
RBC # BLD AUTO: 2.69 M/UL (ref 3.93–5.22)
SODIUM SERPL-SCNC: 132 MMOL/L (ref 136–145)
T4 FREE SERPL-MCNC: 0.73 NG/DL (ref 0.93–1.7)
TSH SERPL DL<=0.005 MIU/L-ACNC: 3.34 UIU/ML (ref 0.27–4.2)
WBC # BLD AUTO: 10.2 K/UL (ref 3.98–10.04)

## 2024-08-07 PROCEDURE — 99214 OFFICE O/P EST MOD 30 MIN: CPT | Performed by: INTERNAL MEDICINE

## 2024-08-07 PROCEDURE — 1123F ACP DISCUSS/DSCN MKR DOCD: CPT | Performed by: INTERNAL MEDICINE

## 2024-08-07 PROCEDURE — 3017F COLORECTAL CA SCREEN DOC REV: CPT | Performed by: INTERNAL MEDICINE

## 2024-08-07 PROCEDURE — G2211 COMPLEX E/M VISIT ADD ON: HCPCS | Performed by: INTERNAL MEDICINE

## 2024-08-07 PROCEDURE — G8427 DOCREV CUR MEDS BY ELIG CLIN: HCPCS | Performed by: INTERNAL MEDICINE

## 2024-08-07 PROCEDURE — 1090F PRES/ABSN URINE INCON ASSESS: CPT | Performed by: INTERNAL MEDICINE

## 2024-08-07 PROCEDURE — 36415 COLL VENOUS BLD VENIPUNCTURE: CPT

## 2024-08-07 PROCEDURE — 6360000002 HC RX W HCPCS: Performed by: INTERNAL MEDICINE

## 2024-08-07 PROCEDURE — 3075F SYST BP GE 130 - 139MM HG: CPT | Performed by: INTERNAL MEDICINE

## 2024-08-07 PROCEDURE — 96413 CHEMO IV INFUSION 1 HR: CPT

## 2024-08-07 PROCEDURE — 1036F TOBACCO NON-USER: CPT | Performed by: INTERNAL MEDICINE

## 2024-08-07 PROCEDURE — 85025 COMPLETE CBC W/AUTO DIFF WBC: CPT

## 2024-08-07 PROCEDURE — 3078F DIAST BP <80 MM HG: CPT | Performed by: INTERNAL MEDICINE

## 2024-08-07 PROCEDURE — 80053 COMPREHEN METABOLIC PANEL: CPT

## 2024-08-07 PROCEDURE — 2580000003 HC RX 258: Performed by: INTERNAL MEDICINE

## 2024-08-07 PROCEDURE — G8420 CALC BMI NORM PARAMETERS: HCPCS | Performed by: INTERNAL MEDICINE

## 2024-08-07 PROCEDURE — G8399 PT W/DXA RESULTS DOCUMENT: HCPCS | Performed by: INTERNAL MEDICINE

## 2024-08-07 RX ORDER — FAMOTIDINE 10 MG/ML
20 INJECTION, SOLUTION INTRAVENOUS
Status: CANCELLED | OUTPATIENT
Start: 2024-08-07

## 2024-08-07 RX ORDER — SODIUM CHLORIDE 9 MG/ML
INJECTION, SOLUTION INTRAVENOUS CONTINUOUS
Status: CANCELLED | OUTPATIENT
Start: 2024-08-07

## 2024-08-07 RX ORDER — DIPHENHYDRAMINE HYDROCHLORIDE 50 MG/ML
50 INJECTION INTRAMUSCULAR; INTRAVENOUS
Status: CANCELLED | OUTPATIENT
Start: 2024-08-07

## 2024-08-07 RX ORDER — EPINEPHRINE 1 MG/ML
0.3 INJECTION, SOLUTION, CONCENTRATE INTRAVENOUS PRN
Status: CANCELLED | OUTPATIENT
Start: 2024-08-07

## 2024-08-07 RX ORDER — ONDANSETRON 2 MG/ML
8 INJECTION INTRAMUSCULAR; INTRAVENOUS
Status: CANCELLED | OUTPATIENT
Start: 2024-08-07

## 2024-08-07 RX ORDER — ACETAMINOPHEN 325 MG/1
650 TABLET ORAL
Status: CANCELLED | OUTPATIENT
Start: 2024-08-07

## 2024-08-07 RX ORDER — HEPARIN SODIUM (PORCINE) LOCK FLUSH IV SOLN 100 UNIT/ML 100 UNIT/ML
500 SOLUTION INTRAVENOUS PRN
Status: CANCELLED | OUTPATIENT
Start: 2024-08-07

## 2024-08-07 RX ORDER — SODIUM CHLORIDE 0.9 % (FLUSH) 0.9 %
5-40 SYRINGE (ML) INJECTION PRN
Status: CANCELLED | OUTPATIENT
Start: 2024-08-07

## 2024-08-07 RX ORDER — SODIUM CHLORIDE 0.9 % (FLUSH) 0.9 %
5-40 SYRINGE (ML) INJECTION PRN
Status: DISCONTINUED | OUTPATIENT
Start: 2024-08-07 | End: 2024-08-08 | Stop reason: HOSPADM

## 2024-08-07 RX ORDER — SODIUM CHLORIDE 9 MG/ML
5-250 INJECTION, SOLUTION INTRAVENOUS PRN
Status: CANCELLED | OUTPATIENT
Start: 2024-08-07

## 2024-08-07 RX ORDER — HEPARIN 100 UNIT/ML
500 SYRINGE INTRAVENOUS PRN
Status: DISCONTINUED | OUTPATIENT
Start: 2024-08-07 | End: 2024-08-08 | Stop reason: HOSPADM

## 2024-08-07 RX ORDER — MEPERIDINE HYDROCHLORIDE 50 MG/ML
12.5 INJECTION INTRAMUSCULAR; INTRAVENOUS; SUBCUTANEOUS PRN
Status: CANCELLED | OUTPATIENT
Start: 2024-08-07

## 2024-08-07 RX ORDER — ALBUTEROL SULFATE 90 UG/1
4 AEROSOL, METERED RESPIRATORY (INHALATION) PRN
Status: CANCELLED | OUTPATIENT
Start: 2024-08-07

## 2024-08-07 RX ADMIN — HEPARIN 500 UNITS: 100 SYRINGE at 15:53

## 2024-08-07 RX ADMIN — ATEZOLIZUMAB 1200 MG: 1200 INJECTION, SOLUTION INTRAVENOUS at 15:24

## 2024-08-07 RX ADMIN — SODIUM CHLORIDE, PRESERVATIVE FREE 10 ML: 5 INJECTION INTRAVENOUS at 15:53

## 2024-08-08 DIAGNOSIS — E78.2 MIXED HYPERLIPIDEMIA: ICD-10-CM

## 2024-08-08 RX ORDER — LOVASTATIN 40 MG/1
TABLET ORAL
Qty: 90 TABLET | Refills: 3 | Status: SHIPPED | OUTPATIENT
Start: 2024-08-08

## 2024-08-08 NOTE — TELEPHONE ENCOUNTER
Nikkie called requesting a refill of the below medication which has been pended for you:     Requested Prescriptions     Pending Prescriptions Disp Refills    lovastatin (MEVACOR) 40 MG tablet 90 tablet 3     Sig: TAKE 1 TABLET EVERY NIGHT       Last Appointment Date: Visit date not found  Next Appointment Date: 12/3/2024    Allergies   Allergen Reactions    Compazine [Prochlorperazine Maleate] Other (See Comments)     Eyelids flipped up and mouth stretched to the side.     Prochlorperazine Maleate Other (See Comments)     Eyelids flipped up and mouth stretched to the side.    Thorazine [Chlorpromazine] Other (See Comments)     Eyelids flipped up and mouth stretched to the side

## 2024-08-09 ENCOUNTER — TELEPHONE (OUTPATIENT)
Dept: INFUSION THERAPY | Age: 74
End: 2024-08-09

## 2024-08-09 ENCOUNTER — HOSPITAL ENCOUNTER (OUTPATIENT)
Dept: INFUSION THERAPY | Age: 74
Discharge: HOME | End: 2024-08-09
Payer: MEDICARE

## 2024-08-09 ENCOUNTER — CLINICAL DOCUMENTATION (OUTPATIENT)
Dept: HEMATOLOGY | Age: 74
End: 2024-08-09

## 2024-08-09 ENCOUNTER — HOSPITAL ENCOUNTER (OUTPATIENT)
Dept: GENERAL RADIOLOGY | Age: 74
Discharge: HOME | End: 2024-08-09
Payer: MEDICARE

## 2024-08-09 VITALS
TEMPERATURE: 98.1 F | OXYGEN SATURATION: 98 % | DIASTOLIC BLOOD PRESSURE: 56 MMHG | HEART RATE: 110 BPM | SYSTOLIC BLOOD PRESSURE: 110 MMHG | RESPIRATION RATE: 18 BRPM

## 2024-08-09 DIAGNOSIS — C34.90 SMALL CELL LUNG CANCER (HCC): ICD-10-CM

## 2024-08-09 DIAGNOSIS — R55 SYNCOPE AND COLLAPSE: ICD-10-CM
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
## 2024-08-09 DIAGNOSIS — C34.90 SMALL CELL LUNG CANCER (HCC): Primary | ICD-10-CM

## 2024-08-09 DIAGNOSIS — E86.0 DEHYDRATION: Primary | ICD-10-CM

## 2024-08-09 DIAGNOSIS — S00.93XA CONTUSION OF HEAD, UNSPECIFIED PART OF HEAD, INITIAL ENCOUNTER: ICD-10-CM

## 2024-08-09 DIAGNOSIS — M25.511 RIGHT SHOULDER PAIN, UNSPECIFIED CHRONICITY: ICD-10-CM

## 2024-08-09 LAB
ALBUMIN SERPL-MCNC: 3.9 G/DL (ref 3.5–5.2)
ALP SERPL-CCNC: 81 U/L (ref 35–104)
ALT SERPL-CCNC: 21 U/L (ref 5–33)
ANION GAP SERPL CALCULATED.3IONS-SCNC: 19 MMOL/L (ref 7–19)
AST SERPL-CCNC: 51 U/L (ref 5–32)
BASOPHILS # BLD: 0.04 K/UL (ref 0.01–0.08)
BASOPHILS NFR BLD: 0.6 % (ref 0.1–1.2)
BILIRUB SERPL-MCNC: <0.2 MG/DL (ref 0–1.2)
BUN SERPL-MCNC: 28 MG/DL (ref 8–23)
CALCIUM SERPL-MCNC: 8.8 MG/DL (ref 8.8–10.2)
CHLORIDE SERPL-SCNC: 97 MMOL/L (ref 98–107)
CO2 SERPL-SCNC: 19 MMOL/L (ref 22–29)
CREAT SERPL-MCNC: 1.3 MG/DL (ref 0.5–0.9)
EOSINOPHIL # BLD: 0.56 K/UL (ref 0.04–0.54)
EOSINOPHIL NFR BLD: 8 % (ref 0.7–7)
ERYTHROCYTE [DISTWIDTH] IN BLOOD BY AUTOMATED COUNT: 16.5 % (ref 11.7–14.4)
GLUCOSE SERPL-MCNC: 109 MG/DL (ref 70–99)
HCT VFR BLD AUTO: 26.2 % (ref 34.1–44.9)
HGB BLD-MCNC: 8.9 G/DL (ref 11.2–15.7)
LYMPHOCYTES # BLD: 0.65 K/UL (ref 1.18–3.74)
LYMPHOCYTES NFR BLD: 9.2 % (ref 19.3–53.1)
MCH RBC QN AUTO: 31.4 PG (ref 25.6–32.2)
MCHC RBC AUTO-ENTMCNC: 34 G/DL (ref 32.3–35.5)
MCV RBC AUTO: 92.6 FL (ref 79.4–94.8)
MONOCYTES # BLD: 0.84 K/UL (ref 0.24–0.82)
MONOCYTES NFR BLD: 11.9 % (ref 4.7–12.5)
NEUTROPHILS # BLD: 4.89 K/UL (ref 1.56–6.13)
NEUTS SEG NFR BLD: 69.6 % (ref 34–71.1)
PLATELET # BLD AUTO: 174 K/UL (ref 182–369)
PMV BLD AUTO: 9.9 FL (ref 7.4–10.4)
POTASSIUM SERPL-SCNC: 4.4 MMOL/L (ref 3.5–5.1)
PROT SERPL-MCNC: 5.9 G/DL (ref 6.4–8.3)
RBC # BLD AUTO: 2.83 M/UL (ref 3.93–5.22)
SODIUM SERPL-SCNC: 135 MMOL/L (ref 136–145)
WBC # BLD AUTO: 7.03 K/UL (ref 3.98–10.04)

## 2024-08-09 PROCEDURE — 6360000002 HC RX W HCPCS: Performed by: INTERNAL MEDICINE

## 2024-08-09 PROCEDURE — 85025 COMPLETE CBC W/AUTO DIFF WBC: CPT

## 2024-08-09 PROCEDURE — 2580000003 HC RX 258: Performed by: INTERNAL MEDICINE

## 2024-08-09 PROCEDURE — 73030 X-RAY EXAM OF SHOULDER: CPT

## 2024-08-09 PROCEDURE — 96361 HYDRATE IV INFUSION ADD-ON: CPT

## 2024-08-09 PROCEDURE — 70260 X-RAY EXAM OF SKULL: CPT

## 2024-08-09 PROCEDURE — 96360 HYDRATION IV INFUSION INIT: CPT

## 2024-08-09 PROCEDURE — 80053 COMPREHEN METABOLIC PANEL: CPT

## 2024-08-09 PROCEDURE — 36415 COLL VENOUS BLD VENIPUNCTURE: CPT

## 2024-08-09 RX ORDER — 0.9 % SODIUM CHLORIDE 0.9 %
1000 INTRAVENOUS SOLUTION INTRAVENOUS ONCE
OUTPATIENT
Start: 2024-08-09 | End: 2024-08-09

## 2024-08-09 RX ORDER — HEPARIN 100 UNIT/ML
500 SYRINGE INTRAVENOUS PRN
OUTPATIENT
Start: 2024-08-09

## 2024-08-09 RX ORDER — ONDANSETRON 2 MG/ML
8 INJECTION INTRAMUSCULAR; INTRAVENOUS
OUTPATIENT
Start: 2024-08-09

## 2024-08-09 RX ORDER — ALBUTEROL SULFATE 90 UG/1
4 AEROSOL, METERED RESPIRATORY (INHALATION) PRN
OUTPATIENT
Start: 2024-08-09

## 2024-08-09 RX ORDER — SODIUM CHLORIDE 9 MG/ML
INJECTION, SOLUTION INTRAVENOUS CONTINUOUS
OUTPATIENT
Start: 2024-08-09

## 2024-08-09 RX ORDER — EPINEPHRINE 1 MG/ML
0.3 INJECTION, SOLUTION, CONCENTRATE INTRAVENOUS PRN
OUTPATIENT
Start: 2024-08-09

## 2024-08-09 RX ORDER — SODIUM CHLORIDE 0.9 % (FLUSH) 0.9 %
5-40 SYRINGE (ML) INJECTION PRN
Status: DISCONTINUED | OUTPATIENT
Start: 2024-08-09 | End: 2024-08-10 | Stop reason: HOSPADM

## 2024-08-09 RX ORDER — SODIUM CHLORIDE 0.9 % (FLUSH) 0.9 %
5-40 SYRINGE (ML) INJECTION PRN
OUTPATIENT
Start: 2024-08-09

## 2024-08-09 RX ORDER — ACETAMINOPHEN 325 MG/1
650 TABLET ORAL
OUTPATIENT
Start: 2024-08-09

## 2024-08-09 RX ORDER — LOVASTATIN 40 MG
TABLET ORAL
Qty: 90 TABLET | Refills: 3 | OUTPATIENT
Start: 2024-08-09

## 2024-08-09 RX ORDER — HEPARIN 100 UNIT/ML
500 SYRINGE INTRAVENOUS PRN
Status: DISCONTINUED | OUTPATIENT
Start: 2024-08-09 | End: 2024-08-10 | Stop reason: HOSPADM

## 2024-08-09 RX ORDER — FAMOTIDINE 10 MG/ML
20 INJECTION, SOLUTION INTRAVENOUS
OUTPATIENT
Start: 2024-08-09

## 2024-08-09 RX ORDER — SODIUM CHLORIDE 9 MG/ML
5-250 INJECTION, SOLUTION INTRAVENOUS PRN
OUTPATIENT
Start: 2024-08-09

## 2024-08-09 RX ORDER — DIPHENHYDRAMINE HYDROCHLORIDE 50 MG/ML
50 INJECTION INTRAMUSCULAR; INTRAVENOUS
OUTPATIENT
Start: 2024-08-09

## 2024-08-09 RX ORDER — 0.9 % SODIUM CHLORIDE 0.9 %
1000 INTRAVENOUS SOLUTION INTRAVENOUS ONCE
Status: COMPLETED | OUTPATIENT
Start: 2024-08-09 | End: 2024-08-09

## 2024-08-09 RX ADMIN — SODIUM CHLORIDE 1000 ML: 9 INJECTION, SOLUTION INTRAVENOUS at 14:00

## 2024-08-09 RX ADMIN — SODIUM CHLORIDE, PRESERVATIVE FREE 10 ML: 5 INJECTION INTRAVENOUS at 16:00

## 2024-08-09 RX ADMIN — HEPARIN 500 UNITS: 100 SYRINGE at 16:00

## 2024-08-09 NOTE — TELEPHONE ENCOUNTER
Pt called and states that she is not feeling well.  She is very weak and is wondering if IVF's may help.  She passed out last night at home and landed on her right shoulder and hit her head.  She thinks she prob needs an x-ray.  Discussed with Dr. Valdivia's nurse and MRI brain ordered and plain film xray of right shoulder.  She will come to clinic for IVF's

## 2024-08-09 NOTE — PROGRESS NOTES
PT here for IVF. PT reports getting up to go to bathroom at 0230 this AM when she passed out and fell hitting the right side of head and RT arm.  PT states she did not go to ER because her S/O can not drive at night.  PT also reports that she was running temp of 101.2 around 1700 last night.  PT states that after taking tylenol that her temp returned to 98.9.  PT instructed to always call this office if her temp is 100.4 or more.  PT reports not eating and drinking much yesterday.  Dr. Valdivia notified and no further orders received at this time.  IVF initiated, vital signs stable and charted.   1600 PT's IVF completed.  PT reports feeling better at this time.  PT instructed to go to ER if she gets worse or any new symptoms arise.   PT verbalizes understanding with no further questions at this time.

## 2024-08-09 NOTE — PROGRESS NOTES
Pt called to report that she passed out and fell last night at home. She called today to ask if she could get IVFs.When asked, pt stated she hit her head and her right shoulder. She now had a bump on her head and her shoulder is sore. Discussed with Dr Valdivia who recommended to do plain film xray right shoulder, MRI brain and give IVFs as needed

## 2024-08-13 ENCOUNTER — HOSPITAL ENCOUNTER (OUTPATIENT)
Dept: MRI IMAGING | Age: 74
Discharge: HOME | End: 2024-08-11
Payer: MEDICARE
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
## 2024-08-13 DIAGNOSIS — C34.90 SMALL CELL LUNG CANCER (HCC): ICD-10-CM

## 2024-08-13 DIAGNOSIS — R55 SYNCOPE AND COLLAPSE: ICD-10-CM

## 2024-08-13 PROCEDURE — 6360000004 HC RX CONTRAST MEDICATION: Performed by: INTERNAL MEDICINE

## 2024-08-13 PROCEDURE — A9577 INJ MULTIHANCE: HCPCS | Performed by: INTERNAL MEDICINE

## 2024-08-13 PROCEDURE — 70553 MRI BRAIN STEM W/O & W/DYE: CPT

## 2024-08-13 RX ADMIN — GADOBENATE DIMEGLUMINE 9 ML: 529 INJECTION, SOLUTION INTRAVENOUS at 14:30

## 2024-08-19 DIAGNOSIS — G47.01 INSOMNIA DUE TO MEDICAL CONDITION: ICD-10-CM

## 2024-08-19 NOTE — TELEPHONE ENCOUNTER
Nikkie called requesting a refill of the below medication which has been pended for you:     Requested Prescriptions     Pending Prescriptions Disp Refills    temazepam (RESTORIL) 7.5 MG capsule 30 capsule 0     Sig: Take 1 capsule by mouth nightly as needed for Sleep for up to 30 days. Max Daily Amount: 7.5 mg       Last Appointment Date: Visit date not found  Next Appointment Date: 12/3/2024    Allergies   Allergen Reactions    Compazine [Prochlorperazine Maleate] Other (See Comments)     Eyelids flipped up and mouth stretched to the side.     Prochlorperazine Maleate Other (See Comments)     Eyelids flipped up and mouth stretched to the side.    Thorazine [Chlorpromazine] Other (See Comments)     Eyelids flipped up and mouth stretched to the side

## 2024-08-22 ENCOUNTER — APPOINTMENT (OUTPATIENT)
Dept: GENERAL RADIOLOGY | Age: 74
End: 2024-08-22
Payer: MEDICARE

## 2024-08-22 ENCOUNTER — HOSPITAL ENCOUNTER (INPATIENT)
Age: 74
LOS: 2 days | Discharge: HOME | End: 2024-08-24
Attending: STUDENT IN AN ORGANIZED HEALTH CARE EDUCATION/TRAINING PROGRAM
Payer: MEDICARE

## 2024-08-22 DIAGNOSIS — E86.0 DEHYDRATION: ICD-10-CM

## 2024-08-22 DIAGNOSIS — R55 SYNCOPE AND COLLAPSE: ICD-10-CM

## 2024-08-22 DIAGNOSIS — R53.1 GENERAL WEAKNESS: Primary | ICD-10-CM

## 2024-08-22 PROBLEM — A41.9 SEPSIS (HCC): Status: ACTIVE | Noted: 2024-08-22

## 2024-08-22 PROBLEM — T45.1X5A ADVERSE EFFECT OF CHEMOTHERAPY: Status: ACTIVE | Noted: 2024-08-22

## 2024-08-22 LAB
25(OH)D3 SERPL-MCNC: 74.1 NG/ML
ALBUMIN SERPL-MCNC: 3.6 G/DL (ref 3.5–5.2)
ALP SERPL-CCNC: 134 U/L (ref 35–104)
ALT SERPL-CCNC: 38 U/L (ref 5–33)
ANION GAP SERPL CALCULATED.3IONS-SCNC: 13 MMOL/L (ref 7–19)
ANISOCYTOSIS BLD QL SMEAR: ABNORMAL
AST SERPL-CCNC: 78 U/L (ref 5–32)
B PARAP IS1001 DNA NPH QL NAA+NON-PROBE: NOT DETECTED
B PERT.PT PRMT NPH QL NAA+NON-PROBE: NOT DETECTED
BACTERIA URNS QL MICRO: ABNORMAL /HPF
BASOPHILS # BLD: 0 K/UL (ref 0–0.2)
BASOPHILS NFR BLD: 0 % (ref 0–1)
BILIRUB SERPL-MCNC: 0.3 MG/DL (ref 0.2–1.2)
BILIRUB UR QL STRIP: NEGATIVE
BNP BLD-MCNC: 734 PG/ML (ref 0–124)
BUN SERPL-MCNC: 35 MG/DL (ref 8–23)
C PNEUM DNA NPH QL NAA+NON-PROBE: NOT DETECTED
CALCIUM SERPL-MCNC: 10.2 MG/DL (ref 8.8–10.2)
CHLORIDE SERPL-SCNC: 95 MMOL/L (ref 98–111)
CK SERPL-CCNC: 13 U/L (ref 26–192)
CLARITY UR: CLEAR
CO2 SERPL-SCNC: 22 MMOL/L (ref 22–29)
COLOR UR: YELLOW
CREAT SERPL-MCNC: 1.1 MG/DL (ref 0.5–0.9)
CREAT UR-MCNC: 55.7 MG/DL (ref 28–217)
CRYSTALS URNS MICRO: ABNORMAL /HPF
EOSINOPHIL # BLD: 5.93 K/UL (ref 0–0.6)
EOSINOPHIL NFR BLD: 46 % (ref 0–5)
EPI CELLS #/AREA URNS AUTO: 0 /HPF (ref 0–5)
ERYTHROCYTE [DISTWIDTH] IN BLOOD BY AUTOMATED COUNT: 18.6 % (ref 11.5–14.5)
FLUAV RNA NPH QL NAA+NON-PROBE: NOT DETECTED
FLUBV RNA NPH QL NAA+NON-PROBE: NOT DETECTED
FOLATE SERPL-MCNC: 19 NG/ML (ref 4.8–37.3)
GLUCOSE SERPL-MCNC: 107 MG/DL (ref 74–109)
GLUCOSE UR STRIP.AUTO-MCNC: NEGATIVE MG/DL
HADV DNA NPH QL NAA+NON-PROBE: NOT DETECTED
HCOV 229E RNA NPH QL NAA+NON-PROBE: NOT DETECTED
HCOV HKU1 RNA NPH QL NAA+NON-PROBE: NOT DETECTED
HCOV NL63 RNA NPH QL NAA+NON-PROBE: NOT DETECTED
HCOV OC43 RNA NPH QL NAA+NON-PROBE: NOT DETECTED
HCT VFR BLD AUTO: 29.3 % (ref 37–47)
HGB BLD-MCNC: 9.6 G/DL (ref 12–16)
HGB UR STRIP.AUTO-MCNC: NEGATIVE MG/L
HMPV RNA NPH QL NAA+NON-PROBE: NOT DETECTED
HPIV1 RNA NPH QL NAA+NON-PROBE: NOT DETECTED
HPIV2 RNA NPH QL NAA+NON-PROBE: NOT DETECTED
HPIV3 RNA NPH QL NAA+NON-PROBE: NOT DETECTED
HPIV4 RNA NPH QL NAA+NON-PROBE: NOT DETECTED
HYALINE CASTS #/AREA URNS AUTO: 1 /HPF (ref 0–8)
IMM GRANULOCYTES # BLD: 0 K/UL
KETONES UR STRIP.AUTO-MCNC: NEGATIVE MG/DL
LACTATE BLDV-SCNC: 1.6 MMOL/L (ref 0.5–1.9)
LEGIONELLA AG UR QL: NORMAL
LEUKOCYTE ESTERASE UR QL STRIP.AUTO: ABNORMAL
LIPASE SERPL-CCNC: 150 U/L (ref 13–60)
LYMPHOCYTES # BLD: 0.9 K/UL (ref 1.1–4.5)
LYMPHOCYTES NFR BLD: 7 % (ref 20–40)
M PNEUMO DNA NPH QL NAA+NON-PROBE: NOT DETECTED
MACROCYTES BLD QL SMEAR: ABNORMAL
MAGNESIUM SERPL-MCNC: 2 MG/DL (ref 1.6–2.4)
MCH RBC QN AUTO: 31.3 PG (ref 27–31)
MCHC RBC AUTO-ENTMCNC: 32.8 G/DL (ref 33–37)
MCV RBC AUTO: 95.4 FL (ref 81–99)
MONOCYTES # BLD: 1 K/UL (ref 0–0.9)
MONOCYTES NFR BLD: 8 % (ref 0–10)
NEUTROPHILS # BLD: 5 K/UL (ref 1.5–7.5)
NEUTS SEG NFR BLD: 39 % (ref 50–65)
NITRITE UR QL STRIP.AUTO: NEGATIVE
PH UR STRIP.AUTO: 6 [PH] (ref 5–8)
PHOSPHATE SERPL-MCNC: 3.8 MG/DL (ref 2.5–4.5)
PLATELET # BLD AUTO: 189 K/UL (ref 130–400)
PLATELET SLIDE REVIEW: ABNORMAL
PMV BLD AUTO: 11 FL (ref 9.4–12.3)
POTASSIUM SERPL-SCNC: 5 MMOL/L (ref 3.5–5)
PROCALCITONIN: 0.2 NG/ML (ref 0–0.09)
PROCALCITONIN: 0.24 NG/ML (ref 0–0.09)
PROT SERPL-MCNC: 5.3 G/DL (ref 6.6–8.7)
PROT UR STRIP.AUTO-MCNC: ABNORMAL MG/DL
RBC # BLD AUTO: 3.07 M/UL (ref 4.2–5.4)
RBC #/AREA URNS AUTO: 1 /HPF (ref 0–4)
REASON FOR REJECTION: NORMAL
REJECTED TEST: NORMAL
RSV RNA NPH QL NAA+NON-PROBE: NOT DETECTED
RV+EV RNA NPH QL NAA+NON-PROBE: NOT DETECTED
S PNEUM AG SPEC QL: NORMAL
SARS-COV-2 RNA NPH QL NAA+NON-PROBE: NOT DETECTED
SODIUM SERPL-SCNC: 130 MMOL/L (ref 136–145)
SODIUM UR-SCNC: 60 MMOL/L
SP GR UR STRIP.AUTO: 1.01 (ref 1–1.03)
T4 FREE SERPL-MCNC: 0.84 NG/DL (ref 0.93–1.7)
TSH SERPL DL<=0.005 MIU/L-ACNC: 5.46 UIU/ML (ref 0.27–4.2)
UROBILINOGEN UR STRIP.AUTO-MCNC: 0.2 E.U./DL
VIT B12 SERPL-MCNC: 1601 PG/ML (ref 211–946)
WBC # BLD AUTO: 12.9 K/UL (ref 4.8–10.8)
WBC #/AREA URNS AUTO: 40 /HPF (ref 0–5)

## 2024-08-22 PROCEDURE — 71045 X-RAY EXAM CHEST 1 VIEW: CPT

## 2024-08-22 PROCEDURE — 36415 COLL VENOUS BLD VENIPUNCTURE: CPT

## 2024-08-22 PROCEDURE — 96374 THER/PROPH/DIAG INJ IV PUSH: CPT

## 2024-08-22 PROCEDURE — 2580000003 HC RX 258: Performed by: HOSPITALIST

## 2024-08-22 PROCEDURE — 85025 COMPLETE CBC W/AUTO DIFF WBC: CPT

## 2024-08-22 PROCEDURE — 87186 SC STD MICRODIL/AGAR DIL: CPT

## 2024-08-22 PROCEDURE — 82550 ASSAY OF CK (CPK): CPT

## 2024-08-22 PROCEDURE — 87205 SMEAR GRAM STAIN: CPT

## 2024-08-22 PROCEDURE — 87086 URINE CULTURE/COLONY COUNT: CPT

## 2024-08-22 PROCEDURE — 6360000002 HC RX W HCPCS: Performed by: HOSPITALIST

## 2024-08-22 PROCEDURE — 84439 ASSAY OF FREE THYROXINE: CPT

## 2024-08-22 PROCEDURE — 2580000003 HC RX 258: Performed by: STUDENT IN AN ORGANIZED HEALTH CARE EDUCATION/TRAINING PROGRAM

## 2024-08-22 PROCEDURE — 6360000002 HC RX W HCPCS: Performed by: STUDENT IN AN ORGANIZED HEALTH CARE EDUCATION/TRAINING PROGRAM

## 2024-08-22 PROCEDURE — 1200000000 HC SEMI PRIVATE

## 2024-08-22 PROCEDURE — 87040 BLOOD CULTURE FOR BACTERIA: CPT

## 2024-08-22 PROCEDURE — 84100 ASSAY OF PHOSPHORUS: CPT

## 2024-08-22 PROCEDURE — 87077 CULTURE AEROBIC IDENTIFY: CPT

## 2024-08-22 PROCEDURE — 6370000000 HC RX 637 (ALT 250 FOR IP): Performed by: HOSPITALIST

## 2024-08-22 PROCEDURE — 82570 ASSAY OF URINE CREATININE: CPT

## 2024-08-22 PROCEDURE — 82306 VITAMIN D 25 HYDROXY: CPT

## 2024-08-22 PROCEDURE — 84145 PROCALCITONIN (PCT): CPT

## 2024-08-22 PROCEDURE — 80053 COMPREHEN METABOLIC PANEL: CPT

## 2024-08-22 PROCEDURE — 84300 ASSAY OF URINE SODIUM: CPT

## 2024-08-22 PROCEDURE — 81001 URINALYSIS AUTO W/SCOPE: CPT

## 2024-08-22 PROCEDURE — 99285 EMERGENCY DEPT VISIT HI MDM: CPT

## 2024-08-22 PROCEDURE — 82746 ASSAY OF FOLIC ACID SERUM: CPT

## 2024-08-22 PROCEDURE — 83605 ASSAY OF LACTIC ACID: CPT

## 2024-08-22 PROCEDURE — 0202U NFCT DS 22 TRGT SARS-COV-2: CPT

## 2024-08-22 PROCEDURE — 83690 ASSAY OF LIPASE: CPT

## 2024-08-22 PROCEDURE — 83880 ASSAY OF NATRIURETIC PEPTIDE: CPT

## 2024-08-22 PROCEDURE — 82607 VITAMIN B-12: CPT

## 2024-08-22 PROCEDURE — 83935 ASSAY OF URINE OSMOLALITY: CPT

## 2024-08-22 PROCEDURE — 87449 NOS EACH ORGANISM AG IA: CPT

## 2024-08-22 PROCEDURE — 84443 ASSAY THYROID STIM HORMONE: CPT

## 2024-08-22 PROCEDURE — 83735 ASSAY OF MAGNESIUM: CPT

## 2024-08-22 RX ORDER — ARFORMOTEROL TARTRATE 15 UG/2ML
15 SOLUTION RESPIRATORY (INHALATION)
Status: DISCONTINUED | OUTPATIENT
Start: 2024-08-22 | End: 2024-08-24 | Stop reason: HOSPADM

## 2024-08-22 RX ORDER — MECOBALAMIN 5000 MCG
5 TABLET,DISINTEGRATING ORAL NIGHTLY PRN
Status: DISCONTINUED | OUTPATIENT
Start: 2024-08-22 | End: 2024-08-24 | Stop reason: HOSPADM

## 2024-08-22 RX ORDER — ATORVASTATIN CALCIUM 10 MG/1
10 TABLET, FILM COATED ORAL NIGHTLY
Status: DISCONTINUED | OUTPATIENT
Start: 2024-08-22 | End: 2024-08-24 | Stop reason: HOSPADM

## 2024-08-22 RX ORDER — METOCLOPRAMIDE HYDROCHLORIDE 5 MG/ML
10 INJECTION INTRAMUSCULAR; INTRAVENOUS EVERY 6 HOURS PRN
Status: DISCONTINUED | OUTPATIENT
Start: 2024-08-22 | End: 2024-08-23

## 2024-08-22 RX ORDER — POLYETHYLENE GLYCOL 3350 17 G/17G
17 POWDER, FOR SOLUTION ORAL 2 TIMES DAILY PRN
Status: DISCONTINUED | OUTPATIENT
Start: 2024-08-22 | End: 2024-08-24 | Stop reason: HOSPADM

## 2024-08-22 RX ORDER — ONDANSETRON 2 MG/ML
4 INJECTION INTRAMUSCULAR; INTRAVENOUS EVERY 6 HOURS PRN
Status: DISCONTINUED | OUTPATIENT
Start: 2024-08-22 | End: 2024-08-24 | Stop reason: HOSPADM

## 2024-08-22 RX ORDER — SODIUM CHLORIDE 9 MG/ML
INJECTION, SOLUTION INTRAVENOUS PRN
Status: DISCONTINUED | OUTPATIENT
Start: 2024-08-22 | End: 2024-08-24 | Stop reason: HOSPADM

## 2024-08-22 RX ORDER — ACETAMINOPHEN 325 MG/1
650 TABLET ORAL EVERY 4 HOURS PRN
Status: DISCONTINUED | OUTPATIENT
Start: 2024-08-22 | End: 2024-08-24 | Stop reason: HOSPADM

## 2024-08-22 RX ORDER — SODIUM CHLORIDE 0.9 % (FLUSH) 0.9 %
5-40 SYRINGE (ML) INJECTION EVERY 12 HOURS SCHEDULED
Status: DISCONTINUED | OUTPATIENT
Start: 2024-08-22 | End: 2024-08-24 | Stop reason: HOSPADM

## 2024-08-22 RX ORDER — SODIUM CHLORIDE 0.9 % (FLUSH) 0.9 %
5-40 SYRINGE (ML) INJECTION PRN
Status: DISCONTINUED | OUTPATIENT
Start: 2024-08-22 | End: 2024-08-24 | Stop reason: HOSPADM

## 2024-08-22 RX ORDER — ENOXAPARIN SODIUM 100 MG/ML
30 INJECTION SUBCUTANEOUS DAILY
Status: DISCONTINUED | OUTPATIENT
Start: 2024-08-23 | End: 2024-08-24 | Stop reason: HOSPADM

## 2024-08-22 RX ORDER — SODIUM CHLORIDE 9 MG/ML
INJECTION, SOLUTION INTRAVENOUS CONTINUOUS
Status: DISCONTINUED | OUTPATIENT
Start: 2024-08-22 | End: 2024-08-24 | Stop reason: HOSPADM

## 2024-08-22 RX ORDER — CALCIUM CARBONATE 500 MG/1
500 TABLET, CHEWABLE ORAL 3 TIMES DAILY PRN
Status: DISCONTINUED | OUTPATIENT
Start: 2024-08-22 | End: 2024-08-24 | Stop reason: HOSPADM

## 2024-08-22 RX ORDER — LOSARTAN POTASSIUM 100 MG/1
100 TABLET ORAL DAILY
Status: DISCONTINUED | OUTPATIENT
Start: 2024-08-23 | End: 2024-08-24 | Stop reason: HOSPADM

## 2024-08-22 RX ORDER — THYROID 60 MG/1
60 TABLET ORAL
Status: DISCONTINUED | OUTPATIENT
Start: 2024-08-23 | End: 2024-08-24 | Stop reason: HOSPADM

## 2024-08-22 RX ORDER — ONDANSETRON 2 MG/ML
4 INJECTION INTRAMUSCULAR; INTRAVENOUS ONCE
Status: COMPLETED | OUTPATIENT
Start: 2024-08-22 | End: 2024-08-22

## 2024-08-22 RX ORDER — OMEGA-3-ACID ETHYL ESTERS 1 G/1
1 CAPSULE, LIQUID FILLED ORAL 2 TIMES DAILY
Status: DISCONTINUED | OUTPATIENT
Start: 2024-08-23 | End: 2024-08-24 | Stop reason: HOSPADM

## 2024-08-22 RX ORDER — LIDOCAINE 40 MG/G
CREAM TOPICAL PRN
Status: DISCONTINUED | OUTPATIENT
Start: 2024-08-22 | End: 2024-08-24 | Stop reason: HOSPADM

## 2024-08-22 RX ORDER — 0.9 % SODIUM CHLORIDE 0.9 %
30 INTRAVENOUS SOLUTION INTRAVENOUS ONCE
Status: COMPLETED | OUTPATIENT
Start: 2024-08-22 | End: 2024-08-22

## 2024-08-22 RX ORDER — 0.9 % SODIUM CHLORIDE 0.9 %
1000 INTRAVENOUS SOLUTION INTRAVENOUS ONCE
Status: COMPLETED | OUTPATIENT
Start: 2024-08-22 | End: 2024-08-22

## 2024-08-22 RX ORDER — ALBUTEROL SULFATE 0.83 MG/ML
2.5 SOLUTION RESPIRATORY (INHALATION) EVERY 4 HOURS PRN
Status: DISCONTINUED | OUTPATIENT
Start: 2024-08-22 | End: 2024-08-24 | Stop reason: HOSPADM

## 2024-08-22 RX ORDER — PANTOPRAZOLE SODIUM 20 MG/1
20 TABLET, DELAYED RELEASE ORAL
Status: DISCONTINUED | OUTPATIENT
Start: 2024-08-23 | End: 2024-08-24 | Stop reason: HOSPADM

## 2024-08-22 RX ORDER — DROPERIDOL 2.5 MG/ML
0.62 INJECTION, SOLUTION INTRAMUSCULAR; INTRAVENOUS EVERY 6 HOURS PRN
Status: DISCONTINUED | OUTPATIENT
Start: 2024-08-22 | End: 2024-08-24 | Stop reason: HOSPADM

## 2024-08-22 RX ORDER — BUDESONIDE 0.5 MG/2ML
0.5 INHALANT ORAL
Status: DISCONTINUED | OUTPATIENT
Start: 2024-08-22 | End: 2024-08-24 | Stop reason: HOSPADM

## 2024-08-22 RX ADMIN — SODIUM CHLORIDE: 9 INJECTION, SOLUTION INTRAVENOUS at 22:51

## 2024-08-22 RX ADMIN — SODIUM CHLORIDE 1362 ML: 9 INJECTION, SOLUTION INTRAVENOUS at 20:41

## 2024-08-22 RX ADMIN — CEFEPIME 2000 MG: 2 INJECTION, POWDER, FOR SOLUTION INTRAVENOUS at 20:41

## 2024-08-22 RX ADMIN — ONDANSETRON 4 MG: 2 INJECTION, SOLUTION INTRAMUSCULAR; INTRAVENOUS at 19:00

## 2024-08-22 RX ADMIN — SODIUM CHLORIDE 1000 ML: 9 INJECTION, SOLUTION INTRAVENOUS at 19:01

## 2024-08-22 RX ADMIN — VANCOMYCIN HYDROCHLORIDE 1250 MG: 10 INJECTION, POWDER, LYOPHILIZED, FOR SOLUTION INTRAVENOUS at 22:53

## 2024-08-22 RX ADMIN — ATORVASTATIN CALCIUM 10 MG: 10 TABLET, FILM COATED ORAL at 22:32

## 2024-08-22 ASSESSMENT — PAIN SCALES - GENERAL: PAINLEVEL_OUTOF10: 0

## 2024-08-22 ASSESSMENT — ENCOUNTER SYMPTOMS
ABDOMINAL PAIN: 0
CHEST TIGHTNESS: 0
EYE PAIN: 0
SORE THROAT: 0
VOMITING: 1
COUGH: 0
NAUSEA: 1
DIARRHEA: 0
EYE REDNESS: 0
SHORTNESS OF BREATH: 0

## 2024-08-23 ENCOUNTER — APPOINTMENT (OUTPATIENT)
Dept: ULTRASOUND IMAGING | Age: 74
End: 2024-08-23
Payer: MEDICARE

## 2024-08-23 PROBLEM — E44.1 MILD MALNUTRITION (HCC): Chronic | Status: ACTIVE | Noted: 2024-08-23

## 2024-08-23 LAB
ANION GAP SERPL CALCULATED.3IONS-SCNC: 14 MMOL/L (ref 7–19)
ANISOCYTOSIS BLD QL SMEAR: ABNORMAL
BACTERIA BLD CULT ORG #2: NORMAL
BACTERIA BLD CULT: NORMAL
BASOPHILS # BLD: 0.1 K/UL (ref 0–0.2)
BASOPHILS NFR BLD: 1 % (ref 0–1)
BUN SERPL-MCNC: 30 MG/DL (ref 8–23)
BURR CELLS BLD QL SMEAR: ABNORMAL
CALCIUM SERPL-MCNC: 9 MG/DL (ref 8.8–10.2)
CHLORIDE SERPL-SCNC: 102 MMOL/L (ref 98–111)
CO2 SERPL-SCNC: 19 MMOL/L (ref 22–29)
CORTIS AM PEAK SERPL-MCNC: 14 UG/DL (ref 6.2–19.4)
CREAT SERPL-MCNC: 1 MG/DL (ref 0.5–0.9)
EOSINOPHIL # BLD: 6.51 K/UL (ref 0–0.6)
EOSINOPHIL NFR BLD: 44 % (ref 0–5)
EOSINOPHIL URNS QL MICRO: NORMAL
ERYTHROCYTE [DISTWIDTH] IN BLOOD BY AUTOMATED COUNT: 16.9 % (ref 11.5–14.5)
GLUCOSE SERPL-MCNC: 86 MG/DL (ref 70–99)
HCT VFR BLD AUTO: 24.9 % (ref 37–47)
HGB BLD-MCNC: 8.3 G/DL (ref 12–16)
IMM GRANULOCYTES # BLD: 0.1 K/UL
LYMPHOCYTES # BLD: 0.6 K/UL (ref 1.1–4.5)
LYMPHOCYTES NFR BLD: 3 % (ref 20–40)
MACROCYTES BLD QL SMEAR: ABNORMAL
MCH RBC QN AUTO: 31.8 PG (ref 27–31)
MCHC RBC AUTO-ENTMCNC: 33.3 G/DL (ref 33–37)
MCV RBC AUTO: 95.4 FL (ref 81–99)
MONOCYTES # BLD: 0.4 K/UL (ref 0–0.9)
MONOCYTES NFR BLD: 3 % (ref 0–10)
NEUTROPHILS # BLD: 7.1 K/UL (ref 1.5–7.5)
NEUTS SEG NFR BLD: 48 % (ref 50–65)
OSMOLALITY URINE: 438 MOSM/KG (ref 250–1200)
OVALOCYTES BLD QL SMEAR: ABNORMAL
PLATELET # BLD AUTO: 130 K/UL (ref 130–400)
PLATELET SLIDE REVIEW: ABNORMAL
PMV BLD AUTO: 11.4 FL (ref 9.4–12.3)
POTASSIUM SERPL-SCNC: 4.8 MMOL/L (ref 3.5–5)
RBC # BLD AUTO: 2.61 M/UL (ref 4.2–5.4)
SCHISTOCYTES BLD QL SMEAR: ABNORMAL
SODIUM SERPL-SCNC: 135 MMOL/L (ref 136–145)
VARIANT LYMPHS NFR BLD: 1 % (ref 0–8)
WBC # BLD AUTO: 14.8 K/UL (ref 4.8–10.8)

## 2024-08-23 PROCEDURE — 6360000002 HC RX W HCPCS: Performed by: HOSPITALIST

## 2024-08-23 PROCEDURE — 80048 BASIC METABOLIC PNL TOTAL CA: CPT

## 2024-08-23 PROCEDURE — 36591 DRAW BLOOD OFF VENOUS DEVICE: CPT

## 2024-08-23 PROCEDURE — 94760 N-INVAS EAR/PLS OXIMETRY 1: CPT

## 2024-08-23 PROCEDURE — 82533 TOTAL CORTISOL: CPT

## 2024-08-23 PROCEDURE — 99222 1ST HOSP IP/OBS MODERATE 55: CPT | Performed by: INTERNAL MEDICINE

## 2024-08-23 PROCEDURE — 85025 COMPLETE CBC W/AUTO DIFF WBC: CPT

## 2024-08-23 PROCEDURE — 6370000000 HC RX 637 (ALT 250 FOR IP): Performed by: HOSPITALIST

## 2024-08-23 PROCEDURE — 2580000003 HC RX 258: Performed by: HOSPITALIST

## 2024-08-23 PROCEDURE — 1200000000 HC SEMI PRIVATE

## 2024-08-23 PROCEDURE — 76770 US EXAM ABDO BACK WALL COMP: CPT

## 2024-08-23 PROCEDURE — 94640 AIRWAY INHALATION TREATMENT: CPT

## 2024-08-23 RX ORDER — METOCLOPRAMIDE HYDROCHLORIDE 5 MG/ML
5 INJECTION INTRAMUSCULAR; INTRAVENOUS EVERY 6 HOURS PRN
Status: DISCONTINUED | OUTPATIENT
Start: 2024-08-23 | End: 2024-08-24 | Stop reason: HOSPADM

## 2024-08-23 RX ORDER — TEMAZEPAM 7.5 MG/1
7.5 CAPSULE ORAL NIGHTLY PRN
Qty: 30 CAPSULE | Refills: 0 | Status: SHIPPED | OUTPATIENT
Start: 2024-08-23 | End: 2024-09-22

## 2024-08-23 RX ADMIN — OMEGA-3-ACID ETHYL ESTERS CAPSULES 1 CAPSULE: 1 CAPSULE, LIQUID FILLED ORAL at 17:49

## 2024-08-23 RX ADMIN — OMEGA-3-ACID ETHYL ESTERS CAPSULES 1 CAPSULE: 1 CAPSULE, LIQUID FILLED ORAL at 09:25

## 2024-08-23 RX ADMIN — ATORVASTATIN CALCIUM 10 MG: 10 TABLET, FILM COATED ORAL at 20:57

## 2024-08-23 RX ADMIN — ENOXAPARIN SODIUM 30 MG: 100 INJECTION SUBCUTANEOUS at 09:25

## 2024-08-23 RX ADMIN — SODIUM CHLORIDE: 9 INJECTION, SOLUTION INTRAVENOUS at 19:51

## 2024-08-23 RX ADMIN — THYROID 60 MG: 60 TABLET ORAL at 05:19

## 2024-08-23 RX ADMIN — ARFORMOTEROL TARTRATE 15 MCG: 15 SOLUTION RESPIRATORY (INHALATION) at 08:30

## 2024-08-23 RX ADMIN — VANCOMYCIN HYDROCHLORIDE 750 MG: 750 INJECTION, POWDER, LYOPHILIZED, FOR SOLUTION INTRAVENOUS at 21:00

## 2024-08-23 RX ADMIN — ARFORMOTEROL TARTRATE 15 MCG: 15 SOLUTION RESPIRATORY (INHALATION) at 19:22

## 2024-08-23 RX ADMIN — SODIUM CHLORIDE: 9 INJECTION, SOLUTION INTRAVENOUS at 05:19

## 2024-08-23 RX ADMIN — BUDESONIDE INHALATION 500 MCG: 0.5 SUSPENSION RESPIRATORY (INHALATION) at 19:22

## 2024-08-23 RX ADMIN — CEFEPIME 2000 MG: 2 INJECTION, POWDER, FOR SOLUTION INTRAVENOUS at 09:29

## 2024-08-23 RX ADMIN — Medication 5000 UNITS: at 09:25

## 2024-08-23 RX ADMIN — LOSARTAN POTASSIUM 100 MG: 100 TABLET, FILM COATED ORAL at 09:25

## 2024-08-23 RX ADMIN — SODIUM CHLORIDE, PRESERVATIVE FREE 10 ML: 5 INJECTION INTRAVENOUS at 09:30

## 2024-08-23 RX ADMIN — BUDESONIDE INHALATION 500 MCG: 0.5 SUSPENSION RESPIRATORY (INHALATION) at 08:30

## 2024-08-23 RX ADMIN — CEFEPIME 2000 MG: 2 INJECTION, POWDER, FOR SOLUTION INTRAVENOUS at 22:10

## 2024-08-23 RX ADMIN — PANTOPRAZOLE SODIUM 20 MG: 20 TABLET, DELAYED RELEASE ORAL at 05:19

## 2024-08-23 ASSESSMENT — ENCOUNTER SYMPTOMS
WHEEZING: 0
COLOR CHANGE: 0
CHEST TIGHTNESS: 0
NAUSEA: 1
ABDOMINAL PAIN: 0
SHORTNESS OF BREATH: 0
VOMITING: 0

## 2024-08-23 NOTE — ED NOTES
ED TO INPATIENT SBAR HANDOFF    Patient Name: Nikkie Banks   : 1950  74 y.o.   Family/Caregiver Present: Yes  Code Status Order: Prior    C-SSRS: Risk of Suicide: No Risk  Sitter No  Restraints:         Situation  Chief Complaint:   Chief Complaint   Patient presents with    Fall     Fall a week ago w head injury    Fatigue     Patient Diagnosis: Generalized weakness [R53.1]  Sepsis (HCC) [A41.9]     Brief Description of Patient's Condition: Nikkie Banks is a 74 y.o. female with PMH of stage IV non-small cell lung cancer, status post 6 cycles of palliative chemotherapy, currently on maintenance therapy with Tecentriq who presents to the emergency department with CC of generalized weakness, fatigue, nausea, decreased appetite.  Ongoing for the last week and a half.  Patient reports that she was so weak that she fell and hit her head.  She did not get checked out at that time, but had a brain MRI a few days later that was negative for bleed.  Her last infusion was 3 to 4 weeks ago.   Mental Status: oriented, alert, and    Arrived from: home    Imaging:   XR CHEST PORTABLE   Final Result       1. Chronic blunting left costophrenic sulcus.                       ______________________________________    Electronically signed by: DWIGHT VOGEL M.D.   Date:     2024   Time:    19:55       US RENAL COMPLETE    (Results Pending)     COVID-19 Results:   Internal Administration   First Dose COVID-19, PFIZER PURPLE top, DILUTE for use, (age 12 y+), 30mcg/0.3mL  2021   Second Dose COVID-19, PFIZER PURPLE top, DILUTE for use, (age 12 y+), 30mcg/0.3mL   2021       Last COVID Lab SARS-CoV-2 (no units)   Date Value   2020 Not Detected     SARS-CoV-2, PCR (no units)   Date Value   2024 Not Detected     SARS-CoV-2, NAAT (no units)   Date Value   2023 Not Detected     SARS-COV-2, POC (no units)   Date Value   2023 Not-Detected           Abnormal labs:   Abnormal Labs  Reviewed   CBC WITH AUTO DIFFERENTIAL - Abnormal; Notable for the following components:       Result Value    WBC 12.9 (*)     RBC 3.07 (*)     Hemoglobin 9.6 (*)     Hematocrit 29.3 (*)     MCH 31.3 (*)     MCHC 32.8 (*)     RDW 18.6 (*)     Neutrophils % 39.0 (*)     Lymphocytes % 7.0 (*)     Eosinophils % 46.0 (*)     Lymphocytes Absolute 0.9 (*)     Monocytes Absolute 1.00 (*)     Eosinophils Absolute 5.93 (*)     Anisocytosis 1+ (*)     Macrocytes 1+ (*)     All other components within normal limits   COMPREHENSIVE METABOLIC PANEL - Abnormal; Notable for the following components:    Sodium 130 (*)     Chloride 95 (*)     BUN 35 (*)     Creatinine 1.1 (*)     Est, Glom Filt Rate 53 (*)     Total Protein 5.3 (*)     Alkaline Phosphatase 134 (*)     ALT 38 (*)     AST 78 (*)     All other components within normal limits   LIPASE - Abnormal; Notable for the following components:    Lipase 150 (*)     All other components within normal limits     Background  Allergies:   Allergies   Allergen Reactions    Compazine [Prochlorperazine Maleate] Other (See Comments)     Eyelids flipped up and mouth stretched to the side.     Prochlorperazine Maleate Other (See Comments)     Eyelids flipped up and mouth stretched to the side.    Thorazine [Chlorpromazine] Other (See Comments)     Eyelids flipped up and mouth stretched to the side     Current Medications:   Medications Administered         ondansetron (ZOFRAN) injection 4 mg Admin Date  08/22/2024 Action  Given Dose  4 mg Rate   Route  IntraVENous Documented By  Vivi Garcia, RN        sodium chloride 0.9 % bolus 1,000 mL Admin Date  08/22/2024 Action  New Bag Dose  1,000 mL Rate  983.6 mL/hr Route  IntraVENous Documented By  Vivi Garcia, STANLEY            History:   Past Medical History:   Diagnosis Date    Acquired hypothyroidism 08/20/2017    Alopecia 08/21/2017    Breast implant capsular contracture     \"with cysts behind right breast\" per pt.     Esophageal mass  2150    Electronically signed by: Electronically signed by Vivi Garcia RN on 8/22/2024 at 8:27 PM

## 2024-08-23 NOTE — PLAN OF CARE
SUBJECTIVE:    Mrs. Nikike Banks is a 74 year old  american lady known to me from prior admission. She has a history most significant for NSCLC. She had presented to [facility] ED tonight with generalized weakness and nausea with emesis. She had been discussed with me, and was known to be tachycardic, her labs have since resulted. She has an elevated WBC as well, while we do not know a source and her LA was only 1.6 infection is something that must be considered. She has been on oral chemotherapy. She has a history of radiotherapy. She has also been receiving Etoposide and Carboplatin, she has had 6 treatments of same as per her note from encounter with Dr Valdivia of Oncology on the 7th of this month, just over 2 weeks ago.       OBJECTIVE:    /69   Pulse 92   Temp 98.2 °F (36.8 °C)   Resp 16   Wt 45.4 kg (100 lb)   SpO2 94%   BMI 19.53 kg/m²       ASSESSMENTS & PLANS:    Sepsis of Unknown Source: Tachycardic with Leukocytosis  Blood Cxx x2 ordered by ED team already  UA with Reflex Cx  LA was 1.6  CBC with Diff daily  Cefepime 2g IV Q12h for now, to be uptitrated to Q8h if renal functions allow, first dose now  Vancomycin to be dosed by PK, first dose now  Urine Strep & Legionella antigen testing  IVF bolus of NS at 30cc/kg  IVF mainteance of NS at 105 cc/h    AURELIANO (Cr baseline 0.7 now at 1.1) on CKD stage 1 (baseline GFR \">60\" but at Cr 1.0 has GFR 59 so should be in high 80s) withOUT hyperkalemia (Potassium 5.0 PoA):  Hyponatremic Dehydration: Na 130 PoA, BUN 35 with baseline Cr 0.7 for ratio 50 which is >>> 20 so prerenal   Admit to [facility], Nephrology area preferred if available  Strict Is and Os  Daily Weights  Elevate HoB 30' atleast  Elevate Legs to prevent sliding down in bed  Added on to UA Urine OSM/EOS/Na/Cr  Avoid Nephrotoxins as able: NSAIDs/ACEi/ARB/Diuretic/Aminoglycosides/IodinatedContrast etc  Renal US in AM   Nephrology Consultation deferred for now while data  collected  IVF as per above, low-normal bicarb level at this time  BMP with Mag reflex daily  CBC with Differential daily    Generalized Weakness:  PT & OT Evaluations with Txx as indicated  Fall Precautions  Bed Alarm  CM consult deferred for now, would consider if therapy recommends rehab  Vit B-12 level added on  TSH level added on  Vit 25-OH D level added on  RD consultation deferred for now    Nausea & Vomiting: did not have 2 doses of antuiemetic and fail in ED so can not specify if Intractable  Place to OBServation on [facility]  Zofran 4mg IVP Q6h PRN 1st line for Nausea / Vomiting  Droperidol 0.625mg IV Q6h PRN 2nd line  Reglan 10mg IV Q6h PRN 3rd line for Nausea / Vomiting   Has a Phenergan and Compazine CI: Tardive Dyskinesia with them (and Thorazine) in past  IVF as per above  Strict Is and Os  Daily Weights  Elevate HoB 30-45  Elevate legs to prevent sliding down in bed    NSCLC:  Locate to Oncology [facility]  Consult to Dr Valdivia of Oncology  Defer on Osirmetinib to Oncology, next dose is not till AM anyways     Chronic Medical Problems:  Continue Home regimen as able   Puffers will be subbed to nebulizers while admitted     Supportive and Prophylactic Txx:  DVT PPx: Lovenox SQ (Hx pancytopenia, no thrombocytopenia at this time)  GI (PUD) PPx: not indicated, is on OTC dosed PPI but that would not be strong enough for PPx  PT: as per above and as per age >65 with admission      Case d/w EP & [facility] NP earlier in ED work up  Chart reviewed   Orders entered by me with CPOE

## 2024-08-23 NOTE — CARE COORDINATION
Case Management Assessment  Initial Evaluation    Date/Time of Evaluation: 8/23/2024 2:22 PM  Assessment Completed by: Mikki Senior RN    If patient is discharged prior to next notation, then this note serves as note for discharge by case management.    Patient Name: Nikkie Banks                   YOB: 1950  Diagnosis: Syncope and collapse [R55]  Dehydration [E86.0]  General weakness [R53.1]  Generalized weakness [R53.1]  Sepsis (HCC) [A41.9]                   Date / Time: 8/22/2024  5:35 PM    Patient Admission Status: Inpatient   Readmission Risk (Low < 19, Mod (19-27), High > 27): Readmission Risk Score: 18.7    Current PCP: Nury Carmona MD  PCP verified by CM? (P) Yes (Dr Nury Carmona new Provider)    Chart Reviewed: Yes      History Provided by: (P) Patient  Patient Orientation: (P) Alert and Oriented    Patient Cognition:      Hospitalization in the last 30 days (Readmission):  No    If yes, Readmission Assessment in  Navigator will be completed.    Advance Directives:      Code Status: DNR   Patient's Primary Decision Maker is: (P) Legal Next of Kin    Primary Decision Maker: Soy Eldridge (HCS) - Spouse - [phone]    Secondary Decision Maker: Nba Mendieta - Child - [phone]    Discharge Planning:    Patient lives with: (P) Spouse/Significant Other Type of Home: Apartment  Primary Care Giver: (P) Self  Patient Support Systems include: (P) Spouse/Significant Other   Current Financial resources: (P) Medicare  Current community resources: (P)  (none at this time)  Current services prior to admission: None            Current DME:              Type of Home Care services:  None    ADLS  Prior functional level: (P) Independent in ADLs/IADLs  Current functional level: (P) Independent in ADLs/IADLs    PT AM-PAC:   /24  OT AM-PAC:   /24    Family can provide assistance at DC: (P) Yes  Would you like Case Management to discuss the discharge plan with any other family  members/significant others, and if so, who? (P) No  Plans to Return to Present Housing: (P) Yes  Other Identified Issues/Barriers to RETURNING to current housing: no barriers noted  Potential Assistance needed at discharge: (P) N/A            Potential DME: pt states, she does not use a walking aide  Patient expects to discharge to: (P) House  Plan for transportation at discharge: (P) Family    Financial    Payor: [employer] MEDICARE / Plan: [employer] CHOICE-PPO MEDICARE / Product Type: *No Product type* /     Does insurance require precert for SNF: Yes    Potential assistance Purchasing Medications: (P) No  Meds-to-Beds request: yes      [facility] [address] [place] [address] -  [phone] - F [phone]  [address] [place] [address]  Phone: [phone] Fax: [phone]      Notes:    Factors facilitating achievement of predicted outcomes: Family support, Motivated, Cooperative, and Pleasant    Barriers to discharge: Medical complications and Medication managment    Additional Case Management Notes: CM met with patient at the bedside. Pt reports, Dr Valdivia, thought \"she has a UTI and she passed out\" Pt is feeling much better today. Pt reports, at baseline she is independent with ADL's and able to do chores around the house. Pt states, she does not use a walker or 02. Pt reports, she has lost 55# and has a \"bad taste in her mouth so food does not taste good\" Pt states, she is drinking 2 \"chocolate Boosts a day\" Pt denies any home care needs at this time.     The Plan for Transition of Care is related to the following treatment goals of Syncope and collapse [R55]  Dehydration [E86.0]  General weakness [R53.1]  Generalized weakness [R53.1]  Sepsis (HCC) [A41.9]    IF APPLICABLE: The Patient and/or patient representative Nikkie and her family were provided with a choice of provider and agrees with the discharge plan. Freedom of choice list with basic dialogue that supports the  patient's individualized plan of care/goals and shares the quality data associated with the providers was provided to: patient  The Patient and/or Patient Representative Agree with the Discharge Plan? Home w/spouse    Mikki Senior RN  Case Management Department  Ph: [phone] Fax: [phone]  Electronically signed by Mikki Senior RN on 8/23/2024 at 2:29 PM

## 2024-08-23 NOTE — ED PROVIDER NOTES
[facility] EMERGENCY DEPT  eMERGENCY dEPARTMENT eNCOUnter      Pt Name: Nikkie Banks  MRN: [id number]  Birthdate 1950  Date of evaluation: 8/22/2024  Provider: Sabra Cormier MD    CHIEF COMPLAINT       Chief Complaint   Patient presents with    Fall     Fall a week ago w head injury    Fatigue         HISTORY OF PRESENT ILLNESS   (Location/Symptom, Timing/Onset,Context/Setting, Quality, Duration, Modifying Factors, Severity)  Note limiting factors.     HPI    Nikkie Banks is a 74 y.o. female with PMH of stage IV non-small cell lung cancer, status post 6 cycles of palliative chemotherapy, currently on maintenance therapy with Tecentriq who presents to the emergency department with CC of generalized weakness, fatigue, nausea, decreased appetite.  Ongoing for the last week and a half.  Patient reports that she was so weak that she fell and hit her head.  She did not get checked out at that time, but had a brain MRI a few days later that was negative for bleed.  Her last infusion was 3 to 4 weeks ago.  She follows with Dr. Valdivia.  She reports she has been so weak the last few days that she has been unable to get out of bed and walk.    NursingNotes were reviewed.    REVIEW OF SYSTEMS    (2-9 systems for level 4, 10 or more for level 5)     Review of Systems   Constitutional:  Positive for chills and fatigue. Negative for fever.   HENT:  Negative for congestion and sore throat.    Eyes:  Negative for pain and redness.   Respiratory:  Negative for cough, chest tightness and shortness of breath.    Cardiovascular:  Negative for chest pain and leg swelling.   Gastrointestinal:  Positive for nausea and vomiting. Negative for abdominal pain and diarrhea.   Genitourinary:  Negative for dysuria and urgency.   Musculoskeletal:  Negative for neck pain and neck stiffness.   Skin:  Negative for rash and wound.   Neurological:  Negative for seizures, syncope and headaches.   Psychiatric/Behavioral:  Negative for

## 2024-08-23 NOTE — PROGRESS NOTES
Hospitalist Progress Note        PCP: Nury Carmona MD    Date of Admission: 8/22/2024    Length of Stay: 1    Chief Complaint: nausea, few episodes of emesis, fall 1.5 weeks ago with diffuse bruising - now resolving, generalized weakness and ambulatory dysfunction.     NSCLC patient on chemotherapy.         Subjective:     Pt reports she feels better today.   No cough.   No fever - reports had fever up to 101F+ at home.         Medications:  Reviewed    Infusion Medications    sodium chloride      sodium chloride 105 mL/hr at 08/23/24 0519     Scheduled Medications    budesonide  0.5 mg Nebulization BID RT    arformoterol tartrate  15 mcg Nebulization BID RT    vitamin D  5,000 Units Oral Daily    thyroid  60 mg Oral QAM AC    pantoprazole  20 mg Oral QAM AC    omega-3 acid ethyl esters  1 capsule Oral BID    atorvastatin  10 mg Oral Nightly    losartan  100 mg Oral Daily    sodium chloride flush  5-40 mL IntraVENous 2 times per day    enoxaparin  30 mg SubCUTAneous Daily    cefepime  2,000 mg IntraVENous Q12H    vancomycin (VANCOCIN) intermittent dosing (placeholder)   Other RX Placeholder    vancomycin  750 mg IntraVENous Q24H     PRN Meds: metoclopramide, ondansetron, droPERidol, albuterol, calcium carbonate, melatonin, polyethylene glycol, lidocaine, sodium chloride flush, sodium chloride, acetaminophen      Intake/Output Summary (Last 24 hours) at 8/23/2024 1210  Last data filed at 8/22/2024 2206  Gross per 24 hour   Intake --   Output 100 ml   Net -100 ml       Physical Exam Performed:    BP (!) 141/62   Pulse (!) 113   Temp 98.2 °F (36.8 °C) (Temporal)   Resp 20   Ht 1.524 m (5')   Wt 45.4 kg (100 lb)   SpO2 96%   BMI 19.53 kg/m²     General appearance: No apparent distress, appears stated age and cooperative.  HEENT: Pupils equal, round, and reactive to light. Conjunctivae/corneas clear.  Neck: Supple, with full range of motion. No jugular venous distention. Trachea midline.  Respiratory:   Normal respiratory effort. Clear to auscultation, bilaterally without Rales/Wheezes/Rhonchi.  Cardiovascular: Regular rate and rhythm with normal S1/S2 without murmurs, rubs or gallops.  Abdomen: Soft, non-tender, non-distended with normal bowel sounds.  Musculoskeletal: No clubbing, cyanosis or edema bilaterally.  Full range of motion without deformity.  Skin: Skin color, texture, turgor normal.  No rashes or lesions.  Neurologic:  Neurovascularly intact without any focal sensory/motor deficits. Cranial nerves: II-XII intact, grossly non-focal.  Psychiatric: Alert and oriented, thought content appropriate, normal insight  Capillary Refill: Brisk, 3 seconds, normal   Peripheral Pulses: +2 palpable, equal bilaterally       Labs:   Recent Labs     08/22/24  1823 08/23/24  0950   WBC 12.9* 14.8*   HGB 9.6* 8.3*   HCT 29.3* 24.9*    130     Recent Labs     08/22/24 1935 08/23/24  0552   * 135*   K 5.0 4.8   CL 95* 102   CO2 22 19*   BUN 35* 30*   CREATININE 1.1* 1.0*   CALCIUM 10.2 9.0   PHOS 3.8  --      Recent Labs     08/22/24 1935   AST 78*   ALT 38*   BILITOT 0.3   ALKPHOS 134*     No results for input(s): \"INR\" in the last 72 hours.  Recent Labs     08/22/24 1935   CKTOTAL 13*       Urinalysis:      Lab Results   Component Value Date/Time    NITRU Negative 08/22/2024 10:10 PM    WBCUA 40 08/22/2024 10:10 PM    BACTERIA 4+ 08/22/2024 10:10 PM    RBCUA 1 08/22/2024 10:10 PM    BLOODU Negative 08/22/2024 10:10 PM    SPECGRAV 1.020 11/13/2023 12:00 AM    GLUCOSEU Negative 08/22/2024 10:10 PM       Radiology:  XR CHEST PORTABLE   Final Result       1. Chronic blunting left costophrenic sulcus.                       ______________________________________    Electronically signed by: DWIGHT VOGEL M.D.   Date:     08/22/2024   Time:    19:55       US RENAL COMPLETE    (Results Pending)       IP CONSULT TO ONCOLOGY  IP CONSULT TO PHARMACY  PALLIATIVE CARE EVAL    Assessment/Plan:    Active Hospital Problems

## 2024-08-23 NOTE — PROGRESS NOTES
[facility]   Pharmacy Pharmacokinetic Monitoring Service - Vancomycin     Nikkie Banks is a 74 y.o. female starting on vancomycin therapy for sepsis. Pharmacy consulted by Dr Cole for monitoring and adjustment.    Target Concentration: Goal AUC/SAHRA 400-600 mg*hr/L    Additional Antimicrobials: cefepime    Pertinent Laboratory Values:   Wt Readings from Last 1 Encounters:   08/22/24 45.4 kg (100 lb)     Temp Readings from Last 1 Encounters:   08/22/24 98.2 °F (36.8 °C)     Estimated Creatinine Clearance: 32 mL/min (A) (based on SCr of 1.1 mg/dL (H)).  Recent Labs     08/22/24  1823 08/22/24  1935   CREATININE  --  1.1*   BUN  --  35*   WBC 12.9*  --      Procalcitonin: 0.20    Pertinent Cultures:  Culture Date Source Results   8/22/24 Blood x2 Sent    MRSA Nasal Swab: N/A. Non-respiratory infection.    Plan:  Dosing recommendations based on Bayesian software  Start vancomycin 1250mg IV load followed by 750mg IV q24h  Anticipated AUC of 499 and trough concentration of 13.7 at steady state  Renal labs as indicated   Vancomycin concentration ordered for 8/24 @ 2030   Pharmacy will continue to monitor patient and adjust therapy as indicated    Thank you for the consult,  Lindsey Kahn RPH  8/22/2024 8:47 PM

## 2024-08-23 NOTE — PROGRESS NOTES
Pt PIV infiltrated. She states that she has \"been stuck 5 times\" in attempts to place an IV in ER. She said that Dr. Valdivia has told her not to have her port accessed. CN notified.       Electronically signed by Padmini Linda RN on 8/22/2024 at 10:21 PM

## 2024-08-23 NOTE — H&P
[facility]      H[facility]ist - History & Physical      PCP: Nury Carmona MD    Date of Admission: 8/22/2024    Date of Service: 8/22/2024    Chief Complaint:  Fall    History Of Present Illness:   The patient is a 74 y.o. female with hypothyroidism, HTN, hypercholesterolemia, stage IV non-small cell lung CA comes to ED for evaluation after a fall about a week ago. Patient complains of fatigue, nausea, and decreased appetite. Patient reports that she has been so weak she fell and hit her head about a week ago. She was not seen at that time but did reach out to Dr. Brooks's office the next day and has had an MRI a few days later that was negative for a bleed. Her last infusion was at least 3 weeks ago. She had had increasing weakness and fatigue and was unable to get out of bed today so she came in for evaluation.     In ED: CXR for chronic blunting of left costophrenic sulcus; sodium 130, creatinine 1.1, BUN 35, GFR 53, procalcitonin 0.24, , CK 13, ALT 38, AST 78, lipase 150, TSH 5.46, T4 0.84, B12 1601, WBC 12.9, Hgb 9.6, UA with 40 WBC and 4+ bacteria. Will admit to hospitalist with consult to hem/onc.     Past Medical History:        Diagnosis Date    Acquired hypothyroidism 08/20/2017    Alopecia 08/21/2017    Breast implant capsular contracture     \"with cysts behind right breast\" per pt.     Esophageal mass 01/31/2023    Essential hypertension 08/20/2017    Familial hypercholesterolemia 08/20/2017    Lung cancer (HCC) 01/01/2023    Liver Cancer    Lung mass 01/31/2023    Nausea & vomiting     recent inpatient admission after chemo; seeing dr. brooks    Nodule of chest wall 05/10/2021    Palliative care patient 03/05/2024    PONV (postoperative nausea and vomiting)     Prolonged emergence from general anesthesia     Stage 1 chronic kidney disease 06/29/2021    Tobacco abuse, in remission        Past Surgical History:        Procedure Laterality Date    BREAST CYST EXCISION  2022    BREAST  tablet by mouth every 6 hours as needed for Nausea or Vomiting 1/26/24   Jennifer Valdivia MD   albuterol sulfate HFA (PROVENTIL HFA) 108 (90 Base) MCG/ACT inhaler Inhale 2 puffs into the lungs every 6 hours as needed for Wheezing 8/15/23   Lovely Ayala APRN   Omega 3 1200 MG CAPS Take 1 capsule by mouth in the morning and 1 capsule in the evening.    ProviderSujey MD   fluticasone-salmeterol (ADVAIR) 500-50 MCG/DOSE diskus inhaler Inhale 1 puff into the lungs daily as needed    ProviderSujey MD   calcium carbonate-vitamin D 600-200 MG-UNIT TABS Take 1 tablet by mouth as needed    Sujey Hummel MD       Allergies:    Compazine [prochlorperazine maleate], Prochlorperazine maleate, and Thorazine [chlorpromazine]    Social History:    The patient currently lives home.  Tobacco:   reports that she quit smoking about 41 years ago. Her smoking use included cigarettes. She started smoking about 51 years ago. She has a 30 pack-year smoking history. She has never used smokeless tobacco.  Alcohol:   reports that she does not currently use alcohol.  Illicit Drugs: denies    Family History:      Problem Relation Age of Onset    High Blood Pressure Mother     Diabetes Mother         Type 2    Obesity Mother     High Blood Pressure Father     Diabetes Sister         Type 2    Obesity Sister     Diabetes Sister     Obesity Sister     Diabetes Sister     Obesity Sister     Diabetes Brother         Type 2    Cancer Brother         Blood form of cancer-not leukemia     Obesity Brother     No Known Problems Son     No Known Problems Son        Review of Systems:   Review of Systems   Constitutional:  Positive for activity change, appetite change and fatigue.   Respiratory:  Negative for chest tightness, shortness of breath and wheezing.    Cardiovascular:  Negative for chest pain, palpitations and leg swelling.   Gastrointestinal:  Positive for nausea. Negative for abdominal pain and vomiting.  CHEST.  HISTORY:  History of lung cancer.  Weakness.  COMPARISON:  03/04/2024  FINDINGS:   Cardiac silhouette is normal.  No organized infiltrate/consolidation.  Chronic blunting left costophrenic sulcus similar to prior.  Right central line is present with the tip over the right atrium.  No acute osseous abnormality.       1. Chronic blunting left costophrenic sulcus.      ______________________________________ Electronically signed by: DWIGHT VOGEL M.D. Date:     08/22/2024 Time:    19:55       Assessment/Plan:  Principal Problem:    Generalized weakness/Sepsis (HCC) of UNKNOWN SOURCE   - Blood cultures done in ED: follow   - UA w/ reflex to culture done in ED: follow   - Lactic acid 1.6   - CBC w/ diff daily   - Cefepime 2g IV q12h for now   - Vancomycin: pharmacy to dose   - Urine strep and legionella add on to urine from ED   - IVF bolus 30 cc/kg   - IVF maintenance NS @ 105 cc/hr   - PT/OT eval and treat   - Up with assistance    Active Problems:    Cancer related pain   - Continue lidocaine prn    NSCLC of left lung    - Consult to oncology   - Brovana   - Pulmicort    Essential hypertension   - Continue losartan    Acquired hypothyroidism   - Continue ARMOUR    Vitamin D deficiency   - Continue vitamin D    Mixed hyperlipidemia   - Continue lovaza   - Continue lipitor    AURELIANO with history of Stage 1 chronic kidney disease   - Renal US in AM   - Daily weights   - Strict I/O's   - Avoid nephrotoxic agents   - No NSAIDs    Gastroesophageal reflux disease without esophagitis   - Sub pantoprazole for omeprazole    Nausea & vomiting   - Continue droperidol   - Reglan   - Zofran    Resolved Problems:    * No resolved hospital problems. *       DVT prophylaxis: lovenox 30 mg daily  GI prophylaxis: patient on PPI    Signed:  CHRIS Jin - CNP, 8/22/2024 8:12 PM

## 2024-08-23 NOTE — PROGRESS NOTES
This  visited with pt to provide support and spiritual care. Pt says she has non-small cell lung cancer stage IV.  Pt's progress notes says with liver metastasis. This  provided spiritual care with sustaining presence, nurtured hope, and prayer. Pt expressed gratitude for spiritual care.       Electronically signed by Mary Behrens on 8/23/2024 at 3:43 PM

## 2024-08-23 NOTE — PROGRESS NOTES
Pharmacy Renal Adjustment    Nikkie Banks is a 74 y.o. female. Pharmacy has renally adjusted medications per protocol.    Recent Labs     08/22/24 1935 08/23/24  0552   BUN 35* 30*       Recent Labs     08/22/24 1935 08/23/24  0552   CREATININE 1.1* 1.0*       Estimated Creatinine Clearance: 35 mL/min (A) (based on SCr of 1 mg/dL (H)).    Height:   Ht Readings from Last 1 Encounters:   08/22/24 1.524 m (5')     Weight:  Wt Readings from Last 1 Encounters:   08/22/24 45.4 kg (100 lb)         Plan: Adjust the following medications based on renal function:           Change reglan to 5mg IV every 6 hours as needed    LUIS ANTONIO HODGSON, PHARM D, 8/23/2024, 10:16 AM

## 2024-08-23 NOTE — CONSULTS
MEDICAL ONCOLOGY CONSULTATION    Pt Name: Nikkie Banks  MRN: [id number]  YOB: 1950  Date of evaluation: 8/23/2024    REASON FOR CONSULTATION:  Lung cancer, continuity of care  REQUESTING PHYSICIAN: Hospitalist    History Obtained From:    patient, electronic medical record    HISTORY OF PRESENT ILLNESS:   The patient is a 74 years old female who is treated for EGFR positive lung cancer.  The patient had progression of disease with transformation to small cell lung cancer.  Patient is currently receiving maintenance treatment with atezolizumab.  The patient has evidence of liver metastasis.  She presented to the ER department on 8/22/2024 with complaints of generalized weakness, fatigue, nausea, decreased appetite.  Symptoms has been going on for about a week.  Patient had a fall about a week ago and hit her head.  Patient had a brain MRI that showed punctate focus of diffusion restriction in the left parietal parasagittal parietal lobe which may be artifactual or represent an acute infarct.  No intracranial metastasis.  Laboratory studies showed WBC 12.9, hemoglobin 9.6, platelet count 79,000, folate 19, vitamin B12 1601.  Sodium 130, potassium 5.0, creatinine 1.1, elevated procalcitonin, alkaline phosphatase 134, ALT 38, AST 78, lipase 150, free T40.84, TSH 5.46.  The patient was hospitalized and started on IV fluids I was consulted for continuity of care        PRIOR NEUROLOGICAL HISTORY    The patient is a 74 years old female who has a diagnosis of non-small cell lung cancer, adenocarcinoma EGFR mutated.  She is currently receiving palliative chemotherapy with carboplatin and etoposide with 20% dose reduction.  She has completed 6 treatments carboplatin, etoposide and Tecentriq.  She has tolerated treatment complains significant fatigue and anemia.  She is here today for maintenance atezolizumab.  She had repeat CT chest abdomen pelvis to assess response.     Diagnosis  LLL lesion, Jan 2023  Liver  07:51     CT CHEST W CONTRAST    Result Date: 8/6/2024    EXAM: CT CHEST WITH CONTRAST  HISTORY:  Small cell lung cancer  COMPARISON:  CT chest 05/17/2024 and priors  TECHNIQUE:  Serial axial images of the chest were obtained after IV contrast was administered.  These were obtained from the lung apices to the upper abdomen.  FINDINGS:  The thyroid is normal.  There is a Port-A-Cath present.  Visualized vessels demonstrate mild atherosclerotic disease.  There is no dissection, aneurysm or stenosis.  The heart is normal in size without pericardial effusion.  There is no mediastinal, hilar or axillary pathologically enlarged lymph nodes.  There is a hiatal hernia.  There is no pneumothorax.  There is a small left pleural effusion.  There is consolidation in the lingula in the central left lower lobe.  There is mild emphysema.  There is a 0.4 cm ground-glass nodule in the right upper lobe anteriorly on image 31 which is stable.  There is a 0.3 cm right upper lobe pulmonary nodule on image 37 which is stable.  Additional stable right-sided pulmonary nodules measuring less than 0.5 cm.  There is no new pulmonary nodule.  The airways are patent.   The gallbladder demonstrates layering stones.  The osseous structures demonstrate multilevel anterior disc osteophytes.      1.  Stable small left pleural effusion with associated consolidation in the lingula and left lower lobe. 2.  Stable right-sided pulmonary nodules. 3.  Mild changes of emphysema. 4.  Hiatal hernia.  All CT scans are performed using dose optimization techniques as appropriate to the performed exam and include at least one of the following: Automated exposure control, adjustment of the mA and/or kV according to size, and the use of iterative reconstruction technique.  ______________________________________ Electronically signed by: OBDULIO DRIVER M.D. Date:     08/06/2024 Time:    08:00        ASSESSMENT:  # Non-small cell lung cancer with small cell  transformation, EGFR positive.  Currently on maintenance with atezolizumab.  Patient has stage IV disease.  Patient has liver metastasis    # Hyponatremia-sodium 130    # Profound fatigue-mild elevation of TSH but not significant.  Will check a.m. cortisol to rule out adrenal insufficiency.  The patient is on immunotherapy    # Mild renal impairment-      Latest Ref Rng & Units 8/22/2024     7:35 PM 8/9/2024     2:24 PM 8/7/2024     2:47 PM 7/16/2024     1:20 PM 7/10/2024    12:33 PM   Labs Renal   BUN 8 - 23 mg/dL 35  28  23  25  23    Cr 0.5 - 0.9 mg/dL 1.1  1.3  1.1  0.8  0.8    K 3.5 - 5.0 mmol/L 5.0  4.4  4.5  4.3  4.1    Na 136 - 145 mmol/L 130  135  132  133  133      # Mild transaminitis  Mild elevation AST/ALT and alk phos could be related to liver metastasis    # Mild neutrophil leukocytosis-likely reactive    # Normocytic anemia-likely related to antineoplastic agents  Continue to monitor    # Elevated lipase  Pancreatitis???  Prior CT scan showed cholelithiasis    # UTI  Abnormal UA  Awaiting urine culture  Blood cultures in process  Patient started on cefepime  Viral respiratory panel-negative      The patient presented with profound fatigue.  The patient has been afebrile.  She was started on IV fluids and broad-spectrum to biotics with vancomycin cefepime.  An infectious sources is not clear but could be related to UTI.  She had a normal chest x-ray.  UA did show elevated WBC and moderate leukocyte esterase.    PLAN:  Continue current supportive care  A.m. cortisol to rule out adrenal insufficiency  Continue to monitor CBC and lipase  Continue antibiotics per your discretion  Continue IV fluids  Care plan discussed with patient and  this morning    I have seen, examined and reviewed this patient medication list, appropriate labs and imaging studies. I reviewed relevant medical records and others physician’s notes. I discussed the plans of care with the patient. I answered all the questions to

## 2024-08-23 NOTE — CONSULTS
Palliative Care:    Pt known to Palliative Care.    73 y/o female with history of stage IV non-small cell lung cancer with liver metastasis.     She presented to ED with increased weakness, nausea and decreased appetite.  Pt is on maintenance chemo and followed by Dr. Brooks.  Pleasant lady who was resting in bed on room air and denies any pain at this time.  She is receiving IV fluids and IV antibiotics.    Pt admits to poor appetite, but tries to take in at least 2 boost supplements daily.    She has good family support and spiritual support from her leisa community.  Discussed the out patient palliative care program again with benefits of having them follow her at home.  She had a consult placed prior.  She states she is aware and will reach out if she elects to have them admit her to service.    Past Medical History:        Past Medical History:   Diagnosis Date    Acquired hypothyroidism 08/20/2017    Alopecia 08/21/2017    Breast implant capsular contracture     \"with cysts behind right breast\" per pt.     Esophageal mass 01/31/2023    Essential hypertension 08/20/2017    Familial hypercholesterolemia 08/20/2017    Lung cancer (HCC) 01/01/2023    Liver Cancer    Lung mass 01/31/2023    Nausea & vomiting     recent inpatient admission after chemo; seeing dr. brooks    Nodule of chest wall 05/10/2021    Palliative care patient 03/05/2024    PONV (postoperative nausea and vomiting)     Prolonged emergence from general anesthesia     Stage 1 chronic kidney disease 06/29/2021    Tobacco abuse, in remission        Advance Directives:    DNR  Pt has advance directive documents on file.  ACP note reviewed and no changes.                   Plan:  continue medical treatments, monitor labs.           Patient/family discussion r/t goals:           (what does living well look like to you?       Goal is to return home with her  when stable to discharge.         Palliative Performance Scale:     60%

## 2024-08-23 NOTE — PROGRESS NOTES
Comprehensive Nutrition Assessment    Type and Reason for Visit:  Initial, Positive Nutrition Screen    Nutrition Recommendations/Plan:   Modify ONS to BID per pt preference.  Food preferences updated.  Dinner meal ticket updated per pt request.  Add mild malnutrition to problem list.     Malnutrition Assessment:  Malnutrition Status:  Mild malnutrition (08/23/24 2317)    Context:  Chronic Illness     Findings of the 6 clinical characteristics of malnutrition:  Energy Intake:  Unable to assess  Weight Loss:  Greater than 5% over 1 month     Body Fat Loss:  Unable to assess     Muscle Mass Loss:  Unable to assess    Fluid Accumulation:  No significant fluid accumulation     Strength:  Not Performed    Nutrition Assessment:    +NS for reported wt loss and decreased appetite. Aware pt is receiving chemo for lung CA and liver mets. Currently receiving a Regular diet w/Ensure Plus TID. IVF for hydration. Pt c/o a rotten taste in her mouth but stated that she does try to practice oral hygiene. Food preferences provided: likes cottage cheese, fruit (peaches), oats, rice, cream of wheat, cereal, apples, sweets, popsicles, sherbet, chocolate milk. Pt also reported drinking 2 chocolate Boost ONS at home at breakfast and in mid-afternoon, but she is agreeable to receive chocolate Ensure during admission; will modify ONS order to BID per pt's routine. Wt hx shows significant wt loss in 1 and 6 months; pt meets criteria for mild malnutrition. Meal ticket updated per pt request for 2 chocolate milks, chocolate ice cream, and applesauce w/dinner tonight.    Nutrition Related Findings:    Na 135, liver enzymes elevated.         Current Nutrition Intake & Therapies:    Average Meal Intake: Unable to assess  Average Supplements Intake: Unable to assess  ADULT DIET; Regular  ADULT ORAL NUTRITION SUPPLEMENT; Breakfast, Lunch; Standard High Calorie/High Protein Oral Supplement    Anthropometric Measures:  Height: 152.4 cm

## 2024-08-24 VITALS
HEIGHT: 60 IN | BODY MASS INDEX: 19.63 KG/M2 | HEART RATE: 102 BPM | OXYGEN SATURATION: 100 % | DIASTOLIC BLOOD PRESSURE: 59 MMHG | SYSTOLIC BLOOD PRESSURE: 117 MMHG | WEIGHT: 100 LBS | RESPIRATION RATE: 20 BRPM | TEMPERATURE: 98.1 F

## 2024-08-24 LAB
ALBUMIN SERPL-MCNC: 2.9 G/DL (ref 3.5–5.2)
ALP SERPL-CCNC: 97 U/L (ref 35–104)
ALT SERPL-CCNC: 29 U/L (ref 5–33)
ANION GAP SERPL CALCULATED.3IONS-SCNC: 9 MMOL/L (ref 7–19)
ANISOCYTOSIS BLD QL SMEAR: ABNORMAL
AST SERPL-CCNC: 57 U/L (ref 5–32)
BASOPHILS # BLD: 0.3 K/UL (ref 0–0.2)
BASOPHILS NFR BLD: 2 % (ref 0–1)
BILIRUB DIRECT SERPL-MCNC: 0.1 MG/DL (ref 0–0.3)
BILIRUB INDIRECT SERPL-MCNC: 0.2 MG/DL (ref 0–1)
BILIRUB SERPL-MCNC: 0.3 MG/DL (ref 0.2–1.2)
BUN SERPL-MCNC: 23 MG/DL (ref 8–23)
CALCIUM SERPL-MCNC: 9.3 MG/DL (ref 8.8–10.2)
CHLORIDE SERPL-SCNC: 105 MMOL/L (ref 98–111)
CO2 SERPL-SCNC: 21 MMOL/L (ref 22–29)
CREAT SERPL-MCNC: 0.8 MG/DL (ref 0.5–0.9)
EOSINOPHIL # BLD: 5.9 K/UL (ref 0–0.6)
EOSINOPHIL NFR BLD: 45 % (ref 0–5)
ERYTHROCYTE [DISTWIDTH] IN BLOOD BY AUTOMATED COUNT: 17.1 % (ref 11.5–14.5)
GLUCOSE SERPL-MCNC: 111 MG/DL (ref 70–99)
HCT VFR BLD AUTO: 23.2 % (ref 37–47)
HGB BLD-MCNC: 7.8 G/DL (ref 12–16)
IMM GRANULOCYTES # BLD: 0 K/UL
LIPASE SERPL-CCNC: 116 U/L (ref 13–60)
LYMPHOCYTES # BLD: 0.7 K/UL (ref 1.1–4.5)
LYMPHOCYTES NFR BLD: 5 % (ref 20–40)
MCH RBC QN AUTO: 31.3 PG (ref 27–31)
MCHC RBC AUTO-ENTMCNC: 33.6 G/DL (ref 33–37)
MCV RBC AUTO: 93.2 FL (ref 81–99)
MONOCYTES # BLD: 1.2 K/UL (ref 0–0.9)
MONOCYTES NFR BLD: 9 % (ref 0–10)
NEUTROPHILS # BLD: 5.1 K/UL (ref 1.5–7.5)
NEUTS SEG NFR BLD: 39 % (ref 50–65)
PLATELET # BLD AUTO: 119 K/UL (ref 130–400)
PLATELET SLIDE REVIEW: ABNORMAL
PMV BLD AUTO: 10.5 FL (ref 9.4–12.3)
POTASSIUM SERPL-SCNC: 4.5 MMOL/L (ref 3.5–5)
PROT SERPL-MCNC: 4.4 G/DL (ref 6.6–8.7)
RBC # BLD AUTO: 2.49 M/UL (ref 4.2–5.4)
SODIUM SERPL-SCNC: 135 MMOL/L (ref 136–145)
WBC # BLD AUTO: 13.1 K/UL (ref 4.8–10.8)

## 2024-08-24 PROCEDURE — 99232 SBSQ HOSP IP/OBS MODERATE 35: CPT | Performed by: INTERNAL MEDICINE

## 2024-08-24 PROCEDURE — 83690 ASSAY OF LIPASE: CPT

## 2024-08-24 PROCEDURE — 85025 COMPLETE CBC W/AUTO DIFF WBC: CPT

## 2024-08-24 PROCEDURE — 80048 BASIC METABOLIC PNL TOTAL CA: CPT

## 2024-08-24 PROCEDURE — 94760 N-INVAS EAR/PLS OXIMETRY 1: CPT

## 2024-08-24 PROCEDURE — 6370000000 HC RX 637 (ALT 250 FOR IP): Performed by: HOSPITALIST

## 2024-08-24 PROCEDURE — 94640 AIRWAY INHALATION TREATMENT: CPT

## 2024-08-24 PROCEDURE — 80076 HEPATIC FUNCTION PANEL: CPT

## 2024-08-24 PROCEDURE — 6360000002 HC RX W HCPCS: Performed by: HOSPITALIST

## 2024-08-24 PROCEDURE — 2580000003 HC RX 258: Performed by: HOSPITALIST

## 2024-08-24 PROCEDURE — 36415 COLL VENOUS BLD VENIPUNCTURE: CPT

## 2024-08-24 PROCEDURE — 6360000002 HC RX W HCPCS: Performed by: INTERNAL MEDICINE

## 2024-08-24 RX ORDER — LEVOFLOXACIN 500 MG/1
500 TABLET, FILM COATED ORAL DAILY
Qty: 5 TABLET | Refills: 0 | Status: SHIPPED | OUTPATIENT
Start: 2024-08-24 | End: 2024-08-29

## 2024-08-24 RX ORDER — HEPARIN 100 UNIT/ML
300 SYRINGE INTRAVENOUS ONCE
Status: COMPLETED | OUTPATIENT
Start: 2024-08-24 | End: 2024-08-24

## 2024-08-24 RX ADMIN — ENOXAPARIN SODIUM 30 MG: 100 INJECTION SUBCUTANEOUS at 09:11

## 2024-08-24 RX ADMIN — ARFORMOTEROL TARTRATE 15 MCG: 15 SOLUTION RESPIRATORY (INHALATION) at 06:19

## 2024-08-24 RX ADMIN — PANTOPRAZOLE SODIUM 20 MG: 20 TABLET, DELAYED RELEASE ORAL at 05:25

## 2024-08-24 RX ADMIN — CEFEPIME 2000 MG: 2 INJECTION, POWDER, FOR SOLUTION INTRAVENOUS at 09:15

## 2024-08-24 RX ADMIN — OMEGA-3-ACID ETHYL ESTERS CAPSULES 1 CAPSULE: 1 CAPSULE, LIQUID FILLED ORAL at 09:11

## 2024-08-24 RX ADMIN — THYROID 60 MG: 60 TABLET ORAL at 05:25

## 2024-08-24 RX ADMIN — SODIUM CHLORIDE: 9 INJECTION, SOLUTION INTRAVENOUS at 06:30

## 2024-08-24 RX ADMIN — BUDESONIDE INHALATION 500 MCG: 0.5 SUSPENSION RESPIRATORY (INHALATION) at 06:19

## 2024-08-24 RX ADMIN — HEPARIN 300 UNITS: 100 SYRINGE at 12:25

## 2024-08-24 RX ADMIN — Medication 5000 UNITS: at 09:11

## 2024-08-24 RX ADMIN — LOSARTAN POTASSIUM 100 MG: 100 TABLET, FILM COATED ORAL at 09:11

## 2024-08-24 NOTE — PROGRESS NOTES
MEDICAL ONCOLOGY PROGRESS NOTE    Pt Name: Nikkie Banks  MRN: [id number]  YOB: 1950  Date of evaluation: 8/24/2024    Subjective-she is afebrile.  She is feeling much better.  Reviewed interval notes internal medicine.  Patient going home today on oral antibiotics.    HISTORY OF PRESENT ILLNESS:   The patient is a 74 years old female who is treated for EGFR positive lung cancer.  The patient had progression of disease with transformation to small cell lung cancer.  Patient is currently receiving maintenance treatment with atezolizumab.  The patient has evidence of liver metastasis.  She presented to the ER department on 8/22/2024 with complaints of generalized weakness, fatigue, nausea, decreased appetite.  Symptoms has been going on for about a week.  Patient had a fall about a week ago and hit her head.  Patient had a brain MRI that showed punctate focus of diffusion restriction in the left parietal parasagittal parietal lobe which may be artifactual or represent an acute infarct.  No intracranial metastasis.  Laboratory studies showed WBC 12.9, hemoglobin 9.6, platelet count 79,000, folate 19, vitamin B12 1601.  Sodium 130, potassium 5.0, creatinine 1.1, elevated procalcitonin, alkaline phosphatase 134, ALT 38, AST 78, lipase 150, free T40.84, TSH 5.46.  The patient was hospitalized and started on IV fluids I was consulted for continuity of care        PRIOR NEUROLOGICAL HISTORY    The patient is a 74 years old female who has a diagnosis of non-small cell lung cancer, adenocarcinoma EGFR mutated.  She is currently receiving palliative chemotherapy with carboplatin and etoposide with 20% dose reduction.  She has completed 6 treatments carboplatin, etoposide and Tecentriq.  She has tolerated treatment complains significant fatigue and anemia.  She is here today for maintenance atezolizumab.  She had repeat CT chest abdomen pelvis to assess response.     Diagnosis  LLL lesion, Jan 2023  Liver  metastasis  NSCLC-adenocarcinoma, Feb 2023  Moderately differentiated   Stage IV (liver mets), [id number] (liver)  Caris: QNS  NGS- [facility]: EGFR exon 19-positive  Liver metastasis-> small cell lung cancer, stage IV     Treatment summary  2/20/23 Initiated Osimertinib 80 mg p.o. daily  10/31/23-11/8/23 SBRT LLL 5000 cGy over 4 treatments  2/21/24 Initiate Carboplatin AUC 5 D 1, Etoposide 100mg/m2 D1-3, Atezolizumab 1200mg every 3 weeks x4-6 cycles and continue Osimertinib 80 mg daily  3/27/24 Decrease Carbo to AUC 4 and Etoposide to 80m/m2 and add GCSF/Peg-filgrastim to each treatment due to hospitalization for neutropenic fever  Anticipate maintenance Tecentriq 1200mg every 3 weeks     Cancer history:  Nikkie Banks was first seen by me on 1/31/2023.  I was consulted for abnormal CT scan findings of a lung mass, mediastinal adenopathy and a lower esophageal mass.  She presented to the ER department on 1/30/2023 at [facility] with complaints of back pain.  She has a history of hypertension, hypothyroidism, obesity, hyperlipidemia and chronic kidney disease.  The pain started about 2-3 days before she comes to the hospital.  Denies any shortness of breath.  Denies any fever or chills.  Imaging studies were performed emergency.  1/30/2023-CTA chest showed no evidence of pulmonary embolism. Lungs: Enhancing solid lesion posteriorly within the left lower lobe. Measures 3.2 x 4.3 x 3.7 cm. Subcarinal lymph node measures 1.3 x 1.2 cm. Asymmetric an irregular mucosal thickening of the distal esophagus. Esophageal mucosal mass is suspected in the distal left aspect measuring 2.4 x 1.8 cm. Diffuse metastatic disease suspected to the liver. I was consulted for the above findings.   1/31/2023-she was seen by GI at .  Endoscopy showed no evidence of esophageal lesion.  1/31/2023-CT abdomen/pelvis with oral/IV contrast showed multiple liver lesions with the largest lesion measured 2.5 cm and 1.9 cm in the right hepatic  There is no pneumothorax.  There is a small left pleural effusion.  There is consolidation in the lingula in the central left lower lobe.  There is mild emphysema.  There is a 0.4 cm ground-glass nodule in the right upper lobe anteriorly on image 31 which is stable.  There is a 0.3 cm right upper lobe pulmonary nodule on image 37 which is stable.  Additional stable right-sided pulmonary nodules measuring less than 0.5 cm.  There is no new pulmonary nodule.  The airways are patent.   The gallbladder demonstrates layering stones.  The osseous structures demonstrate multilevel anterior disc osteophytes.      1.  Stable small left pleural effusion with associated consolidation in the lingula and left lower lobe. 2.  Stable right-sided pulmonary nodules. 3.  Mild changes of emphysema. 4.  Hiatal hernia.  All CT scans are performed using dose optimization techniques as appropriate to the performed exam and include at least one of the following: Automated exposure control, adjustment of the mA and/or kV according to size, and the use of iterative reconstruction technique.  ______________________________________ Electronically signed by: OBDULIO DRIVER M.D. Date:     08/06/2024 Time:    08:00        ASSESSMENT:  # Non-small cell lung cancer with small cell transformation, EGFR positive.  Currently on maintenance with atezolizumab.  Patient has stage IV disease.  Patient has liver metastasis  Treatment is on hold    # Hyponatremia-sodium 135.  Improved.  Continue to monitor    # Profound fatigue-mild elevation of TSH but not significant.  A.m. cortisol was normal.  Lab Results   Component Value Date    TSH 3.340 08/07/2024    TSHFT4 5.46 (H) 08/22/2024     # Mild renal impairment-resolved      Latest Ref Rng & Units 8/24/2024     4:45 AM 8/23/2024     5:52 AM 8/22/2024     7:35 PM 8/9/2024     2:24 PM 8/7/2024     2:47 PM   Labs Renal   BUN 8 - 23 mg/dL 23  30  35  28  23    Cr 0.5 - 0.9 mg/dL 0.8  1.0  1.1  1.3  1.1    K

## 2024-08-24 NOTE — PLAN OF CARE
Problem: Safety - Adult  Goal: Free from fall injury  Outcome: Progressing  Flowsheets (Taken 8/23/2024 2205 by Yuval Beatty, RN)  Free From Fall Injury: Based on caregiver fall risk screen, instruct family/caregiver to ask for assistance with transferring infant if caregiver noted to have fall risk factors     Problem: ABCDS Injury Assessment  Goal: Absence of physical injury  Outcome: Progressing     Problem: Pain  Goal: Verbalizes/displays adequate comfort level or baseline comfort level  Outcome: Progressing

## 2024-08-24 NOTE — PLAN OF CARE
Problem: Safety - Adult  Goal: Free from fall injury  8/24/2024 1124 by Santa Ibarra RN  Outcome: Adequate for Discharge  8/24/2024 0116 by Lam Eldridge RN  Outcome: Progressing  Flowsheets (Taken 8/23/2024 2205 by Yuval Beatty, RN)  Free From Fall Injury: Based on caregiver fall risk screen, instruct family/caregiver to ask for assistance with transferring infant if caregiver noted to have fall risk factors     Problem: ABCDS Injury Assessment  Goal: Absence of physical injury  8/24/2024 1124 by Santa Ibarra RN  Outcome: Adequate for Discharge  8/24/2024 0116 by Lam Eldridge RN  Outcome: Progressing     Problem: Pain  Goal: Verbalizes/displays adequate comfort level or baseline comfort level  8/24/2024 1124 by Santa Ibarra RN  Outcome: Adequate for Discharge  8/24/2024 0116 by Lam Eldridge RN  Outcome: Progressing     Problem: Nutrition Deficit:  Goal: Optimize nutritional status  Outcome: Adequate for Discharge

## 2024-08-24 NOTE — DISCHARGE SUMMARY
Hospital Medicine Discharge Summary    Patient ID: Nikkie Banks      Patient's PCP: Nury Carmona MD    Admit Date: 8/22/2024     Discharge Date:   8/24/2024    Admitting Provider: No admitting provider for patient encounter.     Discharge Provider: Zoya Bruner MD     Discharge Diagnoses:       Active Hospital Problems    Diagnosis     NSCLC of left lung (HCC) [C34.92]      Priority: Medium    Cancer related pain [G89.3]      Priority: Medium    Mild malnutrition (HCC) [E44.1]     Generalized weakness [R53.1]     Sepsis (HCC) of UNKNOWN SOURCE [A41.9]     Adverse effect of chemotherapy SUSPECTED [T45.1X5A]     Moderate malnutrition (HCC) [E44.0]     Nausea & vomiting [R11.2]     Small cell lung cancer (HCC) [C34.90]     Gastroesophageal reflux disease without esophagitis [K21.9]     Stage 1 chronic kidney disease [N18.1]     Mixed hyperlipidemia [E78.2]     Vitamin D deficiency [E55.9]     Acquired hypothyroidism [E03.9]     Essential hypertension [I10]        The patient was seen and examined on day of discharge and this discharge summary is in conjunction with any daily progress note from day of discharge.    Hospital Course: 75yo woman Hx of stage IV NSCLC with liver metastatic process on chemotherapy, presented to ED with weakness, nausea and emesis. Had a fall 1.5 weeks ago with multiple bruises getting better.         Sepsis POA secondary to UTI - can not rule out pyelonephritis.   Cultures pending.   Treated empiric w Vanc and Cefepime IV and transition to PO levaquin on discharge.   Renal US without  obstructive changes.         NSCLS on chemotherapy.   Oncology involved.         Nausea emesis - resolved.   Unclear if chemotherapy effect or secondary to UTI.   Better today.   Diet as tolerates and cont with supportive measures.                           Physical Exam Performed:     BP (!) 117/59   Pulse (!) 102   Temp 98.1 °F (36.7 °C) (Temporal)   Resp 20   Ht 1.524 m (5')

## 2024-08-25 LAB
BACTERIA UR CULT: ABNORMAL
BACTERIA UR CULT: ABNORMAL
ORGANISM: ABNORMAL

## 2024-08-26 ENCOUNTER — TELEPHONE (OUTPATIENT)
Dept: INTERNAL MEDICINE | Age: 74
End: 2024-08-26

## 2024-08-26 NOTE — TELEPHONE ENCOUNTER
Care Transitions Initial Follow Up Call    Outreach made within 2 business days of discharge: Yes  Workman's comp claim?no  Patient: Nikkie Banks   Patient : 1950   MRN: [id number]    Reason for Admission: Generalized weakness  Admission Date:24  Discharge Date: 24       Spoke with: Generic voicemail, unable to leave a message. First attempt    Discharge department/facility: [facility]              Scheduled appointment with PCP within 7-14 days    Follow Up  Future Appointments   Date Time Provider Department Center   2024  2:00 PM INFUSION SCHEDULE, [facility] MED ONC [facility] HOD   2024  2:00 PM INFUSION SCHEDULE, [facility] MED ONC [facility] HOD   12/3/2024  1:30 PM Nury Carmona MD [facility] DEP       Esther Tyler MA

## 2024-08-27 ENCOUNTER — TELEPHONE (OUTPATIENT)
Dept: INTERNAL MEDICINE | Age: 74
End: 2024-08-27

## 2024-08-27 LAB
BACTERIA BLD CULT ORG #2: NORMAL
BACTERIA BLD CULT: NORMAL

## 2024-08-27 NOTE — TELEPHONE ENCOUNTER
Care Transitions Initial Follow Up Call    Outreach made within 2 business days of discharge: Yes  Workman's comp claim?no  Patient: Nikkie Banks   Patient : 1950   MRN: [id number]    Reason for Admission: Generalized weakness  Admission Date:24  Discharge Date: 24       Spoke with: Nikkie Banks    Discharge department/facility: [facility] Interactive Patient Contact:  Was patient able to fill all prescriptions: No: the patient wasn't given any medications.  Was patient instructed to bring all medications to the follow-up visit: Yes  Is patient taking all medications as directed in the discharge summary? Yes  Does patient understand their discharge instructions: Yes  Does patient have questions or concerns that need addressed prior to 7-14 day follow up office visit: no    The patient reports she still has weakness and fatigue. She denies any nausea/vomiting. The patient hasn't had any further falls and her bruises are healing.        Scheduled appointment with PCP within 7-14 days    Follow Up  Future Appointments   Date Time Provider Department Center   2024  2:00 PM INFUSION SCHEDULE, [facility] MED ONC [facility]   2024  3:30 PM [facility]   2024  2:00 PM INFUSION SCHEDULE, [facility] MED ONC [facility]   12/3/2024  1:30 PM [facility]       Esther Tyler MA

## 2024-08-28 ENCOUNTER — HOSPITAL ENCOUNTER (OUTPATIENT)
Dept: INFUSION THERAPY | Age: 74
Discharge: HOME | End: 2024-08-28
Payer: MEDICARE

## 2024-08-28 VITALS
DIASTOLIC BLOOD PRESSURE: 60 MMHG | RESPIRATION RATE: 18 BRPM | TEMPERATURE: 97.9 F | OXYGEN SATURATION: 97 % | HEIGHT: 60 IN | HEART RATE: 111 BPM | BODY MASS INDEX: 19.94 KG/M2 | WEIGHT: 101.6 LBS | SYSTOLIC BLOOD PRESSURE: 100 MMHG

## 2024-08-28 DIAGNOSIS — C34.90 SMALL CELL LUNG CANCER (HCC): Primary | ICD-10-CM

## 2024-08-28 DIAGNOSIS — R53.83 FATIGUE DUE TO TREATMENT: ICD-10-CM

## 2024-08-28 DIAGNOSIS — C34.90 SMALL CELL LUNG CANCER (HCC): ICD-10-CM

## 2024-08-28 LAB
ALBUMIN SERPL-MCNC: 3 G/DL (ref 3.5–5.2)
ALP SERPL-CCNC: 101 U/L (ref 35–104)
ALT SERPL-CCNC: 33 U/L (ref 5–33)
ANION GAP SERPL CALCULATED.3IONS-SCNC: 15 MMOL/L (ref 7–19)
AST SERPL-CCNC: 60 U/L (ref 5–32)
BASOPHILS # BLD: 0.1 K/UL (ref 0.01–0.08)
BASOPHILS NFR BLD: 0.5 % (ref 0.1–1.2)
BILIRUB SERPL-MCNC: 0.3 MG/DL (ref 0–1.2)
BUN SERPL-MCNC: 20 MG/DL (ref 8–23)
CALCIUM SERPL-MCNC: 10 MG/DL (ref 8.8–10.2)
CHLORIDE SERPL-SCNC: 100 MMOL/L (ref 98–107)
CO2 SERPL-SCNC: 22 MMOL/L (ref 22–29)
CREAT SERPL-MCNC: 0.6 MG/DL (ref 0.5–0.9)
EOSINOPHIL # BLD: 9.16 K/UL (ref 0.04–0.54)
EOSINOPHIL NFR BLD: 49.6 % (ref 0.7–7)
ERYTHROCYTE [DISTWIDTH] IN BLOOD BY AUTOMATED COUNT: 18.1 % (ref 11.7–14.4)
GLUCOSE SERPL-MCNC: 100 MG/DL (ref 70–99)
HCT VFR BLD AUTO: 29.3 % (ref 34.1–44.9)
HGB BLD-MCNC: 10.2 G/DL (ref 11.2–15.7)
LYMPHOCYTES # BLD: 0.68 K/UL (ref 1.18–3.74)
LYMPHOCYTES NFR BLD: 3.7 % (ref 19.3–53.1)
MCH RBC QN AUTO: 31.9 PG (ref 25.6–32.2)
MCHC RBC AUTO-ENTMCNC: 34.8 G/DL (ref 32.3–35.5)
MCV RBC AUTO: 91.6 FL (ref 79.4–94.8)
MONOCYTES # BLD: 1.48 K/UL (ref 0.24–0.82)
MONOCYTES NFR BLD: 8 % (ref 4.7–12.5)
NEUTROPHILS # BLD: 6.96 K/UL (ref 1.56–6.13)
NEUTS SEG NFR BLD: 37.8 % (ref 34–71.1)
PLATELET # BLD AUTO: 134 K/UL (ref 182–369)
PMV BLD AUTO: 10.9 FL (ref 7.4–10.4)
POTASSIUM SERPL-SCNC: 4.6 MMOL/L (ref 3.5–5.1)
PROT SERPL-MCNC: 6 G/DL (ref 6.4–8.3)
RBC # BLD AUTO: 3.2 M/UL (ref 3.93–5.22)
SODIUM SERPL-SCNC: 137 MMOL/L (ref 136–145)
T4 FREE SERPL-MCNC: 0.97 NG/DL (ref 0.93–1.7)
TSH SERPL DL<=0.005 MIU/L-ACNC: 7.36 UIU/ML (ref 0.27–4.2)
WBC # BLD AUTO: 18.45 K/UL (ref 3.98–10.04)

## 2024-08-28 PROCEDURE — 96413 CHEMO IV INFUSION 1 HR: CPT

## 2024-08-28 PROCEDURE — 2580000003 HC RX 258: Performed by: INTERNAL MEDICINE

## 2024-08-28 PROCEDURE — 80053 COMPREHEN METABOLIC PANEL: CPT

## 2024-08-28 PROCEDURE — 36415 COLL VENOUS BLD VENIPUNCTURE: CPT

## 2024-08-28 PROCEDURE — 6360000002 HC RX W HCPCS: Performed by: INTERNAL MEDICINE

## 2024-08-28 PROCEDURE — 85025 COMPLETE CBC W/AUTO DIFF WBC: CPT

## 2024-08-28 RX ORDER — EPINEPHRINE 1 MG/ML
0.3 INJECTION, SOLUTION, CONCENTRATE INTRAVENOUS PRN
Status: CANCELLED | OUTPATIENT
Start: 2024-08-28

## 2024-08-28 RX ORDER — FAMOTIDINE 10 MG/ML
20 INJECTION, SOLUTION INTRAVENOUS
Status: CANCELLED | OUTPATIENT
Start: 2024-08-28

## 2024-08-28 RX ORDER — DIPHENHYDRAMINE HYDROCHLORIDE 50 MG/ML
50 INJECTION INTRAMUSCULAR; INTRAVENOUS
Status: CANCELLED | OUTPATIENT
Start: 2024-08-28

## 2024-08-28 RX ORDER — SODIUM CHLORIDE 0.9 % (FLUSH) 0.9 %
5-40 SYRINGE (ML) INJECTION PRN
Status: CANCELLED | OUTPATIENT
Start: 2024-08-28

## 2024-08-28 RX ORDER — ACETAMINOPHEN 325 MG/1
650 TABLET ORAL
Status: CANCELLED
Start: 2024-08-28

## 2024-08-28 RX ORDER — ALBUTEROL SULFATE 90 UG/1
4 AEROSOL, METERED RESPIRATORY (INHALATION) PRN
Status: CANCELLED | OUTPATIENT
Start: 2024-08-28

## 2024-08-28 RX ORDER — SODIUM CHLORIDE 9 MG/ML
5-250 INJECTION, SOLUTION INTRAVENOUS PRN
Status: CANCELLED | OUTPATIENT
Start: 2024-08-28

## 2024-08-28 RX ORDER — ONDANSETRON 2 MG/ML
8 INJECTION INTRAMUSCULAR; INTRAVENOUS
Status: CANCELLED | OUTPATIENT
Start: 2024-08-28

## 2024-08-28 RX ORDER — SODIUM CHLORIDE 0.9 % (FLUSH) 0.9 %
5-40 SYRINGE (ML) INJECTION PRN
Status: DISCONTINUED | OUTPATIENT
Start: 2024-08-28 | End: 2024-08-29 | Stop reason: HOSPADM

## 2024-08-28 RX ORDER — HEPARIN 100 UNIT/ML
500 SYRINGE INTRAVENOUS PRN
Status: CANCELLED | OUTPATIENT
Start: 2024-08-28

## 2024-08-28 RX ORDER — SODIUM CHLORIDE 9 MG/ML
INJECTION, SOLUTION INTRAVENOUS CONTINUOUS
Status: CANCELLED | OUTPATIENT
Start: 2024-08-28

## 2024-08-28 RX ORDER — ACETAMINOPHEN 325 MG/1
650 TABLET ORAL
Status: CANCELLED | OUTPATIENT
Start: 2024-08-28

## 2024-08-28 RX ORDER — HEPARIN 100 UNIT/ML
500 SYRINGE INTRAVENOUS PRN
Status: DISCONTINUED | OUTPATIENT
Start: 2024-08-28 | End: 2024-08-29 | Stop reason: HOSPADM

## 2024-08-28 RX ORDER — MEPERIDINE HYDROCHLORIDE 25 MG/ML
12.5 INJECTION INTRAMUSCULAR; INTRAVENOUS; SUBCUTANEOUS PRN
Status: CANCELLED | OUTPATIENT
Start: 2024-08-28

## 2024-08-28 RX ADMIN — SODIUM CHLORIDE, PRESERVATIVE FREE 10 ML: 5 INJECTION INTRAVENOUS at 16:11

## 2024-08-28 RX ADMIN — ATEZOLIZUMAB 1200 MG: 1200 INJECTION, SOLUTION INTRAVENOUS at 15:41

## 2024-08-28 RX ADMIN — HEPARIN 500 UNITS: 100 SYRINGE at 16:11

## 2024-08-28 NOTE — PROGRESS NOTES
Lab Results   Component Value Date    WBC 18.45 (H) 08/28/2024    HGB 10.2 (L) 08/28/2024    HCT 29.3 (L) 08/28/2024    MCV 91.6 08/28/2024     (L) 08/28/2024     Lab Results   Component Value Date    NEUTROABS 6.96 (H) 08/28/2024     Lab Results   Component Value Date     08/28/2024    K 4.6 08/28/2024     08/28/2024    CO2 22 08/28/2024    BUN 20 08/28/2024    CREATININE 0.6 08/28/2024    GLUCOSE 100 (H) 08/28/2024    CALCIUM 10.0 08/28/2024    BILITOT 0.3 08/28/2024    ALKPHOS 101 08/28/2024    AST 60 (H) 08/28/2024    ALT 33 08/28/2024    LABGLOM >90 08/28/2024    GFRAA >59 06/28/2022    GLOB 2.0 07/16/2024

## 2024-09-04 ENCOUNTER — OFFICE VISIT (OUTPATIENT)
Dept: INTERNAL MEDICINE | Age: 74
End: 2024-09-04

## 2024-09-04 VITALS
HEIGHT: 60 IN | OXYGEN SATURATION: 94 % | SYSTOLIC BLOOD PRESSURE: 104 MMHG | HEART RATE: 106 BPM | WEIGHT: 101 LBS | BODY MASS INDEX: 19.83 KG/M2 | DIASTOLIC BLOOD PRESSURE: 54 MMHG

## 2024-09-04 DIAGNOSIS — R65.21 SEPTIC SHOCK (HCC): ICD-10-CM

## 2024-09-04 DIAGNOSIS — A41.9 SEPTIC SHOCK (HCC): ICD-10-CM

## 2024-09-04 DIAGNOSIS — C34.92 NSCLC OF LEFT LUNG (HCC): Primary | ICD-10-CM

## 2024-09-04 DIAGNOSIS — N10 ACUTE PYELONEPHRITIS: ICD-10-CM

## 2024-09-11 PROBLEM — N30.00 ACUTE CYSTITIS WITHOUT HEMATURIA: Status: ACTIVE | Noted: 2024-09-11

## 2024-09-18 ENCOUNTER — HOSPITAL ENCOUNTER (OUTPATIENT)
Dept: INFUSION THERAPY | Age: 74
Discharge: HOME | End: 2024-09-18
Payer: MEDICARE

## 2024-09-18 ENCOUNTER — OFFICE VISIT (OUTPATIENT)
Dept: HEMATOLOGY | Age: 74
End: 2024-09-18
Payer: MEDICARE

## 2024-09-18 VITALS
OXYGEN SATURATION: 99 % | RESPIRATION RATE: 18 BRPM | DIASTOLIC BLOOD PRESSURE: 58 MMHG | BODY MASS INDEX: 19.04 KG/M2 | WEIGHT: 97 LBS | HEIGHT: 60 IN | HEART RATE: 103 BPM | SYSTOLIC BLOOD PRESSURE: 104 MMHG | TEMPERATURE: 99 F

## 2024-09-18 DIAGNOSIS — C34.90 SMALL CELL LUNG CANCER (HCC): ICD-10-CM

## 2024-09-18 DIAGNOSIS — T45.1X5A ANTINEOPLASTIC CHEMOTHERAPY INDUCED ANEMIA: ICD-10-CM

## 2024-09-18 DIAGNOSIS — Z71.89 CARE PLAN DISCUSSED WITH PATIENT: ICD-10-CM

## 2024-09-18 DIAGNOSIS — E87.1 HYPONATREMIA: ICD-10-CM

## 2024-09-18 DIAGNOSIS — D64.81 ANTINEOPLASTIC CHEMOTHERAPY INDUCED ANEMIA: ICD-10-CM

## 2024-09-18 DIAGNOSIS — R53.83 FATIGUE DUE TO TREATMENT: ICD-10-CM

## 2024-09-18 DIAGNOSIS — Z51.11 CHEMOTHERAPY MANAGEMENT, ENCOUNTER FOR: ICD-10-CM

## 2024-09-18 DIAGNOSIS — C34.90 SMALL CELL LUNG CANCER (HCC): Primary | ICD-10-CM

## 2024-09-18 DIAGNOSIS — T45.1X5D ADVERSE EFFECT OF CHEMOTHERAPY, SUBSEQUENT ENCOUNTER: ICD-10-CM

## 2024-09-18 DIAGNOSIS — Z87.898 HISTORY OF SYNCOPE: ICD-10-CM

## 2024-09-18 DIAGNOSIS — C34.90 EGFR-RELATED LUNG CANCER (HCC): ICD-10-CM

## 2024-09-18 DIAGNOSIS — Z51.12 ENCOUNTER FOR ANTINEOPLASTIC IMMUNOTHERAPY: ICD-10-CM

## 2024-09-18 LAB
ALBUMIN SERPL-MCNC: 3.7 G/DL (ref 3.5–5.2)
ALP SERPL-CCNC: 97 U/L (ref 35–104)
ALT SERPL-CCNC: 48 U/L (ref 5–33)
ANION GAP SERPL CALCULATED.3IONS-SCNC: 10 MMOL/L (ref 7–19)
AST SERPL-CCNC: 70 U/L (ref 5–32)
BASOPHILS # BLD: 0.05 K/UL (ref 0.01–0.08)
BASOPHILS NFR BLD: 0.6 % (ref 0.1–1.2)
BILIRUB SERPL-MCNC: 0.3 MG/DL (ref 0–1.2)
BUN SERPL-MCNC: 17 MG/DL (ref 8–23)
CALCIUM SERPL-MCNC: 9.7 MG/DL (ref 8.8–10.2)
CHLORIDE SERPL-SCNC: 97 MMOL/L (ref 98–107)
CO2 SERPL-SCNC: 24 MMOL/L (ref 22–29)
CORTIS PM SERPL-MCNC: 13.5 UG/DL (ref 2.5–11.9)
CREAT SERPL-MCNC: 0.7 MG/DL (ref 0.5–0.9)
EOSINOPHIL # BLD: 2.75 K/UL (ref 0.04–0.54)
EOSINOPHIL NFR BLD: 32.1 % (ref 0.7–7)
ERYTHROCYTE [DISTWIDTH] IN BLOOD BY AUTOMATED COUNT: 15.9 % (ref 11.7–14.4)
GLUCOSE SERPL-MCNC: 169 MG/DL (ref 70–99)
HCT VFR BLD AUTO: 30 % (ref 34.1–44.9)
HGB BLD-MCNC: 10.4 G/DL (ref 11.2–15.7)
LYMPHOCYTES # BLD: 0.63 K/UL (ref 1.18–3.74)
LYMPHOCYTES NFR BLD: 7.3 % (ref 19.3–53.1)
MCH RBC QN AUTO: 32.5 PG (ref 25.6–32.2)
MCHC RBC AUTO-ENTMCNC: 34.7 G/DL (ref 32.3–35.5)
MCV RBC AUTO: 93.8 FL (ref 79.4–94.8)
MONOCYTES # BLD: 0.75 K/UL (ref 0.24–0.82)
MONOCYTES NFR BLD: 8.7 % (ref 4.7–12.5)
NEUTROPHILS # BLD: 4.38 K/UL (ref 1.56–6.13)
NEUTS SEG NFR BLD: 51.1 % (ref 34–71.1)
PLATELET # BLD AUTO: 173 K/UL (ref 182–369)
PMV BLD AUTO: 9.6 FL (ref 7.4–10.4)
POTASSIUM SERPL-SCNC: 4.6 MMOL/L (ref 3.5–5.1)
PROT SERPL-MCNC: 5.5 G/DL (ref 6.4–8.3)
RBC # BLD AUTO: 3.2 M/UL (ref 3.93–5.22)
SODIUM SERPL-SCNC: 131 MMOL/L (ref 136–145)
T4 FREE SERPL-MCNC: 0.93 NG/DL (ref 0.93–1.7)
TSH SERPL DL<=0.005 MIU/L-ACNC: 1.38 UIU/ML (ref 0.27–4.2)
WBC # BLD AUTO: 8.58 K/UL (ref 3.98–10.04)

## 2024-09-18 PROCEDURE — G8399 PT W/DXA RESULTS DOCUMENT: HCPCS | Performed by: INTERNAL MEDICINE

## 2024-09-18 PROCEDURE — 96413 CHEMO IV INFUSION 1 HR: CPT

## 2024-09-18 PROCEDURE — 3017F COLORECTAL CA SCREEN DOC REV: CPT | Performed by: INTERNAL MEDICINE

## 2024-09-18 PROCEDURE — 2580000003 HC RX 258: Performed by: INTERNAL MEDICINE

## 2024-09-18 PROCEDURE — 6360000002 HC RX W HCPCS: Performed by: INTERNAL MEDICINE

## 2024-09-18 PROCEDURE — 1111F DSCHRG MED/CURRENT MED MERGE: CPT | Performed by: INTERNAL MEDICINE

## 2024-09-18 PROCEDURE — 1123F ACP DISCUSS/DSCN MKR DOCD: CPT | Performed by: INTERNAL MEDICINE

## 2024-09-18 PROCEDURE — G8427 DOCREV CUR MEDS BY ELIG CLIN: HCPCS | Performed by: INTERNAL MEDICINE

## 2024-09-18 PROCEDURE — 1090F PRES/ABSN URINE INCON ASSESS: CPT | Performed by: INTERNAL MEDICINE

## 2024-09-18 PROCEDURE — 99214 OFFICE O/P EST MOD 30 MIN: CPT | Performed by: INTERNAL MEDICINE

## 2024-09-18 PROCEDURE — G2211 COMPLEX E/M VISIT ADD ON: HCPCS | Performed by: INTERNAL MEDICINE

## 2024-09-18 PROCEDURE — 80053 COMPREHEN METABOLIC PANEL: CPT

## 2024-09-18 PROCEDURE — 3074F SYST BP LT 130 MM HG: CPT | Performed by: INTERNAL MEDICINE

## 2024-09-18 PROCEDURE — 85025 COMPLETE CBC W/AUTO DIFF WBC: CPT

## 2024-09-18 PROCEDURE — 3078F DIAST BP <80 MM HG: CPT | Performed by: INTERNAL MEDICINE

## 2024-09-18 PROCEDURE — 36415 COLL VENOUS BLD VENIPUNCTURE: CPT

## 2024-09-18 PROCEDURE — G8420 CALC BMI NORM PARAMETERS: HCPCS | Performed by: INTERNAL MEDICINE

## 2024-09-18 PROCEDURE — 1036F TOBACCO NON-USER: CPT | Performed by: INTERNAL MEDICINE

## 2024-09-18 RX ORDER — SODIUM CHLORIDE 9 MG/ML
INJECTION, SOLUTION INTRAVENOUS CONTINUOUS
Status: CANCELLED | OUTPATIENT
Start: 2024-09-18

## 2024-09-18 RX ORDER — DIPHENHYDRAMINE HYDROCHLORIDE 50 MG/ML
50 INJECTION INTRAMUSCULAR; INTRAVENOUS
Status: CANCELLED | OUTPATIENT
Start: 2024-09-18

## 2024-09-18 RX ORDER — MEPERIDINE HYDROCHLORIDE 50 MG/ML
12.5 INJECTION INTRAMUSCULAR; INTRAVENOUS; SUBCUTANEOUS PRN
Status: CANCELLED | OUTPATIENT
Start: 2024-09-18

## 2024-09-18 RX ORDER — HEPARIN 100 UNIT/ML
500 SYRINGE INTRAVENOUS PRN
Status: DISCONTINUED | OUTPATIENT
Start: 2024-09-18 | End: 2024-09-19 | Stop reason: HOSPADM

## 2024-09-18 RX ORDER — ONDANSETRON 2 MG/ML
8 INJECTION INTRAMUSCULAR; INTRAVENOUS
Status: CANCELLED | OUTPATIENT
Start: 2024-09-18

## 2024-09-18 RX ORDER — FAMOTIDINE 10 MG/ML
20 INJECTION, SOLUTION INTRAVENOUS
Status: CANCELLED | OUTPATIENT
Start: 2024-09-18

## 2024-09-18 RX ORDER — ACETAMINOPHEN 325 MG/1
650 TABLET ORAL
Status: CANCELLED | OUTPATIENT
Start: 2024-09-18

## 2024-09-18 RX ORDER — SODIUM CHLORIDE 0.9 % (FLUSH) 0.9 %
5-40 SYRINGE (ML) INJECTION PRN
Status: DISCONTINUED | OUTPATIENT
Start: 2024-09-18 | End: 2024-09-19 | Stop reason: HOSPADM

## 2024-09-18 RX ORDER — SODIUM CHLORIDE 0.9 % (FLUSH) 0.9 %
5-40 SYRINGE (ML) INJECTION PRN
Status: CANCELLED | OUTPATIENT
Start: 2024-09-18

## 2024-09-18 RX ORDER — SODIUM CHLORIDE 9 MG/ML
5-250 INJECTION, SOLUTION INTRAVENOUS PRN
Status: CANCELLED | OUTPATIENT
Start: 2024-09-18

## 2024-09-18 RX ORDER — HEPARIN SODIUM (PORCINE) LOCK FLUSH IV SOLN 100 UNIT/ML 100 UNIT/ML
500 SOLUTION INTRAVENOUS PRN
Status: CANCELLED | OUTPATIENT
Start: 2024-09-18

## 2024-09-18 RX ORDER — ALBUTEROL SULFATE 90 UG/1
4 INHALANT RESPIRATORY (INHALATION) PRN
Status: CANCELLED | OUTPATIENT
Start: 2024-09-18

## 2024-09-18 RX ORDER — EPINEPHRINE 1 MG/ML
0.3 INJECTION, SOLUTION, CONCENTRATE INTRAVENOUS PRN
Status: CANCELLED | OUTPATIENT
Start: 2024-09-18

## 2024-09-18 RX ORDER — ACETAMINOPHEN 325 MG/1
650 TABLET ORAL
Status: CANCELLED
Start: 2024-09-18

## 2024-09-18 RX ADMIN — SODIUM CHLORIDE, PRESERVATIVE FREE 10 ML: 5 INJECTION INTRAVENOUS at 15:48

## 2024-09-18 RX ADMIN — HEPARIN 500 UNITS: 100 SYRINGE at 15:48

## 2024-09-18 RX ADMIN — ATEZOLIZUMAB 1200 MG: 1200 INJECTION, SOLUTION INTRAVENOUS at 15:12

## 2024-09-23 DIAGNOSIS — G47.01 INSOMNIA DUE TO MEDICAL CONDITION: ICD-10-CM

## 2024-09-24 DIAGNOSIS — G47.01 INSOMNIA DUE TO MEDICAL CONDITION: ICD-10-CM

## 2024-09-24 RX ORDER — TEMAZEPAM 7.5 MG/1
CAPSULE ORAL
Qty: 30 CAPSULE | Refills: 0 | Status: SHIPPED | OUTPATIENT
Start: 2024-09-24 | End: 2024-10-24

## 2024-09-24 RX ORDER — TEMAZEPAM 7.5 MG/1
7.5 CAPSULE ORAL NIGHTLY PRN
Qty: 30 CAPSULE | Refills: 0 | OUTPATIENT
Start: 2024-09-24 | End: 2024-10-24

## 2024-09-27 ENCOUNTER — HOSPITAL ENCOUNTER (OUTPATIENT)
Dept: CT IMAGING | Age: 74
Discharge: HOME | End: 2024-09-27
Payer: MEDICARE

## 2024-09-27 ENCOUNTER — OFFICE VISIT (OUTPATIENT)
Dept: INTERNAL MEDICINE | Age: 74
End: 2024-09-27
Payer: MEDICARE

## 2024-09-27 VITALS
DIASTOLIC BLOOD PRESSURE: 62 MMHG | BODY MASS INDEX: 19.44 KG/M2 | WEIGHT: 99 LBS | HEART RATE: 100 BPM | SYSTOLIC BLOOD PRESSURE: 118 MMHG | HEIGHT: 60 IN | OXYGEN SATURATION: 100 % | RESPIRATION RATE: 20 BRPM

## 2024-09-27 DIAGNOSIS — R10.12 LEFT UPPER QUADRANT PAIN: ICD-10-CM

## 2024-09-27 DIAGNOSIS — C34.90 SMALL CELL LUNG CANCER (HCC): ICD-10-CM

## 2024-09-27 DIAGNOSIS — R53.1 GENERALIZED WEAKNESS: ICD-10-CM

## 2024-09-27 DIAGNOSIS — C34.90 PRIMARY SMALL CELL MALIGNANT NEOPLASM OF LUNG, STAGE 4 (HCC): ICD-10-CM

## 2024-09-27 DIAGNOSIS — R10.12 LEFT UPPER QUADRANT PAIN: Primary | ICD-10-CM

## 2024-09-27 PROCEDURE — 1036F TOBACCO NON-USER: CPT | Performed by: INTERNAL MEDICINE

## 2024-09-27 PROCEDURE — 1090F PRES/ABSN URINE INCON ASSESS: CPT | Performed by: INTERNAL MEDICINE

## 2024-09-27 PROCEDURE — 3078F DIAST BP <80 MM HG: CPT | Performed by: INTERNAL MEDICINE

## 2024-09-27 PROCEDURE — 99214 OFFICE O/P EST MOD 30 MIN: CPT | Performed by: INTERNAL MEDICINE

## 2024-09-27 PROCEDURE — 3074F SYST BP LT 130 MM HG: CPT | Performed by: INTERNAL MEDICINE

## 2024-09-27 PROCEDURE — 3017F COLORECTAL CA SCREEN DOC REV: CPT | Performed by: INTERNAL MEDICINE

## 2024-09-27 PROCEDURE — G8420 CALC BMI NORM PARAMETERS: HCPCS | Performed by: INTERNAL MEDICINE

## 2024-09-27 PROCEDURE — G8427 DOCREV CUR MEDS BY ELIG CLIN: HCPCS | Performed by: INTERNAL MEDICINE

## 2024-09-27 PROCEDURE — 71260 CT THORAX DX C+: CPT

## 2024-09-27 PROCEDURE — 74177 CT ABD & PELVIS W/CONTRAST: CPT

## 2024-09-27 PROCEDURE — 6360000004 HC RX CONTRAST MEDICATION: Performed by: INTERNAL MEDICINE

## 2024-09-27 PROCEDURE — 1123F ACP DISCUSS/DSCN MKR DOCD: CPT | Performed by: INTERNAL MEDICINE

## 2024-09-27 PROCEDURE — G8399 PT W/DXA RESULTS DOCUMENT: HCPCS | Performed by: INTERNAL MEDICINE

## 2024-09-27 RX ORDER — IOPAMIDOL 755 MG/ML
60 INJECTION, SOLUTION INTRAVASCULAR
Status: COMPLETED | OUTPATIENT
Start: 2024-09-27 | End: 2024-09-27

## 2024-09-27 RX ADMIN — IOPAMIDOL 60 ML: 755 INJECTION, SOLUTION INTRAVENOUS at 13:39

## 2024-09-27 NOTE — PROGRESS NOTES
cell lung cancer stage Garcia    4. Generalized weakness  Patient is weak debilitated with ongoing chemo and treatments she does not feel well.  She is also in severe pain at this time I reviewed Dr. Valdivia's records reviewed her chart I ordered CT chest abdomen pelvis with contrast to look for bleeding hematoma rib fracture internal organ injury or other process.

## 2024-10-02 ENCOUNTER — TELEPHONE (OUTPATIENT)
Dept: INTERNAL MEDICINE | Age: 74
End: 2024-10-02

## 2024-10-02 DIAGNOSIS — G89.3 CANCER RELATED PAIN: ICD-10-CM

## 2024-10-02 RX ORDER — HYDROCODONE BITARTRATE AND ACETAMINOPHEN 7.5; 325 MG/1; MG/1
1 TABLET ORAL 3 TIMES DAILY PRN
Qty: 30 TABLET | Refills: 0 | Status: SHIPPED | OUTPATIENT
Start: 2024-10-02 | End: 2024-10-12

## 2024-10-02 NOTE — TELEPHONE ENCOUNTER
She decided that she did want some pain medication for the left side pain.  I had spoken with her earlier today about test results.   [facility]

## 2024-10-09 ENCOUNTER — HOSPITAL ENCOUNTER (OUTPATIENT)
Dept: INFUSION THERAPY | Age: 74
Discharge: HOME | End: 2024-10-09
Payer: MEDICARE

## 2024-10-09 VITALS
DIASTOLIC BLOOD PRESSURE: 58 MMHG | HEIGHT: 60 IN | RESPIRATION RATE: 18 BRPM | OXYGEN SATURATION: 100 % | BODY MASS INDEX: 19.44 KG/M2 | TEMPERATURE: 98.3 F | HEART RATE: 112 BPM | WEIGHT: 99 LBS | SYSTOLIC BLOOD PRESSURE: 122 MMHG

## 2024-10-09 DIAGNOSIS — R53.0 NEOPLASTIC MALIGNANT RELATED FATIGUE: ICD-10-CM

## 2024-10-09 DIAGNOSIS — C34.90 SMALL CELL LUNG CANCER (HCC): ICD-10-CM

## 2024-10-09 DIAGNOSIS — C78.7 NON-SMALL CELL LUNG CANCER METASTATIC TO LIVER (HCC): Primary | ICD-10-CM

## 2024-10-09 DIAGNOSIS — Z51.12 ENCOUNTER FOR ANTINEOPLASTIC IMMUNOTHERAPY: ICD-10-CM

## 2024-10-09 DIAGNOSIS — Z51.11 CHEMOTHERAPY MANAGEMENT, ENCOUNTER FOR: ICD-10-CM

## 2024-10-09 DIAGNOSIS — C34.90 SMALL CELL CARCINOMA OF LUNG, UNSPECIFIED LATERALITY, UNSPECIFIED PART OF LUNG (HCC): Primary | ICD-10-CM

## 2024-10-09 DIAGNOSIS — C78.7 NON-SMALL CELL LUNG CANCER METASTATIC TO LIVER (HCC): ICD-10-CM

## 2024-10-09 DIAGNOSIS — C34.90 NON-SMALL CELL LUNG CANCER METASTATIC TO LIVER (HCC): Primary | ICD-10-CM

## 2024-10-09 DIAGNOSIS — C34.90 NON-SMALL CELL LUNG CANCER METASTATIC TO LIVER (HCC): ICD-10-CM

## 2024-10-09 DIAGNOSIS — R53.83 FATIGUE DUE TO TREATMENT: ICD-10-CM

## 2024-10-09 LAB
ALBUMIN SERPL-MCNC: 3.6 G/DL (ref 3.5–5.2)
ALP SERPL-CCNC: 83 U/L (ref 35–104)
ALT SERPL-CCNC: 17 U/L (ref 5–33)
ANION GAP SERPL CALCULATED.3IONS-SCNC: 11 MMOL/L (ref 7–19)
AST SERPL-CCNC: 34 U/L (ref 5–32)
BASOPHILS # BLD: 0.06 K/UL (ref 0.01–0.08)
BASOPHILS NFR BLD: 1 % (ref 0.1–1.2)
BILIRUB SERPL-MCNC: 0.2 MG/DL (ref 0.2–1.2)
BUN SERPL-MCNC: 20 MG/DL (ref 8–23)
CALCIUM SERPL-MCNC: 9 MG/DL (ref 8.8–10.2)
CHLORIDE SERPL-SCNC: 98 MMOL/L (ref 98–111)
CO2 SERPL-SCNC: 24 MMOL/L (ref 22–29)
CREAT SERPL-MCNC: 0.8 MG/DL (ref 0.5–0.9)
EOSINOPHIL # BLD: 0.81 K/UL (ref 0.04–0.54)
EOSINOPHIL NFR BLD: 13 % (ref 0.7–7)
ERYTHROCYTE [DISTWIDTH] IN BLOOD BY AUTOMATED COUNT: 14.7 % (ref 11.7–14.4)
GLUCOSE SERPL-MCNC: 150 MG/DL (ref 70–99)
HCT VFR BLD AUTO: 32.4 % (ref 34.1–44.9)
HGB BLD-MCNC: 11.1 G/DL (ref 11.2–15.7)
LYMPHOCYTES # BLD: 0.93 K/UL (ref 1.18–3.74)
LYMPHOCYTES NFR BLD: 14.9 % (ref 19.3–53.1)
MCH RBC QN AUTO: 32.2 PG (ref 25.6–32.2)
MCHC RBC AUTO-ENTMCNC: 34.3 G/DL (ref 32.3–35.5)
MCV RBC AUTO: 93.9 FL (ref 79.4–94.8)
MONOCYTES # BLD: 0.9 K/UL (ref 0.24–0.82)
MONOCYTES NFR BLD: 14.4 % (ref 4.7–12.5)
NEUTROPHILS # BLD: 3.53 K/UL (ref 1.56–6.13)
NEUTS SEG NFR BLD: 56.5 % (ref 34–71.1)
PLATELET # BLD AUTO: 211 K/UL (ref 182–369)
PMV BLD AUTO: 9.3 FL (ref 7.4–10.4)
POTASSIUM SERPL-SCNC: 4.5 MMOL/L (ref 3.5–5)
PROT SERPL-MCNC: 5.3 G/DL (ref 6.4–8.3)
RBC # BLD AUTO: 3.45 M/UL (ref 3.93–5.22)
SODIUM SERPL-SCNC: 133 MMOL/L (ref 136–145)
T4 FREE SERPL-MCNC: 0.92 NG/DL (ref 0.93–1.7)
TSH SERPL DL<=0.005 MIU/L-ACNC: 4.51 UIU/ML (ref 0.27–4.2)
WBC # BLD AUTO: 6.24 K/UL (ref 3.98–10.04)

## 2024-10-09 PROCEDURE — 6360000002 HC RX W HCPCS: Performed by: INTERNAL MEDICINE

## 2024-10-09 PROCEDURE — 96413 CHEMO IV INFUSION 1 HR: CPT

## 2024-10-09 PROCEDURE — 96372 THER/PROPH/DIAG INJ SC/IM: CPT

## 2024-10-09 PROCEDURE — 85025 COMPLETE CBC W/AUTO DIFF WBC: CPT

## 2024-10-09 PROCEDURE — 2580000003 HC RX 258: Performed by: INTERNAL MEDICINE

## 2024-10-09 PROCEDURE — 36415 COLL VENOUS BLD VENIPUNCTURE: CPT

## 2024-10-09 RX ORDER — SODIUM CHLORIDE 9 MG/ML
5-250 INJECTION, SOLUTION INTRAVENOUS PRN
Status: CANCELLED | OUTPATIENT
Start: 2024-10-09

## 2024-10-09 RX ORDER — HEPARIN 100 UNIT/ML
500 SYRINGE INTRAVENOUS PRN
Status: DISCONTINUED | OUTPATIENT
Start: 2024-10-09 | End: 2024-10-10 | Stop reason: HOSPADM

## 2024-10-09 RX ORDER — SODIUM CHLORIDE 0.9 % (FLUSH) 0.9 %
5-40 SYRINGE (ML) INJECTION PRN
Status: CANCELLED | OUTPATIENT
Start: 2024-10-09

## 2024-10-09 RX ORDER — MEPERIDINE HYDROCHLORIDE 25 MG/ML
12.5 INJECTION INTRAMUSCULAR; INTRAVENOUS; SUBCUTANEOUS PRN
Status: CANCELLED | OUTPATIENT
Start: 2024-10-09

## 2024-10-09 RX ORDER — FAMOTIDINE 10 MG/ML
20 INJECTION, SOLUTION INTRAVENOUS
Status: CANCELLED | OUTPATIENT
Start: 2024-10-09

## 2024-10-09 RX ORDER — ACETAMINOPHEN 325 MG/1
650 TABLET ORAL
Status: CANCELLED
Start: 2024-10-09

## 2024-10-09 RX ORDER — SODIUM CHLORIDE 9 MG/ML
INJECTION, SOLUTION INTRAVENOUS CONTINUOUS
Status: CANCELLED | OUTPATIENT
Start: 2024-10-09

## 2024-10-09 RX ORDER — HEPARIN 100 UNIT/ML
500 SYRINGE INTRAVENOUS PRN
Status: CANCELLED | OUTPATIENT
Start: 2024-10-09

## 2024-10-09 RX ORDER — ONDANSETRON 2 MG/ML
8 INJECTION INTRAMUSCULAR; INTRAVENOUS
Status: CANCELLED | OUTPATIENT
Start: 2024-10-09

## 2024-10-09 RX ORDER — DIPHENHYDRAMINE HYDROCHLORIDE 50 MG/ML
50 INJECTION INTRAMUSCULAR; INTRAVENOUS
Status: CANCELLED | OUTPATIENT
Start: 2024-10-09

## 2024-10-09 RX ORDER — ALBUTEROL SULFATE 90 UG/1
4 INHALANT RESPIRATORY (INHALATION) PRN
Status: CANCELLED | OUTPATIENT
Start: 2024-10-09

## 2024-10-09 RX ORDER — EPINEPHRINE 1 MG/ML
0.3 INJECTION, SOLUTION, CONCENTRATE INTRAVENOUS PRN
Status: CANCELLED | OUTPATIENT
Start: 2024-10-09

## 2024-10-09 RX ORDER — ACETAMINOPHEN 325 MG/1
650 TABLET ORAL
Status: CANCELLED | OUTPATIENT
Start: 2024-10-09

## 2024-10-09 RX ORDER — SODIUM CHLORIDE 0.9 % (FLUSH) 0.9 %
5-40 SYRINGE (ML) INJECTION PRN
Status: DISCONTINUED | OUTPATIENT
Start: 2024-10-09 | End: 2024-10-10 | Stop reason: HOSPADM

## 2024-10-09 RX ADMIN — SODIUM CHLORIDE, PRESERVATIVE FREE 10 ML: 5 INJECTION INTRAVENOUS at 15:50

## 2024-10-09 RX ADMIN — HEPARIN 500 UNITS: 100 SYRINGE at 15:50

## 2024-10-09 RX ADMIN — ATEZOLIZUMAB 1200 MG: 1200 INJECTION, SOLUTION INTRAVENOUS at 15:19

## 2024-10-09 NOTE — PROGRESS NOTES
Lab Results   Component Value Date    WBC 6.24 10/09/2024    HGB 11.1 (L) 10/09/2024    HCT 32.4 (L) 10/09/2024    MCV 93.9 10/09/2024     10/09/2024     Lab Results   Component Value Date    NEUTROABS 3.53 10/09/2024

## 2024-10-11 PROBLEM — R11.2 NAUSEA & VOMITING: Status: RESOLVED | Noted: 2024-03-04 | Resolved: 2024-10-11

## 2024-10-11 PROBLEM — A41.9 SEPSIS (HCC): Status: RESOLVED | Noted: 2024-08-22 | Resolved: 2024-10-11

## 2024-10-14 DIAGNOSIS — E78.2 MIXED HYPERLIPIDEMIA: ICD-10-CM

## 2024-10-14 RX ORDER — LOVASTATIN 40 MG
TABLET ORAL
Qty: 90 TABLET | Refills: 3 | Status: SHIPPED | OUTPATIENT
Start: 2024-10-14

## 2024-10-25 ENCOUNTER — CLINICAL DOCUMENTATION (OUTPATIENT)
Dept: INFUSION THERAPY | Age: 74
End: 2024-10-25

## 2024-10-25 NOTE — PROGRESS NOTES
Called patient and left message about re-enrollment. Patient needs to provide updated consent @ [email or web address] through their digital assist.    Silver Berumen  Financial Navigator  [phone]

## 2024-10-29 NOTE — PROGRESS NOTES
mutation exon 19.  I have recommended frontline palliative treatment with osimertinib 80 mg p.o. daily.  2/8/23 CARIS: UNABLE TO BE COMPLETED TO THE QNS  2/9/23 Lucence-EGFR exon 19-positive  6/8/23 CT Chest W Contrast  The previously noted mass lesion in the left lower lobe of the lung appears to be decreasing in size.  The lesion measures approximately 2.9 cm transverse, 1.9 cm AP.  This is compared to previous measurements of 4.8 cm transverse, approximately 3.4 cm. Interval decrease size of the left lower lobe mass lesion.Metastatic disease to the liver appear to be decreased in size when compared to previous study.   6/8/23 CT Abd/Pelvis W IV Contrast (oral) Decreased size of the left lower lobe lung mass and liver metastases.Stable irregular thickening of the distal esophagus, could be inflammation or neoplasm.  No evidence of new or worsening disease in the abdomen/pelvis.  The findings measure approximately 11 mm and appear to be decreasing in size when compared to previous study.  9/28/23 CT Chest W Contrast: No mediastinal hilar adenopathy. Small hiatal hernia. Enlarging 3.1 x 4.4 cm mass/neoplasm left lower lobe previously measuring 1.9 x 2.3 cm. No new pulmonary nodule. No pleural effusion.   9/28/23 CT Abd/Pelvis W IV Contrast: The liver again demonstrates a few scattered small low attenuation lesions which appear less noticeable since prior study. It is uncertain whether this represents disease improvement or differences in CT technique.  Largest currently visualized is 1 cm. Moderate atherosclerotic disease. Again noted is apparent thickening of the lower esophagus which at least in part may be secondary to a small sliding hiatal hernia. This can be correlated with fluoroscopic upper gastrointestinal series. General bowel gas pattern and appearance is within normal limits.  10/4/23 PET CT Skull: There is uniform metabolic activity associated with a 3.1 x 4.4 cm left lower lobe mass, with an SUV max of

## 2024-10-30 ENCOUNTER — OFFICE VISIT (OUTPATIENT)
Dept: HEMATOLOGY | Age: 74
End: 2024-10-30
Payer: MEDICARE

## 2024-10-30 ENCOUNTER — HOSPITAL ENCOUNTER (OUTPATIENT)
Dept: INFUSION THERAPY | Age: 74
Discharge: HOME | End: 2024-10-30
Payer: MEDICARE

## 2024-10-30 VITALS
HEIGHT: 60 IN | BODY MASS INDEX: 19.01 KG/M2 | HEART RATE: 113 BPM | RESPIRATION RATE: 18 BRPM | WEIGHT: 96.8 LBS | TEMPERATURE: 97.9 F | DIASTOLIC BLOOD PRESSURE: 55 MMHG | OXYGEN SATURATION: 95 % | SYSTOLIC BLOOD PRESSURE: 105 MMHG

## 2024-10-30 DIAGNOSIS — C34.90 NON-SMALL CELL LUNG CANCER METASTATIC TO LIVER (HCC): ICD-10-CM

## 2024-10-30 DIAGNOSIS — C34.92 SMALL CELL CARCINOMA OF LEFT LUNG, UNSPECIFIED PART OF LUNG (HCC): ICD-10-CM

## 2024-10-30 DIAGNOSIS — R53.83 FATIGUE DUE TO TREATMENT: ICD-10-CM

## 2024-10-30 DIAGNOSIS — C78.7 NON-SMALL CELL LUNG CANCER METASTATIC TO LIVER (HCC): ICD-10-CM

## 2024-10-30 DIAGNOSIS — Z51.12 ENCOUNTER FOR ANTINEOPLASTIC IMMUNOTHERAPY: ICD-10-CM

## 2024-10-30 DIAGNOSIS — C34.90 SMALL CELL CARCINOMA OF LUNG, UNSPECIFIED LATERALITY, UNSPECIFIED PART OF LUNG (HCC): Primary | ICD-10-CM

## 2024-10-30 DIAGNOSIS — C34.92 SMALL CELL CARCINOMA OF LEFT LUNG, UNSPECIFIED PART OF LUNG (HCC): Primary | ICD-10-CM

## 2024-10-30 DIAGNOSIS — R53.0 NEOPLASTIC (MALIGNANT) RELATED FATIGUE: ICD-10-CM

## 2024-10-30 DIAGNOSIS — M54.50 LOW BACK PAIN AT MULTIPLE SITES: ICD-10-CM

## 2024-10-30 DIAGNOSIS — Z71.89 CARE PLAN DISCUSSED WITH PATIENT: ICD-10-CM

## 2024-10-30 LAB
ALBUMIN SERPL-MCNC: 3.7 G/DL (ref 3.5–5.2)
ALP SERPL-CCNC: 61 U/L (ref 35–104)
ALT SERPL-CCNC: 12 U/L (ref 5–33)
ANION GAP SERPL CALCULATED.3IONS-SCNC: 10 MMOL/L (ref 7–19)
AST SERPL-CCNC: 36 U/L (ref 5–32)
BASOPHILS # BLD: 0.05 K/UL (ref 0.01–0.08)
BASOPHILS NFR BLD: 0.7 % (ref 0.1–1.2)
BILIRUB SERPL-MCNC: 0.3 MG/DL (ref 0–1.2)
BUN SERPL-MCNC: 27 MG/DL (ref 8–23)
CALCIUM SERPL-MCNC: 10.4 MG/DL (ref 8.8–10.2)
CHLORIDE SERPL-SCNC: 100 MMOL/L (ref 98–107)
CO2 SERPL-SCNC: 25 MMOL/L (ref 22–29)
CORTIS PM SERPL-MCNC: 14.9 UG/DL (ref 2.5–11.9)
CREAT SERPL-MCNC: 0.9 MG/DL (ref 0.5–0.9)
EOSINOPHIL # BLD: 0.39 K/UL (ref 0.04–0.54)
EOSINOPHIL NFR BLD: 5.3 % (ref 0.7–7)
ERYTHROCYTE [DISTWIDTH] IN BLOOD BY AUTOMATED COUNT: 13.4 % (ref 11.7–14.4)
GLUCOSE SERPL-MCNC: 137 MG/DL (ref 70–99)
HCT VFR BLD AUTO: 36.6 % (ref 34.1–44.9)
HGB BLD-MCNC: 12.4 G/DL (ref 11.2–15.7)
LYMPHOCYTES # BLD: 1.26 K/UL (ref 1.18–3.74)
LYMPHOCYTES NFR BLD: 17 % (ref 19.3–53.1)
MCH RBC QN AUTO: 31.5 PG (ref 25.6–32.2)
MCHC RBC AUTO-ENTMCNC: 33.9 G/DL (ref 32.3–35.5)
MCV RBC AUTO: 92.9 FL (ref 79.4–94.8)
MONOCYTES # BLD: 0.83 K/UL (ref 0.24–0.82)
MONOCYTES NFR BLD: 11.2 % (ref 4.7–12.5)
NEUTROPHILS # BLD: 4.86 K/UL (ref 1.56–6.13)
NEUTS SEG NFR BLD: 65.5 % (ref 34–71.1)
PLATELET # BLD AUTO: 223 K/UL (ref 182–369)
PMV BLD AUTO: 9.1 FL (ref 7.4–10.4)
POTASSIUM SERPL-SCNC: 3.9 MMOL/L (ref 3.5–5.1)
PROT SERPL-MCNC: 5.4 G/DL (ref 6.4–8.3)
RBC # BLD AUTO: 3.94 M/UL (ref 3.93–5.22)
SODIUM SERPL-SCNC: 135 MMOL/L (ref 136–145)
T4 FREE SERPL-MCNC: 1.08 NG/DL (ref 0.93–1.7)
TSH SERPL DL<=0.005 MIU/L-ACNC: 1.39 UIU/ML (ref 0.27–4.2)
WBC # BLD AUTO: 7.41 K/UL (ref 3.98–10.04)

## 2024-10-30 PROCEDURE — 3078F DIAST BP <80 MM HG: CPT | Performed by: INTERNAL MEDICINE

## 2024-10-30 PROCEDURE — 1159F MED LIST DOCD IN RCRD: CPT | Performed by: INTERNAL MEDICINE

## 2024-10-30 PROCEDURE — 6360000002 HC RX W HCPCS: Performed by: INTERNAL MEDICINE

## 2024-10-30 PROCEDURE — G2211 COMPLEX E/M VISIT ADD ON: HCPCS | Performed by: INTERNAL MEDICINE

## 2024-10-30 PROCEDURE — 85025 COMPLETE CBC W/AUTO DIFF WBC: CPT

## 2024-10-30 PROCEDURE — 3074F SYST BP LT 130 MM HG: CPT | Performed by: INTERNAL MEDICINE

## 2024-10-30 PROCEDURE — 80053 COMPREHEN METABOLIC PANEL: CPT

## 2024-10-30 PROCEDURE — G8427 DOCREV CUR MEDS BY ELIG CLIN: HCPCS | Performed by: INTERNAL MEDICINE

## 2024-10-30 PROCEDURE — G8399 PT W/DXA RESULTS DOCUMENT: HCPCS | Performed by: INTERNAL MEDICINE

## 2024-10-30 PROCEDURE — 1036F TOBACCO NON-USER: CPT | Performed by: INTERNAL MEDICINE

## 2024-10-30 PROCEDURE — 36415 COLL VENOUS BLD VENIPUNCTURE: CPT

## 2024-10-30 PROCEDURE — 2580000003 HC RX 258: Performed by: INTERNAL MEDICINE

## 2024-10-30 PROCEDURE — 3017F COLORECTAL CA SCREEN DOC REV: CPT | Performed by: INTERNAL MEDICINE

## 2024-10-30 PROCEDURE — 1090F PRES/ABSN URINE INCON ASSESS: CPT | Performed by: INTERNAL MEDICINE

## 2024-10-30 PROCEDURE — G8484 FLU IMMUNIZE NO ADMIN: HCPCS | Performed by: INTERNAL MEDICINE

## 2024-10-30 PROCEDURE — 1123F ACP DISCUSS/DSCN MKR DOCD: CPT | Performed by: INTERNAL MEDICINE

## 2024-10-30 PROCEDURE — G8420 CALC BMI NORM PARAMETERS: HCPCS | Performed by: INTERNAL MEDICINE

## 2024-10-30 PROCEDURE — 99214 OFFICE O/P EST MOD 30 MIN: CPT | Performed by: INTERNAL MEDICINE

## 2024-10-30 PROCEDURE — 96413 CHEMO IV INFUSION 1 HR: CPT

## 2024-10-30 PROCEDURE — 1126F AMNT PAIN NOTED NONE PRSNT: CPT | Performed by: INTERNAL MEDICINE

## 2024-10-30 RX ORDER — DIPHENHYDRAMINE HYDROCHLORIDE 50 MG/ML
50 INJECTION INTRAMUSCULAR; INTRAVENOUS
Status: CANCELLED | OUTPATIENT
Start: 2024-10-30

## 2024-10-30 RX ORDER — SODIUM CHLORIDE 0.9 % (FLUSH) 0.9 %
5-40 SYRINGE (ML) INJECTION PRN
Status: DISCONTINUED | OUTPATIENT
Start: 2024-10-30 | End: 2024-10-31 | Stop reason: HOSPADM

## 2024-10-30 RX ORDER — MEPERIDINE HYDROCHLORIDE 50 MG/ML
12.5 INJECTION INTRAMUSCULAR; INTRAVENOUS; SUBCUTANEOUS PRN
Status: CANCELLED | OUTPATIENT
Start: 2024-10-30

## 2024-10-30 RX ORDER — SODIUM CHLORIDE 9 MG/ML
5-250 INJECTION, SOLUTION INTRAVENOUS PRN
Status: CANCELLED | OUTPATIENT
Start: 2024-10-30

## 2024-10-30 RX ORDER — FAMOTIDINE 10 MG/ML
20 INJECTION, SOLUTION INTRAVENOUS
Status: CANCELLED | OUTPATIENT
Start: 2024-10-30

## 2024-10-30 RX ORDER — SODIUM CHLORIDE 9 MG/ML
INJECTION, SOLUTION INTRAVENOUS CONTINUOUS
Status: CANCELLED | OUTPATIENT
Start: 2024-10-30

## 2024-10-30 RX ORDER — EPINEPHRINE 1 MG/ML
0.3 INJECTION, SOLUTION, CONCENTRATE INTRAVENOUS PRN
Status: CANCELLED | OUTPATIENT
Start: 2024-10-30

## 2024-10-30 RX ORDER — HEPARIN 100 UNIT/ML
500 SYRINGE INTRAVENOUS PRN
Status: DISCONTINUED | OUTPATIENT
Start: 2024-10-30 | End: 2024-10-31 | Stop reason: HOSPADM

## 2024-10-30 RX ORDER — ACETAMINOPHEN 325 MG/1
650 TABLET ORAL
Status: CANCELLED | OUTPATIENT
Start: 2024-10-30

## 2024-10-30 RX ORDER — ONDANSETRON 2 MG/ML
8 INJECTION INTRAMUSCULAR; INTRAVENOUS
Status: CANCELLED | OUTPATIENT
Start: 2024-10-30

## 2024-10-30 RX ORDER — HEPARIN SODIUM (PORCINE) LOCK FLUSH IV SOLN 100 UNIT/ML 100 UNIT/ML
500 SOLUTION INTRAVENOUS PRN
Status: CANCELLED | OUTPATIENT
Start: 2024-10-30

## 2024-10-30 RX ORDER — ALBUTEROL SULFATE 90 UG/1
4 INHALANT RESPIRATORY (INHALATION) PRN
Status: CANCELLED | OUTPATIENT
Start: 2024-10-30

## 2024-10-30 RX ORDER — ACETAMINOPHEN 325 MG/1
650 TABLET ORAL
Status: CANCELLED
Start: 2024-10-30

## 2024-10-30 RX ORDER — SODIUM CHLORIDE 0.9 % (FLUSH) 0.9 %
5-40 SYRINGE (ML) INJECTION PRN
Status: CANCELLED | OUTPATIENT
Start: 2024-10-30

## 2024-10-30 RX ADMIN — ATEZOLIZUMAB 1200 MG: 1200 INJECTION, SOLUTION INTRAVENOUS at 14:49

## 2024-10-30 RX ADMIN — HEPARIN 500 UNITS: 100 SYRINGE at 15:19

## 2024-10-30 RX ADMIN — SODIUM CHLORIDE, PRESERVATIVE FREE 10 ML: 5 INJECTION INTRAVENOUS at 15:19

## 2024-11-21 ENCOUNTER — HOSPITAL ENCOUNTER (OUTPATIENT)
Dept: CT IMAGING | Age: 74
Discharge: HOME | End: 2024-11-21
Payer: MEDICARE

## 2024-11-21 DIAGNOSIS — M54.50 LOW BACK PAIN AT MULTIPLE SITES: ICD-10-CM

## 2024-11-21 DIAGNOSIS — C34.92 SMALL CELL CARCINOMA OF LEFT LUNG, UNSPECIFIED PART OF LUNG (HCC): ICD-10-CM

## 2024-11-21 PROCEDURE — 71260 CT THORAX DX C+: CPT

## 2024-11-21 PROCEDURE — 72132 CT LUMBAR SPINE W/DYE: CPT

## 2024-11-21 PROCEDURE — 72129 CT CHEST SPINE W/DYE: CPT

## 2024-11-21 PROCEDURE — 6360000004 HC RX CONTRAST MEDICATION: Performed by: INTERNAL MEDICINE

## 2024-11-21 PROCEDURE — 74177 CT ABD & PELVIS W/CONTRAST: CPT

## 2024-11-21 RX ORDER — IOPAMIDOL 755 MG/ML
75 INJECTION, SOLUTION INTRAVASCULAR
Status: COMPLETED | OUTPATIENT
Start: 2024-11-21 | End: 2024-11-21

## 2024-11-21 RX ADMIN — IOPAMIDOL 75 ML: 755 INJECTION, SOLUTION INTRAVENOUS at 11:02

## 2024-11-26 NOTE — PROGRESS NOTES
MEDICAL ONCOLOGY PROGRESS NOTE                                                          Nikkie Banks   1950  11/27/2024     Chief Complaint   Patient presents with    Cancer     Small cell carcinoma of left lung, unspecified part of lung      INTERVAL HISTORY/HISTORY OF PRESENT ILLNESS:  Reason for MD visit-toxicity assessment/disease management/management of high-risk medication  The patient is a 74 years old female who has a diagnosis of non-small cell lung cancer, adenocarcinoma EGFR mutated.  She is currently receiving palliative chemotherapy with carboplatin and etoposide with 20% dose reduction.  She has completed 6 treatments carboplatin, etoposide and Tecentriq.   She is currently on maintenance treatment with atezolizumab.  She has been tolerated treatment relatively well.  Denies any nausea vomit diarrhea.  Denies any rash.  Patient has complaints of dysuria.  UA will be performed today.  Patient also has complaints of back pain.  She is taking Tylenol without much improvement.  She had repeat CT chest abdomen pelvis and CT lumbar and thoracic spine.      Diagnosis  LLL lesion, Jan 2023  Liver metastasis  NSCLC-adenocarcinoma, Feb 2023  Moderately differentiated   Stage IV (liver mets), [id number] (liver)  Caris: QNS  NGS- Lucence: EGFR exon 19-positive  Liver metastasis-> small cell lung cancer, stage IV     Treatment summary  2/20/23 Initiated Osimertinib 80 mg p.o. daily  10/31/23-11/8/23 SBRT LLL 5000 cGy over 4 treatments  2/21/24 Initiate Carboplatin AUC 5 D 1, Etoposide 100mg/m2 D1-3, Atezolizumab 1200mg every 3 weeks x4-6 cycles and continue Osimertinib 80 mg daily  3/27/24 Decrease Carbo to AUC 4 and Etoposide to 80m/m2 and add GCSF/Peg-filgrastim to each treatment due to hospitalization for neutropenic fever  8/6/24 Initiated maintenance Tecentriq 1200mg every 3 weeks x1 year/17 cycles     Cancer history:  Nikkie Banks was first seen by me on 1/31/2023.  I was

## 2024-11-27 ENCOUNTER — HOSPITAL ENCOUNTER (OUTPATIENT)
Dept: INFUSION THERAPY | Age: 74
Discharge: HOME | End: 2024-11-27
Payer: MEDICARE

## 2024-11-27 ENCOUNTER — OFFICE VISIT (OUTPATIENT)
Dept: HEMATOLOGY | Age: 74
End: 2024-11-27
Payer: MEDICARE

## 2024-11-27 VITALS
TEMPERATURE: 96.8 F | WEIGHT: 94 LBS | HEART RATE: 108 BPM | OXYGEN SATURATION: 94 % | BODY MASS INDEX: 18.46 KG/M2 | HEIGHT: 60 IN | SYSTOLIC BLOOD PRESSURE: 122 MMHG | DIASTOLIC BLOOD PRESSURE: 55 MMHG | RESPIRATION RATE: 18 BRPM

## 2024-11-27 VITALS — HEIGHT: 60 IN | BODY MASS INDEX: 18.36 KG/M2

## 2024-11-27 DIAGNOSIS — R53.83 FATIGUE DUE TO TREATMENT: ICD-10-CM

## 2024-11-27 DIAGNOSIS — N30.00 ACUTE CYSTITIS WITHOUT HEMATURIA: ICD-10-CM

## 2024-11-27 DIAGNOSIS — C34.82 SMALL CELL CARCINOMA OF OVERLAPPING SITES OF LEFT LUNG (HCC): Primary | ICD-10-CM

## 2024-11-27 DIAGNOSIS — C34.92 NSCLC OF LEFT LUNG (HCC): Primary | ICD-10-CM

## 2024-11-27 DIAGNOSIS — C34.90 SMALL CELL CARCINOMA OF LUNG, UNSPECIFIED LATERALITY, UNSPECIFIED PART OF LUNG (HCC): Primary | ICD-10-CM

## 2024-11-27 DIAGNOSIS — Z71.89 CARE PLAN DISCUSSED WITH PATIENT: ICD-10-CM

## 2024-11-27 DIAGNOSIS — R30.0 DYSURIA: ICD-10-CM

## 2024-11-27 DIAGNOSIS — E44.0 MODERATE MALNUTRITION (HCC): ICD-10-CM

## 2024-11-27 DIAGNOSIS — G89.3 CANCER ASSOCIATED PAIN: ICD-10-CM

## 2024-11-27 DIAGNOSIS — Z51.12 ENCOUNTER FOR ANTINEOPLASTIC IMMUNOTHERAPY: ICD-10-CM

## 2024-11-27 DIAGNOSIS — R53.0 NEOPLASTIC (MALIGNANT) RELATED FATIGUE: ICD-10-CM

## 2024-11-27 DIAGNOSIS — C34.92 SMALL CELL CARCINOMA OF LEFT LUNG, UNSPECIFIED PART OF LUNG (HCC): ICD-10-CM

## 2024-11-27 LAB
ALBUMIN SERPL-MCNC: 3.9 G/DL (ref 3.5–5.2)
ALP SERPL-CCNC: 55 U/L (ref 35–104)
ALT SERPL-CCNC: 14 U/L (ref 5–33)
ANION GAP SERPL CALCULATED.3IONS-SCNC: 9 MMOL/L (ref 7–19)
AST SERPL-CCNC: 34 U/L (ref 5–32)
BACTERIA #/AREA URNS HPF: ABNORMAL /HPF
BASOPHILS # BLD: 0.02 K/UL (ref 0.01–0.08)
BASOPHILS NFR BLD: 0.3 % (ref 0.1–1.2)
BILIRUB SERPL-MCNC: 0.3 MG/DL (ref 0–1.2)
BILIRUB UR QL STRIP: NEGATIVE
BUN SERPL-MCNC: 25 MG/DL (ref 8–23)
CALCIUM SERPL-MCNC: 10.1 MG/DL (ref 8.8–10.2)
CHARACTER UR: ABNORMAL
CHLORIDE SERPL-SCNC: 102 MMOL/L (ref 98–107)
CLARITY UR: ABNORMAL
CO2 SERPL-SCNC: 25 MMOL/L (ref 22–29)
COLOR UR: YELLOW
CORTIS AM PEAK SERPL-MCNC: 14.3 UG/DL (ref 4.8–19.5)
CREAT SERPL-MCNC: 0.9 MG/DL (ref 0.5–0.9)
CRYSTALS URNS MICRO: ABNORMAL /HPF
EOSINOPHIL # BLD: 0.09 K/UL (ref 0.04–0.54)
EOSINOPHIL NFR BLD: 1.2 % (ref 0.7–7)
ERYTHROCYTE [DISTWIDTH] IN BLOOD BY AUTOMATED COUNT: 13.2 % (ref 11.7–14.4)
GLUCOSE SERPL-MCNC: 158 MG/DL (ref 70–99)
GLUCOSE UR STRIP.AUTO-MCNC: NEGATIVE MG/DL
HCT VFR BLD AUTO: 37.2 % (ref 34.1–44.9)
HGB BLD-MCNC: 12.7 G/DL (ref 11.2–15.7)
HGB UR STRIP.AUTO-MCNC: NEGATIVE MG/L
KETONES UR STRIP.AUTO-MCNC: NEGATIVE MG/DL
LEUKOCYTE ESTERASE UR QL STRIP.AUTO: ABNORMAL
LYMPHOCYTES # BLD: 1.28 K/UL (ref 1.18–3.74)
LYMPHOCYTES NFR BLD: 16.7 % (ref 19.3–53.1)
MCH RBC QN AUTO: 31.6 PG (ref 25.6–32.2)
MCHC RBC AUTO-ENTMCNC: 34.1 G/DL (ref 32.3–35.5)
MCV RBC AUTO: 92.5 FL (ref 79.4–94.8)
MONOCYTES # BLD: 0.94 K/UL (ref 0.24–0.82)
MONOCYTES NFR BLD: 12.2 % (ref 4.7–12.5)
NEUTROPHILS # BLD: 5.33 K/UL (ref 1.56–6.13)
NEUTS SEG NFR BLD: 69.3 % (ref 34–71.1)
NITRITE UR QL STRIP.AUTO: POSITIVE
PH UR STRIP.AUTO: 5.5 [PH] (ref 5–8)
PLATELET # BLD AUTO: 270 K/UL (ref 182–369)
PMV BLD AUTO: 9.3 FL (ref 7.4–10.4)
POTASSIUM SERPL-SCNC: 4.2 MMOL/L (ref 3.5–5.1)
PROT SERPL-MCNC: 6 G/DL (ref 6.4–8.3)
PROT UR STRIP.AUTO-MCNC: ABNORMAL MG/DL
RBC # BLD AUTO: 4.02 M/UL (ref 3.93–5.22)
RBC #/AREA URNS HPF: ABNORMAL /HPF (ref 0–2)
SODIUM SERPL-SCNC: 136 MMOL/L (ref 136–145)
SP GR UR STRIP.AUTO: 1.02 (ref 1–1.03)
SQUAMOUS #/AREA URNS HPF: ABNORMAL /HPF
T4 FREE SERPL-MCNC: 0.85 NG/DL (ref 0.93–1.7)
TSH SERPL DL<=0.005 MIU/L-ACNC: 1.93 UIU/ML (ref 0.27–4.2)
UROBILINOGEN UR STRIP.AUTO-MCNC: 0.2 E.U./DL
WBC # BLD AUTO: 7.68 K/UL (ref 3.98–10.04)
WBC #/AREA URNS HPF: ABNORMAL /HPF (ref 0–5)

## 2024-11-27 PROCEDURE — 3078F DIAST BP <80 MM HG: CPT | Performed by: INTERNAL MEDICINE

## 2024-11-27 PROCEDURE — G8399 PT W/DXA RESULTS DOCUMENT: HCPCS | Performed by: INTERNAL MEDICINE

## 2024-11-27 PROCEDURE — 1090F PRES/ABSN URINE INCON ASSESS: CPT | Performed by: INTERNAL MEDICINE

## 2024-11-27 PROCEDURE — 99215 OFFICE O/P EST HI 40 MIN: CPT | Performed by: INTERNAL MEDICINE

## 2024-11-27 PROCEDURE — G8484 FLU IMMUNIZE NO ADMIN: HCPCS | Performed by: INTERNAL MEDICINE

## 2024-11-27 PROCEDURE — 1159F MED LIST DOCD IN RCRD: CPT | Performed by: INTERNAL MEDICINE

## 2024-11-27 PROCEDURE — G8419 CALC BMI OUT NRM PARAM NOF/U: HCPCS | Performed by: INTERNAL MEDICINE

## 2024-11-27 PROCEDURE — 1036F TOBACCO NON-USER: CPT | Performed by: INTERNAL MEDICINE

## 2024-11-27 PROCEDURE — G2211 COMPLEX E/M VISIT ADD ON: HCPCS | Performed by: INTERNAL MEDICINE

## 2024-11-27 PROCEDURE — G8427 DOCREV CUR MEDS BY ELIG CLIN: HCPCS | Performed by: INTERNAL MEDICINE

## 2024-11-27 PROCEDURE — 96413 CHEMO IV INFUSION 1 HR: CPT

## 2024-11-27 PROCEDURE — 85025 COMPLETE CBC W/AUTO DIFF WBC: CPT

## 2024-11-27 PROCEDURE — 3074F SYST BP LT 130 MM HG: CPT | Performed by: INTERNAL MEDICINE

## 2024-11-27 PROCEDURE — 3017F COLORECTAL CA SCREEN DOC REV: CPT | Performed by: INTERNAL MEDICINE

## 2024-11-27 PROCEDURE — 80053 COMPREHEN METABOLIC PANEL: CPT

## 2024-11-27 PROCEDURE — 36415 COLL VENOUS BLD VENIPUNCTURE: CPT

## 2024-11-27 PROCEDURE — 2580000003 HC RX 258: Performed by: INTERNAL MEDICINE

## 2024-11-27 PROCEDURE — 1123F ACP DISCUSS/DSCN MKR DOCD: CPT | Performed by: INTERNAL MEDICINE

## 2024-11-27 PROCEDURE — 6360000002 HC RX W HCPCS: Performed by: INTERNAL MEDICINE

## 2024-11-27 RX ORDER — ONDANSETRON 2 MG/ML
8 INJECTION INTRAMUSCULAR; INTRAVENOUS
Status: CANCELLED | OUTPATIENT
Start: 2024-11-27

## 2024-11-27 RX ORDER — DIPHENHYDRAMINE HYDROCHLORIDE 50 MG/ML
50 INJECTION INTRAMUSCULAR; INTRAVENOUS
Status: CANCELLED | OUTPATIENT
Start: 2024-11-27

## 2024-11-27 RX ORDER — ACETAMINOPHEN 325 MG/1
650 TABLET ORAL
Status: CANCELLED | OUTPATIENT
Start: 2024-11-27

## 2024-11-27 RX ORDER — HEPARIN 100 UNIT/ML
500 SYRINGE INTRAVENOUS PRN
Status: DISCONTINUED | OUTPATIENT
Start: 2024-11-27 | End: 2024-11-28 | Stop reason: HOSPADM

## 2024-11-27 RX ORDER — ACETAMINOPHEN 325 MG/1
650 TABLET ORAL
Status: CANCELLED
Start: 2024-11-27

## 2024-11-27 RX ORDER — MEPERIDINE HYDROCHLORIDE 50 MG/ML
12.5 INJECTION INTRAMUSCULAR; INTRAVENOUS; SUBCUTANEOUS PRN
Status: CANCELLED | OUTPATIENT
Start: 2024-11-27

## 2024-11-27 RX ORDER — SODIUM CHLORIDE 9 MG/ML
5-250 INJECTION, SOLUTION INTRAVENOUS PRN
Status: CANCELLED | OUTPATIENT
Start: 2024-11-27

## 2024-11-27 RX ORDER — SODIUM CHLORIDE 0.9 % (FLUSH) 0.9 %
5-40 SYRINGE (ML) INJECTION PRN
Status: DISCONTINUED | OUTPATIENT
Start: 2024-11-27 | End: 2024-11-28 | Stop reason: HOSPADM

## 2024-11-27 RX ORDER — FAMOTIDINE 10 MG/ML
20 INJECTION, SOLUTION INTRAVENOUS
Status: CANCELLED | OUTPATIENT
Start: 2024-11-27

## 2024-11-27 RX ORDER — EPINEPHRINE 1 MG/ML
0.3 INJECTION, SOLUTION, CONCENTRATE INTRAVENOUS PRN
Status: CANCELLED | OUTPATIENT
Start: 2024-11-27

## 2024-11-27 RX ORDER — ALBUTEROL SULFATE 90 UG/1
4 INHALANT RESPIRATORY (INHALATION) PRN
Status: CANCELLED | OUTPATIENT
Start: 2024-11-27

## 2024-11-27 RX ORDER — HEPARIN SODIUM (PORCINE) LOCK FLUSH IV SOLN 100 UNIT/ML 100 UNIT/ML
500 SOLUTION INTRAVENOUS PRN
Status: CANCELLED | OUTPATIENT
Start: 2024-11-27

## 2024-11-27 RX ORDER — HYDROCORTISONE SODIUM SUCCINATE 100 MG/2ML
100 INJECTION INTRAMUSCULAR; INTRAVENOUS
Status: CANCELLED | OUTPATIENT
Start: 2024-11-27

## 2024-11-27 RX ORDER — TRAMADOL HYDROCHLORIDE 50 MG/1
50 TABLET ORAL EVERY 8 HOURS PRN
Qty: 90 TABLET | Refills: 0 | Status: SHIPPED | OUTPATIENT
Start: 2024-11-27 | End: 2024-12-27

## 2024-11-27 RX ORDER — SODIUM CHLORIDE 0.9 % (FLUSH) 0.9 %
5-40 SYRINGE (ML) INJECTION PRN
Status: CANCELLED | OUTPATIENT
Start: 2024-11-27

## 2024-11-27 RX ORDER — SODIUM CHLORIDE 9 MG/ML
INJECTION, SOLUTION INTRAVENOUS CONTINUOUS
Status: CANCELLED | OUTPATIENT
Start: 2024-11-27

## 2024-11-27 RX ADMIN — ATEZOLIZUMAB 1200 MG: 1200 INJECTION, SOLUTION INTRAVENOUS at 14:06

## 2024-11-27 RX ADMIN — HEPARIN 500 UNITS: 100 SYRINGE at 14:32

## 2024-11-27 RX ADMIN — SODIUM CHLORIDE, PRESERVATIVE FREE 10 ML: 5 INJECTION INTRAVENOUS at 14:32

## 2024-11-27 NOTE — PROGRESS NOTES
[facility]  [address] [place] [address] [place] [address]  Phone: [phone]  Fax: [phone]    Rossi García, MS, RD, LD   Nikkie RAE Darren 74 y.o. White (non-)  Diagnosis, staging, date of diagnosis: NSCLC metastatic to liver, Feb 2023  Current Treatment: Tecentriq q3 weeks    Comprehensive Nutrition Assessment    Type and Reason for Visit:  Reassess    Malnutrition Assessment:  Malnutrition Status:  Moderate malnutrition (11/27/24 1315)    Context:  Chronic Illness     Findings of the 6 clinical characteristics of malnutrition:  Energy Intake:  Mild decrease in energy intake  Weight Loss:  20% over 1 year     Body Fat Loss:  Mild body fat loss Orbital, Buccal region   Muscle Mass Loss:  Mild muscle mass loss Temples (temporalis), Clavicles (pectoralis & deltoids)    Nutrition Assessment:    DA 75 y/o female presents 11/27/24 for follow-up RD evaluation. Pt presents moderately malnourished AEB wt loss 20% in 9 months, mild muscle wasting, mild fat loss, and inadequate energy intake. Pt reports overall decreased appetite associated with altered taste sensation. She notes this has been ongoing for weeks but worse over the past 2 weeks and states that it will sometimes cause nausea. Pt denies diarrhea or constipation. She has not been taking any antiemetic medication regularly.     Diet History:  Breakfast: cereal, Boost  Lunch/Dinner: pimento cheese, chips and dip  Beverages: Dr. Pepper, Sebastien, grape soda, water  Pt drinks 1 Boost daily    Nutrition Related Laboratory Data:  Na+ 136, K+ 4.2, glu 158, BUN:Cr 27.8, GFR 67    Anthropometric Measures:  Height: 152.4 cm (5')  Ideal Body Weight (IBW): 100 lbs (45 kg)       Current Body Weight: 42.6 kg (94 lb), 94 % IBW.    Current BMI (kg/m2): 18.4  Wt Readings from Last 5 Encounters:   11/27/24 42.6 kg (94 lb)   10/30/24 43.9 kg (96 lb 12.8 oz)   10/09/24 44.9 kg (99 lb)   09/27/24 44.9 kg (99 lb)

## 2024-11-28 RX ORDER — LEVOFLOXACIN 500 MG/1
500 TABLET, FILM COATED ORAL DAILY
Qty: 7 TABLET | Refills: 0 | Status: SHIPPED | OUTPATIENT
Start: 2024-11-28 | End: 2024-12-05

## 2024-11-30 LAB
BACTERIA UR CULT: ABNORMAL
BACTERIA UR CULT: ABNORMAL
ORGANISM: ABNORMAL

## 2024-12-02 ENCOUNTER — TELEPHONE (OUTPATIENT)
Dept: HEMATOLOGY | Age: 74
End: 2024-12-02

## 2024-12-02 DIAGNOSIS — R33.9 URINARY RETENTION: Primary | ICD-10-CM

## 2024-12-02 NOTE — TELEPHONE ENCOUNTER
I attempted to call patient. Left message for patient to call me back at this time. Electronically signed by Lorena Varma RN on 12/2/2024 at 7:50 AM

## 2024-12-02 NOTE — TELEPHONE ENCOUNTER
Called patient to verify patient received medication for urinary track infection. Patient verified that she received medication and was taken it at this time. Patient reports pain in lower abdomen, and continue urinary retention. MD orders received to continue antibiotics and place referral for urology. Patient notified of new MD orders. Referral placed for urology. Electronically signed by Lorena Varma RN on 12/2/2024 at 4:17 PM

## 2024-12-03 ENCOUNTER — OFFICE VISIT (OUTPATIENT)
Dept: INTERNAL MEDICINE | Age: 74
End: 2024-12-03
Payer: MEDICARE

## 2024-12-03 VITALS
OXYGEN SATURATION: 99 % | WEIGHT: 93 LBS | BODY MASS INDEX: 18.26 KG/M2 | HEIGHT: 60 IN | SYSTOLIC BLOOD PRESSURE: 100 MMHG | DIASTOLIC BLOOD PRESSURE: 56 MMHG | HEART RATE: 114 BPM

## 2024-12-03 DIAGNOSIS — I10 ESSENTIAL HYPERTENSION: Chronic | ICD-10-CM

## 2024-12-03 DIAGNOSIS — R53.1 GENERALIZED WEAKNESS: ICD-10-CM

## 2024-12-03 DIAGNOSIS — C34.92 SMALL CELL CARCINOMA OF LEFT LUNG, UNSPECIFIED PART OF LUNG (HCC): Primary | ICD-10-CM

## 2024-12-03 DIAGNOSIS — E03.9 ACQUIRED HYPOTHYROIDISM: Chronic | ICD-10-CM

## 2024-12-03 DIAGNOSIS — R73.9 HYPERGLYCEMIA: ICD-10-CM

## 2024-12-03 LAB — HBA1C MFR BLD: 5.7 %

## 2024-12-03 PROCEDURE — G8427 DOCREV CUR MEDS BY ELIG CLIN: HCPCS | Performed by: INTERNAL MEDICINE

## 2024-12-03 PROCEDURE — G8399 PT W/DXA RESULTS DOCUMENT: HCPCS | Performed by: INTERNAL MEDICINE

## 2024-12-03 PROCEDURE — 3017F COLORECTAL CA SCREEN DOC REV: CPT | Performed by: INTERNAL MEDICINE

## 2024-12-03 PROCEDURE — 1090F PRES/ABSN URINE INCON ASSESS: CPT | Performed by: INTERNAL MEDICINE

## 2024-12-03 PROCEDURE — 1036F TOBACCO NON-USER: CPT | Performed by: INTERNAL MEDICINE

## 2024-12-03 PROCEDURE — G8419 CALC BMI OUT NRM PARAM NOF/U: HCPCS | Performed by: INTERNAL MEDICINE

## 2024-12-03 PROCEDURE — G8484 FLU IMMUNIZE NO ADMIN: HCPCS | Performed by: INTERNAL MEDICINE

## 2024-12-03 PROCEDURE — 3078F DIAST BP <80 MM HG: CPT | Performed by: INTERNAL MEDICINE

## 2024-12-03 PROCEDURE — 1123F ACP DISCUSS/DSCN MKR DOCD: CPT | Performed by: INTERNAL MEDICINE

## 2024-12-03 PROCEDURE — 3074F SYST BP LT 130 MM HG: CPT | Performed by: INTERNAL MEDICINE

## 2024-12-03 PROCEDURE — 83036 HEMOGLOBIN GLYCOSYLATED A1C: CPT | Performed by: INTERNAL MEDICINE

## 2024-12-03 PROCEDURE — 99214 OFFICE O/P EST MOD 30 MIN: CPT | Performed by: INTERNAL MEDICINE

## 2024-12-03 NOTE — PROGRESS NOTES
Chief Complaint   Patient presents with    6 Month Follow-Up       HPI: Patient is here today to follow-up metastatic lung cancer.  She is listed as a new patient but we have seen her before.  She metastatic lung cancer very weak debilitated she said that she has also had some chronic chest and abdominal imaging stable but very weak    Past Medical History:   Diagnosis Date    Acquired hypothyroidism 08/20/2017    Alopecia 08/21/2017    Breast implant capsular contracture     \"with cysts behind right breast\" per pt.     Esophageal mass 01/31/2023    Essential hypertension 08/20/2017    Familial hypercholesterolemia 08/20/2017    Lung cancer (HCC) 01/01/2023    Liver Cancer    Lung mass 01/31/2023    Nausea & vomiting     recent inpatient admission after chemo; seeing dr. brooks    Nodule of chest wall 05/10/2021    Palliative care patient 03/05/2024    PONV (postoperative nausea and vomiting)     Prolonged emergence from general anesthesia     Stage 1 chronic kidney disease 06/29/2021    Tobacco abuse, in remission        Past Surgical History:   Procedure Laterality Date    BREAST CYST EXCISION  2022    BREAST ENHANCEMENT SURGERY Bilateral 06/11/2020    REMOVAL BILATERAL CONTRACTURED IMPLANTS WITH BILATERAL PECTORAL BLOCK performed by Huey Cox MD at [facility] OR    BUNIONECTOMY Bilateral 2008    bunion correct osteotomy smith double-stem lesser MTP Impl    COLONOSCOPY  2017    HEMORRHOID SURGERY  2005    KNEE SURGERY Right 2007    PORT SURGERY N/A 03/18/2024    PORT INSERTION performed by Desire Foster DO at [facility] OR    RECTAL SURGERY  2000    anal fissurectomy with sphincterotomy    MAICOL AND BSO (CERVIX REMOVED) Bilateral 1980    both ovaries removed, had surgery as a result of endometriosis    TUBAL LIGATION  1975    UPPER GASTROINTESTINAL ENDOSCOPY N/A 01/31/2023    Dr Parker-Normal    VASCULAR SURGERY  03/18/2024    PORT INSERTION right internal jugular single lumen       Family History   Problem

## 2024-12-09 NOTE — PROGRESS NOTES
Nikkie Banks is a 74 y.o., female, New patient who presents today   Chief Complaint   Patient presents with    New Patient     Urinary retention     Patient presents for evaluation of left lower abdominal pain.  She reports she fell a couple of months ago and since that time she has felt a mild to moderate pain in her left mid to lower abdomen.  Her pain does not radiate.  It does not appear to be associated with anything.  She was concerned she may have bruised or damaged her kidney in the fall.  Her referral was sent for urinary retention.  I asked the patient if she had any issues with difficulty urinating.  She states she does not have any chronic urinary complaints.  She reports when she last saw Dr. Valdivia, she was experiencing some frequency and dark urine, but after antibiotic therapy, she is no longer symptomatic.    11/27/24 Ecoli, pansensitive  8/22/24 Ecoli, pansensitive  3/4/24  Neg cx    REVIEW OF SYSTEMS:  Review of Systems   Constitutional:  Negative for chills and fever.   Gastrointestinal:  Positive for abdominal pain. Negative for abdominal distention.   Genitourinary:  Negative for difficulty urinating, dysuria, flank pain, frequency, hematuria and urgency.   Skin:  Positive for pallor.   Neurological:  Positive for weakness.   Psychiatric/Behavioral:  Negative for agitation and confusion.        PHYSICAL EXAM:  Temp 99.1 °F (37.3 °C) (Temporal)   Ht 1.524 m (5')   Wt 43.1 kg (95 lb)   BMI 18.55 kg/m²   Physical Exam  Vitals and nursing note reviewed.   Constitutional:       General: She is not in acute distress.     Appearance: She is cachectic. She is not ill-appearing.   Pulmonary:      Effort: Pulmonary effort is normal. No respiratory distress.   Abdominal:      General: There is no distension.      Tenderness: There is abdominal tenderness (mild, left mid-lower abd). There is no right CVA tenderness or left CVA tenderness.   Neurological:      Mental Status: She is alert and

## 2024-12-10 ENCOUNTER — OFFICE VISIT (OUTPATIENT)
Dept: UROLOGY | Age: 74
End: 2024-12-10
Payer: MEDICARE

## 2024-12-10 VITALS — TEMPERATURE: 99.1 F | BODY MASS INDEX: 18.65 KG/M2 | HEIGHT: 60 IN | WEIGHT: 95 LBS

## 2024-12-10 DIAGNOSIS — R10.32 LEFT LOWER QUADRANT ABDOMINAL PAIN: ICD-10-CM

## 2024-12-10 DIAGNOSIS — R39.14 FEELING OF INCOMPLETE BLADDER EMPTYING: Primary | ICD-10-CM

## 2024-12-10 LAB
APPEARANCE FLUID: CLEAR
BILIRUBIN, POC: NORMAL
BLOOD URINE, POC: NORMAL
CLARITY, POC: CLEAR
COLOR, POC: YELLOW
GLUCOSE URINE, POC: NORMAL MG/DL
KETONES, POC: NORMAL MG/DL
LEUKOCYTE EST, POC: NORMAL
NITRITE, POC: NORMAL
PH, POC: 5.5
PROTEIN, POC: NORMAL MG/DL
SPECIFIC GRAVITY, POC: 1.02
UROBILINOGEN, POC: 0.2 MG/DL

## 2024-12-10 PROCEDURE — 1036F TOBACCO NON-USER: CPT | Performed by: NURSE PRACTITIONER

## 2024-12-10 PROCEDURE — 1090F PRES/ABSN URINE INCON ASSESS: CPT | Performed by: NURSE PRACTITIONER

## 2024-12-10 PROCEDURE — 81002 URINALYSIS NONAUTO W/O SCOPE: CPT | Performed by: NURSE PRACTITIONER

## 2024-12-10 PROCEDURE — 3017F COLORECTAL CA SCREEN DOC REV: CPT | Performed by: NURSE PRACTITIONER

## 2024-12-10 PROCEDURE — 1159F MED LIST DOCD IN RCRD: CPT | Performed by: NURSE PRACTITIONER

## 2024-12-10 PROCEDURE — 1123F ACP DISCUSS/DSCN MKR DOCD: CPT | Performed by: NURSE PRACTITIONER

## 2024-12-10 PROCEDURE — 51798 US URINE CAPACITY MEASURE: CPT | Performed by: NURSE PRACTITIONER

## 2024-12-10 PROCEDURE — G8399 PT W/DXA RESULTS DOCUMENT: HCPCS | Performed by: NURSE PRACTITIONER

## 2024-12-10 PROCEDURE — 99203 OFFICE O/P NEW LOW 30 MIN: CPT | Performed by: NURSE PRACTITIONER

## 2024-12-10 PROCEDURE — 1160F RVW MEDS BY RX/DR IN RCRD: CPT | Performed by: NURSE PRACTITIONER

## 2024-12-10 PROCEDURE — G8427 DOCREV CUR MEDS BY ELIG CLIN: HCPCS | Performed by: NURSE PRACTITIONER

## 2024-12-10 PROCEDURE — G8484 FLU IMMUNIZE NO ADMIN: HCPCS | Performed by: NURSE PRACTITIONER

## 2024-12-10 PROCEDURE — G8420 CALC BMI NORM PARAMETERS: HCPCS | Performed by: NURSE PRACTITIONER

## 2024-12-10 ASSESSMENT — ENCOUNTER SYMPTOMS
ABDOMINAL PAIN: 1
ABDOMINAL DISTENTION: 0

## 2024-12-15 PROBLEM — E44.0 MODERATE MALNUTRITION (HCC): Chronic | Status: RESOLVED | Noted: 2024-03-05 | Resolved: 2024-12-15

## 2024-12-15 PROBLEM — E87.20 LACTIC ACIDOSIS: Status: RESOLVED | Noted: 2024-03-04 | Resolved: 2024-12-15

## 2024-12-15 PROBLEM — A41.9 SEPTIC SHOCK (HCC): Status: RESOLVED | Noted: 2024-03-04 | Resolved: 2024-12-15

## 2024-12-15 PROBLEM — R65.21 SEPTIC SHOCK (HCC): Status: RESOLVED | Noted: 2024-03-04 | Resolved: 2024-12-15

## 2024-12-15 PROBLEM — N30.00 ACUTE CYSTITIS WITHOUT HEMATURIA: Status: RESOLVED | Noted: 2024-09-11 | Resolved: 2024-12-15

## 2024-12-15 PROBLEM — E44.1 MILD MALNUTRITION (HCC): Chronic | Status: RESOLVED | Noted: 2024-08-23 | Resolved: 2024-12-15

## 2024-12-18 ENCOUNTER — HOSPITAL ENCOUNTER (OUTPATIENT)
Dept: INFUSION THERAPY | Age: 74
Discharge: HOME | End: 2024-12-18
Payer: MEDICARE

## 2024-12-18 ENCOUNTER — OFFICE VISIT (OUTPATIENT)
Dept: HEMATOLOGY | Age: 74
End: 2024-12-18
Payer: MEDICARE

## 2024-12-18 VITALS
TEMPERATURE: 97.3 F | SYSTOLIC BLOOD PRESSURE: 101 MMHG | WEIGHT: 94 LBS | RESPIRATION RATE: 16 BRPM | BODY MASS INDEX: 18.46 KG/M2 | HEART RATE: 113 BPM | HEIGHT: 60 IN | DIASTOLIC BLOOD PRESSURE: 59 MMHG | OXYGEN SATURATION: 100 %

## 2024-12-18 VITALS — HEIGHT: 60 IN | BODY MASS INDEX: 18.36 KG/M2

## 2024-12-18 DIAGNOSIS — C34.90 SMALL CELL CARCINOMA OF LUNG, UNSPECIFIED LATERALITY, UNSPECIFIED PART OF LUNG (HCC): Primary | ICD-10-CM

## 2024-12-18 DIAGNOSIS — C34.90 SMALL CELL LUNG CANCER (HCC): ICD-10-CM

## 2024-12-18 DIAGNOSIS — E44.0 MODERATE MALNUTRITION (HCC): Primary | ICD-10-CM

## 2024-12-18 DIAGNOSIS — R53.0 NEOPLASTIC MALIGNANT RELATED FATIGUE: ICD-10-CM

## 2024-12-18 DIAGNOSIS — R53.0 NEOPLASTIC MALIGNANT RELATED FATIGUE: Primary | ICD-10-CM

## 2024-12-18 DIAGNOSIS — C34.92 NSCLC OF LEFT LUNG (HCC): ICD-10-CM

## 2024-12-18 LAB
ALBUMIN SERPL-MCNC: 3.8 G/DL (ref 3.5–5.2)
ALP SERPL-CCNC: 58 U/L (ref 35–104)
ALT SERPL-CCNC: 11 U/L (ref 5–33)
ANION GAP SERPL CALCULATED.3IONS-SCNC: 11 MMOL/L (ref 7–19)
AST SERPL-CCNC: 30 U/L (ref 5–32)
BASOPHILS # BLD: 0.01 K/UL (ref 0.01–0.08)
BASOPHILS NFR BLD: 0.2 % (ref 0.1–1.2)
BILIRUB SERPL-MCNC: <0.2 MG/DL (ref 0–1.2)
BUN SERPL-MCNC: 29 MG/DL (ref 8–23)
CALCIUM SERPL-MCNC: 9.8 MG/DL (ref 8.8–10.2)
CHLORIDE SERPL-SCNC: 98 MMOL/L (ref 98–107)
CO2 SERPL-SCNC: 24 MMOL/L (ref 22–29)
CREAT SERPL-MCNC: 0.9 MG/DL (ref 0.5–0.9)
EOSINOPHIL # BLD: 0.01 K/UL (ref 0.04–0.54)
EOSINOPHIL NFR BLD: 0.2 % (ref 0.7–7)
ERYTHROCYTE [DISTWIDTH] IN BLOOD BY AUTOMATED COUNT: 13.2 % (ref 11.7–14.4)
GLUCOSE SERPL-MCNC: 155 MG/DL (ref 70–99)
HCT VFR BLD AUTO: 34.6 % (ref 34.1–44.9)
HGB BLD-MCNC: 12.1 G/DL (ref 11.2–15.7)
LYMPHOCYTES # BLD: 1.29 K/UL (ref 1.18–3.74)
LYMPHOCYTES NFR BLD: 20.4 % (ref 19.3–53.1)
MCH RBC QN AUTO: 31.8 PG (ref 25.6–32.2)
MCHC RBC AUTO-ENTMCNC: 35 G/DL (ref 32.3–35.5)
MCV RBC AUTO: 90.8 FL (ref 79.4–94.8)
MONOCYTES # BLD: 0.69 K/UL (ref 0.24–0.82)
MONOCYTES NFR BLD: 10.9 % (ref 4.7–12.5)
NEUTROPHILS # BLD: 4.29 K/UL (ref 1.56–6.13)
NEUTS SEG NFR BLD: 68 % (ref 34–71.1)
PLATELET # BLD AUTO: 235 K/UL (ref 182–369)
PMV BLD AUTO: 8.6 FL (ref 7.4–10.4)
POTASSIUM SERPL-SCNC: 4.3 MMOL/L (ref 3.5–5.1)
PROT SERPL-MCNC: 5.8 G/DL (ref 6.4–8.3)
RBC # BLD AUTO: 3.81 M/UL (ref 3.93–5.22)
SODIUM SERPL-SCNC: 133 MMOL/L (ref 136–145)
T4 FREE SERPL-MCNC: 0.72 NG/DL (ref 0.93–1.7)
TSH SERPL DL<=0.005 MIU/L-ACNC: 5.34 UIU/ML (ref 0.27–4.2)
WBC # BLD AUTO: 6.31 K/UL (ref 3.98–10.04)

## 2024-12-18 PROCEDURE — 96413 CHEMO IV INFUSION 1 HR: CPT

## 2024-12-18 PROCEDURE — 6360000002 HC RX W HCPCS: Performed by: INTERNAL MEDICINE

## 2024-12-18 PROCEDURE — 2580000003 HC RX 258: Performed by: INTERNAL MEDICINE

## 2024-12-18 PROCEDURE — 80053 COMPREHEN METABOLIC PANEL: CPT

## 2024-12-18 PROCEDURE — 2500000003 HC RX 250 WO HCPCS: Performed by: INTERNAL MEDICINE

## 2024-12-18 PROCEDURE — 85025 COMPLETE CBC W/AUTO DIFF WBC: CPT

## 2024-12-18 PROCEDURE — 36415 COLL VENOUS BLD VENIPUNCTURE: CPT

## 2024-12-18 RX ORDER — ALBUTEROL SULFATE 90 UG/1
4 INHALANT RESPIRATORY (INHALATION) PRN
OUTPATIENT
Start: 2024-12-18

## 2024-12-18 RX ORDER — MEPERIDINE HYDROCHLORIDE 50 MG/ML
12.5 INJECTION INTRAMUSCULAR; INTRAVENOUS; SUBCUTANEOUS PRN
OUTPATIENT
Start: 2024-12-18

## 2024-12-18 RX ORDER — ACETAMINOPHEN 325 MG/1
650 TABLET ORAL
Start: 2024-12-18

## 2024-12-18 RX ORDER — DIPHENHYDRAMINE HYDROCHLORIDE 50 MG/ML
50 INJECTION INTRAMUSCULAR; INTRAVENOUS
OUTPATIENT
Start: 2024-12-18

## 2024-12-18 RX ORDER — HEPARIN 100 UNIT/ML
500 SYRINGE INTRAVENOUS PRN
Status: DISCONTINUED | OUTPATIENT
Start: 2024-12-18 | End: 2024-12-19 | Stop reason: HOSPADM

## 2024-12-18 RX ORDER — SODIUM CHLORIDE 9 MG/ML
5-250 INJECTION, SOLUTION INTRAVENOUS PRN
OUTPATIENT
Start: 2024-12-18

## 2024-12-18 RX ORDER — HYDROCORTISONE SODIUM SUCCINATE 100 MG/2ML
100 INJECTION INTRAMUSCULAR; INTRAVENOUS
OUTPATIENT
Start: 2024-12-18

## 2024-12-18 RX ORDER — FAMOTIDINE 10 MG/ML
20 INJECTION, SOLUTION INTRAVENOUS
OUTPATIENT
Start: 2024-12-18

## 2024-12-18 RX ORDER — SODIUM CHLORIDE 9 MG/ML
INJECTION, SOLUTION INTRAVENOUS CONTINUOUS
OUTPATIENT
Start: 2024-12-18

## 2024-12-18 RX ORDER — EPINEPHRINE 1 MG/ML
0.3 INJECTION, SOLUTION, CONCENTRATE INTRAVENOUS PRN
OUTPATIENT
Start: 2024-12-18

## 2024-12-18 RX ORDER — SODIUM CHLORIDE 0.9 % (FLUSH) 0.9 %
5-40 SYRINGE (ML) INJECTION PRN
Status: DISCONTINUED | OUTPATIENT
Start: 2024-12-18 | End: 2024-12-19 | Stop reason: HOSPADM

## 2024-12-18 RX ORDER — ACETAMINOPHEN 325 MG/1
650 TABLET ORAL
OUTPATIENT
Start: 2024-12-18

## 2024-12-18 RX ORDER — ONDANSETRON 2 MG/ML
8 INJECTION INTRAMUSCULAR; INTRAVENOUS
OUTPATIENT
Start: 2024-12-18

## 2024-12-18 RX ORDER — HEPARIN SODIUM (PORCINE) LOCK FLUSH IV SOLN 100 UNIT/ML 100 UNIT/ML
500 SOLUTION INTRAVENOUS PRN
Status: CANCELLED | OUTPATIENT
Start: 2024-12-18

## 2024-12-18 RX ORDER — SODIUM CHLORIDE 0.9 % (FLUSH) 0.9 %
5-40 SYRINGE (ML) INJECTION PRN
Status: CANCELLED | OUTPATIENT
Start: 2024-12-18

## 2024-12-18 RX ADMIN — ATEZOLIZUMAB 1200 MG: 1200 INJECTION, SOLUTION INTRAVENOUS at 14:50

## 2024-12-18 RX ADMIN — HEPARIN 500 UNITS: 100 SYRINGE at 15:21

## 2024-12-18 RX ADMIN — SODIUM CHLORIDE, PRESERVATIVE FREE 10 ML: 5 INJECTION INTRAVENOUS at 15:21

## 2024-12-18 NOTE — PROGRESS NOTES
Lab Results   Component Value Date    WBC 6.31 12/18/2024    HGB 12.1 12/18/2024    HCT 34.6 12/18/2024    MCV 90.8 12/18/2024     12/18/2024     Lab Results   Component Value Date    NEUTROABS 4.29 12/18/2024     Lab Results   Component Value Date     (L) 12/18/2024    K 4.3 12/18/2024    CL 98 12/18/2024    CO2 24 12/18/2024    BUN 29 (H) 12/18/2024    CREATININE 0.9 12/18/2024    GLUCOSE 155 (H) 12/18/2024    CALCIUM 9.8 12/18/2024    BILITOT <0.2 12/18/2024    ALKPHOS 58 12/18/2024    AST 30 12/18/2024    ALT 11 12/18/2024    LABGLOM 67 12/18/2024    GFRAA >59 06/28/2022    GLOB 2.0 07/16/2024

## 2024-12-18 NOTE — PROGRESS NOTES
[facility] On[facility] H[facility]  [address] [place] [address] [place] [address]  Phone: [phone]  Fax: [phone]    Rossi García, MS, RD, LD   Nikkie RAE Darren 74 y.o. White (non-)  Diagnosis, staging, date of diagnosis: NSCLC metastatic to liver, Feb 2023  Current Treatment: Tecentriq q3 weeks    Comprehensive Nutrition Assessment    Type and Reason for Visit:  Reassess    Malnutrition Assessment:  Malnutrition Status:  Moderate malnutrition (12/18/24 1528)    Context:  Chronic Illness     Findings of the 6 clinical characteristics of malnutrition:  Energy Intake:  No decrease in energy intake  Weight Loss:  20% over 1 year     Body Fat Loss:  Mild body fat loss Orbital, Buccal region   Muscle Mass Loss:  Mild muscle mass loss Temples (temporalis), Clavicles (pectoralis & deltoids)    Nutrition Assessment:    DA 75 y/o female presents 11/27/24 for follow-up RD evaluation. Pt presents moderately malnourished AEB wt loss 20% in 9 months, mild muscle wasting, mild fat loss, and inadequate energy intake. Her wt has remained stable and she reports feeling much better compared to visit 3 weeks ago. Pt reports improvement in appetite and energy intake over the past 3 weeks. Pt notes taste sensation is improved and she is eating much more. Pt denies diarrhea or constipation.     Diet History:  Breakfast: cereal, milk, Boost  Lunch/Dinner: pimento cheese, chips and dip  Beverages: Dr. Pepper, Sebastien, grape soda, water  Pt drinks 1 Boost daily but has started to drink 2 at times    Nutrition Related Laboratory Data:  Na+ 133, K+ 4.3, glu 155, BUN:Cr 32, GFR 67    Anthropometric Measures:  Height: 152.4 cm (5')  Ideal Body Weight (IBW): 100 lbs (45 kg)       Current Body Weight: 42.6 kg (94 lb), 94 % IBW.    Current BMI (kg/m2): 18.4  Wt Readings from Last 5 Encounters:   12/18/24 42.6 kg (94 lb)   12/10/24 43.1 kg (95 lb)   12/03/24 42.2 kg (93 lb)   11/27/24 42.6 kg (94 lb)

## 2025-01-06 LAB
CORTIS AM PEAK SERPL-MCNC: 16 UG/DL (ref 4.8–19.5)
CORTIS AM PEAK SERPL-MCNC: 9.3 UG/DL (ref 4.8–19.5)

## 2025-01-07 NOTE — PROGRESS NOTES
from 1.0 cm.  No new liver lesions.  Essentially, mildly improved metastatic disease in the liver.  11/21/24 NM Bone Scan Whole Body: There is mild linear activity in the anterior right seventh rib. This does not have the usual appearance for trauma and could represent metastatic involvement. There is a punctate focus of activity in the anterior left sixth rib that may represent trauma. There is increased uptake in the right humeral head and humeral neck, more than the left.  This may represent degenerative change but metastatic deposit in this area is also not excluded. Consider radiographs or imaging  if further evaluation is needed. Mild uptake in the medial compartment right knee, L3 and L4-5, suggestive of degenerative change.    Essentially, no evidence of disease progression.    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 Ki-67 proliferation index is greater than 95%.  January 2024 12/6/2024-CT T/L-spine with contrast-no acute findings thoracic spine.  No evidence of malignancy. Lucent area in the S1 vertebral body with an adjacent Schmorl's node.  This appearance is stable and could be degenerative.  If there is concern for metastatic lesion, consider follow-up MRI lumbar spine and / or bony pelvis.   12/6/2024-CT chest/abdomen/pelvis, [facility]: CT chest showed several small nodules in the right lung, unchanged.  Left pleural effusion with adjacent consolidation, stable.  No evidence of intrathoracic progression.  CT of the abdomen pelvis showed multiple liver masses, decreased in size.  The largest, in the right right lobe post segment inferiorly, measures 1.1 cm, decreased from 1.2 cm.  The next largest, also the right lobe, measures 0.9 cm, decreased from 1.0 cm.  No new liver lesions.  Essentially, mildly improved metastatic disease in the liver.  11/21/24 NM Bone Scan Whole Body: There is mild linear

## 2025-01-08 ENCOUNTER — OFFICE VISIT (OUTPATIENT)
Dept: HEMATOLOGY | Age: 75
End: 2025-01-08
Payer: COMMERCIAL

## 2025-01-08 ENCOUNTER — HOSPITAL ENCOUNTER (OUTPATIENT)
Dept: INFUSION THERAPY | Age: 75
Discharge: HOME | End: 2025-01-08
Payer: COMMERCIAL

## 2025-01-08 VITALS
DIASTOLIC BLOOD PRESSURE: 65 MMHG | BODY MASS INDEX: 18.34 KG/M2 | SYSTOLIC BLOOD PRESSURE: 112 MMHG | RESPIRATION RATE: 16 BRPM | OXYGEN SATURATION: 100 % | HEIGHT: 60 IN | WEIGHT: 93.4 LBS | HEART RATE: 95 BPM | TEMPERATURE: 97.9 F

## 2025-01-08 DIAGNOSIS — R53.83 FATIGUE DUE TO TREATMENT: ICD-10-CM

## 2025-01-08 DIAGNOSIS — Z71.89 CARE PLAN DISCUSSED WITH PATIENT: ICD-10-CM

## 2025-01-08 DIAGNOSIS — Z51.12 ENCOUNTER FOR ANTINEOPLASTIC IMMUNOTHERAPY: ICD-10-CM

## 2025-01-08 DIAGNOSIS — C34.90 SMALL CELL CARCINOMA OF LUNG, UNSPECIFIED LATERALITY, UNSPECIFIED PART OF LUNG (HCC): Primary | ICD-10-CM

## 2025-01-08 DIAGNOSIS — C34.82 SMALL CELL CARCINOMA OF OVERLAPPING SITES OF LEFT LUNG (HCC): Primary | ICD-10-CM

## 2025-01-08 DIAGNOSIS — C34.90 SMALL CELL LUNG CANCER (HCC): ICD-10-CM

## 2025-01-08 LAB
ALBUMIN SERPL-MCNC: 4.2 G/DL (ref 3.5–5.2)
ALP SERPL-CCNC: 61 U/L (ref 35–104)
ALT SERPL-CCNC: 12 U/L (ref 5–33)
ANION GAP SERPL CALCULATED.3IONS-SCNC: 10 MMOL/L (ref 7–19)
AST SERPL-CCNC: 31 U/L (ref 5–32)
BASOPHILS # BLD: 0.02 K/UL (ref 0.01–0.08)
BASOPHILS NFR BLD: 0.3 % (ref 0.1–1.2)
BILIRUB SERPL-MCNC: <0.2 MG/DL (ref 0–1.2)
BUN SERPL-MCNC: 34 MG/DL (ref 8–23)
CALCIUM SERPL-MCNC: 10.7 MG/DL (ref 8.8–10.2)
CHLORIDE SERPL-SCNC: 100 MMOL/L (ref 98–107)
CO2 SERPL-SCNC: 24 MMOL/L (ref 22–29)
CREAT SERPL-MCNC: 0.9 MG/DL (ref 0.5–0.9)
EOSINOPHIL # BLD: 0 K/UL (ref 0.04–0.54)
EOSINOPHIL NFR BLD: 0 % (ref 0.7–7)
ERYTHROCYTE [DISTWIDTH] IN BLOOD BY AUTOMATED COUNT: 13.6 % (ref 11.7–14.4)
GLUCOSE SERPL-MCNC: 115 MG/DL (ref 70–99)
HCT VFR BLD AUTO: 36.9 % (ref 34.1–44.9)
HGB BLD-MCNC: 12.8 G/DL (ref 11.2–15.7)
LYMPHOCYTES # BLD: 1.76 K/UL (ref 1.18–3.74)
LYMPHOCYTES NFR BLD: 22.6 % (ref 19.3–53.1)
MCH RBC QN AUTO: 31.8 PG (ref 25.6–32.2)
MCHC RBC AUTO-ENTMCNC: 34.7 G/DL (ref 32.3–35.5)
MCV RBC AUTO: 91.8 FL (ref 79.4–94.8)
MONOCYTES # BLD: 0.92 K/UL (ref 0.24–0.82)
MONOCYTES NFR BLD: 11.8 % (ref 4.7–12.5)
NEUTROPHILS # BLD: 5.08 K/UL (ref 1.56–6.13)
NEUTS SEG NFR BLD: 65 % (ref 34–71.1)
PLATELET # BLD AUTO: 268 K/UL (ref 182–369)
PMV BLD AUTO: 8.6 FL (ref 7.4–10.4)
POTASSIUM SERPL-SCNC: 4.7 MMOL/L (ref 3.5–5.1)
PROT SERPL-MCNC: 6.7 G/DL (ref 6.4–8.3)
RBC # BLD AUTO: 4.02 M/UL (ref 3.93–5.22)
SODIUM SERPL-SCNC: 134 MMOL/L (ref 136–145)
WBC # BLD AUTO: 7.8 K/UL (ref 3.98–10.04)

## 2025-01-08 PROCEDURE — 2500000003 HC RX 250 WO HCPCS: Performed by: INTERNAL MEDICINE

## 2025-01-08 PROCEDURE — 36591 DRAW BLOOD OFF VENOUS DEVICE: CPT

## 2025-01-08 PROCEDURE — 80053 COMPREHEN METABOLIC PANEL: CPT

## 2025-01-08 PROCEDURE — 85025 COMPLETE CBC W/AUTO DIFF WBC: CPT

## 2025-01-08 PROCEDURE — 6360000002 HC RX W HCPCS: Performed by: INTERNAL MEDICINE

## 2025-01-08 PROCEDURE — 96413 CHEMO IV INFUSION 1 HR: CPT

## 2025-01-08 PROCEDURE — 2580000003 HC RX 258: Performed by: INTERNAL MEDICINE

## 2025-01-08 PROCEDURE — 36415 COLL VENOUS BLD VENIPUNCTURE: CPT

## 2025-01-08 RX ORDER — SODIUM CHLORIDE 9 MG/ML
5-250 INJECTION, SOLUTION INTRAVENOUS PRN
Status: CANCELLED | OUTPATIENT
Start: 2025-01-08

## 2025-01-08 RX ORDER — SODIUM CHLORIDE 0.9 % (FLUSH) 0.9 %
5-40 SYRINGE (ML) INJECTION PRN
Status: CANCELLED | OUTPATIENT
Start: 2025-01-08

## 2025-01-08 RX ORDER — HEPARIN 100 UNIT/ML
500 SYRINGE INTRAVENOUS PRN
Status: DISCONTINUED | OUTPATIENT
Start: 2025-01-08 | End: 2025-01-09 | Stop reason: HOSPADM

## 2025-01-08 RX ORDER — ACETAMINOPHEN 325 MG/1
650 TABLET ORAL
Status: CANCELLED
Start: 2025-01-08

## 2025-01-08 RX ORDER — EPINEPHRINE 1 MG/ML
0.3 INJECTION, SOLUTION, CONCENTRATE INTRAVENOUS PRN
Status: CANCELLED | OUTPATIENT
Start: 2025-01-08

## 2025-01-08 RX ORDER — FAMOTIDINE 10 MG/ML
20 INJECTION, SOLUTION INTRAVENOUS
Status: CANCELLED | OUTPATIENT
Start: 2025-01-08

## 2025-01-08 RX ORDER — DIPHENHYDRAMINE HYDROCHLORIDE 50 MG/ML
50 INJECTION INTRAMUSCULAR; INTRAVENOUS
Status: CANCELLED | OUTPATIENT
Start: 2025-01-08

## 2025-01-08 RX ORDER — ONDANSETRON 2 MG/ML
8 INJECTION INTRAMUSCULAR; INTRAVENOUS
Status: CANCELLED | OUTPATIENT
Start: 2025-01-08

## 2025-01-08 RX ORDER — HYDROCORTISONE SODIUM SUCCINATE 100 MG/2ML
100 INJECTION INTRAMUSCULAR; INTRAVENOUS
Status: CANCELLED | OUTPATIENT
Start: 2025-01-08

## 2025-01-08 RX ORDER — HEPARIN SODIUM (PORCINE) LOCK FLUSH IV SOLN 100 UNIT/ML 100 UNIT/ML
500 SOLUTION INTRAVENOUS PRN
Status: CANCELLED | OUTPATIENT
Start: 2025-01-08

## 2025-01-08 RX ORDER — ALBUTEROL SULFATE 90 UG/1
4 INHALANT RESPIRATORY (INHALATION) PRN
Status: CANCELLED | OUTPATIENT
Start: 2025-01-08

## 2025-01-08 RX ORDER — MEPERIDINE HYDROCHLORIDE 50 MG/ML
12.5 INJECTION INTRAMUSCULAR; INTRAVENOUS; SUBCUTANEOUS PRN
Status: CANCELLED | OUTPATIENT
Start: 2025-01-08

## 2025-01-08 RX ORDER — SODIUM CHLORIDE 0.9 % (FLUSH) 0.9 %
5-40 SYRINGE (ML) INJECTION PRN
Status: DISCONTINUED | OUTPATIENT
Start: 2025-01-08 | End: 2025-01-09 | Stop reason: HOSPADM

## 2025-01-08 RX ORDER — SODIUM CHLORIDE 9 MG/ML
INJECTION, SOLUTION INTRAVENOUS CONTINUOUS
Status: CANCELLED | OUTPATIENT
Start: 2025-01-08

## 2025-01-08 RX ORDER — ACETAMINOPHEN 325 MG/1
650 TABLET ORAL
Status: CANCELLED | OUTPATIENT
Start: 2025-01-08

## 2025-01-08 RX ADMIN — SODIUM CHLORIDE, PRESERVATIVE FREE 10 ML: 5 INJECTION INTRAVENOUS at 15:24

## 2025-01-08 RX ADMIN — ATEZOLIZUMAB 1200 MG: 1200 INJECTION, SOLUTION INTRAVENOUS at 14:54

## 2025-01-08 RX ADMIN — HEPARIN 500 UNITS: 100 SYRINGE at 15:24

## 2025-01-27 NOTE — PROGRESS NOTES
MEDICAL ONCOLOGY PROGRESS NOTE                                                          Nikkie Banks   1950  1/29/2025     Chief Complaint   Patient presents with    Cancer     Small cell carcinoma of overlapping sites of left lung (HCC)          INTERVAL HISTORY/HISTORY OF PRESENT ILLNESS:  Reason for MD visit-toxicity assessment/disease management/management of high-risk medication  History of Present Illness  The patient is a 74-year-old female with a history of non-small cell lung cancer, EGFR mutated, presenting today for consideration of treatment. She has stage 4 disease with liver metastasis. The most recent biopsy showed small cell transformation, and she completed carboplatin and etoposide in 03/2024. She has been on immunotherapy maintenance since 08/2024. The most recent CT scans of the chest, abdomen, and pelvis showed stability of the disease. She is scheduled for a repeat CT scan C/A/P.    She reports a general lack of energy, excessive sleepiness, and difficulty in ambulation. It is reported that she spends the majority of her time in bed or on the couch, sleeping. She has been experiencing weight loss and expresses dissatisfaction with her current quality of life. She does not venture outside the house and requires assistance for bathroom visits. She expresses concern about her ability to transfer to a potty chair due to perceived leg weakness. She is uncertain about her ability to undergo the scheduled CT scan due to her current state of weakness. She maintains adequate hydration.    MEDICATIONS  Current: Immunotherapy  Completed: Carboplatin, etoposide        Diagnosis  LLL lesion, Jan 2023  Liver metastasis  NSCLC-adenocarcinoma, Feb 2023  Moderately differentiated   Stage IV (liver mets), [id number] (liver)  Caris: ENID  NGS- Lucence: EGFR exon 19-positive  Liver metastasis-> small cell lung cancer, stage IV     Treatment summary  2/20/23 Initiated Osimertinib 80 mg p.o.

## 2025-01-29 ENCOUNTER — HOSPITAL ENCOUNTER (OUTPATIENT)
Dept: INFUSION THERAPY | Age: 75
Discharge: HOME | End: 2025-01-29
Payer: MEDICARE

## 2025-01-29 ENCOUNTER — OFFICE VISIT (OUTPATIENT)
Dept: HEMATOLOGY | Age: 75
End: 2025-01-29
Payer: MEDICARE

## 2025-01-29 VITALS
BODY MASS INDEX: 17.96 KG/M2 | TEMPERATURE: 97.6 F | DIASTOLIC BLOOD PRESSURE: 66 MMHG | RESPIRATION RATE: 18 BRPM | SYSTOLIC BLOOD PRESSURE: 136 MMHG | WEIGHT: 91.5 LBS | HEIGHT: 60 IN | OXYGEN SATURATION: 100 % | HEART RATE: 110 BPM

## 2025-01-29 DIAGNOSIS — Z71.89 CARE PLAN DISCUSSED WITH PATIENT: ICD-10-CM

## 2025-01-29 DIAGNOSIS — C34.82 SMALL CELL CARCINOMA OF OVERLAPPING SITES OF LEFT LUNG (HCC): Primary | ICD-10-CM

## 2025-01-29 DIAGNOSIS — R53.0 NEOPLASTIC (MALIGNANT) RELATED FATIGUE: ICD-10-CM

## 2025-01-29 DIAGNOSIS — G89.3 CANCER ASSOCIATED PAIN: ICD-10-CM

## 2025-01-29 DIAGNOSIS — D61.818 OTHER PANCYTOPENIA (HCC): ICD-10-CM

## 2025-01-29 DIAGNOSIS — R53.0 NEOPLASTIC MALIGNANT RELATED FATIGUE: ICD-10-CM

## 2025-01-29 DIAGNOSIS — E44.0 MODERATE MALNUTRITION (HCC): ICD-10-CM

## 2025-01-29 DIAGNOSIS — R53.83 FATIGUE DUE TO TREATMENT: ICD-10-CM

## 2025-01-29 DIAGNOSIS — C34.90 SMALL CELL LUNG CANCER (HCC): ICD-10-CM

## 2025-01-29 DIAGNOSIS — R63.4 UNINTENTIONAL WEIGHT LOSS: ICD-10-CM

## 2025-01-29 DIAGNOSIS — C78.7 SECONDARY MALIGNANT NEOPLASM OF LIVER AND INTRAHEPATIC BILE DUCT (HCC): ICD-10-CM

## 2025-01-29 DIAGNOSIS — R53.81 PHYSICAL DECONDITIONING: ICD-10-CM

## 2025-01-29 DIAGNOSIS — Z71.89 COUNSELING REGARDING ADVANCED CARE PLANNING AND GOALS OF CARE: ICD-10-CM

## 2025-01-29 DIAGNOSIS — R53.0 NEOPLASTIC MALIGNANT RELATED FATIGUE: Primary | ICD-10-CM

## 2025-01-29 DIAGNOSIS — Z51.12 ENCOUNTER FOR ANTINEOPLASTIC IMMUNOTHERAPY: ICD-10-CM

## 2025-01-29 DIAGNOSIS — E83.52 HYPERCALCEMIA: ICD-10-CM

## 2025-01-29 DIAGNOSIS — R26.2 DIFFICULTY WALKING: ICD-10-CM

## 2025-01-29 DIAGNOSIS — E86.0 DEHYDRATION: Primary | ICD-10-CM

## 2025-01-29 LAB
ALBUMIN SERPL-MCNC: 4.1 G/DL (ref 3.5–5.2)
ALP SERPL-CCNC: 72 U/L (ref 35–104)
ALT SERPL-CCNC: 10 U/L (ref 5–33)
ANION GAP SERPL CALCULATED.3IONS-SCNC: 14 MMOL/L (ref 7–19)
AST SERPL-CCNC: 34 U/L (ref 5–32)
BASOPHILS # BLD: 0.02 K/UL (ref 0–0.2)
BASOPHILS NFR BLD: 0.2 % (ref 0–1)
BILIRUB SERPL-MCNC: 0.3 MG/DL (ref 0–1.2)
BUN SERPL-MCNC: 30 MG/DL (ref 8–23)
CALCIUM SERPL-MCNC: 10.9 MG/DL (ref 8.8–10.2)
CHLORIDE SERPL-SCNC: 102 MMOL/L (ref 98–107)
CO2 SERPL-SCNC: 22 MMOL/L (ref 22–29)
CORTIS PM SERPL-MCNC: 12.8 UG/DL (ref 2.5–11.9)
CREAT SERPL-MCNC: 1 MG/DL (ref 0.5–0.9)
EOSINOPHIL # BLD: 0 K/UL (ref 0–0.6)
EOSINOPHIL NFR BLD: 0 % (ref 0–5)
ERYTHROCYTE [DISTWIDTH] IN BLOOD BY AUTOMATED COUNT: 13.5 % (ref 11.5–14.5)
GLUCOSE SERPL-MCNC: 160 MG/DL (ref 70–99)
HCT VFR BLD AUTO: 35.6 % (ref 37–47)
HGB BLD-MCNC: 12.4 G/DL (ref 12–16)
LYMPHOCYTES # BLD: 2.09 K/UL (ref 1.1–4.5)
LYMPHOCYTES NFR BLD: 21.2 % (ref 20–40)
MCH RBC QN AUTO: 32 PG (ref 27–31)
MCHC RBC AUTO-ENTMCNC: 34.8 G/DL (ref 33–37)
MCV RBC AUTO: 92 FL (ref 81–99)
MONOCYTES # BLD: 1.05 K/UL (ref 0–0.9)
MONOCYTES NFR BLD: 10.6 % (ref 1–10)
NEUTROPHILS # BLD: 6.67 K/UL (ref 1.5–7.5)
NEUTS SEG NFR BLD: 67.7 % (ref 50–65)
PLATELET # BLD AUTO: 290 K/UL (ref 130–400)
PMV BLD AUTO: 9.1 FL (ref 9.4–12.3)
POTASSIUM SERPL-SCNC: 3.9 MMOL/L (ref 3.5–5.1)
PROT SERPL-MCNC: 6.2 G/DL (ref 6.4–8.3)
RBC # BLD AUTO: 3.87 M/UL (ref 4.2–5.4)
SODIUM SERPL-SCNC: 138 MMOL/L (ref 136–145)
T4 FREE SERPL-MCNC: 0.7 NG/DL (ref 0.93–1.7)
TSH SERPL DL<=0.005 MIU/L-ACNC: 7.14 UIU/ML (ref 0.27–4.2)
WBC # BLD AUTO: 9.86 K/UL (ref 4.8–10.8)

## 2025-01-29 PROCEDURE — 99213 OFFICE O/P EST LOW 20 MIN: CPT

## 2025-01-29 PROCEDURE — 82533 TOTAL CORTISOL: CPT

## 2025-01-29 PROCEDURE — 3078F DIAST BP <80 MM HG: CPT | Performed by: INTERNAL MEDICINE

## 2025-01-29 PROCEDURE — 84443 ASSAY THYROID STIM HORMONE: CPT

## 2025-01-29 PROCEDURE — 85025 COMPLETE CBC W/AUTO DIFF WBC: CPT

## 2025-01-29 PROCEDURE — 2500000003 HC RX 250 WO HCPCS: Performed by: INTERNAL MEDICINE

## 2025-01-29 PROCEDURE — 96523 IRRIG DRUG DELIVERY DEVICE: CPT

## 2025-01-29 PROCEDURE — 1123F ACP DISCUSS/DSCN MKR DOCD: CPT | Performed by: INTERNAL MEDICINE

## 2025-01-29 PROCEDURE — 1125F AMNT PAIN NOTED PAIN PRSNT: CPT | Performed by: INTERNAL MEDICINE

## 2025-01-29 PROCEDURE — 84439 ASSAY OF FREE THYROXINE: CPT

## 2025-01-29 PROCEDURE — 3075F SYST BP GE 130 - 139MM HG: CPT | Performed by: INTERNAL MEDICINE

## 2025-01-29 PROCEDURE — 36415 COLL VENOUS BLD VENIPUNCTURE: CPT

## 2025-01-29 PROCEDURE — 80053 COMPREHEN METABOLIC PANEL: CPT

## 2025-01-29 PROCEDURE — G2211 COMPLEX E/M VISIT ADD ON: HCPCS | Performed by: INTERNAL MEDICINE

## 2025-01-29 PROCEDURE — 99215 OFFICE O/P EST HI 40 MIN: CPT | Performed by: INTERNAL MEDICINE

## 2025-01-29 PROCEDURE — 6360000002 HC RX W HCPCS: Performed by: INTERNAL MEDICINE

## 2025-01-29 RX ORDER — SODIUM CHLORIDE 0.9 % (FLUSH) 0.9 %
5-40 SYRINGE (ML) INJECTION PRN
OUTPATIENT
Start: 2025-01-29

## 2025-01-29 RX ORDER — SODIUM CHLORIDE 9 MG/ML
INJECTION, SOLUTION INTRAVENOUS CONTINUOUS
OUTPATIENT
Start: 2025-01-29

## 2025-01-29 RX ORDER — SODIUM CHLORIDE 0.9 % (FLUSH) 0.9 %
5-40 SYRINGE (ML) INJECTION PRN
Status: CANCELLED | OUTPATIENT
Start: 2025-01-29

## 2025-01-29 RX ORDER — EPINEPHRINE 1 MG/ML
0.3 INJECTION, SOLUTION, CONCENTRATE INTRAVENOUS PRN
OUTPATIENT
Start: 2025-01-29

## 2025-01-29 RX ORDER — DIPHENHYDRAMINE HYDROCHLORIDE 50 MG/ML
50 INJECTION INTRAMUSCULAR; INTRAVENOUS
OUTPATIENT
Start: 2025-01-29

## 2025-01-29 RX ORDER — HEPARIN SODIUM (PORCINE) LOCK FLUSH IV SOLN 100 UNIT/ML 100 UNIT/ML
500 SOLUTION INTRAVENOUS PRN
Status: CANCELLED | OUTPATIENT
Start: 2025-01-29

## 2025-01-29 RX ORDER — 0.9 % SODIUM CHLORIDE 0.9 %
1000 INTRAVENOUS SOLUTION INTRAVENOUS ONCE
OUTPATIENT
Start: 2025-01-29 | End: 2025-01-29

## 2025-01-29 RX ORDER — DIPHENHYDRAMINE HYDROCHLORIDE 50 MG/ML
50 INJECTION INTRAMUSCULAR; INTRAVENOUS
Status: CANCELLED | OUTPATIENT
Start: 2025-01-29

## 2025-01-29 RX ORDER — SODIUM CHLORIDE 0.9 % (FLUSH) 0.9 %
5-40 SYRINGE (ML) INJECTION PRN
Status: DISCONTINUED | OUTPATIENT
Start: 2025-01-29 | End: 2025-01-30 | Stop reason: HOSPADM

## 2025-01-29 RX ORDER — SODIUM CHLORIDE 9 MG/ML
5-250 INJECTION, SOLUTION INTRAVENOUS PRN
Status: CANCELLED | OUTPATIENT
Start: 2025-01-29

## 2025-01-29 RX ORDER — MEPERIDINE HYDROCHLORIDE 50 MG/ML
12.5 INJECTION INTRAMUSCULAR; INTRAVENOUS; SUBCUTANEOUS PRN
Status: CANCELLED | OUTPATIENT
Start: 2025-01-29

## 2025-01-29 RX ORDER — FAMOTIDINE 10 MG/ML
20 INJECTION, SOLUTION INTRAVENOUS
Status: CANCELLED | OUTPATIENT
Start: 2025-01-29

## 2025-01-29 RX ORDER — ONDANSETRON 2 MG/ML
8 INJECTION INTRAMUSCULAR; INTRAVENOUS
OUTPATIENT
Start: 2025-01-29

## 2025-01-29 RX ORDER — SODIUM CHLORIDE 9 MG/ML
INJECTION, SOLUTION INTRAVENOUS CONTINUOUS
Status: CANCELLED | OUTPATIENT
Start: 2025-01-29

## 2025-01-29 RX ORDER — HYDROCORTISONE SODIUM SUCCINATE 100 MG/2ML
100 INJECTION INTRAMUSCULAR; INTRAVENOUS
OUTPATIENT
Start: 2025-01-29

## 2025-01-29 RX ORDER — ALBUTEROL SULFATE 90 UG/1
4 INHALANT RESPIRATORY (INHALATION) PRN
Status: CANCELLED | OUTPATIENT
Start: 2025-01-29

## 2025-01-29 RX ORDER — HEPARIN 100 UNIT/ML
500 SYRINGE INTRAVENOUS PRN
OUTPATIENT
Start: 2025-01-29

## 2025-01-29 RX ORDER — FAMOTIDINE 10 MG/ML
20 INJECTION, SOLUTION INTRAVENOUS
OUTPATIENT
Start: 2025-01-29

## 2025-01-29 RX ORDER — ACETAMINOPHEN 325 MG/1
650 TABLET ORAL
Status: CANCELLED | OUTPATIENT
Start: 2025-01-29

## 2025-01-29 RX ORDER — ALBUTEROL SULFATE 90 UG/1
4 INHALANT RESPIRATORY (INHALATION) PRN
OUTPATIENT
Start: 2025-01-29

## 2025-01-29 RX ORDER — SODIUM CHLORIDE 9 MG/ML
5-250 INJECTION, SOLUTION INTRAVENOUS PRN
OUTPATIENT
Start: 2025-01-29

## 2025-01-29 RX ORDER — ACETAMINOPHEN 325 MG/1
650 TABLET ORAL
OUTPATIENT
Start: 2025-01-29

## 2025-01-29 RX ORDER — ACETAMINOPHEN 325 MG/1
650 TABLET ORAL
Status: CANCELLED
Start: 2025-01-29

## 2025-01-29 RX ORDER — HYDROCORTISONE SODIUM SUCCINATE 100 MG/2ML
100 INJECTION INTRAMUSCULAR; INTRAVENOUS
Status: CANCELLED | OUTPATIENT
Start: 2025-01-29

## 2025-01-29 RX ORDER — TRAMADOL HYDROCHLORIDE 50 MG/1
50 TABLET ORAL EVERY 6 HOURS PRN
Qty: 120 TABLET | Refills: 0 | Status: SHIPPED | OUTPATIENT
Start: 2025-01-29 | End: 2025-02-28

## 2025-01-29 RX ORDER — HEPARIN 100 UNIT/ML
500 SYRINGE INTRAVENOUS PRN
Status: DISCONTINUED | OUTPATIENT
Start: 2025-01-29 | End: 2025-01-30 | Stop reason: HOSPADM

## 2025-01-29 RX ORDER — ONDANSETRON 2 MG/ML
8 INJECTION INTRAMUSCULAR; INTRAVENOUS
Status: CANCELLED | OUTPATIENT
Start: 2025-01-29

## 2025-01-29 RX ORDER — EPINEPHRINE 1 MG/ML
0.3 INJECTION, SOLUTION, CONCENTRATE INTRAVENOUS PRN
Status: CANCELLED | OUTPATIENT
Start: 2025-01-29

## 2025-01-29 RX ADMIN — HEPARIN 500 UNITS: 100 SYRINGE at 15:40

## 2025-01-29 RX ADMIN — SODIUM CHLORIDE, PRESERVATIVE FREE 10 ML: 5 INJECTION INTRAVENOUS at 15:40

## 2025-01-30 DIAGNOSIS — C34.82 SMALL CELL CARCINOMA OF OVERLAPPING SITES OF LEFT LUNG (HCC): Primary | ICD-10-CM

## 2025-02-05 ENCOUNTER — HOSPITAL ENCOUNTER (OUTPATIENT)
Dept: CT IMAGING | Age: 75
Discharge: HOME | End: 2025-02-05
Attending: INTERNAL MEDICINE
Payer: MEDICARE

## 2025-02-05 ENCOUNTER — TELEPHONE (OUTPATIENT)
Dept: HEMATOLOGY | Age: 75
End: 2025-02-05

## 2025-02-05 DIAGNOSIS — C34.82 SMALL CELL CARCINOMA OF OVERLAPPING SITES OF LEFT LUNG (HCC): ICD-10-CM

## 2025-02-05 DIAGNOSIS — E83.52 HYPERCALCEMIA: Primary | ICD-10-CM

## 2025-02-05 PROCEDURE — 71260 CT THORAX DX C+: CPT

## 2025-02-05 PROCEDURE — 36415 COLL VENOUS BLD VENIPUNCTURE: CPT | Performed by: INTERNAL MEDICINE

## 2025-02-05 PROCEDURE — 74177 CT ABD & PELVIS W/CONTRAST: CPT

## 2025-02-05 PROCEDURE — 6360000004 HC RX CONTRAST MEDICATION: Performed by: INTERNAL MEDICINE

## 2025-02-05 PROCEDURE — 6360000002 HC RX W HCPCS: Performed by: INTERNAL MEDICINE

## 2025-02-05 RX ORDER — DIPHENHYDRAMINE HYDROCHLORIDE 50 MG/ML
50 INJECTION INTRAMUSCULAR; INTRAVENOUS
OUTPATIENT
Start: 2025-02-07

## 2025-02-05 RX ORDER — ALBUTEROL SULFATE 90 UG/1
4 INHALANT RESPIRATORY (INHALATION) PRN
OUTPATIENT
Start: 2025-02-07

## 2025-02-05 RX ORDER — IOPAMIDOL 755 MG/ML
75 INJECTION, SOLUTION INTRAVASCULAR
Status: COMPLETED | OUTPATIENT
Start: 2025-02-05 | End: 2025-02-05

## 2025-02-05 RX ORDER — HYDROCORTISONE SODIUM SUCCINATE 100 MG/2ML
100 INJECTION INTRAMUSCULAR; INTRAVENOUS
OUTPATIENT
Start: 2025-02-07

## 2025-02-05 RX ORDER — ACETAMINOPHEN 325 MG/1
650 TABLET ORAL
OUTPATIENT
Start: 2025-02-07

## 2025-02-05 RX ORDER — SODIUM CHLORIDE 9 MG/ML
5-250 INJECTION, SOLUTION INTRAVENOUS PRN
OUTPATIENT
Start: 2025-02-07

## 2025-02-05 RX ORDER — SODIUM CHLORIDE 0.9 % (FLUSH) 0.9 %
5-40 SYRINGE (ML) INJECTION PRN
OUTPATIENT
Start: 2025-02-07

## 2025-02-05 RX ORDER — HEPARIN SODIUM (PORCINE) LOCK FLUSH IV SOLN 100 UNIT/ML 100 UNIT/ML
500 SOLUTION INTRAVENOUS PRN
OUTPATIENT
Start: 2025-02-07

## 2025-02-05 RX ORDER — SODIUM CHLORIDE 9 MG/ML
INJECTION, SOLUTION INTRAVENOUS CONTINUOUS
OUTPATIENT
Start: 2025-02-07

## 2025-02-05 RX ORDER — HEPARIN 100 UNIT/ML
300 SYRINGE INTRAVENOUS ONCE
Status: COMPLETED | OUTPATIENT
Start: 2025-02-05 | End: 2025-02-05

## 2025-02-05 RX ORDER — FAMOTIDINE 10 MG/ML
20 INJECTION, SOLUTION INTRAVENOUS
OUTPATIENT
Start: 2025-02-07

## 2025-02-05 RX ORDER — ONDANSETRON 2 MG/ML
8 INJECTION INTRAMUSCULAR; INTRAVENOUS
OUTPATIENT
Start: 2025-02-07

## 2025-02-05 RX ORDER — ZOLEDRONIC ACID 0.04 MG/ML
4 INJECTION, SOLUTION INTRAVENOUS ONCE
OUTPATIENT
Start: 2025-02-07 | End: 2025-02-07

## 2025-02-05 RX ORDER — EPINEPHRINE 1 MG/ML
0.3 INJECTION, SOLUTION, CONCENTRATE INTRAVENOUS PRN
OUTPATIENT
Start: 2025-02-07

## 2025-02-05 RX ADMIN — HEPARIN 300 UNITS: 100 SYRINGE at 15:17

## 2025-02-05 RX ADMIN — IOPAMIDOL 75 ML: 755 INJECTION, SOLUTION INTRAVENOUS at 15:15

## 2025-02-05 NOTE — PROGRESS NOTES
Received call from lab with critical results. Patients calcium is 12.2, corrected calcium is 11.96. Spoke with MD and new orders received for Zometa infusion. I attempted to call patient and her  and inform them of new orders and appt change. I left messages on both phones for them to call me back at this time. Electronically signed by Lorena Varma RN on 2/5/2025 at 4:42 PM

## 2025-02-05 NOTE — PROGRESS NOTES
follow-up MRI lumbar spine and / or bony pelvis.   12/6/2024-CT chest/abdomen/pelvis, [facility]: CT chest showed several small nodules in the right lung, unchanged.  Left pleural effusion with adjacent consolidation, stable.  No evidence of intrathoracic progression.  CT of the abdomen pelvis showed multiple liver masses, decreased in size.  The largest, in the right right lobe post segment inferiorly, measures 1.1 cm, decreased from 1.2 cm.  The next largest, also the right lobe, measures 0.9 cm, decreased from 1.0 cm.  No new liver lesions.  Essentially, mildly improved metastatic disease in the liver.  11/21/24 NM Bone Scan Whole Body: There is mild linear activity in the anterior right seventh rib. This does not have the usual appearance for trauma and could represent metastatic involvement. There is a punctate focus of activity in the anterior left sixth rib that may represent trauma. There is increased uptake in the right humeral head and humeral neck, more than the left.  This may represent degenerative change but metastatic deposit in this area is also not excluded. Consider radiographs or imaging  if further evaluation is needed. Mild uptake in the medial compartment right knee, L3 and L4-5, suggestive of degenerative change.  12/9/2024-I reviewed results CT chest abdomen pelvis, CT thoracic and lumbar spine and bone scan. No evidence of progression. Continue maintenance treatment with Tecentriq.  2/5/25 CT Chest W Contrast: Stable right lung nodules. No new nodules. No adenopathy. Stable small left pleural effusion with mild associated atelectasis. Liver lesions. The please see the report of the abdomen pelvis done the same day. Other chronic and non emergent findings as above.   2/5/25 CT Abd/Pelvis W Contrast: Metastatic disease of the liver, with multiple lesions size increase since the previous study. Fatty infiltration of the liver, again noted. Multiple liver masses, increased in size the largest measuring

## 2025-02-06 NOTE — TELEPHONE ENCOUNTER
Attempted to call patient this morning. Left message for patient to call me back at this time. Electronically signed by Lorena Varma RN on 2/6/2025 at 8:11 AM

## 2025-02-07 ENCOUNTER — HOSPITAL ENCOUNTER (OUTPATIENT)
Dept: INFUSION THERAPY | Age: 75
End: 2025-02-07

## 2025-02-07 ENCOUNTER — TELEMEDICINE (OUTPATIENT)
Dept: HEMATOLOGY | Age: 75
End: 2025-02-07

## 2025-02-07 DIAGNOSIS — Z71.89 CARE PLAN DISCUSSED WITH PATIENT: ICD-10-CM

## 2025-02-07 DIAGNOSIS — C34.82 SMALL CELL CARCINOMA OF OVERLAPPING SITES OF LEFT LUNG (HCC): Primary | ICD-10-CM

## 2025-02-07 DIAGNOSIS — E43 SEVERE PROTEIN-CALORIE MALNUTRITION (GOMEZ: LESS THAN 60% OF STANDARD WEIGHT) (HCC): ICD-10-CM

## 2025-02-07 DIAGNOSIS — Z71.89 GOALS OF CARE, COUNSELING/DISCUSSION: ICD-10-CM

## 2025-02-07 DIAGNOSIS — E83.52 HYPERCALCEMIA: ICD-10-CM

## (undated) DEVICE — SOLUTION IV 250ML 0.9% SOD CHL PH 5 INJ USP VIAFLX PLAS

## (undated) DEVICE — GLOVE SURG SZ 75 CRM LTX FREE POLYISOPRENE POLYMER BEAD ANTI

## (undated) DEVICE — GLOVE SURG SZ 8 CRM LTX FREE POLYISOPRENE POLYMER BEAD ANTI

## (undated) DEVICE — BANDAGE,GAUZE,4.5"X4.1YD,STERILE,LF: Brand: MEDLINE

## (undated) DEVICE — SYRINGE MED 10ML LUERLOCK TIP W/O SFTY DISP

## (undated) DEVICE — 3M™ IOBAN™ 2 ANTIMICROBIAL INCISE DRAPE 6650EZ: Brand: IOBAN™ 2

## (undated) DEVICE — GLOVE SURG SZ 85 L12IN FNGR ORTHO 126MIL CRM LTX FREE

## (undated) DEVICE — 3M™ STERI-DRAPE™ 2 INCISE DRAPE 2035: Brand: STERI-DRAPE™

## (undated) DEVICE — GAUZE,SPONGE,FLUFF,6"X6.75",STRL,10/TRAY: Brand: MEDLINE

## (undated) DEVICE — PACK,UNIVERSAL,NO GOWNS: Brand: MEDLINE

## (undated) DEVICE — SYRINGE MED 30ML STD CLR PLAS LUERLOCK TIP N CTRL DISP

## (undated) DEVICE — MAJOR CDS

## (undated) DEVICE — ENDO KIT,LOURDES HOSPITAL: Brand: MEDLINE INDUSTRIES, INC.

## (undated) DEVICE — SUTURE VCRL SZ 3-0 L27IN ABSRB UD L26MM SH 1/2 CIR J416H

## (undated) DEVICE — STAPLER EXT SKIN 35 WIDE S STL STPL SQUEEZE HNDL VISISTAT

## (undated) DEVICE — AMBU AURA-I U SIZE 3, DISPOSABLE LARYNGEAL MASK: Brand: AURA-I

## (undated) DEVICE — PROVE COVER: Brand: UNBRANDED

## (undated) DEVICE — GOWN,PREVENTION PLUS,2XL,ST,22/CS: Brand: MEDLINE

## (undated) DEVICE — SYSTEM SKIN CLSR 22CM DERMBND PRINEO

## (undated) DEVICE — GOWN,PREVENTION PLUS,XL,ST,24/CS: Brand: MEDLINE

## (undated) DEVICE — MINOR CDS: Brand: MEDLINE INDUSTRIES, INC.

## (undated) DEVICE — SHEET,DRAPE,53X77,STERILE: Brand: MEDLINE

## (undated) DEVICE — STANDARD HYPODERMIC NEEDLE,POLYPROPYLENE HUB: Brand: MONOJECT

## (undated) DEVICE — AGENT HEMSTAT W4XL8IN OXIDIZED REGENERATED CELOS ABSRB

## (undated) DEVICE — SUTURE PROL SZ 2-0 L36IN NONABSORBABLE BLU V-7 L26MM 1/2 8977H

## (undated) DEVICE — CATHETER KIT 5 FR 21 GAX7 CM MICROINTRODUCER GUIDEWIRE STIFF

## (undated) DEVICE — Device

## (undated) DEVICE — DECANTER BAG 9": Brand: MEDLINE INDUSTRIES, INC.

## (undated) DEVICE — STAPLER SKIN L39MM DIA0.53MM CRWN 5.7MM S STL FIX HD PROX

## (undated) DEVICE — TOWEL,OR,DSP,ST,BLUE,DLX,4/PK,20PK/CS: Brand: MEDLINE

## (undated) DEVICE — SYRINGE 20ML LL S/C 50

## (undated) DEVICE — INTRODUCER SHTH 0.018 IN 5 FRX15 CM 21 GAX7 CM MINI STK MAX

## (undated) DEVICE — BLADE SURG NO10 C STL DISP ST

## (undated) DEVICE — LIQUIBAND RAPID ADHESIVE 36/CS 0.8ML: Brand: MEDLINE

## (undated) DEVICE — C-ARM: Brand: UNBRANDED

## (undated) DEVICE — SUTURE ABSORBABLE MONOFILAMENT 3-0 SH 27 IN UD PDS + PDP416H

## (undated) DEVICE — DRAIN JACKSON PRATT ROUND 15FR: Brand: CARDINAL HEALTH

## (undated) DEVICE — SUTURE VCRL SZ 2-0 L36IN ABSRB UD L36MM CT-1 1/2 CIR J945H

## (undated) DEVICE — ROYAL SILK SURGICAL GOWN, XXL: Brand: CONVERTORS

## (undated) DEVICE — SUTURE ETHLN SZ 2-0 L30IN NONABSORBABLE BLK L36MM FSLX 3/8 1674H

## (undated) DEVICE — ELECTROSURGICAL PENCIL BUTTON SWITCH NON COATED BLADE ELECTRODE 10 FT (3 M) CORD HOLSTER: Brand: MEGADYNE